# Patient Record
Sex: FEMALE | Race: WHITE | Employment: OTHER | URBAN - METROPOLITAN AREA
[De-identification: names, ages, dates, MRNs, and addresses within clinical notes are randomized per-mention and may not be internally consistent; named-entity substitution may affect disease eponyms.]

---

## 2018-09-01 ENCOUNTER — HOSPITAL ENCOUNTER (EMERGENCY)
Age: 53
Discharge: HOME | End: 2018-09-01
Attending: EMERGENCY MEDICINE
Payer: OTHER GOVERNMENT

## 2018-09-01 VITALS
TEMPERATURE: 98.7 F | WEIGHT: 135 LBS | DIASTOLIC BLOOD PRESSURE: 75 MMHG | HEART RATE: 93 BPM | OXYGEN SATURATION: 95 % | HEIGHT: 66 IN | SYSTOLIC BLOOD PRESSURE: 120 MMHG | RESPIRATION RATE: 18 BRPM | BODY MASS INDEX: 21.69 KG/M2

## 2018-09-01 DIAGNOSIS — N30.01 ACUTE CYSTITIS WITH HEMATURIA: Primary | ICD-10-CM

## 2018-09-01 LAB
APPEARANCE UR: ABNORMAL
BACTERIA URNS QL MICRO: ABNORMAL /HPF
BILIRUB UR QL: NEGATIVE
COLOR UR: ABNORMAL
EPITH CASTS URNS QL MICRO: ABNORMAL /LPF (ref 0–5)
GLUCOSE UR STRIP.AUTO-MCNC: NEGATIVE MG/DL
HGB UR QL STRIP: ABNORMAL
KETONES UR QL STRIP.AUTO: ABNORMAL MG/DL
LEUKOCYTE ESTERASE UR QL STRIP.AUTO: ABNORMAL
NITRITE UR QL STRIP.AUTO: POSITIVE
PH UR STRIP: 5 [PH] (ref 5–8)
PROT UR STRIP-MCNC: 300 MG/DL
RBC #/AREA URNS HPF: ABNORMAL /HPF (ref 0–5)
SP GR UR REFRACTOMETRY: >1.03 (ref 1–1.03)
UROBILINOGEN UR QL STRIP.AUTO: 1 EU/DL (ref 0.2–1)
WBC URNS QL MICRO: ABNORMAL /HPF (ref 0–4)

## 2018-09-01 PROCEDURE — 81001 URINALYSIS AUTO W/SCOPE: CPT | Performed by: PHYSICIAN ASSISTANT

## 2018-09-01 PROCEDURE — 87086 URINE CULTURE/COLONY COUNT: CPT

## 2018-09-01 PROCEDURE — 87077 CULTURE AEROBIC IDENTIFY: CPT

## 2018-09-01 PROCEDURE — 87186 SC STD MICRODIL/AGAR DIL: CPT

## 2018-09-01 PROCEDURE — 99283 EMERGENCY DEPT VISIT LOW MDM: CPT

## 2018-09-01 RX ORDER — NAPROXEN 375 MG/1
375 TABLET ORAL 2 TIMES DAILY WITH MEALS
Qty: 20 TAB | Refills: 0 | Status: SHIPPED | OUTPATIENT
Start: 2018-09-01 | End: 2019-05-10

## 2018-09-01 RX ORDER — PHENAZOPYRIDINE HYDROCHLORIDE 200 MG/1
200 TABLET, FILM COATED ORAL 3 TIMES DAILY
Qty: 6 TAB | Refills: 0 | Status: SHIPPED | OUTPATIENT
Start: 2018-09-01 | End: 2018-09-03

## 2018-09-01 RX ORDER — CEPHALEXIN 500 MG/1
500 CAPSULE ORAL 4 TIMES DAILY
Qty: 28 CAP | Refills: 0 | Status: SHIPPED | OUTPATIENT
Start: 2018-09-01 | End: 2018-09-08

## 2018-09-01 NOTE — ED PROVIDER NOTES
Patient is a 46 y.o. female presenting with urinary pain. The history is provided by the patient. Urinary Pain This is a new problem. Episode onset: 1 weeka go. The problem occurs every urination. The problem has been gradually worsening. The quality of the pain is described as burning. The pain is at a severity of 3/10. Patient reports a subjective fever - was not measured. The fever has been present for 1 - 2 days. Associated symptoms include chills, frequency, hematuria and urgency. Pertinent negatives include no sweats, no nausea, no vomiting, no discharge, no hesitancy, no flank pain, no vaginal discharge, no abdominal pain and no back pain. Associated symptoms comments: +abd pressure. The patient is not pregnant. Treatments tried: Azo. The treatment provided no relief. Denies any other modifying factors or complaints at this time. Past Medical History:  
Diagnosis Date  Bipolar affective ([facility] [address]  Chronic obstructive pulmonary disease ([facility] [address]  Cirrhosis ([facility] [address]  ETOH abuse  Hyperlipidemia 11/20/2009  Left breast mass 05/13/2011 U/S Left Breast: No definite mass identified. Recommended recheck in 6 months  Manic depression ([address]  Panic disorder  Vitamin D insufficiency 11/20/2009  
 23 ng/mL  Wrist fracture, right 01/06/2010 Non-Displaced Distal Radius/Ulnar Styloid Fx History reviewed. No pertinent surgical history. History reviewed. No pertinent family history. Social History Social History  Marital status:  Spouse name: N/A  
 Number of children: N/A  
 Years of education: N/A Occupational History  Not on file. Social History Main Topics  Smoking status: Former Smoker Packs/day: 1.00 Years: 40.00  Smokeless tobacco: Former User Quit date: 06/2016  Alcohol use No  
   Comment: quit since June 2016  Drug use: No  
 Sexual activity: Not on file Other Topics Concern  Not on file Social History Narrative ALLERGIES: Review of patient's allergies indicates no known allergies. Review of Systems Constitutional: Positive for chills. Negative for fever. HENT: Negative for ear pain, rhinorrhea and sore throat. Eyes: Negative for pain and redness. Respiratory: Negative for cough and shortness of breath. Cardiovascular: Negative for chest pain. Gastrointestinal: Negative for abdominal pain, constipation, diarrhea, nausea and vomiting. Genitourinary: Positive for dysuria, frequency, hematuria and urgency. Negative for flank pain, hesitancy, vaginal bleeding, vaginal discharge and vaginal pain. Musculoskeletal: Negative for back pain. Skin: Negative. Neurological: Negative for dizziness, weakness, light-headedness and headaches. Psychiatric/Behavioral: Negative. All other systems reviewed and are negative. Vitals:  
 09/01/18 1633 BP: 120/75 Pulse: 93 Resp: 18 Temp: 98.7 °F (37.1 °C) SpO2: 95% Weight: 61.2 kg (135 lb) Height: 5' 6\" (1.676 m) Physical Exam  
Constitutional: She is oriented to person, place, and time. She appears well-developed and well-nourished. HENT:  
Head: Normocephalic and atraumatic. Right Ear: Tympanic membrane, external ear and ear canal normal.  
Left Ear: Tympanic membrane, external ear and ear canal normal.  
Nose: Nose normal.  
Mouth/Throat: Oropharynx is clear and moist and mucous membranes are normal.  
Eyes: Conjunctivae and EOM are normal. Pupils are equal, round, and reactive to light. Neck: Normal range of motion. Cardiovascular: Normal rate, regular rhythm and normal heart sounds. Pulmonary/Chest: Effort normal and breath sounds normal.  
Abdominal: Soft. Bowel sounds are normal. There is no tenderness. There is no rigidity, no rebound, no guarding, no CVA tenderness, no tenderness at McBurney's point and negative Rojo's sign. Musculoskeletal: Normal range of motion. Neurological: She is alert and oriented to person, place, and time. Skin: Skin is warm and dry. Psychiatric: She has a normal mood and affect. Her behavior is normal. Judgment and thought content normal.  
Nursing note and vitals reviewed. MDM Number of Diagnoses or Management Options Acute cystitis with hematuria: new and requires workup Diagnosis management comments: DDX: UTI, pyelo, vaginitis, std 
 
UA with nitrites, leuks, bacteria. Afebrile, well appearing, without flank pain. Based upon the patient's presentation with noted HPI and PE, along with the work up done in the emergency department, I believe that the patient is having an urinary tract infection. Will treat with keflex and pyridium. Urine culture pending. Discussed results, care in ED and further care, f/u and s/s warranting return to ED. Pt and family present understood and agreed to plan. Progress: stable Plan and discharge instructions: 
Drink plenty of fluids. Finish antibiotics as directed. Take Pyridium for bladder relaxation as needed as directed. NOTE: it causes red-orange urine discoloration and may stain your clothes and contact lenses. Take Tylenol/Acetaminophen (every 4-6 hours) and/or Motrin/Ibuprofen/Advil (every 6-8 hours) or Naprosyn/Naproxyn/Aleve for fever or pain as needed. Follow up with your doctor or the provided referral for further evaluation and management Return to emergency room for worsening or new symptoms. Pt results have been reviewed with the patient and any family present. They have been counseled regarding diagnosis, treatment, and plan. They verbally convey understanding and agreement of the signs, symptoms, diagnosis, treatment and prognosis and additionally agrees to follow up as discussed. They also agree with the care-plan and convey that all of their questions have been answered.  I have also provided discharge instructions for them that include: educational information regarding their diagnosis and treatment, and list of reasons why they would want to return to the ED prior to their follow-up appointment, should their condition change. Page Rosales PA-C 
5:07 PM  
 
  
Amount and/or Complexity of Data Reviewed Clinical lab tests: ordered and reviewed Review and summarize past medical records: yes Discuss the patient with other providers: yes Risk of Complications, Morbidity, and/or Mortality Presenting problems: low Diagnostic procedures: low Management options: low ED Course Procedures Labs Reviewed URINALYSIS W/ RFLX MICROSCOPIC - Abnormal; Notable for the following:   
    Result Value Specific gravity >1.030 (*) Protein 300 (*) Ketone TRACE (*) Blood LARGE (*) Nitrites POSITIVE (*) Leukocyte Esterase MODERATE (*) All other components within normal limits URINE MICROSCOPIC ONLY - Abnormal; Notable for the following:   
 Bacteria 4+ (*) All other components within normal limits CULTURE, URINE Diagnosis: 1. Acute cystitis with hematuria Disposition: Discharge to home. Follow-up Information Follow up With Details Comments Contact Info [address] [facility] EMERGENCY DEPT  As needed, If symptoms worsen [facility] [id number] [phone] [facility]  [address] [facility]) [address] [phone] [facility] Go in 2 days  [place] [address] [address] [place] [address] 
[phone] Patient's Medications Start Taking CEPHALEXIN (KEFLEX) 500 MG CAPSULE    Take 1 Cap by mouth four (4) times daily for 7 days. NAPROXEN (NAPROSYN) 375 MG TABLET    Take 1 Tab by mouth two (2) times daily (with meals). PHENAZOPYRIDINE (PYRIDIUM) 200 MG TABLET    Take 1 Tab by mouth three (3) times daily for 2 days. Continue Taking No medications on file These Medications have changed No medications on file Stop Taking CYCLOBENZAPRINE (FLEXERIL) 5 MG TABLET    Take 1 Tab by mouth three (3) times daily as needed for Muscle Spasm(s). METHYLPREDNISOLONE (MEDROL, GILBERTO,) 4 MG TABLET    Follow package instructions.

## 2018-09-01 NOTE — DISCHARGE INSTRUCTIONS
Urinary Tract Infection in Women: Care Instructions  Your Care Instructions    A urinary tract infection, or UTI, is a general term for an infection anywhere between the kidneys and the urethra (where urine comes out). Most UTIs are bladder infections. They often cause pain or burning when you urinate. UTIs are caused by bacteria and can be cured with antibiotics. Be sure to complete your treatment so that the infection goes away. Follow-up care is a key part of your treatment and safety. Be sure to make and go to all appointments, and call your doctor if you are having problems. It's also a good idea to know your test results and keep a list of the medicines you take. How can you care for yourself at home? · Take your antibiotics as directed. Do not stop taking them just because you feel better. You need to take the full course of antibiotics. · Drink extra water and other fluids for the next day or two. This may help wash out the bacteria that are causing the infection. (If you have kidney, heart, or liver disease and have to limit fluids, talk with your doctor before you increase your fluid intake.)  · Avoid drinks that are carbonated or have caffeine. They can irritate the bladder. · Urinate often. Try to empty your bladder each time. · To relieve pain, take a hot bath or lay a heating pad set on low over your lower belly or genital area. Never go to sleep with a heating pad in place. To prevent UTIs  · Drink plenty of water each day. This helps you urinate often, which clears bacteria from your system. (If you have kidney, heart, or liver disease and have to limit fluids, talk with your doctor before you increase your fluid intake.)  · Urinate when you need to. · Urinate right after you have sex. · Change sanitary pads often. · Avoid douches, bubble baths, feminine hygiene sprays, and other feminine hygiene products that have deodorants.   · After going to the bathroom, wipe from front to back.  When should you call for help? Call your doctor now or seek immediate medical care if:    · Symptoms such as fever, chills, nausea, or vomiting get worse or appear for the first time.     · You have new pain in your back just below your rib cage. This is called flank pain.     · There is new blood or pus in your urine.     · You have any problems with your antibiotic medicine.    Watch closely for changes in your health, and be sure to contact your doctor if:    · You are not getting better after taking an antibiotic for 2 days.     · Your symptoms go away but then come back. Where can you learn more? Go to http://janae-rodrick.info/. Enter X174 in the search box to learn more about \"Urinary Tract Infection in Women: Care Instructions. \"  Current as of: May 12, 2017  Content Version: 11.7  © 0245-9002 [employer]. Care instructions adapted under license by [employer] (which disclaims liability or warranty for this information). If you have questions about a medical condition or this instruction, always ask your healthcare professional. [address] any warranty or liability for your use of this information.

## 2018-09-03 LAB
BACTERIA SPEC CULT: ABNORMAL
SERVICE CMNT-IMP: ABNORMAL

## 2019-05-09 ENCOUNTER — APPOINTMENT (OUTPATIENT)
Dept: CT IMAGING | Age: 54
End: 2019-05-09
Attending: PHYSICIAN ASSISTANT
Payer: MEDICAID

## 2019-05-09 ENCOUNTER — HOSPITAL ENCOUNTER (OUTPATIENT)
Age: 54
Setting detail: OBSERVATION
Discharge: HOME | End: 2019-05-10
Attending: EMERGENCY MEDICINE | Admitting: FAMILY MEDICINE
Payer: MEDICAID

## 2019-05-09 DIAGNOSIS — R11.2 INTRACTABLE VOMITING WITH NAUSEA, UNSPECIFIED VOMITING TYPE: Primary | ICD-10-CM

## 2019-05-09 DIAGNOSIS — K52.9 ENTERITIS: ICD-10-CM

## 2019-05-09 DIAGNOSIS — R10.84 ABDOMINAL PAIN, GENERALIZED: ICD-10-CM

## 2019-05-09 DIAGNOSIS — F10.29 ALCOHOL DEPENDENCE WITH UNSPECIFIED ALCOHOL-INDUCED DISORDER (HCC): ICD-10-CM

## 2019-05-09 LAB
ALBUMIN SERPL-MCNC: 3.5 G/DL (ref 3.4–5)
ALBUMIN/GLOB SERPL: 0.9 {RATIO} (ref 0.8–1.7)
ALP SERPL-CCNC: 183 U/L (ref 45–117)
ALT SERPL-CCNC: 98 U/L (ref 13–56)
ANION GAP SERPL CALC-SCNC: 11 MMOL/L (ref 3–18)
APPEARANCE UR: CLEAR
AST SERPL-CCNC: 194 U/L (ref 15–37)
BASOPHILS # BLD: 0.1 K/UL (ref 0–0.1)
BASOPHILS NFR BLD: 1 % (ref 0–2)
BILIRUB DIRECT SERPL-MCNC: 0.5 MG/DL (ref 0–0.2)
BILIRUB SERPL-MCNC: 1.3 MG/DL (ref 0.2–1)
BILIRUB UR QL: ABNORMAL
BUN SERPL-MCNC: 7 MG/DL (ref 7–18)
BUN/CREAT SERPL: 11 (ref 12–20)
CALCIUM SERPL-MCNC: 9.4 MG/DL (ref 8.5–10.1)
CHLORIDE SERPL-SCNC: 99 MMOL/L (ref 100–108)
CO2 SERPL-SCNC: 23 MMOL/L (ref 21–32)
COLOR UR: ABNORMAL
CREAT SERPL-MCNC: 0.62 MG/DL (ref 0.6–1.3)
DIFFERENTIAL METHOD BLD: ABNORMAL
EOSINOPHIL # BLD: 0 K/UL (ref 0–0.4)
EOSINOPHIL NFR BLD: 1 % (ref 0–5)
ERYTHROCYTE [DISTWIDTH] IN BLOOD BY AUTOMATED COUNT: 12.7 % (ref 11.6–14.5)
ETHANOL SERPL-MCNC: 101 MG/DL (ref 0–3)
GLOBULIN SER CALC-MCNC: 3.8 G/DL (ref 2–4)
GLUCOSE SERPL-MCNC: 88 MG/DL (ref 74–99)
GLUCOSE UR STRIP.AUTO-MCNC: NEGATIVE MG/DL
HCT VFR BLD AUTO: 43.7 % (ref 35–45)
HGB BLD-MCNC: 15.1 G/DL (ref 12–16)
HGB UR QL STRIP: NEGATIVE
KETONES UR QL STRIP.AUTO: ABNORMAL MG/DL
LACTATE BLD-SCNC: 3.79 MMOL/L (ref 0.4–2)
LEUKOCYTE ESTERASE UR QL STRIP.AUTO: NEGATIVE
LIPASE SERPL-CCNC: 187 U/L (ref 73–393)
LYMPHOCYTES # BLD: 2.1 K/UL (ref 0.9–3.6)
LYMPHOCYTES NFR BLD: 39 % (ref 21–52)
MCH RBC QN AUTO: 36.3 PG (ref 24–34)
MCHC RBC AUTO-ENTMCNC: 34.6 G/DL (ref 31–37)
MCV RBC AUTO: 105 FL (ref 74–97)
MONOCYTES # BLD: 0.6 K/UL (ref 0.05–1.2)
MONOCYTES NFR BLD: 11 % (ref 3–10)
NEUTS SEG # BLD: 2.6 K/UL (ref 1.8–8)
NEUTS SEG NFR BLD: 48 % (ref 40–73)
NITRITE UR QL STRIP.AUTO: NEGATIVE
PH UR STRIP: 6 [PH] (ref 5–8)
PLATELET # BLD AUTO: 71 K/UL (ref 135–420)
PMV BLD AUTO: 11.4 FL (ref 9.2–11.8)
POTASSIUM SERPL-SCNC: 3.3 MMOL/L (ref 3.5–5.5)
PROT SERPL-MCNC: 7.3 G/DL (ref 6.4–8.2)
PROT UR STRIP-MCNC: NEGATIVE MG/DL
RBC # BLD AUTO: 4.16 M/UL (ref 4.2–5.3)
SODIUM SERPL-SCNC: 133 MMOL/L (ref 136–145)
SP GR UR REFRACTOMETRY: >1.03 (ref 1–1.03)
UROBILINOGEN UR QL STRIP.AUTO: 1 EU/DL (ref 0.2–1)
WBC # BLD AUTO: 5.3 K/UL (ref 4.6–13.2)

## 2019-05-09 PROCEDURE — 85025 COMPLETE CBC W/AUTO DIFF WBC: CPT

## 2019-05-09 PROCEDURE — 81003 URINALYSIS AUTO W/O SCOPE: CPT

## 2019-05-09 PROCEDURE — 93005 ELECTROCARDIOGRAM TRACING: CPT

## 2019-05-09 PROCEDURE — 96366 THER/PROPH/DIAG IV INF ADDON: CPT

## 2019-05-09 PROCEDURE — 80048 BASIC METABOLIC PNL TOTAL CA: CPT

## 2019-05-09 PROCEDURE — 74011000250 HC RX REV CODE- 250: Performed by: PHYSICIAN ASSISTANT

## 2019-05-09 PROCEDURE — 96361 HYDRATE IV INFUSION ADD-ON: CPT

## 2019-05-09 PROCEDURE — 83605 ASSAY OF LACTIC ACID: CPT

## 2019-05-09 PROCEDURE — 96360 HYDRATION IV INFUSION INIT: CPT

## 2019-05-09 PROCEDURE — 99285 EMERGENCY DEPT VISIT HI MDM: CPT

## 2019-05-09 PROCEDURE — 80307 DRUG TEST PRSMV CHEM ANLYZR: CPT

## 2019-05-09 PROCEDURE — 96375 TX/PRO/DX INJ NEW DRUG ADDON: CPT

## 2019-05-09 PROCEDURE — 83690 ASSAY OF LIPASE: CPT

## 2019-05-09 PROCEDURE — 74177 CT ABD & PELVIS W/CONTRAST: CPT

## 2019-05-09 PROCEDURE — 80076 HEPATIC FUNCTION PANEL: CPT

## 2019-05-09 PROCEDURE — 96376 TX/PRO/DX INJ SAME DRUG ADON: CPT

## 2019-05-09 PROCEDURE — 74011250636 HC RX REV CODE- 250/636: Performed by: PHYSICIAN ASSISTANT

## 2019-05-09 PROCEDURE — 96365 THER/PROPH/DIAG IV INF INIT: CPT

## 2019-05-09 PROCEDURE — 74011636320 HC RX REV CODE- 636/320: Performed by: EMERGENCY MEDICINE

## 2019-05-09 RX ORDER — LORAZEPAM 2 MG/ML
1 INJECTION INTRAMUSCULAR
Status: COMPLETED | OUTPATIENT
Start: 2019-05-09 | End: 2019-05-09

## 2019-05-09 RX ORDER — SODIUM CHLORIDE 0.9 % (FLUSH) 0.9 %
5-40 SYRINGE (ML) INJECTION AS NEEDED
Status: DISCONTINUED | OUTPATIENT
Start: 2019-05-09 | End: 2019-05-10 | Stop reason: HOSPADM

## 2019-05-09 RX ORDER — LORAZEPAM 2 MG/ML
1 INJECTION INTRAMUSCULAR
Status: DISCONTINUED | OUTPATIENT
Start: 2019-05-09 | End: 2019-05-10 | Stop reason: HOSPADM

## 2019-05-09 RX ORDER — SODIUM CHLORIDE 0.9 % (FLUSH) 0.9 %
5-40 SYRINGE (ML) INJECTION EVERY 8 HOURS
Status: DISCONTINUED | OUTPATIENT
Start: 2019-05-09 | End: 2019-05-10 | Stop reason: HOSPADM

## 2019-05-09 RX ORDER — HALOPERIDOL 5 MG/ML
3 INJECTION INTRAMUSCULAR
Status: COMPLETED | OUTPATIENT
Start: 2019-05-09 | End: 2019-05-09

## 2019-05-09 RX ORDER — ONDANSETRON 2 MG/ML
4 INJECTION INTRAMUSCULAR; INTRAVENOUS
Status: COMPLETED | OUTPATIENT
Start: 2019-05-09 | End: 2019-05-09

## 2019-05-09 RX ORDER — DIPHENHYDRAMINE HYDROCHLORIDE 50 MG/ML
25 INJECTION, SOLUTION INTRAMUSCULAR; INTRAVENOUS
Status: COMPLETED | OUTPATIENT
Start: 2019-05-09 | End: 2019-05-09

## 2019-05-09 RX ORDER — LORAZEPAM 1 MG/1
1 TABLET ORAL
Status: DISCONTINUED | OUTPATIENT
Start: 2019-05-09 | End: 2019-05-10 | Stop reason: HOSPADM

## 2019-05-09 RX ORDER — LORAZEPAM 2 MG/ML
3 INJECTION INTRAMUSCULAR
Status: DISCONTINUED | OUTPATIENT
Start: 2019-05-09 | End: 2019-05-10 | Stop reason: HOSPADM

## 2019-05-09 RX ORDER — LORAZEPAM 2 MG/ML
2 INJECTION INTRAMUSCULAR
Status: DISCONTINUED | OUTPATIENT
Start: 2019-05-09 | End: 2019-05-10 | Stop reason: HOSPADM

## 2019-05-09 RX ORDER — LORAZEPAM 1 MG/1
2 TABLET ORAL
Status: DISCONTINUED | OUTPATIENT
Start: 2019-05-09 | End: 2019-05-10 | Stop reason: HOSPADM

## 2019-05-09 RX ORDER — METOCLOPRAMIDE HYDROCHLORIDE 5 MG/ML
5 INJECTION INTRAMUSCULAR; INTRAVENOUS
Status: COMPLETED | OUTPATIENT
Start: 2019-05-09 | End: 2019-05-09

## 2019-05-09 RX ADMIN — LORAZEPAM 1 MG: 2 INJECTION, SOLUTION INTRAMUSCULAR; INTRAVENOUS at 22:39

## 2019-05-09 RX ADMIN — ONDANSETRON 4 MG: 2 INJECTION INTRAMUSCULAR; INTRAVENOUS at 20:57

## 2019-05-09 RX ADMIN — IOPAMIDOL 100 ML: 612 INJECTION, SOLUTION INTRAVENOUS at 21:44

## 2019-05-09 RX ADMIN — DIPHENHYDRAMINE HYDROCHLORIDE 25 MG: 50 INJECTION, SOLUTION INTRAMUSCULAR; INTRAVENOUS at 22:41

## 2019-05-09 RX ADMIN — FOLIC ACID: 5 INJECTION, SOLUTION INTRAMUSCULAR; INTRAVENOUS; SUBCUTANEOUS at 20:45

## 2019-05-09 RX ADMIN — HALOPERIDOL LACTATE 3 MG: 5 INJECTION, SOLUTION INTRAMUSCULAR at 23:38

## 2019-05-09 RX ADMIN — METOCLOPRAMIDE 5 MG: 5 INJECTION, SOLUTION INTRAMUSCULAR; INTRAVENOUS at 22:42

## 2019-05-09 RX ADMIN — SODIUM CHLORIDE 1000 ML: 900 INJECTION, SOLUTION INTRAVENOUS at 20:57

## 2019-05-09 RX ADMIN — LORAZEPAM 1 MG: 2 INJECTION, SOLUTION INTRAMUSCULAR; INTRAVENOUS at 20:57

## 2019-05-10 VITALS
DIASTOLIC BLOOD PRESSURE: 65 MMHG | HEART RATE: 117 BPM | RESPIRATION RATE: 20 BRPM | OXYGEN SATURATION: 92 % | SYSTOLIC BLOOD PRESSURE: 97 MMHG | TEMPERATURE: 98.6 F

## 2019-05-10 PROBLEM — F10.939 ALCOHOL WITHDRAWAL (HCC): Status: ACTIVE | Noted: 2019-05-10

## 2019-05-10 PROBLEM — R11.2 INTRACTABLE NAUSEA AND VOMITING: Status: ACTIVE | Noted: 2019-05-10

## 2019-05-10 LAB
ANION GAP SERPL CALC-SCNC: 6 MMOL/L (ref 3–18)
ATRIAL RATE: 111 BPM
BUN SERPL-MCNC: 5 MG/DL (ref 7–18)
BUN/CREAT SERPL: 8 (ref 12–20)
CALCIUM SERPL-MCNC: 7.7 MG/DL (ref 8.5–10.1)
CALCULATED P AXIS, ECG09: 78 DEGREES
CALCULATED R AXIS, ECG10: 78 DEGREES
CALCULATED T AXIS, ECG11: 70 DEGREES
CHLORIDE SERPL-SCNC: 106 MMOL/L (ref 100–108)
CO2 SERPL-SCNC: 25 MMOL/L (ref 21–32)
CREAT SERPL-MCNC: 0.63 MG/DL (ref 0.6–1.3)
DIAGNOSIS, 93000: NORMAL
ERYTHROCYTE [DISTWIDTH] IN BLOOD BY AUTOMATED COUNT: 12.7 % (ref 11.6–14.5)
GLUCOSE SERPL-MCNC: 96 MG/DL (ref 74–99)
HCT VFR BLD AUTO: 38.1 % (ref 35–45)
HGB BLD-MCNC: 12.9 G/DL (ref 12–16)
LACTATE BLD-SCNC: 2.1 MMOL/L (ref 0.4–2)
LACTATE SERPL-SCNC: 2.3 MMOL/L (ref 0.4–2)
MCH RBC QN AUTO: 36.1 PG (ref 24–34)
MCHC RBC AUTO-ENTMCNC: 33.9 G/DL (ref 31–37)
MCV RBC AUTO: 106.7 FL (ref 74–97)
P-R INTERVAL, ECG05: 156 MS
PLATELET # BLD AUTO: 59 K/UL (ref 135–420)
PMV BLD AUTO: 11.9 FL (ref 9.2–11.8)
POTASSIUM SERPL-SCNC: 3 MMOL/L (ref 3.5–5.5)
Q-T INTERVAL, ECG07: 328 MS
QRS DURATION, ECG06: 82 MS
QTC CALCULATION (BEZET), ECG08: 446 MS
RBC # BLD AUTO: 3.57 M/UL (ref 4.2–5.3)
SODIUM SERPL-SCNC: 137 MMOL/L (ref 136–145)
VENTRICULAR RATE, ECG03: 111 BPM
WBC # BLD AUTO: 5 K/UL (ref 4.6–13.2)

## 2019-05-10 PROCEDURE — 96367 TX/PROPH/DG ADDL SEQ IV INF: CPT

## 2019-05-10 PROCEDURE — 74011250637 HC RX REV CODE- 250/637: Performed by: HOSPITALIST

## 2019-05-10 PROCEDURE — 96376 TX/PRO/DX INJ SAME DRUG ADON: CPT

## 2019-05-10 PROCEDURE — 74011250636 HC RX REV CODE- 250/636: Performed by: FAMILY MEDICINE

## 2019-05-10 PROCEDURE — 83605 ASSAY OF LACTIC ACID: CPT

## 2019-05-10 PROCEDURE — 99218 HC RM OBSERVATION: CPT

## 2019-05-10 PROCEDURE — 74011250636 HC RX REV CODE- 250/636: Performed by: HOSPITALIST

## 2019-05-10 PROCEDURE — 96366 THER/PROPH/DIAG IV INF ADDON: CPT

## 2019-05-10 PROCEDURE — 80048 BASIC METABOLIC PNL TOTAL CA: CPT

## 2019-05-10 PROCEDURE — 74011250637 HC RX REV CODE- 250/637: Performed by: PHYSICIAN ASSISTANT

## 2019-05-10 PROCEDURE — 74011250637 HC RX REV CODE- 250/637: Performed by: FAMILY MEDICINE

## 2019-05-10 PROCEDURE — 96361 HYDRATE IV INFUSION ADD-ON: CPT

## 2019-05-10 PROCEDURE — 85027 COMPLETE CBC AUTOMATED: CPT

## 2019-05-10 PROCEDURE — 36415 COLL VENOUS BLD VENIPUNCTURE: CPT

## 2019-05-10 RX ORDER — LORAZEPAM 2 MG/ML
2 INJECTION INTRAMUSCULAR
Status: DISCONTINUED | OUTPATIENT
Start: 2019-05-10 | End: 2019-05-10 | Stop reason: HOSPADM

## 2019-05-10 RX ORDER — MAGNESIUM SULFATE HEPTAHYDRATE 40 MG/ML
2 INJECTION, SOLUTION INTRAVENOUS ONCE
Status: COMPLETED | OUTPATIENT
Start: 2019-05-10 | End: 2019-05-10

## 2019-05-10 RX ORDER — SODIUM CHLORIDE 0.9 % (FLUSH) 0.9 %
5-40 SYRINGE (ML) INJECTION AS NEEDED
Status: DISCONTINUED | OUTPATIENT
Start: 2019-05-10 | End: 2019-05-10 | Stop reason: HOSPADM

## 2019-05-10 RX ORDER — FAMOTIDINE 20 MG/1
20 TABLET, FILM COATED ORAL 2 TIMES DAILY
Status: DISCONTINUED | OUTPATIENT
Start: 2019-05-10 | End: 2019-05-10 | Stop reason: HOSPADM

## 2019-05-10 RX ORDER — SODIUM CHLORIDE 0.9 % (FLUSH) 0.9 %
5-40 SYRINGE (ML) INJECTION EVERY 8 HOURS
Status: DISCONTINUED | OUTPATIENT
Start: 2019-05-10 | End: 2019-05-10 | Stop reason: HOSPADM

## 2019-05-10 RX ORDER — IBUPROFEN 200 MG
1 TABLET ORAL DAILY
Status: DISCONTINUED | OUTPATIENT
Start: 2019-05-10 | End: 2019-05-10 | Stop reason: HOSPADM

## 2019-05-10 RX ORDER — DEXTROSE, SODIUM CHLORIDE, SODIUM LACTATE, POTASSIUM CHLORIDE, AND CALCIUM CHLORIDE 5; .6; .31; .03; .02 G/100ML; G/100ML; G/100ML; G/100ML; G/100ML
100 INJECTION, SOLUTION INTRAVENOUS CONTINUOUS
Status: DISCONTINUED | OUTPATIENT
Start: 2019-05-10 | End: 2019-05-10 | Stop reason: HOSPADM

## 2019-05-10 RX ORDER — ONDANSETRON 2 MG/ML
4 INJECTION INTRAMUSCULAR; INTRAVENOUS
Status: DISCONTINUED | OUTPATIENT
Start: 2019-05-10 | End: 2019-05-10 | Stop reason: HOSPADM

## 2019-05-10 RX ORDER — LOPERAMIDE HYDROCHLORIDE 2 MG/1
4 CAPSULE ORAL ONCE
Status: COMPLETED | OUTPATIENT
Start: 2019-05-10 | End: 2019-05-10

## 2019-05-10 RX ORDER — LORAZEPAM 2 MG/ML
3 INJECTION INTRAMUSCULAR
Status: DISCONTINUED | OUTPATIENT
Start: 2019-05-10 | End: 2019-05-10 | Stop reason: HOSPADM

## 2019-05-10 RX ORDER — METOCLOPRAMIDE 5 MG/1
5 TABLET ORAL
Status: DISCONTINUED | OUTPATIENT
Start: 2019-05-10 | End: 2019-05-10 | Stop reason: HOSPADM

## 2019-05-10 RX ORDER — FAMOTIDINE 20 MG/1
20 TABLET, FILM COATED ORAL 2 TIMES DAILY
Qty: 60 TAB | Refills: 0 | Status: SHIPPED | OUTPATIENT
Start: 2019-05-10 | End: 2020-02-14

## 2019-05-10 RX ORDER — LORAZEPAM 1 MG/1
1 TABLET ORAL
Status: DISCONTINUED | OUTPATIENT
Start: 2019-05-10 | End: 2019-05-10 | Stop reason: HOSPADM

## 2019-05-10 RX ORDER — ONDANSETRON 4 MG/1
4 TABLET, ORALLY DISINTEGRATING ORAL
Qty: 20 TAB | Refills: 0 | Status: SHIPPED | OUTPATIENT
Start: 2019-05-10 | End: 2020-01-21

## 2019-05-10 RX ORDER — LORAZEPAM 2 MG/ML
1 INJECTION INTRAMUSCULAR
Status: DISCONTINUED | OUTPATIENT
Start: 2019-05-10 | End: 2019-05-10 | Stop reason: HOSPADM

## 2019-05-10 RX ORDER — LANOLIN ALCOHOL/MO/W.PET/CERES
400 CREAM (GRAM) TOPICAL DAILY
Qty: 30 TAB | Refills: 0 | Status: SHIPPED | OUTPATIENT
Start: 2019-05-10

## 2019-05-10 RX ORDER — POTASSIUM CHLORIDE 20 MEQ/1
40 TABLET, EXTENDED RELEASE ORAL
Status: COMPLETED | OUTPATIENT
Start: 2019-05-10 | End: 2019-05-10

## 2019-05-10 RX ORDER — LORAZEPAM 1 MG/1
2 TABLET ORAL
Status: DISCONTINUED | OUTPATIENT
Start: 2019-05-10 | End: 2019-05-10 | Stop reason: HOSPADM

## 2019-05-10 RX ORDER — ACETAMINOPHEN 325 MG/1
650 TABLET ORAL
Status: DISCONTINUED | OUTPATIENT
Start: 2019-05-10 | End: 2019-05-10 | Stop reason: HOSPADM

## 2019-05-10 RX ORDER — LOPERAMIDE HYDROCHLORIDE 2 MG/1
4 CAPSULE ORAL ONCE
Status: DISCONTINUED | OUTPATIENT
Start: 2019-05-10 | End: 2019-05-10 | Stop reason: SDUPTHER

## 2019-05-10 RX ORDER — LANOLIN ALCOHOL/MO/W.PET/CERES
100 CREAM (GRAM) TOPICAL DAILY
Qty: 30 TAB | Refills: 0 | Status: SHIPPED | OUTPATIENT
Start: 2019-05-10

## 2019-05-10 RX ADMIN — ONDANSETRON 4 MG: 2 INJECTION INTRAMUSCULAR; INTRAVENOUS at 02:11

## 2019-05-10 RX ADMIN — Medication 10 ML: at 09:55

## 2019-05-10 RX ADMIN — MAGNESIUM SULFATE HEPTAHYDRATE 2 G: 40 INJECTION, SOLUTION INTRAVENOUS at 09:53

## 2019-05-10 RX ADMIN — FAMOTIDINE 20 MG: 20 TABLET ORAL at 09:47

## 2019-05-10 RX ADMIN — Medication 10 ML: at 02:12

## 2019-05-10 RX ADMIN — POTASSIUM CHLORIDE 40 MEQ: 20 TABLET, EXTENDED RELEASE ORAL at 09:53

## 2019-05-10 RX ADMIN — SODIUM CHLORIDE, SODIUM LACTATE, POTASSIUM CHLORIDE, CALCIUM CHLORIDE, AND DEXTROSE MONOHYDRATE 100 ML/HR: 600; 310; 30; 20; 5 INJECTION, SOLUTION INTRAVENOUS at 02:15

## 2019-05-10 RX ADMIN — LOPERAMIDE HYDROCHLORIDE 4 MG: 2 CAPSULE ORAL at 11:44

## 2019-05-10 NOTE — PROGRESS NOTES
Problem: Discharge Planning Goal: *Discharge to safe environment Outcome: Resolved/Met Home Reason for Admission: ETOH withdrawal / N/V   
                
RRAT Score:  6 Plan for utilizing home health:   Not indicated. Current Advanced Directive/Advance Care Plan: Not on file Likelihood of Readmission:  Low/Green Transition of Care Plan:   Home with sig other & out-pt follow up Chart reviewed. Met with pt., verified all demographics. \[facility]\"" has NO ins. \[facility]\"" has NO PCP, referral sent to . : Stacey Franco, sig other, with whom she lives with. Uses no DME. Independent with ADL's prior to admit. States her sig other will pick her up @ discharge. Available as needed. Luana JacksonRN,ext 0901. Patient has designated her sig other to participate in his/her discharge plan and to receive any needed information. Name: Stacey Franco Address: 
Phone number:  [phone] Patient and/or next of kin has been given the Outpatient Observation Information and Notification letter and all questions answered. Care Management Interventions PCP Verified by CM: Yes(states was going to Dr. Misael Romero but no longer has in, will need new PCP) Palliative Care Criteria Met (RRAT>21 & CHF Dx)?: No 
Mode of Transport at Discharge: Other (see comment)(sig other) Transition of Care Consult (CM Consult): Discharge Planning Discharge Durable Medical Equipment: No 
Physical Therapy Consult: No 
Occupational Therapy Consult: No 
Speech Therapy Consult: No 
Current Support Network: Other(lives with sig other) Confirm Follow Up Transport: Self Plan discussed with Pt/Family/Caregiver: Yes Discharge Location Discharge Placement: Home

## 2019-05-10 NOTE — ROUTINE PROCESS
Bedside and Verbal shift change report given to [facility] (oncoming nurse) by Yen Fofana RN (offgoing nurse). Report included the following information SBAR, Kardex, Intake/Output, MAR, Recent Results and Cardiac Rhythm ST.  
 
0850 Pt requesting medication for diarrhea. Paged Dr. Scottie Pretty. Will continue to monitor. 1000 Received critical result for Lactic Acid of 2.3. Charted appropriately and paged Dr. Scottie Pretty. Will continue to monitor. 56 Notified Dr. Scottie Pretty about pt's critical lactic acid value. No new orders received at this time. Will continue to monitor. 1300 Reviewed discharge instructions with pt. Pt acknowledged. Pt discharged home with family.

## 2019-05-10 NOTE — ED PROVIDER NOTES
EMERGENCY DEPARTMENT HISTORY AND PHYSICAL EXAM 
 
Date: 5/9/2019 Patient Name: Phyllis Tinsley History of Presenting Illness Chief Complaint Patient presents with  Vomiting History Provided By: Patient Chief Complaint: abdominal pain Duration: 4 Days Timing:  Gradual and Constant Location: abdomen Quality: Pressure and Tightness Severity: Mild Modifying Factors: unable to keep anything down Associated Symptoms: N/V/D, tremors HPI: Phyllis Tinsley is a 53 y.o. female with a PMH of Hyperlipidemia, bipolar, COPD, vitamin D deficiency, alcohol abuse, manic depression, cirrhosis of the liver who presents to the ER complaining of generalized abdominal pain, distention, nausea, vomiting and diarrhea. Patient states her symptoms began 4 days ago and continue to persist.  She has not been able to tolerate any p.o. intake. Patient admits to approximately 1/2 pint of vodka daily, however due to her illness she has not been able to drink very much. She does admit to having a few sips earlier this afternoon to try and take the edge off. Patient also complaining of tremors that began earlier today. She denied any recent sick contacts. She denies any associated fevers. She has not had any bloody stools or emesis. She denied all other symptoms and complaints. PCP: None Current Facility-Administered Medications Medication Dose Route Frequency Provider Last Rate Last Dose  nicotine (NICODERM CQ) 21 mg/24 hr patch 1 Patch  1 Patch TransDERmal DAILY Markie Beltran PA-C      
 sodium chloride (NS) flush 5-40 mL  5-40 mL IntraVENous Q8H Maren Perez PA-C      
 sodium chloride (NS) flush 5-40 mL  5-40 mL IntraVENous PRN Maren Perez PA-C      
 LORazepam (ATIVAN) tablet 1 mg  1 mg Oral Q1H PRN Markie Beltran PA-C  Or  
 LORazepam (ATIVAN) injection 1 mg  1 mg IntraVENous Q1H PRN Markie Beltran PA-C      
  LORazepam (ATIVAN) tablet 2 mg  2 mg Oral Q1H PRN Desiree Peña PA-C Or  
 LORazepam (ATIVAN) injection 2 mg  2 mg IntraVENous Q1H PRN Maren Perez PA-C      
 LORazepam (ATIVAN) injection 3 mg  3 mg IntraVENous Q15MIN PRN Desiree Peña PA-C Current Outpatient Medications Medication Sig Dispense Refill  naproxen (NAPROSYN) 375 mg tablet Take 1 Tab by mouth two (2) times daily (with meals). 20 Tab 0 Past History Past Medical History: 
Past Medical History:  
Diagnosis Date  Bipolar affective ([facility] [address]  Chronic obstructive pulmonary disease ([facility] [address]  Cirrhosis ([facility] [address]  ETOH abuse  Hyperlipidemia 11/20/2009  Left breast mass 05/13/2011 U/S Left Breast: No definite mass identified. Recommended recheck in 6 months  Manic depression ([facility] [address]  Panic disorder  Vitamin D insufficiency 11/20/2009  
 23 ng/mL  Wrist fracture, right 01/06/2010 Non-Displaced Distal Radius/Ulnar Styloid Fx Past Surgical History: No past surgical history on file. Family History: No family history on file. Social History: 
Social History Tobacco Use  Smoking status: Former Smoker Packs/day: 1.00 Years: 40.00 Pack years: 40.00  Smokeless tobacco: Former User Quit date: 06/2016 Substance Use Topics  Alcohol use: No  
  Comment: quit since June 2016  Drug use: No  
 
 
Allergies: 
No Known Allergies Review of Systems Review of Systems Constitutional: Negative for chills, fatigue and fever. HENT: Negative. Negative for sore throat. Eyes: Negative. Respiratory: Negative for cough and shortness of breath. Cardiovascular: Negative for chest pain and palpitations. Gastrointestinal: Positive for abdominal pain, diarrhea, nausea and vomiting. Genitourinary: Negative for dysuria. Musculoskeletal: Negative. Skin: Negative. Neurological: Positive for tremors.  Negative for dizziness, weakness, light-headedness and headaches. Psychiatric/Behavioral: Negative. All other systems reviewed and are negative. Physical Exam  
 
Vitals:  
 05/09/19 2027 05/09/19 2030 05/09/19 2031 05/09/19 2200 BP:  124/86  104/76 Pulse:    98 Resp:    15 Temp:      
SpO2: 92% 95% 93% 93% Physical Exam  
Constitutional: She is oriented to person, place, and time. She appears well-developed and well-nourished. She appears distressed. Pt very tremulous on exam  
HENT:  
Head: Normocephalic and atraumatic. Mouth/Throat: Oropharynx is clear and moist.  
Eyes: Conjunctivae are normal. No scleral icterus. Neck: Neck supple. No JVD present. No tracheal deviation present. Cardiovascular: Regular rhythm and normal heart sounds. Tachycardia present. No murmur heard. Pulmonary/Chest: Effort normal and breath sounds normal. No respiratory distress. She has no wheezes. She has no rales. She exhibits no tenderness. Abdominal: Soft. Bowel sounds are normal. She exhibits distension. She exhibits no shifting dullness, no fluid wave and no mass. There is generalized tenderness. There is no rigidity, no rebound, no guarding, no CVA tenderness, no tenderness at McBurney's point and negative Rojo's sign. Mild distention noted throughout abdomen; no point tenderness, no peritoneal signs Musculoskeletal: Normal range of motion. Neurological: She is alert and oriented to person, place, and time. She has normal strength. She displays tremor. Gait normal. GCS eye subscore is 4. GCS verbal subscore is 5. GCS motor subscore is 6. Skin: Skin is warm and dry. She is not diaphoretic. Psychiatric: She has a normal mood and affect. Nursing note and vitals reviewed. Diagnostic Study Results Labs - Recent Results (from the past 12 hour(s)) CBC WITH AUTOMATED DIFF Collection Time: 05/09/19  8:45 PM  
Result Value Ref Range WBC 5.3 4.6 - 13.2 K/uL  
 RBC 4.16 (L) 4.20 - 5.30 M/uL HGB 15.1 12.0 - 16.0 g/dL HCT 43.7 35.0 - 45.0 % .0 (H) 74.0 - 97.0 FL  
 MCH 36.3 (H) 24.0 - 34.0 PG  
 MCHC 34.6 31.0 - 37.0 g/dL  
 RDW 12.7 11.6 - 14.5 % PLATELET 71 (L) 059 - 420 K/uL MPV 11.4 9.2 - 11.8 FL  
 NEUTROPHILS 48 40 - 73 % LYMPHOCYTES 39 21 - 52 % MONOCYTES 11 (H) 3 - 10 % EOSINOPHILS 1 0 - 5 % BASOPHILS 1 0 - 2 %  
 ABS. NEUTROPHILS 2.6 1.8 - 8.0 K/UL  
 ABS. LYMPHOCYTES 2.1 0.9 - 3.6 K/UL  
 ABS. MONOCYTES 0.6 0.05 - 1.2 K/UL  
 ABS. EOSINOPHILS 0.0 0.0 - 0.4 K/UL  
 ABS. BASOPHILS 0.1 0.0 - 0.1 K/UL  
 DF AUTOMATED METABOLIC PANEL, BASIC Collection Time: 05/09/19  8:45 PM  
Result Value Ref Range Sodium 133 (L) 136 - 145 mmol/L Potassium 3.3 (L) 3.5 - 5.5 mmol/L Chloride 99 (L) 100 - 108 mmol/L  
 CO2 23 21 - 32 mmol/L Anion gap 11 3.0 - 18 mmol/L Glucose 88 74 - 99 mg/dL BUN 7 7.0 - 18 MG/DL Creatinine 0.62 0.6 - 1.3 MG/DL  
 BUN/Creatinine ratio 11 (L) 12 - 20 GFR est AA >60 >60 ml/min/1.73m2 GFR est non-AA >60 >60 ml/min/1.73m2 Calcium 9.4 8.5 - 10.1 MG/DL  
HEPATIC FUNCTION PANEL Collection Time: 05/09/19  8:45 PM  
Result Value Ref Range Protein, total 7.3 6.4 - 8.2 g/dL Albumin 3.5 3.4 - 5.0 g/dL Globulin 3.8 2.0 - 4.0 g/dL A-G Ratio 0.9 0.8 - 1.7 Bilirubin, total 1.3 (H) 0.2 - 1.0 MG/DL Bilirubin, direct 0.5 (H) 0.0 - 0.2 MG/DL Alk. phosphatase 183 (H) 45 - 117 U/L  
 AST (SGOT) 194 (H) 15 - 37 U/L  
 ALT (SGPT) 98 (H) 13 - 56 U/L  
LIPASE Collection Time: 05/09/19  8:45 PM  
Result Value Ref Range Lipase 187 73 - 393 U/L  
ETHYL ALCOHOL Collection Time: 05/09/19  8:45 PM  
Result Value Ref Range ALCOHOL(ETHYL),SERUM 101 (H) 0 - 3 MG/DL POC LACTIC ACID Collection Time: 05/09/19  8:50 PM  
Result Value Ref Range Lactic Acid (POC) 3.79 (HH) 0.40 - 2.00 mmol/L  
EKG, 12 LEAD, INITIAL Collection Time: 05/09/19  8:52 PM  
Result Value Ref Range  Ventricular Rate 111 BPM  
 Atrial Rate 111 BPM  
 P-R Interval 156 ms QRS Duration 82 ms Q-T Interval 328 ms QTC Calculation (Bezet) 446 ms Calculated P Axis 78 degrees Calculated R Axis 78 degrees Calculated T Axis 70 degrees Diagnosis Sinus tachycardia Possible Lateral infarct , age undetermined Abnormal ECG No previous ECGs available URINALYSIS W/ RFLX MICROSCOPIC Collection Time: 05/09/19 10:15 PM  
Result Value Ref Range Color DARK YELLOW Appearance CLEAR Specific gravity >1.030 (H) 1.005 - 1.030  
 pH (UA) 6.0 5.0 - 8.0 Protein NEGATIVE  NEG mg/dL Glucose NEGATIVE  NEG mg/dL Ketone TRACE (A) NEG mg/dL Bilirubin SMALL (A) NEG Blood NEGATIVE  NEG Urobilinogen 1.0 0.2 - 1.0 EU/dL Nitrites NEGATIVE  NEG Leukocyte Esterase NEGATIVE  NEG    
POC LACTIC ACID Collection Time: 05/10/19 12:20 AM  
Result Value Ref Range Lactic Acid (POC) 2.10 (HH) 0.40 - 2.00 mmol/L Radiologic Studies -  
CT ABD PELV W CONT    (Results Pending) CT Results  (Last 48 hours) None CXR Results  (Last 48 hours) None Medical Decision Making I am the first provider for this patient. I reviewed the vital signs, available nursing notes, past medical history, past surgical history, family history and social history. Vital Signs-Reviewed the patient's vital signs. Records Reviewed: Nursing Notes and Old Medical Records 503 PM 
53-year-old female with history of cirrhosis and alcohol dependence presents the ER complaining of generalized abdominal pain, distention, nausea, vomiting and diarrhea 4 days ago. She has not been able to tolerate any p.o. intake since then. Patient reports normally drinking 1/2 pint of vodka daily however has only had a few sips today due to her illness. She has not had any recent sick contacts. She denies any associated fevers.   Patient is very tremulous on exam concerning for DTs or withdrawal.  Patient noted to have a moderately elevated lactic. We will plan on labs, imaging, fluids at this time. Patient also placed on CIWA protocol. EKG sinus tachycardia rate 111 with no acute ST or T wave abnormalities. Kamilah Gardner PA-C 
 
12:38 AM 
Patient has had continued multiple episodes of nausea and vomiting and has not tolerated any ice chips or other p.o. challenge. CT of the abdomen and pelvis shows enteritis with no other acute infectious process. Patient's lactic noted to improve to 2.1 after administration of a single fluid bolus and banana bag. Patient still somewhat tremulous on exam concerning for withdrawal and possible DTs. Discussed patient with hospitalist, Sammy Wills who agrees for admission at this time. Patient made aware of pending status. Kamilah Gardner PA-C Disposition: 
Admitted Follow-up Information None Current Discharge Medication List  
  
 
 
Provider Notes (Medical Decision Making):  
 
Procedures: 
Procedures Diagnosis Clinical Impression:  
1. Intractable vomiting with nausea, unspecified vomiting type 2. Alcohol dependence with unspecified alcohol-induced disorder ([facility] [address] 3. Abdominal pain, generalized 4. Enteritis

## 2019-05-10 NOTE — PROGRESS NOTES
Problem: Nausea/Vomiting (Adult) Goal: *Absence of nausea/vomiting Outcome: Progressing Towards Goal 
  
Problem: Alcohol Withdrawal 
Goal: *STG: Participates in treatment plan Outcome: Progressing Towards Goal 
Goal: *STG: Remains safe in hospital 
Outcome: Progressing Towards Goal 
Goal: *STG: Seeks staff when symptoms of withdrawal increase Outcome: Progressing Towards Goal 
Goal: *STG: Complies with medication therapy Outcome: Progressing Towards Goal 
Goal: *STG: Attends activities and groups Outcome: Progressing Towards Goal 
Goal: *STG: Will identify negative impact of chemical dependency including the use of tobacco, alcohol, and other substances Outcome: Progressing Towards Goal 
Goal: *STG: Verbalizes abstinence as an achievable goal 
Outcome: Progressing Towards Goal 
Goal: *STG: Agrees to participate in outpatient after care program to support ongoing mental health Outcome: Progressing Towards Goal 
Goal: *STG: Able to indentify relapse triggers including interpersonal/social and familial factors Outcome: Progressing Towards Goal 
Goal: *STG: Identify lifestyle changes to support long term sobriety such as vocation, employment, education, and legal issues Outcome: Progressing Towards Goal 
Goal: *STG: Maintains appropriate nutrition and hydration Outcome: Progressing Towards Goal 
Goal: *STG: Vital signs within defined limits Outcome: Progressing Towards Goal 
Goal: *STG/LTG: Relapse prevention plan in place to include housing/aftercare, leisure activities, and spirituality Outcome: Progressing Towards Goal 
Goal: Interventions Outcome: Progressing Towards Goal 
  
Problem: Pain Goal: *Control of Pain Outcome: Progressing Towards Goal

## 2019-05-10 NOTE — PROGRESS NOTES
PCP appointment made: 5/10/19 New or Established: New Doctor: Kriss Cook Date : 5/13/19 Time : 10:45a See provider location/contact information in follow up.

## 2019-05-10 NOTE — ED NOTES
Patient actively vomiting. Patient needs addressed and provider aware of patient condition, orders to be placed.

## 2019-05-10 NOTE — ED NOTES
TRANSFER - ED to INPATIENT REPORT: 
 
SBAR report made available to receiving floor on this patient being transferred to [address] [place] [address] ([facility])  for routine progression of care Admitting diagnosis Intractable nausea and vomiting [R11.2] Alcohol withdrawal ([facility] [F10.239] Information from the following report(s) SBAR, ED Summary, MAR, Recent Results and Cardiac Rhythm Strips was made available to receiving floor. Lines:  
Peripheral IV 05/09/19 Left Antecubital (Active) Site Assessment Clean, dry, & intact 5/9/2019  8:45 PM  
Phlebitis Assessment 0 5/9/2019  8:45 PM  
Infiltration Assessment 0 5/9/2019  8:45 PM  
Dressing Status Clean, dry, & intact 5/9/2019  8:45 PM  
Dressing Type Transparent 5/9/2019  8:45 PM  
Hub Color/Line Status Pink 5/9/2019  8:45 PM  
  
 
Medication list confirmed with patient Opportunity for questions and clarification was provided. Patient is oriented to time, place, person and situation . Patient is  continent and ambulatory without assist 
  
Valuables transported with patient Patient transported with: 
 [facility]

## 2019-05-10 NOTE — PROGRESS NOTES
conducted an initial consultation and Spiritual Assessment for Iraida Palacios, who is a 53 y.o.,female. Patients Primary Language is: [place]. According to the patients EMR Adventism Affiliation is: [facility].  
 
The reason the Patient came to the hospital is:  
Patient Active Problem List  
 Diagnosis Date Noted  [facility] withdrawal ([facility] 05/10/2019  Intractable nausea and vomiting 05/10/2019  Acute exacerbation of chronic obstructive pulmonary disease (COPD) ([facility] 09/14/2016  Acute maxillary sinusitis 09/14/2016  Former smoker 09/14/2016  Hyperlipidemia  ETOH abuse  Chronic obstructive pulmonary disease ([facility]  Manic depression ([facility]  Panic disorder  Left breast mass 05/13/2011  Wrist fracture, right 01/06/2010  Vitamin D insufficiency 11/20/2009 The  provided the following Interventions: 
Initiated a relationship of care and support. Explored issues of kade, spirituality and/or Taoist needs while hospitalized. Listened empathically. Provided chaplaincy education. Provided information about Spiritual Care Services. Offered prayer and assurance of continued prayers on patient's behalf. Chart reviewed. The following outcomes were achieved: 
Patient shared some information about their medical narrative and spiritual journey/beliefs. Patient processed feeling about current hospitalization. Patient expressed gratitude for the 's visit. Assessment: 
Patient did not indicate any spiritual or Taoist issues which require Spiritual Care Services interventions at this time. Patient does not have any Taoist/cultural needs that will affect patients preferences in health care. Plan: 
Chaplains will continue to follow and will provide pastoral care on an as needed or requested basis.  recommends bedside caregivers page  on duty if patient shows signs of acute spiritual or emotional distress. [address] [facility] Staff  Spiritual Care  
[phone]

## 2019-05-10 NOTE — DISCHARGE INSTRUCTIONS
Patient armband removed and shredded. DISCHARGE SUMMARY from Nurse    PATIENT INSTRUCTIONS:    What to do at Home:  Recommended activity: Activity as tolerated. If you experience any of the following symptoms increased nausea, vomiting, diarrhea, temperature greater than 100.5, bleeding or change in mental status please follow up with your PCP or call 911. *  Please give a list of your current medications to your Primary Care Provider. *  Please update this list whenever your medications are discontinued, doses are      changed, or new medications (including over-the-counter products) are added. *  Please carry medication information at all times in case of emergency situations. These are general instructions for a healthy lifestyle:    No smoking/ No tobacco products/ Avoid exposure to second hand smoke  Surgeon General's Warning:  Quitting smoking now greatly reduces serious risk to your health. Obesity, smoking, and sedentary lifestyle greatly increases your risk for illness    A healthy diet, regular physical exercise & weight monitoring are important for maintaining a healthy lifestyle    You may be retaining fluid if you have a history of heart failure or if you experience any of the following symptoms:  Weight gain of 3 pounds or more overnight or 5 pounds in a week, increased swelling in our hands or feet or shortness of breath while lying flat in bed. Please call your doctor as soon as you notice any of these symptoms; do not wait until your next office visit. Recognize signs and symptoms of STROKE:    F-face looks uneven    A-arms unable to move or move unevenly    S-speech slurred or non-existent    T-time-call 911 as soon as signs and symptoms begin-DO NOT go       Back to bed or wait to see if you get better-TIME IS BRAIN. Warning Signs of HEART ATTACK     Call 911 if you have these symptoms:   Chest discomfort.  Most heart attacks involve discomfort in the center of the chest that lasts more than a few minutes, or that goes away and comes back. It can feel like uncomfortable pressure, squeezing, fullness, or pain.  Discomfort in other areas of the upper body. Symptoms can include pain or discomfort in one or both arms, the back, neck, jaw, or stomach.  Shortness of breath with or without chest discomfort.  Other signs may include breaking out in a cold sweat, nausea, or lightheadedness. Don't wait more than five minutes to call 911 - MINUTES MATTER! Fast action can save your life. Calling 911 is almost always the fastest way to get lifesaving treatment. Emergency Medical Services staff can begin treatment when they arrive -- up to an hour sooner than if someone gets to the hospital by car. The discharge information has been reviewed with the patient. The patient verbalized understanding. Discharge medications reviewed with the patient and appropriate educational materials and side effects teaching were provided.   ___________________________________________________________________________________________________________________________________

## 2019-05-10 NOTE — PROGRESS NOTES
0145 - Pt arrived to the unit from the ED. Alert and oriented x 4. Denies any pain. No signs of distress. [id number] - Bedside and Verbal shift change report given to Jessica Drake RN (oncoming nurse) by Neyda Lopez RN (offgoing nurse). Report included the following information SBAR, Kardex, Intake/Output, MAR and Recent Results.

## 2019-05-10 NOTE — ED TRIAGE NOTES
Pt arrived through triage with c/o N/V and abdominal pain x 3-4 days. Pt states cirrhosis of the liver. States she consumes alcohol daily, last drink was at approximately 1500 \"just a few sips. \" Pt shaking in triage.

## 2019-05-10 NOTE — H&P
Internal Medicine History and Physical 
   
 
 
Subjective HPI: Michell De León is a 53 y.o. female who presented to the ED with generalized abdominal pain, distention, nausea, vomiting and diarrhea. Patient states her symptoms began 4 days ago and continue to persist.  She has not been able to tolerate any p.o. intake. Patient admits to approximately 1/2 pint of vodka daily, however due to her illness she has not been able to drink very much. She does admit to having a few sips earlier this afternoon to try and take the edge off. Patient also complaining of tremors that began earlier today. She denied any recent sick contacts. She denies any associated fevers. She has not had any bloody stools or emesis. She denied all other symptoms and complaints. ED evaluation revealed persistent N/V in spite of treatment. Patient was tremulous, tachycardic with suspicion for withdrawal sx. Will admit for further evaluation and treatment PMHx: 
Past Medical History:  
Diagnosis Date  Bipolar affective ([facility]  Chronic obstructive pulmonary disease ([facility]  Cirrhosis ([facility]  ETOH abuse  Hyperlipidemia 11/20/2009  Left breast mass 05/13/2011 U/S Left Breast: No definite mass identified. Recommended recheck in 6 months  Manic depression ([facility]  Panic disorder  Vitamin D insufficiency 11/20/2009  
 23 ng/mL  Wrist fracture, right 01/06/2010 Non-Displaced Distal Radius/Ulnar Styloid Fx PSurgHx: 
No past surgical history on file. SocialHx: 
Social History Socioeconomic History  Marital status:  Spouse name: Not on file  Number of children: Not on file  Years of education: Not on file  Highest education level: Not on file Occupational History  Not on file Social Needs  Financial resource strain: Not on file  Food insecurity:  
  Worry: Not on file Inability: Not on file  Transportation needs: Medical: Not on file Non-medical: Not on file Tobacco Use  Smoking status: Former Smoker Packs/day: 1.00 Years: 40.00 Pack years: 40.00  Smokeless tobacco: Former User Quit date: 06/2016 Substance and Sexual Activity  Alcohol use: No  
  Comment: quit since June 2016  Drug use: No  
 Sexual activity: Not on file Lifestyle  Physical activity:  
  Days per week: Not on file Minutes per session: Not on file  Stress: Not on file Relationships  Social connections:  
  Talks on phone: Not on file Gets together: Not on file Attends Confucianist service: Not on file Active member of club or organization: Not on file Attends meetings of clubs or organizations: Not on file Relationship status: Not on file  Intimate partner violence:  
  Fear of current or ex partner: Not on file Emotionally abused: Not on file Physically abused: Not on file Forced sexual activity: Not on file Other Topics Concern  Not on file Social History Narrative  Not on file Allergies: 
No Known Allergies FamilyHx: 
No family history on file. Prior to Admission Medications Prescriptions Last Dose Informant Patient Reported? Taking?  
naproxen (NAPROSYN) 375 mg tablet   No No  
Sig: Take 1 Tab by mouth two (2) times daily (with meals). Facility-Administered Medications: None Review of Systems: 
CONST: fever(-),   chills(-),   Fatigue(+),   Malaise(+) SKIN: itching(-),   rash(-) EYES: vision - no change from baseline ENT: ear pain(-),   nasal discharge(-),   sore throat(-),   voice change(-) RESP: SOB(-),   cough(-),   hemoptysis(-) CV: chest pain(-),   PND(-),   orthopnea(-),   exertional dyspnea(-),   palpitations(-), syncope(-) 
GI: abdominal pain (+)   melena(-),   hematemesis(-), hematochesia(-) 
: dysuria(-),   frequency(-),   urgency(-),   hematuria(-) 
MS: arthralgias(-),   myalgias(-) NEURO: speech w/o change,   tremors(-),   seizures(-),   numbness(-),   dizziness(-) Objective Visit Vitals /76 Pulse 98 Temp 99.1 °F (37.3 °C) Resp 15 SpO2 93% Physical Exam: 
CONST: NAD,   VSS reviewed SKIN: rashes(-),   ulcers(-) EYES: PERRL,   EOMI,   sclerae w/o jaundice HENT: HEENT,   normal appearing nose and ears,   normal nasal mucosa and turbinates NECK: supple,   no LA,   trachea is midline,   thyroid w/o goiter or palpable nodules RESP: normal respiratory effort,   wheezes(-),   rales(-),   rhochi(-),   no consolidation on percussion CHEST: normal axillae,   retractions(-),   use of accessory muscles(-) CV: JVD(-),   carotid bruits(-),   RRR,   gallops(-),   murmurs(-),   edema LE(-),   abd bruits(-),   peripheral pulses normal 
ABD: soft, tender(+) diffusely,   distended(-),   HSM(-),   BS(+) in all quadrants,   peritoneal signs(-) 
MS: NROM in all extremities,  clubbing(-),   peripheral cyanosis(-) NEURO: speech normal, tremors(+),   CN II-XII(-),   lateralizing signs(-) PSYCH: AOC x 3,   appropriate affect,   non-suicidal 
 
 
Laboratory Studies: 
CMP:  
Lab Results Component Value Date/Time  (L) 05/09/2019 08:45 PM  
 K 3.3 (L) 05/09/2019 08:45 PM  
 CL 99 (L) 05/09/2019 08:45 PM  
 CO2 23 05/09/2019 08:45 PM  
 AGAP 11 05/09/2019 08:45 PM  
 GLU 88 05/09/2019 08:45 PM  
 BUN 7 05/09/2019 08:45 PM  
 CREA 0.62 05/09/2019 08:45 PM  
 GFRAA >60 05/09/2019 08:45 PM  
 GFRNA >60 05/09/2019 08:45 PM  
 CA 9.4 05/09/2019 08:45 PM  
 ALB 3.5 05/09/2019 08:45 PM  
 TP 7.3 05/09/2019 08:45 PM  
 GLOB 3.8 05/09/2019 08:45 PM  
 AGRAT 0.9 05/09/2019 08:45 PM  
 SGOT 194 (H) 05/09/2019 08:45 PM  
 ALT 98 (H) 05/09/2019 08:45 PM  
 
CBC:  
Lab Results Component Value Date/Time  WBC 5.3 05/09/2019 08:45 PM  
 HGB 15.1 05/09/2019 08:45 PM  
 HCT 43.7 05/09/2019 08:45 PM  
 PLT 71 (L) 05/09/2019 08:45 PM  
 
COAGS: No results found for: APTT, PTP, INR 
 Liver Panel:  
Lab Results Component Value Date/Time ALB 3.5 05/09/2019 08:45 PM  
 CBIL 0.5 (H) 05/09/2019 08:45 PM  
 TP 7.3 05/09/2019 08:45 PM  
 GLOB 3.8 05/09/2019 08:45 PM  
 AGRAT 0.9 05/09/2019 08:45 PM  
 SGOT 194 (H) 05/09/2019 08:45 PM  
 ALT 98 (H) 05/09/2019 08:45 PM  
  (H) 05/09/2019 08:45 PM  
 
Pancreatic Markers:  
Lab Results Component Value Date/Time LPSE 187 05/09/2019 08:45 PM  
 
 
Imaging Reviewed: 
No results found. Assessment/Plan Active Hospital Problems Diagnosis Date Noted  Alcohol withdrawal ([facility] 05/10/2019  Intractable nausea and vomiting 05/10/2019 Intractable nausea and vomiting Clear liquids and advance as tolerated Antiemetics Alcohol withdrawal ([facility] CIWA protocol - Cont acceptable home medications for chronic conditions  
- DVT protocol and GI prophylaxis I have personally reviewed all pertinent labs, films and EKGs that have officially resulted. I reviewed available electronic documentation outlining the initial presentation as well as the emergency room physician's encounter. Total time spent with patient and chart review, orders and documentation - 45min out of which more than 50% spent face-to-face with patient. Concetta Ibarra MD 
5/10/2019  
1:05 AM 
 
 
[facility]

## 2019-05-10 NOTE — ED NOTES
Patient has hx of COPD. Patient desatting to 87-89% on RA while dozing off, patient placed on 2L NC, provider aware.

## 2019-05-12 NOTE — DISCHARGE SUMMARY
Discharge Summary Patient: Anabela Bueno               Sex: female          DOA: 5/9/2019 YOB: 1965      Age:  53 y.o.        LOS:  LOS: 0 days Admit Date: 5/9/2019 Discharge Date: 5/10/2019 Discharge Medications:    
Discharge Medication List as of 5/10/2019 12:21 PM  
  
START taking these medications Details  
famotidine (PEPCID) 20 mg tablet Take 1 Tab by mouth two (2) times a day., Print, Disp-60 Tab, R-0  
  
ondansetron (ZOFRAN ODT) 4 mg disintegrating tablet Take 1 Tab by mouth every eight (8) hours as needed for Nausea. , Print, Disp-20 Tab, R-0  
  
magnesium oxide (MAG-OX) 400 mg tablet Take 1 Tab by mouth daily. , Print, Disp-30 Tab, R-0  
  
thiamine HCL (B-1) 100 mg tablet Take 1 Tab by mouth daily. , Print, Disp-30 Tab, R-0  
  
  
STOP taking these medications  
  
 naproxen (NAPROSYN) 375 mg tablet Comments:  
Reason for Stopping: Follow-up: PCP Discharge Condition: Stable Activity: Activity as tolerated Diet: Resume previous diet Labs: 
Labs: Results:  
   
Chemistry Recent Labs 05/10/19 
0820 05/09/19 2045 GLU 96 88  133* K 3.0* 3.3*  
 99* CO2 25 23 BUN 5* 7 CREA 0.63 0.62  
CA 7.7* 9.4 AGAP 6 11 BUCR 8* 11* AP  --  183* TP  --  7.3 ALB  --  3.5 GLOB  --  3.8 AGRAT  --  0.9 CBC w/Diff Recent Labs 05/10/19 
0820 05/09/19 2045 WBC 5.0 5.3  
RBC 3.57* 4.16* HGB 12.9 15.1 HCT 38.1 43.7 PLT 59* 71* GRANS  --  48  
LYMPH  --  39 EOS  --  1 Cardiac Enzymes No results for input(s): CPK, CKND1, PAPO in the last 72 hours. No lab exists for component: [place] Coagulation No results for input(s): PTP, INR, APTT in the last 72 hours. No lab exists for component: INREXT Lipid Panel Lab Results Component Value Date/Time  Cholesterol, total 210 (H) 11/20/2009 11:39 AM  
 HDL Cholesterol 72 (H) 11/20/2009 11:39 AM  
 LDL, calculated 126.4 (H) 11/20/2009 11:39 AM  
 VLDL, calculated 11.6 11/20/2009 11:39 AM  
 Triglyceride 58 11/20/2009 11:39 AM  
 CHOL/HDL Ratio 2.9 11/20/2009 11:39 AM  
  
BNP No results for input(s): BNPP in the last 72 hours. Liver Enzymes Recent Labs 05/09/19 
2045  
TP 7.3 ALB 3.5 * SGOT 194* Thyroid Studies No results found for: T4, T3U, TSH, TSHEXT Imaging:   
Ct Abd Pelv W Cont Result Date: 5/10/2019 CT of the abdomen and pelvis with contrast INDICATION: Abdominal pain, cirrhosis. . COMPARISON: None. TECHNIQUE: Axial CT imaging of the abdomen and pelvis was performed with intravenous contrast. No oral contrast was given which decreases sensitivity for bowel pathology or abscess. Multiplanar reformats were generated. One or more dose reduction techniques were used on this CT: automated exposure control, adjustment of the mAs and/or kVp according to patient size, and iterative reconstruction techniques. The specific techniques used on this CT exam have been documented in the patient's electronic medical record. Digital Imaging and Communications in Medicine (DICOM) format image data are available to nonaffiliated external healthcare facilities or entities on a secure, media free, reciprocally searchable basis with patient authorization for at least a 12-month period after this study. _______________ FINDINGS: LOWER CHEST: Unremarkable. LIVER, BILIARY: Liver is diffusely hypodense and nodular in contour. No discrete hepatic mass noted. There is evidence for portal hypertension with recanalized umbilical vein. No biliary dilation. Cholelithiasis without evidence for acute cholecystitis. PANCREAS: Normal. SPLEEN: Normal. ADRENALS: Normal. KIDNEYS: 6 mm indeterminant hypodensity anterior right kidney, probable cyst. Left kidney appears normal. No urolithiasis or hydronephrosis. VASCULATURE: Patent. GASTROINTESTINAL TRACT: No bowel dilation or wall thickening. LYMPH NODES: No enlarged lymph nodes. PELVIC ORGANS: Unremarkable. BONES: Age indeterminant mild superior endplate compression of L4 vertebral body, likely chronic. No other fracture or suspicious bone lesion. OTHER: None. _______________ IMPRESSION: 1. Hepatic steatosis. Findings consistent with cirrhosis with portal hypertension and recanalized umbilical vein. No hepatic mass, splenomegaly, or ascites. 2. Cholelithiasis. 3. Indeterminate 6 mm right renal cortical hypodensity, statistically cyst.  4. Mild superior endplate compression of L4 vertebral body, age indeterminate but most likely chronic. A preliminary report provided by on call radiology resident. Consults: None Treatment Team: Treatment Team: Consulting Provider: Dann Jang MD 
 
Significant Diagnostic Studies: labs: No results found for this or any previous visit (from the past 25 hour(s)). Discharge diagnoses:   
Problem List as of 5/10/2019 Never Reviewed Codes Class Noted - Resolved Alcohol withdrawal ([facility] ICD-10-CM: D79.921 ICD-9-CM: 291.81  5/10/2019 - Present * (Principal) Intractable nausea and vomiting ICD-10-CM: R11.2 ICD-9-CM: 536.2  5/10/2019 - Present Hyperlipidemia ICD-10-CM: E78.5 ICD-9-CM: 272.4  Unknown - Present ETOH abuse ICD-10-CM: F10.10 ICD-9-CM: 305.00  Unknown - Present Chronic obstructive pulmonary disease (HCC) ICD-10-CM: J44.9 ICD-9-CM: 496  Unknown - Present Manic depression ([facility] ICD-10-CM: F31.9 ICD-9-CM: 296.80  Unknown - Present Panic disorder ICD-10-CM: F41.0 ICD-9-CM: 300.01  Unknown - Present Acute exacerbation of chronic obstructive pulmonary disease (COPD) ([facility] ICD-10-CM: J44.1 ICD-9-CM: 491.21  9/14/2016 - Present Acute maxillary sinusitis ICD-10-CM: J01.00 ICD-9-CM: 461.0  9/14/2016 - Present Former smoker ICD-10-CM: O02.817 ICD-9-CM: V15.82  9/14/2016 - Present Left breast mass ICD-10-CM: N63.20 ICD-9-CM: 611.72  5/13/2011 - Present Overview Signed 9/14/2016  9:26 AM by Alejo Van  
  U/S Left Breast: No definite mass identified. Recommended recheck in 3 months. Wrist fracture, right ICD-10-CM: S62.101A ICD-9-CM: 814.00  1/6/2010 - Present Overview Signed 9/14/2016  1:24 PM by Alejo Van Non-Displaced Distal Radius/Ulnar Styloid Fx Vitamin D insufficiency ICD-10-CM: E55.9 ICD-9-CM: 268.9  11/20/2009 - Present Overview Signed 9/14/2016  9:25 AM by Alejo Van  
  23 ng/mL Hospital Course:  
Major issues addressed during hospitalization outlined  below. Sudeep Cantu is a 53 y.o. female who presented to the ED with generalized abdominal pain, distention, nausea, vomiting and diarrhea.  Patient states her symptoms began 4 days ago and continue to persist.  She has not been able to tolerate any p.o. intake.  Patient admits to approximately 1/2 pint of vodka daily, however due to her illness she has not been able to drink very much.  She does admit to having a few sips earlier this afternoon to try and take the edge off.  Patient also complaining of tremors that began earlier today.  She denied any recent sick contacts. Luanne Lovelace denies any associated fevers. Luanne Lovelace has not had any bloody stools or emesis.  She denied all other symptoms and complaints. 
  
ED evaluation revealed persistent N/V in spite of treatment. Patient was tremulous, tachycardic with suspicion for withdrawal sx.  
  
Will admit for further evaluation and treatment Patient discharged the following morning as hospital is not a drug rehab center and there were no alarming withdrawal symptoms (ie seizure or hallucination). DC home. Ean Mercer MD 
May 12, 2019 Total time spent35 minutes

## 2019-05-13 ENCOUNTER — HOSPITAL ENCOUNTER (OUTPATIENT)
Dept: LAB | Age: 54
Discharge: HOME | End: 2019-05-13
Payer: MEDICAID

## 2019-05-13 ENCOUNTER — OFFICE VISIT (OUTPATIENT)
Dept: FAMILY MEDICINE CLINIC | Age: 54
End: 2019-05-13

## 2019-05-13 VITALS
BODY MASS INDEX: 21.21 KG/M2 | HEIGHT: 66 IN | OXYGEN SATURATION: 99 % | DIASTOLIC BLOOD PRESSURE: 82 MMHG | SYSTOLIC BLOOD PRESSURE: 105 MMHG | WEIGHT: 132 LBS | RESPIRATION RATE: 16 BRPM | HEART RATE: 94 BPM | TEMPERATURE: 97.4 F

## 2019-05-13 DIAGNOSIS — D69.6 THROMBOCYTOPENIA (HCC): ICD-10-CM

## 2019-05-13 DIAGNOSIS — E78.2 MIXED HYPERLIPIDEMIA: ICD-10-CM

## 2019-05-13 DIAGNOSIS — E87.6 HYPOKALEMIA: ICD-10-CM

## 2019-05-13 DIAGNOSIS — J44.9 CHRONIC OBSTRUCTIVE PULMONARY DISEASE, UNSPECIFIED COPD TYPE (HCC): ICD-10-CM

## 2019-05-13 DIAGNOSIS — F31.62 BIPOLAR DISORDER, CURRENT EPISODE MIXED, MODERATE (HCC): ICD-10-CM

## 2019-05-13 DIAGNOSIS — K70.30 ALCOHOLIC CIRRHOSIS OF LIVER WITHOUT ASCITES (HCC): ICD-10-CM

## 2019-05-13 DIAGNOSIS — E55.9 VITAMIN D INSUFFICIENCY: ICD-10-CM

## 2019-05-13 DIAGNOSIS — F10.930 ALCOHOL WITHDRAWAL SYNDROME WITHOUT COMPLICATION (HCC): Primary | ICD-10-CM

## 2019-05-13 LAB
ERYTHROCYTE [DISTWIDTH] IN BLOOD BY AUTOMATED COUNT: 13.3 % (ref 11.6–14.5)
HCT VFR BLD AUTO: 43.7 % (ref 35–45)
HGB BLD-MCNC: 13.9 G/DL (ref 12–16)
MCH RBC QN AUTO: 35.5 PG (ref 24–34)
MCHC RBC AUTO-ENTMCNC: 31.8 G/DL (ref 31–37)
MCV RBC AUTO: 111.8 FL (ref 74–97)
PLATELET # BLD AUTO: 70 K/UL (ref 135–420)
PMV BLD AUTO: 12.7 FL (ref 9.2–11.8)
POTASSIUM SERPL-SCNC: 3.7 MMOL/L (ref 3.5–5.5)
RBC # BLD AUTO: 3.91 M/UL (ref 4.2–5.3)
WBC # BLD AUTO: 3.2 K/UL (ref 4.6–13.2)

## 2019-05-13 PROCEDURE — 36415 COLL VENOUS BLD VENIPUNCTURE: CPT

## 2019-05-13 PROCEDURE — 84132 ASSAY OF SERUM POTASSIUM: CPT

## 2019-05-13 PROCEDURE — 85027 COMPLETE CBC AUTOMATED: CPT

## 2019-05-13 RX ORDER — BUSPIRONE HYDROCHLORIDE 10 MG/1
10 TABLET ORAL
COMMUNITY
End: 2020-01-21

## 2019-05-13 RX ORDER — BUDESONIDE AND FORMOTEROL FUMARATE DIHYDRATE 160; 4.5 UG/1; UG/1
2 AEROSOL RESPIRATORY (INHALATION) 2 TIMES DAILY
Qty: 1 INHALER | Refills: 2 | Status: SHIPPED | OUTPATIENT
Start: 2019-05-13 | End: 2020-02-14 | Stop reason: SDUPTHER

## 2019-05-13 RX ORDER — BUDESONIDE AND FORMOTEROL FUMARATE DIHYDRATE 160; 4.5 UG/1; UG/1
2 AEROSOL RESPIRATORY (INHALATION) 2 TIMES DAILY
COMMUNITY
End: 2019-05-13 | Stop reason: SDUPTHER

## 2019-05-13 RX ORDER — OMEPRAZOLE 40 MG/1
20 CAPSULE, DELAYED RELEASE ORAL DAILY
COMMUNITY

## 2019-05-13 RX ORDER — RISPERIDONE 2 MG/1
2 TABLET, FILM COATED ORAL DAILY
COMMUNITY
End: 2020-01-01

## 2019-05-13 NOTE — PATIENT INSTRUCTIONS
Take a B50 vitamin daily     Cirrhosis: Care Instructions  Your Care Instructions    Cirrhosis occurs when healthy tissue in your liver gets scarred. This keeps the liver from working well. It usually happens after a liver has been inflamed for years. Cirrhosis is most often caused by alcohol abuse or hepatitis infection. But there are other causes too. These include medicines and too much fat in the liver. Conditions passed down in families and other disorders can also cause it. In some cases, no cause can be found. Treatment can't completely fix liver damage. But you may be able to slow or prevent more damage if you don't drink alcohol or use drugs that harm your liver. Follow-up care is a key part of your treatment and safety. Be sure to make and go to all appointments, and call your doctor if you are having problems. It's also a good idea to know your test results and keep a list of the medicines you take. How can you care for yourself at home? · Do not drink any alcohol. It can harm your liver. Talk to your doctor if you need help to stop drinking. · Be safe with medicines. Take your medicines exactly as prescribed. Call your doctor if you think you are having a problem with your medicine. · Talk to your doctor before you take any other medicines. These include over-the-counter medicines and herbal products. · Be careful taking acetaminophen (Tylenol), ibuprofen (Advil, Motrin), or naproxen (Aleve). These can sometimes cause more liver damage. Talk with your doctor if you're not sure which medicines are safe. · If your cirrhosis causes extra fluid to build up in your body, try not to eat a lot of salt. Use less salt when you cook and at the table. Don't eat fast foods or snack foods with a lot of salt. Extra fluid in your belly, legs, and chest can cause serious problems. · Work with your doctor or a dietitian to be sure you eat the right amount of carbohydrate, protein, fat, and sodium (salt).  It's very important to choose the best foods for the health of your liver. · If your doctor recommends it, limit how much fluid you drink. · If your doctor recommends it, get more exercise. Walking is a good choice. Bit by bit, increase the amount you walk every day. Try for at least 30 minutes on most days of the week. You also may want to swim, bike, or do other activities. When should you call for help? Call 911 anytime you think you may need emergency care. For example, call if:    · You have trouble breathing.     · You vomit blood or what looks like coffee grounds.    Call your doctor now or seek immediate medical care if:    · You feel very sleepy or confused.     · You have new belly pain, or your pain gets worse.     · You have a fever.     · There is a new or increasing yellow tint to your skin or the whites of your eyes.     · You have any abnormal bleeding, such as:  ? Nosebleeds. ? Vaginal bleeding that is different (heavier, more frequent, at a different time of the month) than what you are used to.  ? Bloody or black stools, or rectal bleeding. ? Bloody or pink urine.    Watch closely for changes in your health, and be sure to contact your doctor if:    · You have any problems.     · Your belly is getting bigger.     · You are gaining weight. Where can you learn more? Go to http://janae-rodrick.info/. Enter M412 in the search box to learn more about \"Cirrhosis: Care Instructions. \"  Current as of: March 27, 2018  Content Version: 11.9  © 4957-8031 [employer]. Care instructions adapted under license by [employer] (which disclaims liability or warranty for this information). If you have questions about a medical condition or this instruction, always ask your healthcare professional. [address] any warranty or liability for your use of this information.

## 2019-05-13 NOTE — PROGRESS NOTES
Pt is a 53y.o. year old female who presents for transition of care. Hospitalization summary; Ramandeep Henry a 53 y. o. female who presented to the ED with generalized abdominal pain, distention, nausea, vomiting and diarrhea.  Patient states her symptoms began 4 days ago and continue to persist.  She has not been able to tolerate any p.o. intake.  Patient admits to approximately 1/2 pint of vodka daily, however due to her illness she has not been able to drink very much.  She does admit to having a few sips earlier this afternoon to try and take the edge off.  Patient also complaining of tremors that began earlier today.  She denied any recent sick contacts. Mariia Boswell denies any associated fevers. Mariia Boswell has not had any bloody stools or emesis.  She denied all other symptoms and complaints.     ED evaluation revealed persistent N/V in spite of treatment. Patient was tremulous, tachycardic with suspicion for withdrawal sx.      Will admit for further evaluation and treatment      Patient discharged the following morning as hospital is not a drug rehab center and there were no alarming withdrawal symptoms (ie seizure or hallucination). DC home. Works at [email or web address], only getting 4 hours a week    Test results:  CT abdomen  IMPRESSION: 1. Hepatic steatosis. Findings consistent with cirrhosis with portal hypertension and recanalized umbilical vein. No hepatic mass, splenomegaly, or ascites. 2. Cholelithiasis. 3. Indeterminate 6 mm right renal cortical hypodensity, statistically cyst.  4. Mild superior endplate compression of L4 vertebral body, age indeterminate but most likely chronic    ROS:  Review of Systems   Constitutional: Negative. HENT: Negative. Eyes: Negative. Respiratory: Positive for shortness of breath. At times pmh copd   Cardiovascular: Negative. Gastrointestinal: Positive for abdominal pain. Genitourinary: Negative. Musculoskeletal: Positive for back pain. Skin: Negative. Neurological: Positive for tingling and sensory change. Endorses severe painful neuropathy to her hands and feet. Walk with a foot slap. Psychiatric/Behavioral: Positive for depression and substance abuse. Negative for suicidal ideas. The patient is nervous/anxious. Has started drinking again after years of sobriety. It is the only thing to help her pain. Recently  in the last year. Works at [employer] and her hours are being cut. Date admitted: 5/9/19    Date discharged: 5/10/19    Date of face to face: 5/13/19    Medication reconciliation performed: Yes    Medications added/changed/discontinued: Start: pepcid, zofran, mag oxide, thiamine  Stop: naproxen    Review discharge instructions: Yes    Need for follow up on in hospital testing: No    Need for follow up with specialist: Yes  Name of specialist: Gastroenterology     Does patient have help at home: No  Name:     Barriers to obtaining medications: Yes. Her alcoholism and emotional status    Patient/family educated on cause of hospitalization: Yes    Patient/family able to repeat back cause of hospitalization, disease process, medication changes, and when to seek help: Yes    Patient Past Records were reviewed:  yes    Vitals:    05/13/19 1118   BP: 105/82   Pulse: 94   Resp: 16   Temp: 97.4 °F (36.3 °C)   TempSrc: Oral   SpO2: 99%   Weight: 132 lb (59.9 kg)   Height: 5' 6\" (1.676 m)      Body mass index is 21.31 kg/m². Physical assessment:  Physical Exam   Constitutional: She is oriented to person, place, and time. Vital signs are normal. She appears not jaundiced. She appears cachectic. She appears distressed. HENT:   Head: Normocephalic and atraumatic. Right Ear: Hearing, tympanic membrane, external ear and ear canal normal.   Left Ear: Hearing, tympanic membrane, external ear and ear canal normal.   Eyes: Pupils are equal, round, and reactive to light.  Conjunctivae are normal.   Neck: Full passive range of motion without pain. Carotid bruit is not present. No thyroid mass and no thyromegaly present. Cardiovascular: Normal rate, regular rhythm, S1 normal and S2 normal.   Pulmonary/Chest: Effort normal and breath sounds normal.   Abdominal: Bowel sounds are normal. She exhibits no distension, no fluid wave, no abdominal bruit and no ascites. There is no hepatosplenomegaly. There is no tenderness. Musculoskeletal:        Right foot: There is deformity. Left foot: There is deformity. Bilateral foot drop   Neurological: She is alert and oriented to person, place, and time. She displays weakness. Gait abnormal.   Skin: Skin is warm, dry and intact. Psychiatric: Memory and judgment normal. Her affect is blunt. She exhibits a depressed mood. She expresses no suicidal plans. 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  Recommended recheck in 6 months    Manic depression ([address]     Panic disorder     Vitamin D insufficiency 11/20/2009    23 ng/mL    Wrist fracture, right 01/06/2010    Non-Displaced Distal Radius/Ulnar Styloid Fx          Social History     Socioeconomic History    Marital status:      Spouse name: Not on file    Number of children: Not on file    Years of education: Not on file    Highest education level: Not on file   Occupational History    Not on file   Social Needs    Financial resource strain: Not on file    Food insecurity:     Worry: Not on file     Inability: Not on file    Transportation needs:     Medical: Not on file     Non-medical: Not on file   Tobacco Use    Smoking status: Former Smoker     Packs/day: 1.00     Years: 40.00     Pack years: 40.00    Smokeless tobacco: Former User     Quit date: 06/2016   Substance and Sexual Activity    Alcohol use: No     Comment: quit since June 2016    Drug use: No    Sexual activity: Not on file   Lifestyle    Physical activity:     Days per week: Not on file     Minutes per session: Not on file    Stress: Not on file   Relationships    Social connections:     Talks on phone: Not on file     Gets together: Not on file     Attends Rastafarian service: Not on file     Active member of club or organization: Not on file     Attends meetings of clubs or organizations: Not on file     Relationship status: Not on file    Intimate partner violence:     Fear of current or ex partner: Not on file     Emotionally abused: Not on file     Physically abused: Not on file     Forced sexual activity: Not on file   Other Topics Concern    Not on file   Social History Narrative    Not on file     No family history on file. Current Outpatient Medications   Medication Sig Dispense Refill    albuterol (PROVENTIL HFA) 90 mcg/actuation inhaler Take 1-2 Puffs by inhalation.  busPIRone (BUSPAR) 10 mg tablet Take 10 mg by mouth two (2) times daily as needed (take one to three tabs po bid as needed). Indications: Repeated Episodes of Anxiety      omeprazole (PRILOSEC) 40 mg capsule Take 40 mg by mouth daily.  risperiDONE (RISPERDAL) 2 mg tablet Take 2 mg by mouth daily.  Indications: Bipolar Disorder in Remission      budesonide-formoterol (SYMBICORT) 160-4.5 mcg/actuation HFAA Take 2 Puffs by inhalation two (2) times a day. 1 Inhaler 2    tiotropium bromide (SPIRIVA RESPIMAT) 2.5 mcg/actuation inhaler Take 1 Puff by inhalation daily. 1 Inhaler 2    famotidine (PEPCID) 20 mg tablet Take 1 Tab by mouth two (2) times a day. 60 Tab 0    magnesium oxide (MAG-OX) 400 mg tablet Take 1 Tab by mouth daily. 30 Tab 0    escitalopram oxalate (LEXAPRO) 10 mg tablet Take 10 mg by mouth.  ondansetron (ZOFRAN ODT) 4 mg disintegrating tablet Take 1 Tab by mouth every eight (8) hours as needed for Nausea. 20 Tab 0    thiamine HCL (B-1) 100 mg tablet Take 1 Tab by mouth daily. 30 Tab 0       Social History     Tobacco Use   Smoking Status Former Smoker    Packs/day: 1.00    Years: 40.00    Pack years: 40.00   Smokeless Tobacco Former User    Quit date: 06/2016       Allergies   Allergen Reactions    Animal Dander Itching    Egg Nausea and Vomiting       Assessment/ Plan:   Diagnoses and all orders for this visit:    1. Alcohol withdrawal syndrome without complication (HCC)  -no symptoms of alcohol withdrawal at todays visiti    2. Mixed hyperlipidemia  -most likely related to her liver disease,    3. Vitamin D insufficiency    4. Alcoholic cirrhosis of liver without ascites (HCC)  -     REFERRAL TO GASTROENTEROLOGY  -     CBC W/O DIFF; Future  -get her re-established with gastroenterology     5. Thrombocytopenia (HCC)  -     CBC W/O DIFF; Future  -recheck platlets    6. Hypokalemia  -     POTASSIUM; Future  -re check potasium    7. Chronic obstructive pulmonary disease, unspecified COPD type (HCC)  -     budesonide-formoterol (SYMBICORT) 160-4.5 mcg/actuation HFAA; Take 2 Puffs by inhalation two (2) times a day. -     tiotropium bromide (SPIRIVA RESPIMAT) 2.5 mcg/actuation inhaler; Take 1 Puff by inhalation daily.  -controlled with her medications  -refill meds    8.  Bipolar disorder, current episode mixed, moderate ([address]  -on buspar, risperidone and lexapro, continue same    Visit included long conversation with her telling me the problems and  Issues she is having in her life. The neuropathy in her hands and feet. Her resumption and alcohol consumption. Problems with her work cutting her hours. Her feeling of hopelessness as she knows much of the damage done to her body from alcoholism is not reversible even if she had a liver transplat. Feels like \"what the use\". Follow-up and Dispositions    · Return in about 1 month (around 6/10/2019) for cirrhosis, hypokalemia, 15 minutes. I have discussed the diagnosis with the patient and the intended plan as seen in the above orders. The patient has received an After-Visit Summary and questions were answered concerning future plans. Medication Side Effects and Warnings were discussed with patient: yes      Return to clinic if sxs persist, to ER if sxs worsen    Patient verbalized understanding of above instructions. AVS printed and given to ptANDREA Aguilar-BC  [id number] [facility] [address]  [address]

## 2019-05-13 NOTE — PROGRESS NOTES
1. Have you been to the ER, urgent care clinic since your last visit? Hospitalized since your last visit? Yes When: 5-9-2019 at [place] [address]     2. Have you seen or consulted any other health care providers outside of the [address] [place] [address] since your last visit? Include any pap smears or colon screening.  No     Chief Complaint   Patient presents with    Transitions Of Care      5-9-2019 Depaul for nausea/ vomitting

## 2019-05-26 PROBLEM — Z72.0 TOBACCO ABUSE: Status: ACTIVE | Noted: 2018-10-21

## 2019-05-26 PROBLEM — K70.30 ALCOHOLIC CIRRHOSIS OF LIVER WITHOUT ASCITES (HCC): Status: ACTIVE | Noted: 2018-10-21

## 2019-05-26 RX ORDER — ALBUTEROL SULFATE 90 UG/1
1-2 AEROSOL, METERED RESPIRATORY (INHALATION)
COMMUNITY
Start: 2018-10-21

## 2019-05-26 RX ORDER — ESCITALOPRAM OXALATE 10 MG/1
10 TABLET ORAL
COMMUNITY
End: 2020-02-14

## 2019-06-12 ENCOUNTER — OFFICE VISIT (OUTPATIENT)
Dept: FAMILY MEDICINE CLINIC | Age: 54
End: 2019-06-12

## 2019-06-12 VITALS
HEART RATE: 118 BPM | DIASTOLIC BLOOD PRESSURE: 85 MMHG | HEIGHT: 66 IN | BODY MASS INDEX: 20.38 KG/M2 | OXYGEN SATURATION: 95 % | WEIGHT: 126.8 LBS | SYSTOLIC BLOOD PRESSURE: 99 MMHG | TEMPERATURE: 98.4 F | RESPIRATION RATE: 16 BRPM

## 2019-06-12 DIAGNOSIS — K70.30 ALCOHOLIC CIRRHOSIS OF LIVER WITHOUT ASCITES (HCC): Primary | ICD-10-CM

## 2019-06-12 DIAGNOSIS — G89.29 CHRONIC MIDLINE THORACIC BACK PAIN: ICD-10-CM

## 2019-06-12 DIAGNOSIS — E87.6 HYPOKALEMIA: ICD-10-CM

## 2019-06-12 DIAGNOSIS — M54.6 CHRONIC MIDLINE THORACIC BACK PAIN: ICD-10-CM

## 2019-06-12 DIAGNOSIS — M41.9 SCOLIOSIS OF THORACOLUMBAR SPINE, UNSPECIFIED SCOLIOSIS TYPE: ICD-10-CM

## 2019-06-12 RX ORDER — NAPROXEN SODIUM 220 MG
220 TABLET ORAL AS NEEDED
COMMUNITY
End: 2020-01-21

## 2019-06-12 NOTE — PROGRESS NOTES
[address] [place] [address] [place] [address]               [phone]      Nelly Walker is a 53 y.o. female and presents with Other (Hypokalemia, 1 month follow up) and Cirrhosis Of Liver       Assessment/Plan:    Diagnoses and all orders for this visit:    1. Alcoholic cirrhosis of liver without ascites ([facility]    2. Hypokalemia  -     POTASSIUM; Future    3. Chronic midline thoracic back pain  -     REFERRAL TO PAIN MANAGEMENT    4. Scoliosis of thoracolumbar spine, unspecified scoliosis type  -     REFERRAL TO PAIN MANAGEMENT    she continues to drink, which is unfortunate, because at her last visit she was in a good amount of pain in her feet and hands and she had been told a transplant would not fix that pain, however, today she denies any hand or feet pain, defer primary care of her cirrhosis to GI    Potassium has been low, 5/13 was 3.7, recheck today    Chronic pain in back, refer to pain managment    Follow-up and Dispositions    · Return in about 3 months (around 9/12/2019) for chronic conditions, 30 minutes. Subjective:    Hypokalemia  No heart palpitation nor did she notice a slow heart rate    cirrohsis  appt with  GI on 7/12 at 11am, has to pay 100$  She does not have the money at this time  Still drinking alcohol, smoking marijuana  Has been taking vitamins including B50  Denies hand neuropathy    Back pain  Due to scoliosis  Can barely stand all day at work  Works at [employer] as   Working about 4-6 hours every 2 weeks    ROS:  As stated in HPI, otherwise all others negative. ROS    The problem list was updated as a part of today's visit.   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 SH:  Social History     Tobacco Use    Smoking status: Former Smoker     Packs/day: 1.00     Years: 40.00     Pack years: 40.00    Smokeless tobacco: Former User     Quit date: 06/2016   Substance Use Topics    Alcohol use: No     Comment: quit since June 2016    Drug use: No       Medications/Allergies:  Current Outpatient Medications on File Prior to Visit   Medication Sig Dispense Refill    naproxen sodium (ALEVE) 220 mg tablet Take 220 mg by mouth as needed.  albuterol (PROVENTIL HFA) 90 mcg/actuation inhaler Take 1-2 Puffs by inhalation.  busPIRone (BUSPAR) 10 mg tablet Take 10 mg by mouth two (2) times daily as needed (take one to three tabs po bid as needed). Indications: Repeated Episodes of Anxiety      risperiDONE (RISPERDAL) 2 mg tablet Take 2 mg by mouth daily. Indications: Bipolar Disorder in Remission      budesonide-formoterol (SYMBICORT) 160-4.5 mcg/actuation HFAA Take 2 Puffs by inhalation two (2) times a day. 1 Inhaler 2    tiotropium bromide (SPIRIVA RESPIMAT) 2.5 mcg/actuation inhaler Take 1 Puff by inhalation daily. 1 Inhaler 2    famotidine (PEPCID) 20 mg tablet Take 1 Tab by mouth two (2) times a day. 60 Tab 0    ondansetron (ZOFRAN ODT) 4 mg disintegrating tablet Take 1 Tab by mouth every eight (8) hours as needed for Nausea. 20 Tab 0    magnesium oxide (MAG-OX) 400 mg tablet Take 1 Tab by mouth daily. 30 Tab 0    thiamine HCL (B-1) 100 mg tablet Take 1 Tab by mouth daily. 30 Tab 0    escitalopram oxalate (LEXAPRO) 10 mg tablet Take 10 mg by mouth.  omeprazole (PRILOSEC) 40 mg capsule Take 40 mg by mouth daily. No current facility-administered medications on file prior to visit.          Allergies   Allergen Reactions  Animal Dander Itching    Egg Nausea and Vomiting       Objective:  Visit Vitals  BP 99/85   Pulse (!) 118   Temp 98.4 °F (36.9 °C) (Oral)   Resp 16   Ht 5' 6\" (1.676 m)   Wt 126 lb 12.8 oz (57.5 kg)   SpO2 95%   BMI 20.47 kg/m²    Body mass index is 20.47 kg/m². Physical assessment  Physical Exam   Constitutional: She is oriented to person, place, and time and well-developed, well-nourished, and in no distress. Vital signs are normal.   HENT:   Head: Normocephalic and atraumatic. Cardiovascular: Normal rate, regular rhythm and normal heart sounds. Pulmonary/Chest: Effort normal and breath sounds normal.   Musculoskeletal:   No longer walking with foot drop/slap  Taking slow purposful steps   Neurological: She is alert and oriented to person, place, and time. Gait normal.   No neuropathic hand pain   Skin: Skin is warm, dry and intact. Nursing note and vitals reviewed.         Labwork and Ancillary Studies:    CBC w/Diff  Lab Results   Component Value Date/Time    WBC 3.2 (L) 05/13/2019 11:57 AM    HGB 13.9 05/13/2019 11:57 AM    PLATELET 70 (L) 61/73/3065 11:57 AM         Basic Metabolic Profile  Lab Results   Component Value Date/Time    Sodium 137 05/10/2019 08:20 AM    Potassium 3.7 05/13/2019 11:57 AM    Chloride 106 05/10/2019 08:20 AM    CO2 25 05/10/2019 08:20 AM    Anion gap 6 05/10/2019 08:20 AM    Glucose 96 05/10/2019 08:20 AM    BUN 5 (L) 05/10/2019 08:20 AM    Creatinine 0.63 05/10/2019 08:20 AM    BUN/Creatinine ratio 8 (L) 05/10/2019 08:20 AM    GFR est AA >60 05/10/2019 08:20 AM    GFR est non-AA >60 05/10/2019 08:20 AM    Calcium 7.7 (L) 05/10/2019 08:20 AM        Cholesterol  Lab Results   Component Value Date/Time    Cholesterol, total 210 (H) 11/20/2009 11:39 AM    HDL Cholesterol 72 (H) 11/20/2009 11:39 AM    LDL, calculated 126.4 (H) 11/20/2009 11:39 AM    Triglyceride 58 11/20/2009 11:39 AM    CHOL/HDL Ratio 2.9 11/20/2009 11:39 AM       Health Maintenance:   Health Maintenance Topic Date Due    Hepatitis C Screening  1965    PAP AKA CERVICAL CYTOLOGY  01/10/1985    BREAST CANCER SCRN MAMMOGRAM  01/10/2014    FOBT Q 1 YEAR AGE 50-75  01/10/2014    Shingrix Vaccine Age 50> (1 of 2) 09/17/2019 (Originally 1/10/2014)    DTaP/Tdap/Td series (1 - Tdap) 06/12/2020 (Originally 1/10/1985)    Pneumococcal 0-64 years (1 of 1 - PPSV23) 06/12/2020 (Originally 1/10/1970)    Influenza Age 5 to Adult  08/01/2019       I have discussed the diagnosis with the patient and the intended plan as seen in the above orders. The patient has received an After-Visit Summary and questions were answered concerning future plans. An After Visit Summary was printed and given to the patient. All diagnosis have been discussed with the patient and all of the patient's questions have been answered. Follow-up and Dispositions    · Return in about 3 months (around 9/12/2019) for chronic conditions, 30 minutes. Dru Baltazar, AGNP-BC  [address] [facility]   [address] [facility] [address]  [address]

## 2019-06-12 NOTE — PROGRESS NOTES
1. Have you been to the ER, urgent care clinic since your last visit? Hospitalized since your last visit? No.     2. Have you seen or consulted any other health care providers outside of the [address] [place] [address] since your last visit? Include any pap smears or colon screening.  No.     Chief Complaint   Patient presents with    Other     Hypokalemia, 1 month follow up    Cirrhosis Of Liver

## 2019-06-12 NOTE — PATIENT INSTRUCTIONS
Liver Disease Diet: Care Instructions  Your Care Instructions    The liver does many jobs that are vital to the rest of your body. When something is wrong with the liver, your body may not get the nutrition it needs. It is important that you eat a healthy diet that includes a variety of foods from the basic food groups: grains, vegetables, fruits, dairy, and protein foods. Follow your doctor's instructions for eating carbohydrate, protein, and fat in the right amounts for you. Your doctor also may limit salt or take salt out of your diet to help protect the liver. Always talk with your doctor or dietitian before you make changes in your diet. Follow-up care is a key part of your treatment and safety. Be sure to make and go to all appointments, and call your doctor if you are having problems. It's also a good idea to know your test results and keep a list of the medicines you take. How can you care for yourself at home? · Work with your doctor or dietitian to create a food plan that guides your daily food choices. · Eat a balanced diet. Do not skip meals or go for many hours without eating. If you eat several small meals during the day, you have a better chance of getting the extra calories your body needs for energy. · Your doctor may recommend a high-carbohydrate diet to get enough calories, since you may have to limit fat. You may need to spread carbohydrate throughout your meals and snacks to control the amount of sugar in your blood. Eat six small meals a day, rather than three big ones. Carbohydrate is found in:  ? Whole-grain and refined breads and cereals. ? Some vegetables. ? Cooked beans (such as kidney or black beans) and peas (such as lentils or split peas). ? Fruits. ? Low-fat or nonfat milk and milk products, which also supply protein. ? Candy, table sugar, and sugary soda drinks (try to limit these).   · Follow your doctor's or dietitian's instructions on how to get the right amount of Patient states that she has been having abdominal pain since last evening, and states that she has been having rapid heartbeat throughout the day.  Rates pain as a 5 on 0-10 scale.  Aleve this morning with no relief.  Reports an episode of emesis today at 1800.  Patient offered water and warm blanket.     protein in your diet. Examples of animal protein are:  ? Meat, fish, and poultry. ? Eggs. ? Milk and milk products. · Your doctor or dietitian may ask you to eat a certain amount of protein that comes from plants (rather than protein that comes from animals). You can get plant protein from foods such as:  ? Cooked dried beans and peas. ? Peanut butter, nuts, and seeds. ? Tofu. · Limit salt, if your doctor tells you to. This will help prevent fluid buildup in your belly and chest, which can cause serious problems. Salt is in many prepared foods, such as kinney, canned foods, snack foods, sauces, and soups. Look for reduced-salt products. · Your doctor may recommend vitamin and mineral supplements. However, do not take any other medicine, including over-the-counter medicines, vitamins, and herbal products, without talking to your doctor first.  · Do NOT take acetaminophen (Tylenol), ibuprofen (Advil, Motrin), or naproxen (Aleve). These can cause more liver damage. · Do not drink any alcohol. It can harm your liver. Talk to your doctor if you need help to stop drinking. · If you have a loss of appetite or have nausea or vomiting, try to:  ? Stay away from foods and food smells that make you feel worse. ? Avoid greasy or fatty foods. ? Eat food that settles your stomach when it feels upset. Try crackers, dry toast, or murali (murali tea, hard murali candy, or crystallized murali). When should you call for help? Watch closely for changes in your health, and be sure to contact your doctor if you have any problems. Where can you learn more? Go to http://janae-rodrick.info/. Enter U188 in the search box to learn more about \"Liver Disease Diet: Care Instructions. \"  Current as of: March 28, 2018  Content Version: 11.9  © 0854-7729 [employer]. Care instructions adapted under license by [employer] (which disclaims liability or warranty for this information).  If you have questions about a medical condition or this instruction, always ask your healthcare professional. [address] any warranty or liability for your use of this information. Cirrhosis: Care Instructions  Your Care Instructions    Cirrhosis occurs when healthy tissue in your liver gets scarred. This keeps the liver from working well. It usually happens after a liver has been inflamed for years. Cirrhosis is most often caused by alcohol abuse or hepatitis infection. But there are other causes too. These include medicines and too much fat in the liver. Conditions passed down in families and other disorders can also cause it. In some cases, no cause can be found. Treatment can't completely fix liver damage. But you may be able to slow or prevent more damage if you don't drink alcohol or use drugs that harm your liver. Follow-up care is a key part of your treatment and safety. Be sure to make and go to all appointments, and call your doctor if you are having problems. It's also a good idea to know your test results and keep a list of the medicines you take. How can you care for yourself at home? · Do not drink any alcohol. It can harm your liver. Talk to your doctor if you need help to stop drinking. · Be safe with medicines. Take your medicines exactly as prescribed. Call your doctor if you think you are having a problem with your medicine. · Talk to your doctor before you take any other medicines. These include over-the-counter medicines and herbal products. · Be careful taking acetaminophen (Tylenol), ibuprofen (Advil, Motrin), or naproxen (Aleve). These can sometimes cause more liver damage. Talk with your doctor if you're not sure which medicines are safe. · If your cirrhosis causes extra fluid to build up in your body, try not to eat a lot of salt. Use less salt when you cook and at the table. Don't eat fast foods or snack foods with a lot of salt.  Extra fluid in your belly, legs, and chest can cause serious problems. · Work with your doctor or a dietitian to be sure you eat the right amount of carbohydrate, protein, fat, and sodium (salt). It's very important to choose the best foods for the health of your liver. · If your doctor recommends it, limit how much fluid you drink. · If your doctor recommends it, get more exercise. Walking is a good choice. Bit by bit, increase the amount you walk every day. Try for at least 30 minutes on most days of the week. You also may want to swim, bike, or do other activities. When should you call for help? Call 911 anytime you think you may need emergency care. For example, call if:    · You have trouble breathing.     · You vomit blood or what looks like coffee grounds.    Call your doctor now or seek immediate medical care if:    · You feel very sleepy or confused.     · You have new belly pain, or your pain gets worse.     · You have a fever.     · There is a new or increasing yellow tint to your skin or the whites of your eyes.     · You have any abnormal bleeding, such as:  ? Nosebleeds. ? Vaginal bleeding that is different (heavier, more frequent, at a different time of the month) than what you are used to.  ? Bloody or black stools, or rectal bleeding. ? Bloody or pink urine.    Watch closely for changes in your health, and be sure to contact your doctor if:    · You have any problems.     · Your belly is getting bigger.     · You are gaining weight. Where can you learn more? Go to http://janae-rodrick.info/. Enter M412 in the search box to learn more about \"Cirrhosis: Care Instructions. \"  Current as of: March 27, 2018  Content Version: 11.9  © 9361-9666 [employer]. Care instructions adapted under license by [employer] (which disclaims liability or warranty for this information).  If you have questions about a medical condition or this instruction, always ask your healthcare professional. [email or web address] disclaims any warranty or liability for your use of this information.

## 2020-01-01 ENCOUNTER — VIRTUAL VISIT (OUTPATIENT)
Dept: FAMILY MEDICINE CLINIC | Age: 55
End: 2020-01-01

## 2020-01-01 ENCOUNTER — TELEPHONE (OUTPATIENT)
Dept: FAMILY MEDICINE CLINIC | Age: 55
End: 2020-01-01

## 2020-01-01 ENCOUNTER — HOSPITAL ENCOUNTER (OUTPATIENT)
Dept: MAMMOGRAPHY | Age: 55
Discharge: HOME | End: 2020-08-31
Attending: NURSE PRACTITIONER
Payer: MEDICAID

## 2020-01-01 ENCOUNTER — HOSPITAL ENCOUNTER (OUTPATIENT)
Dept: LAB | Age: 55
Discharge: HOME | End: 2020-09-10
Payer: MEDICAID

## 2020-01-01 ENCOUNTER — HOSPITAL ENCOUNTER (OUTPATIENT)
Dept: LAB | Age: 55
Discharge: HOME | End: 2020-04-02
Payer: COMMERCIAL

## 2020-01-01 ENCOUNTER — HOSPITAL ENCOUNTER (OUTPATIENT)
Dept: GENERAL RADIOLOGY | Age: 55
Discharge: HOME | End: 2020-07-29
Payer: COMMERCIAL

## 2020-01-01 DIAGNOSIS — Z12.31 ENCOUNTER FOR SCREENING MAMMOGRAM FOR BREAST CANCER: ICD-10-CM

## 2020-01-01 DIAGNOSIS — D70.8 OTHER NEUTROPENIA (HCC): ICD-10-CM

## 2020-01-01 DIAGNOSIS — J44.9 CHRONIC OBSTRUCTIVE PULMONARY DISEASE, UNSPECIFIED COPD TYPE (HCC): Primary | ICD-10-CM

## 2020-01-01 DIAGNOSIS — J44.9 CHRONIC OBSTRUCTIVE PULMONARY DISEASE, UNSPECIFIED COPD TYPE (HCC): ICD-10-CM

## 2020-01-01 DIAGNOSIS — Z12.31 SCREENING MAMMOGRAM, ENCOUNTER FOR: ICD-10-CM

## 2020-01-01 DIAGNOSIS — K70.30 ALCOHOLIC CIRRHOSIS OF LIVER WITHOUT ASCITES (HCC): Primary | ICD-10-CM

## 2020-01-01 DIAGNOSIS — F31.75 BIPOLAR DISORDER, IN PARTIAL REMISSION, MOST RECENT EPISODE DEPRESSED (HCC): ICD-10-CM

## 2020-01-01 DIAGNOSIS — K70.30 ALCOHOLIC CIRRHOSIS OF LIVER WITHOUT ASCITES (HCC): ICD-10-CM

## 2020-01-01 DIAGNOSIS — F51.01 PRIMARY INSOMNIA: ICD-10-CM

## 2020-01-01 DIAGNOSIS — Z72.0 NICOTINE ABUSE: ICD-10-CM

## 2020-01-01 DIAGNOSIS — R10.9 STOMACH ACHE: ICD-10-CM

## 2020-01-01 DIAGNOSIS — E78.2 MIXED HYPERLIPIDEMIA: ICD-10-CM

## 2020-01-01 LAB
25(OH)D3 SERPL-MCNC: 65.8 NG/ML (ref 30–100)
ALBUMIN SERPL-MCNC: 3.5 G/DL (ref 3.4–5)
ALBUMIN SERPL-MCNC: 3.7 G/DL (ref 3.4–5)
ALBUMIN/GLOB SERPL: 0.9 {RATIO} (ref 0.8–1.7)
ALBUMIN/GLOB SERPL: 1 {RATIO} (ref 0.8–1.7)
ALP SERPL-CCNC: 130 U/L (ref 45–117)
ALP SERPL-CCNC: 80 U/L (ref 45–117)
ALT SERPL-CCNC: 116 U/L (ref 13–56)
ALT SERPL-CCNC: 43 U/L (ref 13–56)
ANION GAP SERPL CALC-SCNC: 5 MMOL/L (ref 3–18)
ANION GAP SERPL CALC-SCNC: 7 MMOL/L (ref 3–18)
AST SERPL-CCNC: 332 U/L (ref 10–38)
AST SERPL-CCNC: 73 U/L (ref 10–38)
BASOPHILS # BLD: 0 K/UL (ref 0–0.1)
BASOPHILS # BLD: 0.1 K/UL (ref 0–0.1)
BASOPHILS NFR BLD: 2 % (ref 0–2)
BASOPHILS NFR BLD: 2 % (ref 0–2)
BILIRUB SERPL-MCNC: 0.7 MG/DL (ref 0.2–1)
BILIRUB SERPL-MCNC: 0.8 MG/DL (ref 0.2–1)
BUN SERPL-MCNC: 11 MG/DL (ref 7–18)
BUN SERPL-MCNC: 5 MG/DL (ref 7–18)
BUN/CREAT SERPL: 12 (ref 12–20)
BUN/CREAT SERPL: 19 (ref 12–20)
CALCIUM SERPL-MCNC: 8.7 MG/DL (ref 8.5–10.1)
CALCIUM SERPL-MCNC: 8.8 MG/DL (ref 8.5–10.1)
CHLORIDE SERPL-SCNC: 106 MMOL/L (ref 100–111)
CHLORIDE SERPL-SCNC: 106 MMOL/L (ref 100–111)
CHOLEST SERPL-MCNC: 205 MG/DL
CHOLEST SERPL-MCNC: 238 MG/DL
CO2 SERPL-SCNC: 26 MMOL/L (ref 21–32)
CO2 SERPL-SCNC: 30 MMOL/L (ref 21–32)
CREAT SERPL-MCNC: 0.42 MG/DL (ref 0.6–1.3)
CREAT SERPL-MCNC: 0.57 MG/DL (ref 0.6–1.3)
DIFFERENTIAL METHOD BLD: ABNORMAL
DIFFERENTIAL METHOD BLD: ABNORMAL
EOSINOPHIL # BLD: 0 K/UL (ref 0–0.4)
EOSINOPHIL # BLD: 0.1 K/UL (ref 0–0.4)
EOSINOPHIL NFR BLD: 0 % (ref 0–5)
EOSINOPHIL NFR BLD: 3 % (ref 0–5)
ERYTHROCYTE [DISTWIDTH] IN BLOOD BY AUTOMATED COUNT: 13.3 % (ref 11.6–14.5)
ERYTHROCYTE [DISTWIDTH] IN BLOOD BY AUTOMATED COUNT: 15.7 % (ref 11.6–14.5)
GLOBULIN SER CALC-MCNC: 3.7 G/DL (ref 2–4)
GLOBULIN SER CALC-MCNC: 3.9 G/DL (ref 2–4)
GLUCOSE SERPL-MCNC: 92 MG/DL (ref 74–99)
GLUCOSE SERPL-MCNC: 93 MG/DL (ref 74–99)
HBA1C MFR BLD: 4.9 % (ref 4.2–5.6)
HBA1C MFR BLD: 5.1 % (ref 4.2–5.6)
HCT VFR BLD AUTO: 38.6 % (ref 35–45)
HCT VFR BLD AUTO: 40.2 % (ref 35–45)
HDLC SERPL-MCNC: 50 MG/DL (ref 40–60)
HDLC SERPL-MCNC: 87 MG/DL (ref 40–60)
HDLC SERPL: 2.4 {RATIO} (ref 0–5)
HDLC SERPL: 4.8 {RATIO} (ref 0–5)
HGB BLD-MCNC: 12.4 G/DL (ref 12–16)
HGB BLD-MCNC: 13.1 G/DL (ref 12–16)
LDLC SERPL CALC-MCNC: 109.2 MG/DL (ref 0–100)
LDLC SERPL CALC-MCNC: ABNORMAL MG/DL (ref 0–100)
LIPID PROFILE,FLP: ABNORMAL
LIPID PROFILE,FLP: ABNORMAL
LYMPHOCYTES # BLD: 0.8 K/UL (ref 0.9–3.6)
LYMPHOCYTES # BLD: 1.5 K/UL (ref 0.9–3.6)
LYMPHOCYTES NFR BLD: 33 % (ref 21–52)
LYMPHOCYTES NFR BLD: 47 % (ref 21–52)
MCH RBC QN AUTO: 30 PG (ref 24–34)
MCH RBC QN AUTO: 30.8 PG (ref 24–34)
MCHC RBC AUTO-ENTMCNC: 32.1 G/DL (ref 31–37)
MCHC RBC AUTO-ENTMCNC: 32.6 G/DL (ref 31–37)
MCV RBC AUTO: 93.2 FL (ref 74–97)
MCV RBC AUTO: 94.4 FL (ref 74–97)
MONOCYTES # BLD: 0.4 K/UL (ref 0.05–1.2)
MONOCYTES # BLD: 0.5 K/UL (ref 0.05–1.2)
MONOCYTES NFR BLD: 11 % (ref 3–10)
MONOCYTES NFR BLD: 18 % (ref 3–10)
NEUTS SEG # BLD: 1.2 K/UL (ref 1.8–8)
NEUTS SEG # BLD: 1.2 K/UL (ref 1.8–8)
NEUTS SEG NFR BLD: 37 % (ref 40–73)
NEUTS SEG NFR BLD: 47 % (ref 40–73)
PLATELET # BLD AUTO: 113 K/UL (ref 135–420)
PLATELET # BLD AUTO: 122 K/UL (ref 135–420)
PMV BLD AUTO: 11.5 FL (ref 9.2–11.8)
PMV BLD AUTO: 11.9 FL (ref 9.2–11.8)
POTASSIUM SERPL-SCNC: 3.7 MMOL/L (ref 3.5–5.5)
POTASSIUM SERPL-SCNC: 3.9 MMOL/L (ref 3.5–5.5)
PROLACTIN SERPL-MCNC: 84.8 NG/ML
PROT SERPL-MCNC: 7.4 G/DL (ref 6.4–8.2)
PROT SERPL-MCNC: 7.4 G/DL (ref 6.4–8.2)
RBC # BLD AUTO: 4.14 M/UL (ref 4.2–5.3)
RBC # BLD AUTO: 4.26 M/UL (ref 4.2–5.3)
SODIUM SERPL-SCNC: 139 MMOL/L (ref 136–145)
SODIUM SERPL-SCNC: 141 MMOL/L (ref 136–145)
T3FREE SERPL-MCNC: 3 PG/ML (ref 2.18–3.98)
T4 FREE SERPL-MCNC: 1 NG/DL (ref 0.7–1.5)
TRIGL SERPL-MCNC: 44 MG/DL (ref ?–150)
TRIGL SERPL-MCNC: 441 MG/DL (ref ?–150)
TSH SERPL DL<=0.05 MIU/L-ACNC: 1.45 UIU/ML (ref 0.36–3.74)
VIT B12 SERPL-MCNC: 1218 PG/ML (ref 211–911)
VLDLC SERPL CALC-MCNC: 8.8 MG/DL
VLDLC SERPL CALC-MCNC: ABNORMAL MG/DL
WBC # BLD AUTO: 2.5 K/UL (ref 4.6–13.2)
WBC # BLD AUTO: 3.2 K/UL (ref 4.6–13.2)

## 2020-01-01 PROCEDURE — 82607 VITAMIN B-12: CPT

## 2020-01-01 PROCEDURE — 80061 LIPID PANEL: CPT

## 2020-01-01 PROCEDURE — 83036 HEMOGLOBIN GLYCOSYLATED A1C: CPT

## 2020-01-01 PROCEDURE — 84439 ASSAY OF FREE THYROXINE: CPT

## 2020-01-01 PROCEDURE — 85025 COMPLETE CBC W/AUTO DIFF WBC: CPT

## 2020-01-01 PROCEDURE — 84443 ASSAY THYROID STIM HORMONE: CPT

## 2020-01-01 PROCEDURE — 36415 COLL VENOUS BLD VENIPUNCTURE: CPT

## 2020-01-01 PROCEDURE — 84481 FREE ASSAY (FT-3): CPT

## 2020-01-01 PROCEDURE — 74018 RADEX ABDOMEN 1 VIEW: CPT

## 2020-01-01 PROCEDURE — 77063 BREAST TOMOSYNTHESIS BI: CPT

## 2020-01-01 PROCEDURE — 80053 COMPREHEN METABOLIC PANEL: CPT

## 2020-01-01 PROCEDURE — 82306 VITAMIN D 25 HYDROXY: CPT

## 2020-01-01 PROCEDURE — 84146 ASSAY OF PROLACTIN: CPT

## 2020-01-01 RX ORDER — LACTULOSE 10 G/15ML
2 SOLUTION ORAL; RECTAL 2 TIMES DAILY
COMMUNITY

## 2020-01-01 RX ORDER — TRAZODONE HYDROCHLORIDE 50 MG/1
50 TABLET ORAL
COMMUNITY
End: 2020-01-01

## 2020-01-01 RX ORDER — ZIPRASIDONE HYDROCHLORIDE 20 MG/1
CAPSULE ORAL
COMMUNITY
Start: 2020-01-01

## 2020-01-01 RX ORDER — CALCIUM CARB/VITAMIN D3/VIT K1 500-500-40
TABLET,CHEWABLE ORAL
COMMUNITY

## 2020-01-08 ENCOUNTER — APPOINTMENT (OUTPATIENT)
Dept: ULTRASOUND IMAGING | Age: 55
DRG: 463 | End: 2020-01-08
Attending: EMERGENCY MEDICINE
Payer: COMMERCIAL

## 2020-01-08 ENCOUNTER — APPOINTMENT (OUTPATIENT)
Dept: CT IMAGING | Age: 55
DRG: 463 | End: 2020-01-08
Attending: EMERGENCY MEDICINE
Payer: COMMERCIAL

## 2020-01-08 ENCOUNTER — APPOINTMENT (OUTPATIENT)
Dept: GENERAL RADIOLOGY | Age: 55
DRG: 463 | End: 2020-01-08
Attending: EMERGENCY MEDICINE
Payer: COMMERCIAL

## 2020-01-08 ENCOUNTER — HOSPITAL ENCOUNTER (INPATIENT)
Age: 55
LOS: 12 days | Discharge: HOME | DRG: 463 | End: 2020-01-21
Attending: EMERGENCY MEDICINE | Admitting: INTERNAL MEDICINE
Payer: COMMERCIAL

## 2020-01-08 DIAGNOSIS — N39.0 ACUTE UTI: ICD-10-CM

## 2020-01-08 DIAGNOSIS — R11.2 NON-INTRACTABLE VOMITING WITH NAUSEA, UNSPECIFIED VOMITING TYPE: ICD-10-CM

## 2020-01-08 DIAGNOSIS — R65.21 SEPTIC SHOCK (HCC): Primary | ICD-10-CM

## 2020-01-08 DIAGNOSIS — N17.9 AKI (ACUTE KIDNEY INJURY) (HCC): ICD-10-CM

## 2020-01-08 DIAGNOSIS — K80.10 CALCULUS OF GALLBLADDER WITH CHOLECYSTITIS WITHOUT BILIARY OBSTRUCTION, UNSPECIFIED CHOLECYSTITIS ACUITY: ICD-10-CM

## 2020-01-08 DIAGNOSIS — R10.11 ACUTE ABDOMINAL PAIN IN RIGHT UPPER QUADRANT: ICD-10-CM

## 2020-01-08 DIAGNOSIS — J44.1 COPD WITH ACUTE EXACERBATION (HCC): ICD-10-CM

## 2020-01-08 DIAGNOSIS — A41.9 SEPTIC SHOCK (HCC): Primary | ICD-10-CM

## 2020-01-08 LAB
ALBUMIN SERPL-MCNC: 3.1 G/DL (ref 3.4–5)
ALBUMIN/GLOB SERPL: 0.9 {RATIO} (ref 0.8–1.7)
ALP SERPL-CCNC: 185 U/L (ref 45–117)
ALT SERPL-CCNC: 180 U/L (ref 13–56)
ANION GAP SERPL CALC-SCNC: 19 MMOL/L (ref 3–18)
APPEARANCE UR: ABNORMAL
AST SERPL-CCNC: 333 U/L (ref 10–38)
BACTERIA URNS QL MICRO: ABNORMAL /HPF
BASOPHILS # BLD: 0 K/UL (ref 0–0.1)
BASOPHILS NFR BLD: 0 % (ref 0–2)
BILIRUB SERPL-MCNC: 3.5 MG/DL (ref 0.2–1)
BILIRUB UR QL: ABNORMAL
BUN SERPL-MCNC: 21 MG/DL (ref 7–18)
BUN/CREAT SERPL: 7 (ref 12–20)
CALCIUM SERPL-MCNC: 9.2 MG/DL (ref 8.5–10.1)
CHLORIDE SERPL-SCNC: 97 MMOL/L (ref 100–111)
CO2 SERPL-SCNC: 20 MMOL/L (ref 21–32)
COLOR UR: ABNORMAL
CREAT SERPL-MCNC: 2.87 MG/DL (ref 0.6–1.3)
DIFFERENTIAL METHOD BLD: ABNORMAL
EOSINOPHIL # BLD: 0 K/UL (ref 0–0.4)
EOSINOPHIL NFR BLD: 0 % (ref 0–5)
EPITH CASTS URNS QL MICRO: ABNORMAL /LPF (ref 0–5)
ERYTHROCYTE [DISTWIDTH] IN BLOOD BY AUTOMATED COUNT: 15.1 % (ref 11.6–14.5)
ETHANOL SERPL-MCNC: 25 MG/DL (ref 0–3)
GLOBULIN SER CALC-MCNC: 3.5 G/DL (ref 2–4)
GLUCOSE SERPL-MCNC: 86 MG/DL (ref 74–99)
GLUCOSE UR STRIP.AUTO-MCNC: NEGATIVE MG/DL
HCT VFR BLD AUTO: 36.1 % (ref 35–45)
HGB BLD-MCNC: 11.9 G/DL (ref 12–16)
HGB UR QL STRIP: ABNORMAL
INR PPP: 1.1 (ref 0.8–1.2)
KETONES UR QL STRIP.AUTO: 15 MG/DL
LACTATE BLD-SCNC: 1.85 MMOL/L (ref 0.4–2)
LEUKOCYTE ESTERASE UR QL STRIP.AUTO: ABNORMAL
LIPASE SERPL-CCNC: 366 U/L (ref 73–393)
LYMPHOCYTES # BLD: 0.7 K/UL (ref 0.9–3.6)
LYMPHOCYTES NFR BLD: 16 % (ref 21–52)
MAGNESIUM SERPL-MCNC: 2.8 MG/DL (ref 1.6–2.6)
MCH RBC QN AUTO: 33.7 PG (ref 24–34)
MCHC RBC AUTO-ENTMCNC: 33 G/DL (ref 31–37)
MCV RBC AUTO: 102.3 FL (ref 74–97)
MONOCYTES # BLD: 0.6 K/UL (ref 0.05–1.2)
MONOCYTES NFR BLD: 14 % (ref 3–10)
NEUTS SEG # BLD: 3 K/UL (ref 1.8–8)
NEUTS SEG NFR BLD: 70 % (ref 40–73)
NITRITE UR QL STRIP.AUTO: NEGATIVE
PH UR STRIP: 5 [PH] (ref 5–8)
PLATELET # BLD AUTO: 51 K/UL (ref 135–420)
PMV BLD AUTO: 13 FL (ref 9.2–11.8)
POTASSIUM SERPL-SCNC: 3.8 MMOL/L (ref 3.5–5.5)
PROT SERPL-MCNC: 6.6 G/DL (ref 6.4–8.2)
PROT UR STRIP-MCNC: 30 MG/DL
PROTHROMBIN TIME: 14.3 SEC (ref 11.5–15.2)
RBC # BLD AUTO: 3.53 M/UL (ref 4.2–5.3)
RBC #/AREA URNS HPF: ABNORMAL /HPF (ref 0–5)
SODIUM SERPL-SCNC: 136 MMOL/L (ref 136–145)
SP GR UR REFRACTOMETRY: 1.02 (ref 1–1.03)
UROBILINOGEN UR QL STRIP.AUTO: 1 EU/DL (ref 0.2–1)
WBC # BLD AUTO: 4.3 K/UL (ref 4.6–13.2)
WBC URNS QL MICRO: ABNORMAL /HPF (ref 0–4)
YEAST URNS QL MICRO: ABNORMAL

## 2020-01-08 PROCEDURE — 93005 ELECTROCARDIOGRAM TRACING: CPT

## 2020-01-08 PROCEDURE — 80053 COMPREHEN METABOLIC PANEL: CPT

## 2020-01-08 PROCEDURE — 81001 URINALYSIS AUTO W/SCOPE: CPT

## 2020-01-08 PROCEDURE — 87186 SC STD MICRODIL/AGAR DIL: CPT

## 2020-01-08 PROCEDURE — 85025 COMPLETE CBC W/AUTO DIFF WBC: CPT

## 2020-01-08 PROCEDURE — 94762 N-INVAS EAR/PLS OXIMTRY CONT: CPT

## 2020-01-08 PROCEDURE — 99285 EMERGENCY DEPT VISIT HI MDM: CPT

## 2020-01-08 PROCEDURE — 80307 DRUG TEST PRSMV CHEM ANLYZR: CPT

## 2020-01-08 PROCEDURE — 74011000250 HC RX REV CODE- 250: Performed by: EMERGENCY MEDICINE

## 2020-01-08 PROCEDURE — 87086 URINE CULTURE/COLONY COUNT: CPT

## 2020-01-08 PROCEDURE — 76705 ECHO EXAM OF ABDOMEN: CPT

## 2020-01-08 PROCEDURE — 85610 PROTHROMBIN TIME: CPT

## 2020-01-08 PROCEDURE — 96367 TX/PROPH/DG ADDL SEQ IV INF: CPT

## 2020-01-08 PROCEDURE — 74011250636 HC RX REV CODE- 250/636: Performed by: NURSE PRACTITIONER

## 2020-01-08 PROCEDURE — 83690 ASSAY OF LIPASE: CPT

## 2020-01-08 PROCEDURE — 74176 CT ABD & PELVIS W/O CONTRAST: CPT

## 2020-01-08 PROCEDURE — 83605 ASSAY OF LACTIC ACID: CPT

## 2020-01-08 PROCEDURE — 96375 TX/PRO/DX INJ NEW DRUG ADDON: CPT

## 2020-01-08 PROCEDURE — 87077 CULTURE AEROBIC IDENTIFY: CPT

## 2020-01-08 PROCEDURE — 83735 ASSAY OF MAGNESIUM: CPT

## 2020-01-08 PROCEDURE — 96365 THER/PROPH/DIAG IV INF INIT: CPT

## 2020-01-08 PROCEDURE — 74011250636 HC RX REV CODE- 250/636: Performed by: EMERGENCY MEDICINE

## 2020-01-08 PROCEDURE — 87040 BLOOD CULTURE FOR BACTERIA: CPT

## 2020-01-08 PROCEDURE — 74011000258 HC RX REV CODE- 258: Performed by: EMERGENCY MEDICINE

## 2020-01-08 PROCEDURE — 71045 X-RAY EXAM CHEST 1 VIEW: CPT

## 2020-01-08 RX ORDER — IPRATROPIUM BROMIDE AND ALBUTEROL SULFATE 2.5; .5 MG/3ML; MG/3ML
3 SOLUTION RESPIRATORY (INHALATION)
Status: COMPLETED | OUTPATIENT
Start: 2020-01-08 | End: 2020-01-08

## 2020-01-08 RX ORDER — SODIUM CHLORIDE 0.9 % (FLUSH) 0.9 %
5-10 SYRINGE (ML) INJECTION AS NEEDED
Status: DISCONTINUED | OUTPATIENT
Start: 2020-01-08 | End: 2020-01-21 | Stop reason: HOSPADM

## 2020-01-08 RX ORDER — NOREPINEPHRINE BIT/0.9 % NACL 8 MG/250ML
.5-16 INFUSION BOTTLE (ML) INTRAVENOUS
Status: DISCONTINUED | OUTPATIENT
Start: 2020-01-08 | End: 2020-01-09

## 2020-01-08 RX ORDER — FAMOTIDINE 10 MG/ML
20 INJECTION INTRAVENOUS
Status: COMPLETED | OUTPATIENT
Start: 2020-01-08 | End: 2020-01-08

## 2020-01-08 RX ORDER — LORAZEPAM 2 MG/ML
0.5 INJECTION INTRAMUSCULAR
Status: COMPLETED | OUTPATIENT
Start: 2020-01-08 | End: 2020-01-08

## 2020-01-08 RX ORDER — ONDANSETRON 2 MG/ML
4 INJECTION INTRAMUSCULAR; INTRAVENOUS
Status: COMPLETED | OUTPATIENT
Start: 2020-01-08 | End: 2020-01-08

## 2020-01-08 RX ADMIN — METHYLPREDNISOLONE SODIUM SUCCINATE 125 MG: 125 INJECTION, POWDER, FOR SOLUTION INTRAMUSCULAR; INTRAVENOUS at 23:46

## 2020-01-08 RX ADMIN — LORAZEPAM 0.5 MG: 2 INJECTION, SOLUTION INTRAMUSCULAR; INTRAVENOUS at 23:45

## 2020-01-08 RX ADMIN — SODIUM CHLORIDE, SODIUM LACTATE, POTASSIUM CHLORIDE, AND CALCIUM CHLORIDE 1000 ML: 600; 310; 30; 20 INJECTION, SOLUTION INTRAVENOUS at 22:18

## 2020-01-08 RX ADMIN — NOREPINEPHRINE BITARTRATE 0.5 MCG/MIN: 1 INJECTION INTRAVENOUS at 23:46

## 2020-01-08 RX ADMIN — PIPERACILLIN AND TAZOBACTAM 3.38 G: 3; .375 INJECTION, POWDER, LYOPHILIZED, FOR SOLUTION INTRAVENOUS at 21:50

## 2020-01-08 RX ADMIN — SODIUM CHLORIDE 1000 ML: 900 INJECTION, SOLUTION INTRAVENOUS at 18:27

## 2020-01-08 RX ADMIN — IPRATROPIUM BROMIDE AND ALBUTEROL SULFATE 3 ML: .5; 3 SOLUTION RESPIRATORY (INHALATION) at 22:18

## 2020-01-08 RX ADMIN — SODIUM CHLORIDE 1000 ML: 900 INJECTION, SOLUTION INTRAVENOUS at 22:33

## 2020-01-08 RX ADMIN — FOLIC ACID: 5 INJECTION, SOLUTION INTRAMUSCULAR; INTRAVENOUS; SUBCUTANEOUS at 18:55

## 2020-01-08 RX ADMIN — ONDANSETRON 4 MG: 2 INJECTION INTRAMUSCULAR; INTRAVENOUS at 18:31

## 2020-01-08 RX ADMIN — FAMOTIDINE 20 MG: 10 INJECTION, SOLUTION INTRAVENOUS at 18:31

## 2020-01-08 RX ADMIN — IPRATROPIUM BROMIDE AND ALBUTEROL SULFATE 3 ML: .5; 3 SOLUTION RESPIRATORY (INHALATION) at 23:45

## 2020-01-08 NOTE — ED NOTES
I performed a brief evaluation, including history and physical, of the patient here in triage and I have determined that pt will need further treatment and evaluation from the main side ER physician. I have placed initial orders to help in expediting patients care. Chief complaint: nausea, vomiting, diarrhea X 6-8 days with associated generalized abd discomfort, generalized weakness. Also c/o blurred vision with associated h/a, no dizziness.           January 08, 2020 at 5:41 PM - Demetrice Holden NP        Visit Vitals  /80   Pulse (!) 120   Temp 97.9 °F (36.6 °C)   Resp 18   SpO2 100%

## 2020-01-08 NOTE — ED TRIAGE NOTES
\"I have low blood pressure and my doctor wheeled me over here to be seen. I can't stand very well, I get wobbly, loss of appetite, upset stomach, all for a few weeks, worse today. My eyesight is getting worse, it's blurry. \"

## 2020-01-08 NOTE — ED PROVIDER NOTES
Wily Bee is a 53 y.o. female with history of liver cirrhosis, COPD who was sent over from her psychiatrist office due to hypotension. Patient states she has been having nausea vomiting and occasional diarrhea for the last several days. She has had difficult time keeping things down. She is not been able to eat much. She denies any blood in her stool or melena. She denies any fever. She always has a chronic cough. There is no hemoptysis. She denies any urinary symptoms. Patient symptoms are worse with any standing or exertion. She last drink alcohol last night. The history is provided by the patient and medical records. Past Medical History:   Diagnosis Date    Bipolar affective ([facility]     Chronic obstructive pulmonary disease ([facility]     Cirrhosis ([facility]     ETOH abuse     Hyperlipidemia 11/20/2009    Left breast mass 05/13/2011    U/S Left Breast: No definite mass identified. Recommended recheck in 6 months    Manic depression ([facility]     Panic disorder     Vitamin D insufficiency 11/20/2009    23 ng/mL    Wrist fracture, right 01/06/2010    Non-Displaced Distal Radius/Ulnar Styloid Fx       History reviewed. No pertinent surgical history. History reviewed. No pertinent family history.     Social History     Socioeconomic History    Marital status:      Spouse name: Not on file    Number of children: Not on file    Years of education: Not on file    Highest education level: Not on file   Occupational History    Not on file   Social Needs    Financial resource strain: Not on file    Food insecurity:     Worry: Not on file     Inability: Not on file    Transportation needs:     Medical: Not on file     Non-medical: Not on file   Tobacco Use    Smoking status: Former Smoker     Packs/day: 1.00     Years: 40.00     Pack years: 40.00    Smokeless tobacco: Former User     Quit date: 06/2016   Substance and Sexual Activity    Alcohol use: No     Comment: quit since June 2016    Drug use: No    Sexual activity: Not on file   Lifestyle    Physical activity:     Days per week: Not on file     Minutes per session: Not on file    Stress: Not on file   Relationships    Social connections:     Talks on phone: Not on file     Gets together: Not on file     Attends [employer] service: Not on file     Active member of club or organization: Not on file     Attends meetings of clubs or organizations: Not on file     Relationship status: Not on file    Intimate partner violence:     Fear of current or ex partner: Not on file     Emotionally abused: Not on file     Physically abused: Not on file     Forced sexual activity: Not on file   Other Topics Concern    Not on file   Social History Narrative    Not on file         ALLERGIES: Animal dander and Egg    Review of Systems   Constitutional: Positive for fatigue. HENT: Negative for sore throat. Eyes: Negative for visual disturbance. Respiratory: Positive for cough. Cardiovascular: Negative for chest pain. Gastrointestinal: Positive for abdominal distention. Genitourinary: Positive for decreased urine volume. Negative for difficulty urinating. Musculoskeletal: Negative for gait problem. Skin: Negative for wound. Neurological: Positive for weakness and light-headedness. Negative for syncope. Hematological: Bruises/bleeds easily. Psychiatric/Behavioral: Positive for sleep disturbance. Vitals:    01/09/20 0021 01/09/20 0026 01/09/20 0036 01/09/20 0045   BP: (!) 111/93 94/55 100/54 99/51   Pulse: (!) 129 (!) 130 (!) 121 (!) 115   Resp: 20 22 22 18   Temp:       SpO2: 92% 97% 95% 96%   Weight:                Physical Exam  Vitals signs and nursing note reviewed. Constitutional:       General: She is in acute distress. Appearance: She is well-developed. She is ill-appearing. She is not diaphoretic. HENT:      Head: Normocephalic and atraumatic.       Right Ear: External ear normal.      Left Ear: External ear normal.      Nose: Nose normal.      Mouth/Throat:      Pharynx: Uvula midline. Eyes:      General: No scleral icterus. Conjunctiva/sclera: Conjunctivae normal.   Neck:      Musculoskeletal: Neck supple. Cardiovascular:      Rate and Rhythm: Regular rhythm. Tachycardia present. Heart sounds: Normal heart sounds. Pulmonary:      Effort: Pulmonary effort is normal.      Breath sounds: Normal breath sounds. Abdominal:      General: There is distension. Palpations: Abdomen is soft. Tenderness: There is no tenderness. Musculoskeletal:      Right lower leg: Edema present. Left lower leg: Edema present. Skin:     General: Skin is warm and dry. Capillary Refill: Capillary refill takes less than 2 seconds. Neurological:      Mental Status: She is alert and oriented to person, place, and time.       Gait: Gait normal.   Psychiatric:         Behavior: Behavior normal.          MDM       Procedures  Vitals:  Patient Vitals for the past 12 hrs:   Temp Pulse Resp BP SpO2   01/09/20 0045  (!) 115 18 99/51 96 %   01/09/20 0036  (!) 121 22 100/54 95 %   01/09/20 0026  (!) 130 22 94/55 97 %   01/09/20 0021  (!) 129 20 (!) 111/93 92 %   01/09/20 0015  (!) 117 20 (!) 85/47 96 %   01/09/20 0000  (!) 122 14 (!) 87/43 99 %   01/08/20 2345  (!) 116 18 (!) 83/44 96 %   01/08/20 2329  (!) 114 12 90/45 98 %   01/08/20 2245  (!) 112 15 (!) 82/46 99 %   01/08/20 2230  (!) 118 17 (!) 89/50 (!) 82 %   01/08/20 2217  (!) 118 17 (!) 82/47 92 %   01/08/20 2215  (!) 107 16 (!) 70/38 97 %   01/08/20 2203  (!) 112 14 (!) 81/43 90 %   01/08/20 2115  (!) 127 24 91/57 (!) 88 %   01/08/20 2103  (!) 110 20 94/67 (!) 88 %   01/08/20 2100  (!) 119 17 (!) 80/28 (!) 88 %   01/08/20 2030    (!) 89/47    01/08/20 1930  (!) 111 18 (!) 85/47 90 %   01/08/20 1915  (!) 113 17 (!) 86/50 93 %   01/08/20 1902  (!) 112 19 95/54 93 %   01/08/20 1900  (!) 110 18 (!) 88/49 93 %   01/08/20 1845  (!) 109 17 (!) 76/60 97 %   01/08/20 1830  (!) 109 13 (!) 88/56 95 %   01/08/20 1823     95 %   01/08/20 1817  (!) 119 19 (!) 86/48 94 %   01/08/20 1739 97.9 °F (36.6 °C) (!) 120 18 100/80 100 %         Medications ordered:   Medications   sodium chloride (NS) flush 5-10 mL (has no administration in time range)   piperacillin-tazobactam (ZOSYN) 3.375 g in 0.9% sodium chloride (MBP/ADV) 100 mL MBP (0 g IntraVENous IV Completed 1/8/20 2354)   NOREPINephrine (LEVOPHED) 8 mg in 0.9% NS 250ml infusion (16 mcg/min IntraVENous Rate Change 1/9/20 0047)   nicotine (NICODERM CQ) 21 mg/24 hr patch 1 Patch (has no administration in time range)   lactated Ringers infusion (has no administration in time range)   sodium chloride 0.9 % bolus infusion 1,000 mL (0 mL IntraVENous IV Completed 1/8/20 1927)   ondansetron (ZOFRAN) injection 4 mg (4 mg IntraVENous Given 1/8/20 1831)   famotidine (PF) (PEPCID) injection 20 mg (20 mg IntraVENous Given 1/8/20 1831)   dextrose 5% and 0.9% NaCl 1,000 mL with mvi, adult no. 4 with vit K 10 mL, thiamine 129 mg, folic acid 1 mg, magnesium sulfate 2 g infusion ( IntraVENous IV Completed 1/8/20 1945)   sodium chloride 0.9 % bolus infusion 1,000 mL (0 mL IntraVENous IV Completed 1/8/20 2354)   lactated ringers bolus infusion 1,000 mL (0 mL IntraVENous IV Completed 1/8/20 2354)   albuterol-ipratropium (DUO-NEB) 2.5 MG-0.5 MG/3 ML (3 mL Nebulization Given 1/8/20 2218)   LORazepam (ATIVAN) injection 0.5 mg (0.5 mg IntraVENous Given 1/8/20 2345)   albuterol-ipratropium (DUO-NEB) 2.5 MG-0.5 MG/3 ML (3 mL Nebulization Given 1/8/20 2345)   methylPREDNISolone (PF) (Solu-MEDROL) injection 125 mg (125 mg IntraVENous Given 1/8/20 2346)         Lab findings:  Recent Results (from the past 12 hour(s))   EKG, 12 LEAD, INITIAL    Collection Time: 01/08/20  5:43 PM   Result Value Ref Range    Ventricular Rate 115 BPM    Atrial Rate 115 BPM    P-R Interval 144 ms    QRS Duration 76 ms    Q-T Interval 314 ms    QTC Calculation (Bezet) 434 ms    Calculated P Axis 82 degrees    Calculated R Axis 80 degrees    Calculated T Axis 75 degrees    Diagnosis       Sinus tachycardia  Otherwise normal ECG  When compared with ECG of 09-MAY-2019 20:52,  No significant change was found     CBC WITH AUTOMATED DIFF    Collection Time: 01/08/20  5:45 PM   Result Value Ref Range    WBC 4.3 (L) 4.6 - 13.2 K/uL    RBC 3.53 (L) 4.20 - 5.30 M/uL    HGB 11.9 (L) 12.0 - 16.0 g/dL    HCT 36.1 35.0 - 45.0 %    .3 (H) 74.0 - 97.0 FL    MCH 33.7 24.0 - 34.0 PG    MCHC 33.0 31.0 - 37.0 g/dL    RDW 15.1 (H) 11.6 - 14.5 %    PLATELET 51 (L) 035 - 420 K/uL    MPV 13.0 (H) 9.2 - 11.8 FL    NEUTROPHILS 70 40 - 73 %    LYMPHOCYTES 16 (L) 21 - 52 %    MONOCYTES 14 (H) 3 - 10 %    EOSINOPHILS 0 0 - 5 %    BASOPHILS 0 0 - 2 %    ABS. NEUTROPHILS 3.0 1.8 - 8.0 K/UL    ABS. LYMPHOCYTES 0.7 (L) 0.9 - 3.6 K/UL    ABS. MONOCYTES 0.6 0.05 - 1.2 K/UL    ABS. EOSINOPHILS 0.0 0.0 - 0.4 K/UL    ABS.  BASOPHILS 0.0 0.0 - 0.1 K/UL    DF AUTOMATED     LIPASE    Collection Time: 01/08/20  5:45 PM   Result Value Ref Range    Lipase 366 73 - 393 U/L   MAGNESIUM    Collection Time: 01/08/20  5:45 PM   Result Value Ref Range    Magnesium 2.8 (H) 1.6 - 2.6 mg/dL   URINALYSIS W/ RFLX MICROSCOPIC    Collection Time: 01/08/20  6:05 PM   Result Value Ref Range    Color DARK YELLOW      Appearance TURBID      Specific gravity 1.022 1.005 - 1.030      pH (UA) 5.0 5.0 - 8.0      Protein 30 (A) NEG mg/dL    Glucose NEGATIVE  NEG mg/dL    Ketone 15 (A) NEG mg/dL    Bilirubin SMALL (A) NEG      Blood MODERATE (A) NEG      Urobilinogen 1.0 0.2 - 1.0 EU/dL    Nitrites NEGATIVE  NEG      Leukocyte Esterase SMALL (A) NEG     URINE MICROSCOPIC ONLY    Collection Time: 01/08/20  6:05 PM   Result Value Ref Range    WBC 6 to 8 0 - 4 /hpf    RBC 2 to 3 0 - 5 /hpf    Epithelial cells 2+ 0 - 5 /lpf    Bacteria 3+ (A) NEG /hpf    Yeast 1+ (A) NEG   METABOLIC PANEL, COMPREHENSIVE    Collection Time: 01/08/20  6:15 PM   Result Value Ref Range    Sodium 136 136 - 145 mmol/L    Potassium 3.8 3.5 - 5.5 mmol/L    Chloride 97 (L) 100 - 111 mmol/L    CO2 20 (L) 21 - 32 mmol/L    Anion gap 19 (H) 3.0 - 18 mmol/L    Glucose 86 74 - 99 mg/dL    BUN 21 (H) 7.0 - 18 MG/DL    Creatinine 2.87 (H) 0.6 - 1.3 MG/DL    BUN/Creatinine ratio 7 (L) 12 - 20      GFR est AA 21 (L) >60 ml/min/1.73m2    GFR est non-AA 17 (L) >60 ml/min/1.73m2    Calcium 9.2 8.5 - 10.1 MG/DL    Bilirubin, total 3.5 (H) 0.2 - 1.0 MG/DL    ALT (SGPT) 180 (H) 13 - 56 U/L    AST (SGOT) 333 (H) 10 - 38 U/L    Alk.  phosphatase 185 (H) 45 - 117 U/L    Protein, total 6.6 6.4 - 8.2 g/dL    Albumin 3.1 (L) 3.4 - 5.0 g/dL    Globulin 3.5 2.0 - 4.0 g/dL    A-G Ratio 0.9 0.8 - 1.7     PROTHROMBIN TIME + INR    Collection Time: 01/08/20  6:15 PM   Result Value Ref Range    Prothrombin time 14.3 11.5 - 15.2 sec    INR 1.1 0.8 - 1.2     ETHYL ALCOHOL    Collection Time: 01/08/20  6:15 PM   Result Value Ref Range    ALCOHOL(ETHYL),SERUM 25 (H) 0 - 3 MG/DL   POC LACTIC ACID    Collection Time: 01/08/20  9:12 PM   Result Value Ref Range    Lactic Acid (POC) 1.85 0.40 - 2.00 mmol/L   EKG, 12 LEAD, INITIAL    Collection Time: 01/08/20  9:35 PM   Result Value Ref Range    Ventricular Rate 111 BPM    QRS Duration 80 ms    Q-T Interval 328 ms    QTC Calculation (Bezet) 446 ms    Calculated R Axis 67 degrees    Calculated T Axis 62 degrees    Diagnosis       Accelerated Junctional rhythm  Abnormal ECG  When compared with ECG of 08-JAN-2020 17:43,  Junctional rhythm has replaced Sinus rhythm         EKG interpretation by ED Physician:  Sinus tach with no acute ST or T wave changes  Rate 115 QRS 76   No significant change from previous    Cardiac monitor: Tachycardia with regular rhythm and no ectopy  Pulse ox: 100% room air    X-Ray, CT or other radiology findings or impressions:  XR CHEST PORT    (Results Pending)   US ABD LTD    (Results Pending)   CT ABD PELV WO CONT (Results Pending)   Chest x-ray: No acute process per my interpretation  Ultrasound: Thickened gallbladder wall with cholelithiasis. No definite pericholecystic fluid. Negative sonographic Rojo's. Equivocal for cholecystitis. CT abdomen pelvis: Market fluid fat stranding around the miguelina hepatis and pancreatic head and body. Thickened gallbladder wall with gallstones. Difficult to discern the primary pathology. Favored to be pancreatitis with market kim-pancreatic edema and fluid. There could also be some biliary component given thickened gallbladder and gallstones. Also some degree of acute hepatitis. Finding of cirrhosis with portal hypertension. Diverticulosis. Progress notes, Consult notes or additional Procedure notes:         ED Critical Care Note    System at risk for life threatening failure: Neuro, cardiac, pulmonary, renal, hepatic  Associated problems: Septic shock, cholelithiasis, acute kidney injury, COPD    Critical Care services provided: Bedside management of septic shock, acute kidney injury, COPD, UTI, documentation, consultation, bedside reassessment  Excluded procedures (time not included in critical care): EKG interpretation    Total Critical Care Time (in minutes) 130    Discussed with Dr. Durga Hoffman who is on-call for the intensive care unit with whom I reviewed labs and imaging results along with current clinical situation who will arrange consult. Discussed with Dr. Marisela Fairbanks on-call for general surgery    Discussed with Dr. Yousuf Lowery on-call for GI who will also consult. Discussed with Dr. Yogi Contreras on-call for the hospitalist service who will admit    Symptoms likely that her gallbladder would be the actual source of her infection. Still feel like this is more of a volume related issue given her significant kidney injury. Despite 4 L of fluid patient was still persistently hypotensive and required vasopressor support.   Patient has good peripheral access and can continue with his current regimen as anticipate that she will be be able to weaned off her Levophed very soon. Reevaluation of patient:   stable    Disposition:  Diagnosis:   1. Septic shock ([facility]    2. Acute abdominal pain in right upper quadrant    3. Non-intractable vomiting with nausea, unspecified vomiting type    4. HENRRY (acute kidney injury) ([facility]    5. Calculus of gallbladder with cholecystitis without biliary obstruction, unspecified cholecystitis acuity    6. Acute UTI    7. COPD with acute exacerbation ([facility]        Disposition: admit      Follow-up Information    None           Patient's Medications   Start Taking    No medications on file   Continue Taking    ALBUTEROL (PROVENTIL HFA) 90 MCG/ACTUATION INHALER    Take 1-2 Puffs by inhalation. BUDESONIDE-FORMOTEROL (SYMBICORT) 160-4.5 MCG/ACTUATION HFAA    Take 2 Puffs by inhalation two (2) times a day. BUSPIRONE (BUSPAR) 10 MG TABLET    Take 10 mg by mouth two (2) times daily as needed (take one to three tabs po bid as needed). Indications: Repeated Episodes of Anxiety    ESCITALOPRAM OXALATE (LEXAPRO) 10 MG TABLET    Take 10 mg by mouth. FAMOTIDINE (PEPCID) 20 MG TABLET    Take 1 Tab by mouth two (2) times a day. MAGNESIUM OXIDE (MAG-OX) 400 MG TABLET    Take 1 Tab by mouth daily. NAPROXEN SODIUM (ALEVE) 220 MG TABLET    Take 220 mg by mouth as needed. OMEPRAZOLE (PRILOSEC) 40 MG CAPSULE    Take 40 mg by mouth daily. ONDANSETRON (ZOFRAN ODT) 4 MG DISINTEGRATING TABLET    Take 1 Tab by mouth every eight (8) hours as needed for Nausea. RISPERIDONE (RISPERDAL) 2 MG TABLET    Take 2 mg by mouth daily. Indications: Bipolar Disorder in Remission    THIAMINE HCL (B-1) 100 MG TABLET    Take 1 Tab by mouth daily. TIOTROPIUM BROMIDE (SPIRIVA RESPIMAT) 2.5 MCG/ACTUATION INHALER    Take 1 Puff by inhalation daily.    These Medications have changed    No medications on file   Stop Taking    No medications on file

## 2020-01-09 PROBLEM — D69.6 THROMBOCYTOPENIA (HCC): Status: ACTIVE | Noted: 2020-01-09

## 2020-01-09 PROBLEM — D53.9 MACROCYTIC ANEMIA: Status: ACTIVE | Noted: 2020-01-09

## 2020-01-09 PROBLEM — D72.819 LEUKOPENIA: Status: ACTIVE | Noted: 2020-01-09

## 2020-01-09 PROBLEM — J96.00 ACUTE RESPIRATORY FAILURE (HCC): Status: ACTIVE | Noted: 2020-01-09

## 2020-01-09 PROBLEM — N39.0 UTI (URINARY TRACT INFECTION): Status: ACTIVE | Noted: 2020-01-09

## 2020-01-09 PROBLEM — E87.6 HYPOKALEMIA: Status: ACTIVE | Noted: 2020-01-09

## 2020-01-09 PROBLEM — K74.60 CIRRHOSIS (HCC): Status: ACTIVE | Noted: 2020-01-09

## 2020-01-09 PROBLEM — I95.9 ACUTE HYPOTENSION: Status: ACTIVE | Noted: 2020-01-09

## 2020-01-09 PROBLEM — D61.818 PANCYTOPENIA (HCC): Status: ACTIVE | Noted: 2020-01-09

## 2020-01-09 PROBLEM — N17.9 AKI (ACUTE KIDNEY INJURY) (HCC): Status: ACTIVE | Noted: 2020-01-09

## 2020-01-09 LAB
ALBUMIN SERPL-MCNC: 2.4 G/DL (ref 3.4–5)
ALBUMIN/GLOB SERPL: 0.8 {RATIO} (ref 0.8–1.7)
ALP SERPL-CCNC: 141 U/L (ref 45–117)
ALT SERPL-CCNC: 165 U/L (ref 13–56)
AMMONIA PLAS-SCNC: 55 UMOL/L (ref 11–32)
ANION GAP SERPL CALC-SCNC: 13 MMOL/L (ref 3–18)
ANION GAP SERPL CALC-SCNC: 9 MMOL/L (ref 3–18)
APPEARANCE UR: CLEAR
AST SERPL-CCNC: 256 U/L (ref 10–38)
ATRIAL RATE: 115 BPM
BACTERIA URNS QL MICRO: ABNORMAL /HPF
BASOPHILS # BLD: 0 K/UL (ref 0–0.1)
BASOPHILS NFR BLD: 0 % (ref 0–2)
BILIRUB SERPL-MCNC: 2.7 MG/DL (ref 0.2–1)
BILIRUB UR QL: NEGATIVE
BUN SERPL-MCNC: 13 MG/DL (ref 7–18)
BUN SERPL-MCNC: 17 MG/DL (ref 7–18)
BUN/CREAT SERPL: 11 (ref 12–20)
BUN/CREAT SERPL: 9 (ref 12–20)
CALCIUM SERPL-MCNC: 7.3 MG/DL (ref 8.5–10.1)
CALCIUM SERPL-MCNC: 7.6 MG/DL (ref 8.5–10.1)
CALCULATED P AXIS, ECG09: 82 DEGREES
CALCULATED R AXIS, ECG10: 67 DEGREES
CALCULATED R AXIS, ECG10: 80 DEGREES
CALCULATED T AXIS, ECG11: 62 DEGREES
CALCULATED T AXIS, ECG11: 75 DEGREES
CHLORIDE SERPL-SCNC: 106 MMOL/L (ref 100–111)
CHLORIDE SERPL-SCNC: 106 MMOL/L (ref 100–111)
CO2 SERPL-SCNC: 21 MMOL/L (ref 21–32)
CO2 SERPL-SCNC: 25 MMOL/L (ref 21–32)
COLOR UR: YELLOW
CREAT SERPL-MCNC: 1.23 MG/DL (ref 0.6–1.3)
CREAT SERPL-MCNC: 1.85 MG/DL (ref 0.6–1.3)
DIAGNOSIS, 93000: NORMAL
DIAGNOSIS, 93000: NORMAL
DIFFERENTIAL METHOD BLD: ABNORMAL
EOSINOPHIL # BLD: 0 K/UL (ref 0–0.4)
EOSINOPHIL NFR BLD: 0 % (ref 0–5)
EPITH CASTS URNS QL MICRO: ABNORMAL /LPF (ref 0–5)
ERYTHROCYTE [DISTWIDTH] IN BLOOD BY AUTOMATED COUNT: 15 % (ref 11.6–14.5)
GLOBULIN SER CALC-MCNC: 3 G/DL (ref 2–4)
GLUCOSE SERPL-MCNC: 117 MG/DL (ref 74–99)
GLUCOSE SERPL-MCNC: 184 MG/DL (ref 74–99)
GLUCOSE UR STRIP.AUTO-MCNC: >1000 MG/DL
HCT VFR BLD AUTO: 31.1 % (ref 35–45)
HGB BLD-MCNC: 10.2 G/DL (ref 12–16)
HGB UR QL STRIP: ABNORMAL
KETONES UR QL STRIP.AUTO: 15 MG/DL
LEUKOCYTE ESTERASE UR QL STRIP.AUTO: NEGATIVE
LYMPHOCYTES # BLD: 0.2 K/UL (ref 0.9–3.6)
LYMPHOCYTES NFR BLD: 5 % (ref 21–52)
MCH RBC QN AUTO: 33.9 PG (ref 24–34)
MCHC RBC AUTO-ENTMCNC: 32.8 G/DL (ref 31–37)
MCV RBC AUTO: 103.3 FL (ref 74–97)
MONOCYTES # BLD: 0.5 K/UL (ref 0.05–1.2)
MONOCYTES NFR BLD: 14 % (ref 3–10)
NEUTS SEG # BLD: 2.7 K/UL (ref 1.8–8)
NEUTS SEG NFR BLD: 81 % (ref 40–73)
NITRITE UR QL STRIP.AUTO: NEGATIVE
P-R INTERVAL, ECG05: 144 MS
PH UR STRIP: 5 [PH] (ref 5–8)
PHOSPHATE SERPL-MCNC: 0.5 MG/DL (ref 2.5–4.9)
PLATELET # BLD AUTO: 45 K/UL (ref 135–420)
PMV BLD AUTO: 12.7 FL (ref 9.2–11.8)
POTASSIUM SERPL-SCNC: 3 MMOL/L (ref 3.5–5.5)
POTASSIUM SERPL-SCNC: 3.1 MMOL/L (ref 3.5–5.5)
POTASSIUM SERPL-SCNC: 3.2 MMOL/L (ref 3.5–5.5)
PROT SERPL-MCNC: 5.4 G/DL (ref 6.4–8.2)
PROT UR STRIP-MCNC: ABNORMAL MG/DL
Q-T INTERVAL, ECG07: 314 MS
Q-T INTERVAL, ECG07: 328 MS
QRS DURATION, ECG06: 76 MS
QRS DURATION, ECG06: 80 MS
QTC CALCULATION (BEZET), ECG08: 434 MS
QTC CALCULATION (BEZET), ECG08: 446 MS
RBC # BLD AUTO: 3.01 M/UL (ref 4.2–5.3)
RBC #/AREA URNS HPF: ABNORMAL /HPF (ref 0–5)
SODIUM SERPL-SCNC: 140 MMOL/L (ref 136–145)
SODIUM SERPL-SCNC: 140 MMOL/L (ref 136–145)
SP GR UR REFRACTOMETRY: 1.02 (ref 1–1.03)
UROBILINOGEN UR QL STRIP.AUTO: 1 EU/DL (ref 0.2–1)
VENTRICULAR RATE, ECG03: 111 BPM
VENTRICULAR RATE, ECG03: 115 BPM
WBC # BLD AUTO: 3.3 K/UL (ref 4.6–13.2)
WBC URNS QL MICRO: ABNORMAL /HPF (ref 0–4)
YEAST URNS QL MICRO: ABNORMAL

## 2020-01-09 PROCEDURE — 36415 COLL VENOUS BLD VENIPUNCTURE: CPT

## 2020-01-09 PROCEDURE — 74011000250 HC RX REV CODE- 250: Performed by: INTERNAL MEDICINE

## 2020-01-09 PROCEDURE — 74011250637 HC RX REV CODE- 250/637: Performed by: EMERGENCY MEDICINE

## 2020-01-09 PROCEDURE — 74011000258 HC RX REV CODE- 258: Performed by: EMERGENCY MEDICINE

## 2020-01-09 PROCEDURE — 74011250636 HC RX REV CODE- 250/636: Performed by: EMERGENCY MEDICINE

## 2020-01-09 PROCEDURE — 77030037877 HC DRSG MEPILEX >48IN BORD MOLN -A

## 2020-01-09 PROCEDURE — 94640 AIRWAY INHALATION TREATMENT: CPT

## 2020-01-09 PROCEDURE — 74011250637 HC RX REV CODE- 250/637: Performed by: INTERNAL MEDICINE

## 2020-01-09 PROCEDURE — 85025 COMPLETE CBC W/AUTO DIFF WBC: CPT

## 2020-01-09 PROCEDURE — 74011250636 HC RX REV CODE- 250/636: Performed by: INTERNAL MEDICINE

## 2020-01-09 PROCEDURE — 94762 N-INVAS EAR/PLS OXIMTRY CONT: CPT

## 2020-01-09 PROCEDURE — 84100 ASSAY OF PHOSPHORUS: CPT

## 2020-01-09 PROCEDURE — 74011000258 HC RX REV CODE- 258: Performed by: INTERNAL MEDICINE

## 2020-01-09 PROCEDURE — 80053 COMPREHEN METABOLIC PANEL: CPT

## 2020-01-09 PROCEDURE — 65610000006 HC RM INTENSIVE CARE

## 2020-01-09 PROCEDURE — 81001 URINALYSIS AUTO W/SCOPE: CPT

## 2020-01-09 PROCEDURE — 82140 ASSAY OF AMMONIA: CPT

## 2020-01-09 PROCEDURE — 77010033678 HC OXYGEN DAILY

## 2020-01-09 PROCEDURE — 97166 OT EVAL MOD COMPLEX 45 MIN: CPT

## 2020-01-09 PROCEDURE — 74011000250 HC RX REV CODE- 250: Performed by: EMERGENCY MEDICINE

## 2020-01-09 PROCEDURE — 74011250637 HC RX REV CODE- 250/637: Performed by: HOSPITALIST

## 2020-01-09 PROCEDURE — 84132 ASSAY OF SERUM POTASSIUM: CPT

## 2020-01-09 PROCEDURE — 77030038269 HC DRN EXT URIN PURWCK BARD -A

## 2020-01-09 PROCEDURE — 74011250636 HC RX REV CODE- 250/636: Performed by: HOSPITALIST

## 2020-01-09 RX ORDER — BUSPIRONE HYDROCHLORIDE 10 MG/1
10 TABLET ORAL
Status: DISCONTINUED | OUTPATIENT
Start: 2020-01-09 | End: 2020-01-21 | Stop reason: HOSPADM

## 2020-01-09 RX ORDER — POTASSIUM CHLORIDE 750 MG/1
10 TABLET, EXTENDED RELEASE ORAL
Status: COMPLETED | OUTPATIENT
Start: 2020-01-09 | End: 2020-01-09

## 2020-01-09 RX ORDER — ASPIRIN 325 MG/1
100 TABLET, FILM COATED ORAL DAILY
Status: DISCONTINUED | OUTPATIENT
Start: 2020-01-09 | End: 2020-01-21 | Stop reason: HOSPADM

## 2020-01-09 RX ORDER — LANOLIN ALCOHOL/MO/W.PET/CERES
400 CREAM (GRAM) TOPICAL DAILY
Status: DISCONTINUED | OUTPATIENT
Start: 2020-01-09 | End: 2020-01-11

## 2020-01-09 RX ORDER — LANOLIN ALCOHOL/MO/W.PET/CERES
12 CREAM (GRAM) TOPICAL
Status: DISCONTINUED | OUTPATIENT
Start: 2020-01-09 | End: 2020-01-21 | Stop reason: HOSPADM

## 2020-01-09 RX ORDER — FOLIC ACID 1 MG/1
1 TABLET ORAL DAILY
Status: DISCONTINUED | OUTPATIENT
Start: 2020-01-09 | End: 2020-01-21 | Stop reason: HOSPADM

## 2020-01-09 RX ORDER — IPRATROPIUM BROMIDE 0.5 MG/2.5ML
0.5 SOLUTION RESPIRATORY (INHALATION)
Status: DISCONTINUED | OUTPATIENT
Start: 2020-01-09 | End: 2020-01-21 | Stop reason: HOSPADM

## 2020-01-09 RX ORDER — LORAZEPAM 2 MG/ML
3 INJECTION INTRAMUSCULAR
Status: DISCONTINUED | OUTPATIENT
Start: 2020-01-09 | End: 2020-01-21 | Stop reason: HOSPADM

## 2020-01-09 RX ORDER — RISPERIDONE 2 MG/1
2 TABLET, FILM COATED ORAL DAILY
Status: DISCONTINUED | OUTPATIENT
Start: 2020-01-09 | End: 2020-01-21 | Stop reason: HOSPADM

## 2020-01-09 RX ORDER — BUDESONIDE AND FORMOTEROL FUMARATE DIHYDRATE 160; 4.5 UG/1; UG/1
2 AEROSOL RESPIRATORY (INHALATION) 2 TIMES DAILY
Status: DISCONTINUED | OUTPATIENT
Start: 2020-01-09 | End: 2020-01-09

## 2020-01-09 RX ORDER — FAMOTIDINE 20 MG/1
20 TABLET, FILM COATED ORAL 2 TIMES DAILY
Status: DISCONTINUED | OUTPATIENT
Start: 2020-01-09 | End: 2020-01-11

## 2020-01-09 RX ORDER — BUDESONIDE 0.5 MG/2ML
500 INHALANT ORAL
Status: DISCONTINUED | OUTPATIENT
Start: 2020-01-09 | End: 2020-01-21 | Stop reason: HOSPADM

## 2020-01-09 RX ORDER — IBUPROFEN 200 MG
1 TABLET ORAL DAILY
Status: DISCONTINUED | OUTPATIENT
Start: 2020-01-09 | End: 2020-01-21 | Stop reason: HOSPADM

## 2020-01-09 RX ORDER — ACETAMINOPHEN 500 MG
500 TABLET ORAL
Status: DISCONTINUED | OUTPATIENT
Start: 2020-01-09 | End: 2020-01-21 | Stop reason: HOSPADM

## 2020-01-09 RX ORDER — POTASSIUM CHLORIDE 7.45 MG/ML
10 INJECTION INTRAVENOUS
Status: DISPENSED | OUTPATIENT
Start: 2020-01-10 | End: 2020-01-10

## 2020-01-09 RX ORDER — LORAZEPAM 1 MG/1
2 TABLET ORAL
Status: DISCONTINUED | OUTPATIENT
Start: 2020-01-09 | End: 2020-01-21 | Stop reason: HOSPADM

## 2020-01-09 RX ORDER — ARFORMOTEROL TARTRATE 15 UG/2ML
15 SOLUTION RESPIRATORY (INHALATION)
Status: DISCONTINUED | OUTPATIENT
Start: 2020-01-09 | End: 2020-01-21 | Stop reason: HOSPADM

## 2020-01-09 RX ORDER — SODIUM CHLORIDE, SODIUM LACTATE, POTASSIUM CHLORIDE, CALCIUM CHLORIDE 600; 310; 30; 20 MG/100ML; MG/100ML; MG/100ML; MG/100ML
150 INJECTION, SOLUTION INTRAVENOUS CONTINUOUS
Status: DISCONTINUED | OUTPATIENT
Start: 2020-01-09 | End: 2020-01-12

## 2020-01-09 RX ORDER — ESCITALOPRAM OXALATE 10 MG/1
10 TABLET ORAL DAILY
Status: DISCONTINUED | OUTPATIENT
Start: 2020-01-09 | End: 2020-01-21 | Stop reason: HOSPADM

## 2020-01-09 RX ORDER — LORAZEPAM 2 MG/ML
2 INJECTION INTRAMUSCULAR
Status: DISCONTINUED | OUTPATIENT
Start: 2020-01-09 | End: 2020-01-21 | Stop reason: HOSPADM

## 2020-01-09 RX ORDER — IPRATROPIUM BROMIDE AND ALBUTEROL SULFATE 2.5; .5 MG/3ML; MG/3ML
3 SOLUTION RESPIRATORY (INHALATION)
Status: DISCONTINUED | OUTPATIENT
Start: 2020-01-09 | End: 2020-01-09

## 2020-01-09 RX ORDER — LORAZEPAM 1 MG/1
1 TABLET ORAL
Status: DISCONTINUED | OUTPATIENT
Start: 2020-01-09 | End: 2020-01-21 | Stop reason: HOSPADM

## 2020-01-09 RX ORDER — ONDANSETRON 2 MG/ML
4 INJECTION INTRAMUSCULAR; INTRAVENOUS
Status: DISCONTINUED | OUTPATIENT
Start: 2020-01-09 | End: 2020-01-21 | Stop reason: HOSPADM

## 2020-01-09 RX ORDER — PANTOPRAZOLE SODIUM 40 MG/1
40 TABLET, DELAYED RELEASE ORAL
Status: DISCONTINUED | OUTPATIENT
Start: 2020-01-09 | End: 2020-01-09

## 2020-01-09 RX ORDER — LORAZEPAM 2 MG/ML
1 INJECTION INTRAMUSCULAR
Status: DISCONTINUED | OUTPATIENT
Start: 2020-01-09 | End: 2020-01-21 | Stop reason: HOSPADM

## 2020-01-09 RX ORDER — PHENYLEPHRINE HCL IN 0.9% NACL 30MG/250ML
10-100 PLASTIC BAG, INJECTION (ML) INTRAVENOUS
Status: DISCONTINUED | OUTPATIENT
Start: 2020-01-09 | End: 2020-01-11

## 2020-01-09 RX ADMIN — SODIUM CHLORIDE, SODIUM LACTATE, POTASSIUM CHLORIDE, AND CALCIUM CHLORIDE 150 ML/HR: 600; 310; 30; 20 INJECTION, SOLUTION INTRAVENOUS at 07:21

## 2020-01-09 RX ADMIN — LORAZEPAM 2 MG: 1 TABLET ORAL at 16:35

## 2020-01-09 RX ADMIN — ARFORMOTEROL TARTRATE 15 MCG: 15 SOLUTION RESPIRATORY (INHALATION) at 10:43

## 2020-01-09 RX ADMIN — POTASSIUM CHLORIDE 10 MEQ: 10 TABLET, EXTENDED RELEASE ORAL at 15:28

## 2020-01-09 RX ADMIN — FOLIC ACID 1 MG: 1 TABLET ORAL at 12:25

## 2020-01-09 RX ADMIN — PANTOPRAZOLE SODIUM 40 MG: 40 TABLET, DELAYED RELEASE ORAL at 08:38

## 2020-01-09 RX ADMIN — PIPERACILLIN AND TAZOBACTAM 3.38 G: 3; .375 INJECTION, POWDER, LYOPHILIZED, FOR SOLUTION INTRAVENOUS at 04:58

## 2020-01-09 RX ADMIN — POTASSIUM BICARBONATE 40 MEQ: 782 TABLET, EFFERVESCENT ORAL at 04:57

## 2020-01-09 RX ADMIN — RISPERIDONE 2 MG: 0.5 TABLET, FILM COATED ORAL at 08:37

## 2020-01-09 RX ADMIN — POTASSIUM CHLORIDE 10 MEQ: 10 TABLET, EXTENDED RELEASE ORAL at 16:35

## 2020-01-09 RX ADMIN — POTASSIUM CHLORIDE 10 MEQ: 10 TABLET, EXTENDED RELEASE ORAL at 16:07

## 2020-01-09 RX ADMIN — SODIUM CHLORIDE, SODIUM LACTATE, POTASSIUM CHLORIDE, AND CALCIUM CHLORIDE 150 ML/HR: 600; 310; 30; 20 INJECTION, SOLUTION INTRAVENOUS at 22:38

## 2020-01-09 RX ADMIN — BUDESONIDE 500 MCG: 0.5 SUSPENSION RESPIRATORY (INHALATION) at 20:43

## 2020-01-09 RX ADMIN — PIPERACILLIN AND TAZOBACTAM 3.38 G: 3; .375 INJECTION, POWDER, LYOPHILIZED, FOR SOLUTION INTRAVENOUS at 16:36

## 2020-01-09 RX ADMIN — LORAZEPAM 1 MG: 1 TABLET ORAL at 12:25

## 2020-01-09 RX ADMIN — POTASSIUM CHLORIDE 10 MEQ: 7.46 INJECTION, SOLUTION INTRAVENOUS at 23:18

## 2020-01-09 RX ADMIN — FAMOTIDINE 20 MG: 20 TABLET, FILM COATED ORAL at 08:41

## 2020-01-09 RX ADMIN — LACTULOSE 15 ML: 20 SOLUTION ORAL at 22:37

## 2020-01-09 RX ADMIN — BUDESONIDE 500 MCG: 0.5 SUSPENSION RESPIRATORY (INHALATION) at 10:43

## 2020-01-09 RX ADMIN — SODIUM CHLORIDE, SODIUM LACTATE, POTASSIUM CHLORIDE, AND CALCIUM CHLORIDE 150 ML/HR: 600; 310; 30; 20 INJECTION, SOLUTION INTRAVENOUS at 14:43

## 2020-01-09 RX ADMIN — NOREPINEPHRINE BITARTRATE 7 MCG/MIN: 1 INJECTION INTRAVENOUS at 08:55

## 2020-01-09 RX ADMIN — FAMOTIDINE 20 MG: 20 TABLET, FILM COATED ORAL at 18:03

## 2020-01-09 RX ADMIN — POTASSIUM CHLORIDE 10 MEQ: 10 TABLET, EXTENDED RELEASE ORAL at 14:17

## 2020-01-09 RX ADMIN — POTASSIUM CHLORIDE 10 MEQ: 10 TABLET, EXTENDED RELEASE ORAL at 18:03

## 2020-01-09 RX ADMIN — SODIUM CHLORIDE 30 MMOL: 900 INJECTION, SOLUTION INTRAVENOUS at 20:58

## 2020-01-09 RX ADMIN — PIPERACILLIN AND TAZOBACTAM 3.38 G: 3; .375 INJECTION, POWDER, LYOPHILIZED, FOR SOLUTION INTRAVENOUS at 08:53

## 2020-01-09 RX ADMIN — ESCITALOPRAM 10 MG: 10 TABLET, FILM COATED ORAL at 08:39

## 2020-01-09 RX ADMIN — ARFORMOTEROL TARTRATE 15 MCG: 15 SOLUTION RESPIRATORY (INHALATION) at 20:43

## 2020-01-09 RX ADMIN — NOREPINEPHRINE BITARTRATE 5 MCG/MIN: 1 INJECTION INTRAVENOUS at 08:56

## 2020-01-09 RX ADMIN — Medication 45 MCG/MIN: at 23:01

## 2020-01-09 RX ADMIN — SODIUM CHLORIDE, SODIUM LACTATE, POTASSIUM CHLORIDE, AND CALCIUM CHLORIDE 150 ML/HR: 600; 310; 30; 20 INJECTION, SOLUTION INTRAVENOUS at 01:06

## 2020-01-09 RX ADMIN — Medication 400 MG: at 08:40

## 2020-01-09 RX ADMIN — Medication 30 MCG/MIN: at 12:29

## 2020-01-09 RX ADMIN — TIOTROPIUM BROMIDE INHALATION SPRAY 1 PUFF: 3.12 SPRAY, METERED RESPIRATORY (INHALATION) at 08:37

## 2020-01-09 RX ADMIN — Medication 100 MG: at 08:40

## 2020-01-09 NOTE — ED NOTES
TRANSFER - ED to INPATIENT REPORT:    SBAR report made available to receiving floor on this patient being transferred to [address] [place] [address] ICU [id number])  for routine progression of care       Admitting diagnosis Acute hypotension [I95.9]  Macrocytic anemia [D53.9]  Thrombocytopenia (HCC) [D69.6]  Hypokalemia [E87.6]  HENRRY (acute kidney injury) ([facility] [N17.9]  Leukopenia [D72.819]  Pancytopenia ([facility] [D61.818]  UTI (urinary tract infection) [N39.0]    Information from the following report(s) SBAR was made available to receiving floor. Lines:   Peripheral IV 01/08/20 Left Antecubital (Active)   Site Assessment Clean, dry, & intact 1/8/2020  6:26 PM   Phlebitis Assessment 0 1/8/2020  6:26 PM   Infiltration Assessment 0 1/8/2020  6:26 PM   Dressing Status Clean, dry, & intact 1/8/2020  6:26 PM   Dressing Type Transparent 1/8/2020  6:26 PM   Hub Color/Line Status Pink;Patent; Flushed 1/8/2020  6:26 PM       Peripheral IV 01/08/20 Right Wrist (Active)   Site Assessment Clean, dry, & intact 1/8/2020  9:25 PM   Phlebitis Assessment 0 1/8/2020  9:25 PM   Infiltration Assessment 0 1/8/2020  9:25 PM   Dressing Status Clean, dry, & intact 1/8/2020  9:25 PM   Hub Color/Line Status Green 1/8/2020  9:25 PM        Medication list confirmed with patient    Opportunity for questions and clarification was provided. Patient is oriented to time, place, person and situation.   Patient is  continent and ambulatory with assist     Valuables transported with patient     Patient transported with:   Monitor  O2 @ 3 liters  Registered Nurse    MAP (Monitor): 72 =Monitored (most recent)  Vitals w/ MEWS Score (last day)     Date/Time MEWS Score Pulse Resp Temp BP Level of Consciousness SpO2    01/09/20 0345    94  20    103/63    94 %    01/09/20 0330    (!) 105  17    106/64    93 %    01/09/20 0315    (!) 102  18    104/60    93 %    01/09/20 0310    (!) 108  19    109/63    92 %    01/09/20 0305    (!) 115  23    109/64    92 % 01/09/20 0300    (!) 142  22    95/47    91 %    01/09/20 0255    (!) 148  22    129/86    92 %    01/09/20 0245    (!) 101  19    100/57    96 %    01/09/20 0240    (!) 101  19    99/58    96 %    01/09/20 0230    (!) 105  16    103/57    95 %    01/09/20 0220    (!) 134  21    119/70    98 %    01/09/20 0215    100  19    99/52    96 %    01/09/20 0210    100  18    94/53    97 %    01/09/20 0200    (!) 102  18    95/51    97 %    01/09/20 0150    (!) 105  17    95/52    97 %    01/09/20 0145    (!) 107  17    99/52    97 %    01/09/20 0140    (!) 108  18    104/60    96 %    01/09/20 0135    (!) 132  22    110/59    96 %    01/09/20 0132    (!) 105  19    98/60    97 %    01/09/20 0130    (!) 106  19    90/46    96 %    01/09/20 0125    (!) 110  25    90/49    97 %    01/09/20 0120    (!) 113  20    99/54    97 %    01/09/20 0115    (!) 113  26    104/55    96 %    01/09/20 0110    (!) 109  16        96 %    01/09/20 0100    (!) 108  19    (!) 88/54    96 %    01/09/20 0053    (!) 115  19    103/56    94 %    01/09/20 0045    (!) 115  18    99/51    96 %    01/09/20 0036    (!) 121  22    100/54    95 %    01/09/20 0030    (!) 130  23    (!) 88/53    97 %    01/09/20 0026    (!) 130  22    94/55    97 %    01/09/20 0021    (!) 129  20    (!) 111/93    92 %    01/09/20 0015    (!) 117  20    (!) 85/47    96 %    01/09/20 0000    (!) 122  14    (!) 87/43    99 %    01/08/20 2355    (!) 117  18    (!) 77/44    98 %    01/08/20 2345    (!) 116  18    (!) 83/44    96 %    01/08/20 2330    (!) 122  21    (!) 85/45    96 %    01/08/20 2329    (!) 114  12    90/45    98 %    01/08/20 2319    (!) 115  16    90/45    95 %    01/08/20 2245    (!) 112  15    (!) 82/46    99 %    01/08/20 2236    (!) 115  17    (!) 83/44    (!) 82 %    01/08/20 2230    (!) 118  17    (!) 89/50    (!) 82 %    01/08/20 2217    (!) 118 17    (!) 82/47    92 %    01/08/20 2215    (!) 107  16    (!) 70/38    97 %    01/08/20 2203    (!) 112  14    (!) 81/43    90 %    01/08/20 2115    (!) 127  24    91/57    (!) 88 %    01/08/20 2103    (!) 110  20    94/67    (!) 88 %    01/08/20 2100    (!) 119  17    (!) 80/28    (!) 88 %    01/08/20 2030          (!) 89/47        01/08/20 1930    (!) 111  18    (!) 85/47    90 %    01/08/20 1915    (!) 113  17    (!) 86/50    93 %    01/08/20 1902    (!) 112  19    95/54    93 %    01/08/20 1900    (!) 110  18    (!) 88/49    93 %    01/08/20 1845    (!) 109  17    (!) 76/60    97 %    01/08/20 1830    (!) 109  13    (!) 88/56    95 %    01/08/20 18:23:49              95 %    01/08/20 1817    (!) 119  19    (!) 86/48    94 %    01/08/20 1739  4  (!) 120  18  97.9 °F (36.6 °C)  100/80  Alert  100 %              Septic Patients:     Lactic Acid  Lab Results   Component Value Date    LACPOC 1.85 01/08/2020    LACPOC 2.10 ([facility]) 05/10/2019

## 2020-01-09 NOTE — CONSULTS
Consult Note    Patient: Linn Connolly               Sex: female          DOA: 1/8/2020         YOB: 1965      Age:  53 y.o.        LOS:  LOS: 0 days              HPI:     Linn Connolly is a 53 y.o. female with a longstanding hx of ETOH cirrhosis (>10 years) and COPD sent to the ER by her psychiatrist due to hypotension noted in the office. The patient complained of shortness of breath and cough. She also complained of RUQ pain, nausea and one episode of emesis yesterday. A CT in the ER showed gallstones with GB wall thickening and inflammatory changes around the duodenum and head of the pancreas. There was a small amount of perihepatic ascites. The patient admits to ongoing drinking of Vodka at night (three shots). She takes Aleve prn. Past Medical History:   Diagnosis Date    Bipolar affective ([address]     Chronic obstructive pulmonary disease ([facility]     Cirrhosis ([facility]     ETOH abuse     Hyperlipidemia 11/20/2009    Left breast mass 05/13/2011    U/S Left Breast: No definite mass identified. Recommended recheck in 6 months    Manic depression ([address]     Panic disorder     Vitamin D insufficiency 11/20/2009    23 ng/mL    Wrist fracture, right 01/06/2010    Non-Displaced Distal Radius/Ulnar Styloid Fx       History reviewed. No pertinent surgical history. History reviewed. No pertinent family history. Social History     Socioeconomic History    Marital status:      Spouse name: Not on file    Number of children: Not on file    Years of education: Not on file    Highest education level: Not on file   Tobacco Use    Smoking status: Former Smoker     Packs/day: 1.00     Years: 40.00     Pack years: 40.00    Smokeless tobacco: Former User     Quit date: 06/2016   Substance and Sexual Activity    Alcohol use: No     Comment: quit since June 2016    Drug use: No       Prior to Admission medications    Medication Sig Start Date End Date Taking?  Authorizing Provider naproxen sodium (ALEVE) 220 mg tablet Take 220 mg by mouth as needed. Yes Provider, Historical   albuterol (PROVENTIL HFA) 90 mcg/actuation inhaler Take 1-2 Puffs by inhalation. 10/21/18  Yes Provider, Historical   omeprazole (PRILOSEC) 40 mg capsule Take 40 mg by mouth daily. Yes Provider, Historical   risperiDONE (RISPERDAL) 2 mg tablet Take 2 mg by mouth daily. Indications: Bipolar Disorder in Remission   Yes Kevin Alvarado MD   famotidine (PEPCID) 20 mg tablet Take 1 Tab by mouth two (2) times a day. 5/10/19  Yes Tonya Gresham MD   ondansetron (ZOFRAN ODT) 4 mg disintegrating tablet Take 1 Tab by mouth every eight (8) hours as needed for Nausea. 5/10/19  Yes Tonya Gresham MD   escitalopram oxalate (LEXAPRO) 10 mg tablet Take 10 mg by mouth. Provider, Historical   busPIRone (BUSPAR) 10 mg tablet Take 10 mg by mouth two (2) times daily as needed (take one to three tabs po bid as needed). Indications: Repeated Episodes of Anxiety    Kevin Alvarado MD   budesonide-formoterol (SYMBICORT) 160-4.5 mcg/actuation HFAA Take 2 Puffs by inhalation two (2) times a day. 5/13/19   Luis Crawford NP   tiotropium bromide (SPIRIVA RESPIMAT) 2.5 mcg/actuation inhaler Take 1 Puff by inhalation daily. 5/13/19   Luis Crawford NP   magnesium oxide (MAG-OX) 400 mg tablet Take 1 Tab by mouth daily. 5/10/19   Tonya Gresham MD   thiamine HCL (B-1) 100 mg tablet Take 1 Tab by mouth daily. 5/10/19   Tonya Gresham MD       Allergies   Allergen Reactions    Animal Dander Itching    Egg Nausea and Vomiting       Review of Systems  A comprehensive review of systems was negative except for that written in the History of Present Illness.       Physical Exam:      Visit Vitals  BP 90/48   Pulse (!) 120   Temp 98.1 °F (36.7 °C)   Resp 25   Ht 5' 6\" (1.676 m)   Wt 71.7 kg (158 lb)   SpO2 95%   BMI 25.50 kg/m²       Physical Exam:  Constitutional: negative  Eyes: negative  Ears, nose, mouth, throat, and face: negative  Respiratory: negative  Cardiovascular: negative  Gastrointestinal: soft, mildly tender in the RUQ, no masses  Genitourinary:negative  Integument/breast: negative  Hematologic/lymphatic: negative  Musculoskeletal:negative  Neurological: negative    Labs Reviewed:  BMP:   Lab Results   Component Value Date/Time     01/09/2020 12:02 PM    K 3.1 (L) 01/09/2020 12:02 PM     01/09/2020 12:02 PM    CO2 25 01/09/2020 12:02 PM    AGAP 9 01/09/2020 12:02 PM     (H) 01/09/2020 12:02 PM    BUN 13 01/09/2020 12:02 PM    CREA 1.23 01/09/2020 12:02 PM    GFRAA 55 (L) 01/09/2020 12:02 PM    GFRNA 45 (L) 01/09/2020 12:02 PM     CMP:   Lab Results   Component Value Date/Time     01/09/2020 12:02 PM    K 3.1 (L) 01/09/2020 12:02 PM     01/09/2020 12:02 PM    CO2 25 01/09/2020 12:02 PM    AGAP 9 01/09/2020 12:02 PM     (H) 01/09/2020 12:02 PM    BUN 13 01/09/2020 12:02 PM    CREA 1.23 01/09/2020 12:02 PM    GFRAA 55 (L) 01/09/2020 12:02 PM    GFRNA 45 (L) 01/09/2020 12:02 PM    CA 7.6 (L) 01/09/2020 12:02 PM    MG 2.8 (H) 01/08/2020 05:45 PM    PHOS 0.5 (L) 01/09/2020 12:02 PM    ALB 2.4 (L) 01/09/2020 12:02 PM    TP 5.4 (L) 01/09/2020 12:02 PM    GLOB 3.0 01/09/2020 12:02 PM    AGRAT 0.8 01/09/2020 12:02 PM    SGOT 256 (H) 01/09/2020 12:02 PM     (H) 01/09/2020 12:02 PM     CBC:   Lab Results   Component Value Date/Time    WBC 3.3 (L) 01/09/2020 12:02 PM    HGB 10.2 (L) 01/09/2020 12:02 PM    HCT 31.1 (L) 01/09/2020 12:02 PM    PLT 45 (L) 01/09/2020 12:02 PM     COAGS:   Lab Results   Component Value Date/Time    PTP 14.3 01/08/2020 06:15 PM    INR 1.1 01/08/2020 06:15 PM     Liver Panel:   Lab Results   Component Value Date/Time    ALB 2.4 (L) 01/09/2020 12:02 PM    TP 5.4 (L) 01/09/2020 12:02 PM    GLOB 3.0 01/09/2020 12:02 PM    AGRAT 0.8 01/09/2020 12:02 PM    SGOT 256 (H) 01/09/2020 12:02 PM     (H) 01/09/2020 12:02 PM     (H) 01/09/2020 12:02 PM     Pancreatic Markers:   Lab Results   Component Value Date/Time    LPSE 366 01/08/2020 05:45 PM       Imaging:  CT scan  1. An acute inflammatory or infectious process in the upper abdomen centered in  the peripancreatic/periduodenal region represents a change from prior, not well  assessed in the absence of intravenous or oral contrast. Differential  possibilities include acute pancreatitis versus duodenitis, and less likely  acute cholecystitis given nondistention of the gallbladder (although there are  gallstones and areas of minor gallbladder wall thickening).     2. Small volume simple perihepatic ascites and trace pelvic free fluid are new  from prior. Peripancreatic/periduodenal fluid and stranding are present, also  new from prior.     3. Additional stable chronic findings include profound hepatic steatosis,  cirrhosis, and sequela of portal hypertension.       Assessment/Plan     Tiny Jerez is a 52 yo woman with longstanding ETOH cirrhosis and a past hx of ascites, who continues to drink alcohol. She is admitted with RUQ pain, nausea, vomiting, loose stools, and hypotension. Labs show evidence of hepatic decompensation. CT shows inflammatory changes in the area of the head of the pancreas and duodenum, along with cholelithiasis and a thickened GB wall. The differential dx for the patient 's pain includes acid peptic disease in the setting of NSAID use, pancreatitis, or cholethiasis/cholecystitis. Plan:    Monitor LFTs  EGD tomorrow. HIDA scan tomorrow  Full liquids, low fat.      Arpit Kaba MD.

## 2020-01-09 NOTE — PROGRESS NOTES
Problem: Discharge Planning  Goal: *Discharge to safe environment  Outcome: Progressing Towards Goal  Plan- home with Home Health Skilled RN

## 2020-01-09 NOTE — ED NOTES
Patient stated nebulizer treatments make her \"very nervous\" if she takes the full treatment. Provider made aware. Per Dr Dandre Judge patient does not need to complete treatment at this time.  Treatment stopped

## 2020-01-09 NOTE — DIABETES MGMT
NUTRITIONAL ASSESSMENT GLYCEMIC CONTROL/ PLAN OF CARE     Randolph Macias           53 y.o.           1/8/2020                 1. Septic shock ([facility]    2. Acute abdominal pain in right upper quadrant    3. Non-intractable vomiting with nausea, unspecified vomiting type    4. HENRRY (acute kidney injury) ([facility] [facility]    5. Calculus of gallbladder with cholecystitis without biliary obstruction, unspecified cholecystitis acuity    6. Acute UTI    7. COPD with acute exacerbation (HCC)         INTERVENTIONS/PLAN:   Monitor po intake and diet advancement, labs and weights. ASSESSMENT:   Pt is 122% ideal weight;  BMI (calculated): 25.5 kg/m2 (overweight classification). Pt appears well nourished but is at nutrition risk with recent reported history of N/V/D and poor appetite. Although pt reports weight loss of unknown amount, current weight is 31 lbs > than her weight in June 2019. With large weight discrepancy between  current weight or past documented weights, question their accuracy. Nutrition Diagnoses:   Inadequate oral food and beverage intake due to recent N/V/D as evidenced by pt reporting poor po intake past several days and overall poor appetite past 2 months. SUBJECTIVE/OBJECTIVE:   Information obtained from: chart review, pt, ICU rounds  Pt with PMHx including etoh cirrhosis, Chronic Hypotension, Active Etoh abuse, COPD, and Bipolar disorder, who presented to the ER after being found hypotensive by her MD (pt c/o unsteady gait,  N/V/D and poor appetite). RUQ and Epigastric abd pain. CT Abd showed peripancreatic inflammation vs infection   1/9:  Pt reports her usual weight is 136 lbs and she thinks she has lost weight over the past couple of months but is unable to quantify. She states she was once allergic to eggs but that she now is able to eat them without side effects. She denies having issues with chewing or swallowing.   She reports that a friend brought breakfast to her this AM before she knew she was NPO/full liquids. Per pt reports of po intake at breakfast, she consumed 425 calories, 22 grams protein. Diet: full liquids    No data found. Medications: [x]                Reviewed   Pertinent:  Folvite, Thiamine  IVF:  LR at 150 ml/hr    Most Recent POC Glucose:   Recent Labs     01/09/20  0046 01/08/20  1815   * 86         Labs:   No results found for: HBA1C, HGBE8, HFD6GHRR  Lab Results   Component Value Date/Time    Sodium 140 01/09/2020 12:46 AM    Potassium 3.0 (L) 01/09/2020 12:46 AM    Chloride 106 01/09/2020 12:46 AM    CO2 21 01/09/2020 12:46 AM    Anion gap 13 01/09/2020 12:46 AM    Glucose 117 (H) 01/09/2020 12:46 AM    BUN 17 01/09/2020 12:46 AM    Creatinine 1.85 (H) 01/09/2020 12:46 AM    Calcium 7.3 (L) 01/09/2020 12:46 AM    Magnesium 2.8 (H) 01/08/2020 05:45 PM    Albumin 3.1 (L) 01/08/2020 06:15 PM       Anthropometrics: IBW : 59 kg (130 lb), % IBW (Calculated): 121.54 %, BMI (calculated): 25.5  Wt Readings from Last 1 Encounters:   01/09/20 71.7 kg (158 lb)      Last Weight Metrics:  Weight Loss Metrics 1/9/2020 6/12/2019 5/13/2019 9/1/2018 11/26/2016 9/14/2016   Today's Wt 158 lb 126 lb 12.8 oz 132 lb 135 lb 140 lb 117 lb   BMI 25.5 kg/m2 20.47 kg/m2 21.31 kg/m2 21.79 kg/m2 22.6 kg/m2 19.47 kg/m2     Wt Readings from Last 3 Encounters:   01/09/20 71.7 kg (158 lb)   06/12/19 57.5 kg (126 lb 12.8 oz)   05/13/19 59.9 kg (132 lb)     Ht Readings from Last 1 Encounters:   01/09/20 5' 6\" (1.676 m)     Estimated Nutrition Needs:  1728 Kcals/day, Protein (g): 77 g Fluid (ml): 1700 ml  Based on:   [x]          Actual BW    []          ABW   []            Adjusted BW         Nutrition Interventions:  None at this time  Goal:   Pt will consume > 75% meals by 1/14/20. Weight maintenance (+/- 1-2 kg) by 1/26/20.      Nutrition Monitoring and Evaluation      [x]     Monitor po intake on meal rounds  [x]     Continue inpatient monitoring and intervention  []     Other:  Nutrition Risk:  [] High     [x]  Moderate    []  Minimal/Uncompromised  [place], [address] [place] [address], E   Office:  [facility] Pager:  [phone]

## 2020-01-09 NOTE — ED NOTES
Pt HR noted to be elevated, RN checked on the patient. Pt stated she needed to use the bathroom, Pt placed on bed pan.

## 2020-01-09 NOTE — CONSULTS
[facility]  Pulmonary, Critical Care, and Sleep Medicine    Name: Michell De León MRN: [id number]   : 1965 Hospital: [address] [place] [address]   Date: 2020  Consulted By:      Critical Care History and Physical      Subjective/History:   Patient is a 53 y.o. female with Etoh cirrhosis, Chronic Hypotension, Active Etoh abuse, COPD, and Bipolar disorder, who presented to the ER overnight after being found hypotensive by her psychiatrist. She c/o unsteady gait, decreased appetite, N/V/D, and blurry vision x 1-2 weeks. In the ER she was found to be very tremulous. She reports she is still actively drinking. In the ER she was found to have BP 76/60 (MAP 64), and concern was raised for possible sepsis. Patient was afebrile with no leukocytosis and lactate only 1.85. She was given 2L IVF but after BP remained low, she was started on levophed. Patient c/o abd pain, and CT Abdomen revealed peripancreatic inflammation vs infection. RUQ US showed gallstones with a nondistended GB but did show GB wall thickening. GI and Surgery were consulted by the ER. This AM, patient reports that she feels a little better. She says the nebulizer she received made her feel nervous though. She is in sinus tach this AM. She says no further N/V but that she continues to have diarrhea and abd pain. Denies SOB, Cough, F/C. Past Medical History:   Diagnosis Date    Bipolar affective ([facility]     Chronic obstructive pulmonary disease ([facility]     Cirrhosis ([facility]     ETOH abuse     Hyperlipidemia 2009    Left breast mass 2011    U/S Left Breast: No definite mass identified. Recommended recheck in 6 months    Manic depression ([address]     Panic disorder     Vitamin D insufficiency 2009    23 ng/mL    Wrist fracture, right 2010    Non-Displaced Distal Radius/Ulnar Styloid Fx        Prior to Admission medications    Medication Sig Start Date End Date Taking?  Authorizing Provider   naproxen sodium (ALEVE) 220 mg tablet Take 220 mg by mouth as needed. Yes Provider, Historical   albuterol (PROVENTIL HFA) 90 mcg/actuation inhaler Take 1-2 Puffs by inhalation. 10/21/18  Yes Provider, Historical   omeprazole (PRILOSEC) 40 mg capsule Take 40 mg by mouth daily. Yes Provider, Historical   risperiDONE (RISPERDAL) 2 mg tablet Take 2 mg by mouth daily. Indications: Bipolar Disorder in Remission   Yes Elisabet Alvarado MD   famotidine (PEPCID) 20 mg tablet Take 1 Tab by mouth two (2) times a day. 5/10/19  Yes Saravanan Kim MD   ondansetron (ZOFRAN ODT) 4 mg disintegrating tablet Take 1 Tab by mouth every eight (8) hours as needed for Nausea. 5/10/19  Yes Saravanan Kim MD   escitalopram oxalate (LEXAPRO) 10 mg tablet Take 10 mg by mouth. Provider, Historical   busPIRone (BUSPAR) 10 mg tablet Take 10 mg by mouth two (2) times daily as needed (take one to three tabs po bid as needed). Indications: Repeated Episodes of Anxiety    Elisabet Alvarado MD   budesonide-formoterol (SYMBICORT) 160-4.5 mcg/actuation HFAA Take 2 Puffs by inhalation two (2) times a day. 5/13/19   Carmen Perry NP   tiotropium bromide (SPIRIVA RESPIMAT) 2.5 mcg/actuation inhaler Take 1 Puff by inhalation daily. 5/13/19   Carmen Perry NP   magnesium oxide (MAG-OX) 400 mg tablet Take 1 Tab by mouth daily. 5/10/19   Saravanan Kim MD   thiamine HCL (B-1) 100 mg tablet Take 1 Tab by mouth daily.  5/10/19   Saravanan Kim MD     Current Facility-Administered Medications   Medication Dose Route Frequency    nicotine (NICODERM CQ) 21 mg/24 hr patch 1 Patch  1 Patch TransDERmal DAILY    lactated Ringers infusion  150 mL/hr IntraVENous CONTINUOUS    escitalopram oxalate (LEXAPRO) tablet 10 mg  10 mg Oral DAILY    famotidine (PEPCID) tablet 20 mg  20 mg Oral BID    magnesium oxide (MAG-OX) tablet 400 mg  400 mg Oral DAILY    risperiDONE (RisperDAL) tablet 2 mg  2 mg Oral DAILY    thiamine mononitrate (B-1) tablet 100 mg  100 mg Oral DAILY    tiotropium bromide (SPIRIVA RESPIMAT) 2.5 mcg /actuation  1 Puff Inhalation DAILY    piperacillin-tazobactam (ZOSYN) 3.375 g in 0.9% sodium chloride (MBP/ADV) 100 mL MBP  3.375 g IntraVENous Q8H    budesonide (PULMICORT) 500 mcg/2 ml nebulizer suspension  500 mcg Nebulization BID RT    arformoterol (BROVANA) neb solution 15 mcg  15 mcg Nebulization BID RT    folic acid (FOLVITE) tablet 1 mg  1 mg Oral DAILY    NOREPINephrine (LEVOPHED) 8 mg in 0.9% NS 250ml infusion  0.5-16 mcg/min IntraVENous TITRATE     Allergies   Allergen Reactions    Animal Dander Itching    Egg Nausea and Vomiting      Social History     Tobacco Use    Smoking status: Former Smoker     Packs/day: 1.00     Years: 40.00     Pack years: 40.00    Smokeless tobacco: Former User     Quit date: 2016   Substance Use Topics    Alcohol use: No     Comment: quit since 2016        History reviewed. No pertinent family history. Review of Systems:  A comprehensive review of systems was negative except for that written in the HPI. Objective:   Vital Signs:    Visit Vitals  BP 90/48   Pulse (!) 120   Temp 98.6 °F (37 °C)   Resp 25   Wt 71.7 kg (158 lb)   SpO2 95%   BMI 25.50 kg/m²       O2 Device: Nasal cannula   O2 Flow Rate (L/min): 3 l/min(patient found on 3 lpm. SpO2 93%. )   Temp (24hrs), Av.4 °F (36.9 °C), Min:97.9 °F (36.6 °C), Max:98.6 °F (37 °C)       Intake/Output:   Last shift:      701 - 1900  In: 374 [I.V.:374]  Out: -   Last 3 shifts: 1901 -  07  In: 2385.1 [I.V.:2385.1]  Out: 400 [Urine:400]    Intake/Output Summary (Last 24 hours) at 2020 1153  Last data filed at 2020 0900  Gross per 24 hour   Intake 2759.14 ml   Output 400 ml   Net 2359.14 ml     Physical Exam:   General:  WDWN Adult female, Alert, cooperative, NAD   HEENT:   NCAT, PERRL, OP clear, MM moist    Neck: Supple, trachea midline.    Lungs:   Symmetrical chest rise; good AE bilat; CTAB; no wheezes/rhonchi/rales noted.    Heart:  RRR, S1, S2 normal, no m/r/g   Abdomen:   Soft, TTP in Epigastric and RUQ, No guarding or rebound. Normoactive bowel sounds. Extremities: Extremities normal, atraumatic, no cyanosis or edema. Pulses: 2+ and symmetric all extremities. Skin: Skin color, texture, turgor normal. No rashes or lesions   Neurologic: Grossly nonfocal, Awake/alert, moving all extremities, mildly tremulous. Devices:      Data:     Recent Results (from the past 24 hour(s))   EKG, 12 LEAD, INITIAL    Collection Time: 01/08/20  5:43 PM   Result Value Ref Range    Ventricular Rate 115 BPM    Atrial Rate 115 BPM    P-R Interval 144 ms    QRS Duration 76 ms    Q-T Interval 314 ms    QTC Calculation (Bezet) 434 ms    Calculated P Axis 82 degrees    Calculated R Axis 80 degrees    Calculated T Axis 75 degrees    Diagnosis       Sinus tachycardia  Otherwise normal ECG  When compared with ECG of 09-MAY-2019 20:52,  No significant change was found  Confirmed by Patricio Ellsworth ([id number]) on 1/9/2020 9:08:26 AM     CBC WITH AUTOMATED DIFF    Collection Time: 01/08/20  5:45 PM   Result Value Ref Range    WBC 4.3 (L) 4.6 - 13.2 K/uL    RBC 3.53 (L) 4.20 - 5.30 M/uL    HGB 11.9 (L) 12.0 - 16.0 g/dL    HCT 36.1 35.0 - 45.0 %    .3 (H) 74.0 - 97.0 FL    MCH 33.7 24.0 - 34.0 PG    MCHC 33.0 31.0 - 37.0 g/dL    RDW 15.1 (H) 11.6 - 14.5 %    PLATELET 51 (L) 014 - 420 K/uL    MPV 13.0 (H) 9.2 - 11.8 FL    NEUTROPHILS 70 40 - 73 %    LYMPHOCYTES 16 (L) 21 - 52 %    MONOCYTES 14 (H) 3 - 10 %    EOSINOPHILS 0 0 - 5 %    BASOPHILS 0 0 - 2 %    ABS. NEUTROPHILS 3.0 1.8 - 8.0 K/UL    ABS. LYMPHOCYTES 0.7 (L) 0.9 - 3.6 K/UL    ABS. MONOCYTES 0.6 0.05 - 1.2 K/UL    ABS. EOSINOPHILS 0.0 0.0 - 0.4 K/UL    ABS.  BASOPHILS 0.0 0.0 - 0.1 K/UL    DF AUTOMATED     LIPASE    Collection Time: 01/08/20  5:45 PM   Result Value Ref Range    Lipase 366 73 - 393 U/L   MAGNESIUM    Collection Time: 01/08/20  5:45 PM   Result Value Ref Range    Magnesium 2.8 (H) 1.6 - 2.6 mg/dL   URINALYSIS W/ RFLX MICROSCOPIC    Collection Time: 01/08/20  6:05 PM   Result Value Ref Range    Color DARK YELLOW      Appearance TURBID      Specific gravity 1.022 1.005 - 1.030      pH (UA) 5.0 5.0 - 8.0      Protein 30 (A) NEG mg/dL    Glucose NEGATIVE  NEG mg/dL    Ketone 15 (A) NEG mg/dL    Bilirubin SMALL (A) NEG      Blood MODERATE (A) NEG      Urobilinogen 1.0 0.2 - 1.0 EU/dL    Nitrites NEGATIVE  NEG      Leukocyte Esterase SMALL (A) NEG     URINE MICROSCOPIC ONLY    Collection Time: 01/08/20  6:05 PM   Result Value Ref Range    WBC 6 to 8 0 - 4 /hpf    RBC 2 to 3 0 - 5 /hpf    Epithelial cells 2+ 0 - 5 /lpf    Bacteria 3+ (A) NEG /hpf    Yeast 1+ (A) NEG   METABOLIC PANEL, COMPREHENSIVE    Collection Time: 01/08/20  6:15 PM   Result Value Ref Range    Sodium 136 136 - 145 mmol/L    Potassium 3.8 3.5 - 5.5 mmol/L    Chloride 97 (L) 100 - 111 mmol/L    CO2 20 (L) 21 - 32 mmol/L    Anion gap 19 (H) 3.0 - 18 mmol/L    Glucose 86 74 - 99 mg/dL    BUN 21 (H) 7.0 - 18 MG/DL    Creatinine 2.87 (H) 0.6 - 1.3 MG/DL    BUN/Creatinine ratio 7 (L) 12 - 20      GFR est AA 21 (L) >60 ml/min/1.73m2    GFR est non-AA 17 (L) >60 ml/min/1.73m2    Calcium 9.2 8.5 - 10.1 MG/DL    Bilirubin, total 3.5 (H) 0.2 - 1.0 MG/DL    ALT (SGPT) 180 (H) 13 - 56 U/L    AST (SGOT) 333 (H) 10 - 38 U/L    Alk.  phosphatase 185 (H) 45 - 117 U/L    Protein, total 6.6 6.4 - 8.2 g/dL    Albumin 3.1 (L) 3.4 - 5.0 g/dL    Globulin 3.5 2.0 - 4.0 g/dL    A-G Ratio 0.9 0.8 - 1.7     PROTHROMBIN TIME + INR    Collection Time: 01/08/20  6:15 PM   Result Value Ref Range    Prothrombin time 14.3 11.5 - 15.2 sec    INR 1.1 0.8 - 1.2     ETHYL ALCOHOL    Collection Time: 01/08/20  6:15 PM   Result Value Ref Range    ALCOHOL(ETHYL),SERUM 25 (H) 0 - 3 MG/DL   CULTURE, BLOOD    Collection Time: 01/08/20  9:11 PM   Result Value Ref Range    Special Requests: LEFT  Antecubital        Culture result: NO GROWTH AFTER 11 HOURS     POC LACTIC ACID    Collection Time: 01/08/20  9:12 PM   Result Value Ref Range    Lactic Acid (POC) 1.85 0.40 - 2.00 mmol/L   EKG, 12 LEAD, INITIAL    Collection Time: 01/08/20  9:35 PM   Result Value Ref Range    Ventricular Rate 111 BPM    QRS Duration 80 ms    Q-T Interval 328 ms    QTC Calculation (Bezet) 446 ms    Calculated R Axis 67 degrees    Calculated T Axis 62 degrees    Diagnosis       Poor data quality, interpretation may be adversely affected  Electrode noise  Repeat EKG  Probable sinus rhythm. Abnormal ECG  Confirmed by Stephanie Trujillo ([id number]) on 1/9/2020 9:09:47 AM     CULTURE, BLOOD    Collection Time: 01/08/20  9:41 PM   Result Value Ref Range    Special Requests: RIGHT  HAND        Culture result: NO GROWTH AFTER 11 HOURS     METABOLIC PANEL, BASIC    Collection Time: 01/09/20 12:46 AM   Result Value Ref Range    Sodium 140 136 - 145 mmol/L    Potassium 3.0 (L) 3.5 - 5.5 mmol/L    Chloride 106 100 - 111 mmol/L    CO2 21 21 - 32 mmol/L    Anion gap 13 3.0 - 18 mmol/L    Glucose 117 (H) 74 - 99 mg/dL    BUN 17 7.0 - 18 MG/DL    Creatinine 1.85 (H) 0.6 - 1.3 MG/DL    BUN/Creatinine ratio 9 (L) 12 - 20      GFR est AA 34 (L) >60 ml/min/1.73m2    GFR est non-AA 28 (L) >60 ml/min/1.73m2    Calcium 7.3 (L) 8.5 - 10.1 MG/DL           No results for input(s): FIO2I, IFO2, HCO3I, IHCO3, HCOPOC, PCO2I, PCOPOC, IPHI, PHI, PHPOC, PO2I, PO2POC in the last 72 hours. No lab exists for component: IPOC2    Imaging:  I have personally reviewed the patients radiographs and have reviewed the reports:    CXR [1/8]:Subtle nodular opacity in the peripheral right lower lung zone, likely superimposition. No definite focal consolidation, effusion or pneumothorax. Biapical scarring. CT ABD/PELVIS [1/8]:   1.  An acute inflammatory or infectious process in the upper abdomen centered in  the peripancreatic/periduodenal region represents a change from prior, not well  assessed in the absence of intravenous or oral contrast. Differential  possibilities include acute pancreatitis versus duodenitis, and less likely  acute cholecystitis given nondistention of the gallbladder (although there are  gallstones and areas of minor gallbladder wall thickening). 2. Small volume simple perihepatic ascites and trace pelvic free fluid are new  from prior. Peripancreatic/periduodenal fluid and stranding are present, also  new from prior. 3. Additional stable chronic findings include profound hepatic steatosis,  cirrhosis, and sequela of portal hypertension.     RUQ US (1/8):   1. Hepatic steatosis and nodularity of the hepatic contour, suggestive of cirrhosis. 2. Dependent shadowing gallstones are present in the nondistended gallbladder. There is nonspecific gallbladder wall thickening, which may be related to hepatic dysfunction. Findings are equivocal for acute cholecystitis. If there is clinical concern for acute cholecystitis, consider nuclear medicine hepatobiliary imaging for further evaluation. 3. Trace perihepatic and upper abdominal ascites. IMPRESSION:   54yoF with Etoh cirrhosis, Chronic Hypotension, Active Etoh abuse, COPD, and Bipolar disorder, who presented to the ER overnight after being found hypotensive by her psychiatrist. She c/o unsteady gait, decreased appetite, N/V/D, and blurry vision x 1-2 weeks. In the ER she was found to be very tremulous. She reports she is still actively drinking. In the ER she was found to have BP 76/60 (MAP 64), and concern was raised for possible sepsis. Patient was afebrile with no leukocytosis and lactate only 1.85      PLAN:   Respiratory:  No active issues    Cardiovascular:  1. Sinus tachycardia: likely related to the levophed and nebs; will change levo to rodrick  2. Shock superimposed on Chronic HYPOtension: will lower MAP goal to >55 given that she has cirrhosis; baseline SBP on multiple prior clinic visits typically in 90's-100.  Worsening of baseline blood pressure likely related to hypovolemia and less likely due to sepsis as patient has no fever, leukocytosis, and lactate was only 1.85 before IVF's given. Renal:  1. HENRRY: likely pre-renal in setting of intravascular depletion from N/V/D  - creatinine 2.87 on admit, up from baseline of 0.63, improved to 1.85 today  - UA contaminated with epithelial cells; send new sample  - monitor UOP; avoid nephrotoxic agents. If creatinine doesn't continue improving will obtain renal ultrasound    2. Hypokalemia:  - repleted     Infectious Disease:  1. Questionable Septic shock: no signs of sepsis; is in shock but may be related to hypovolemia. Only possible source of infxn is her abdomen as she has pain in her epigastric and RUQ. RUQ US indeterminate for acute sherrie. Awaiting surgery consult. Continue Zosyn. Blood cultures pending. CXR showed no pneumonia. UA was contaminated sample with many epithelial cells; collect new sample now. Endocrine:  No active issues     GI:  1. Abdominal pain; Etoh cirrhosis; Possible Etoh hepatitis:  - RUQ and Epigastric abd pain. CT Abd showed peripancreatic inflammation vs infection which could be related to pancreatitis vs duodenitis vs acute sherrie. Lipase normal. Elevated LFT's possible related to Etoh hepatitis. RUQ US indeterminate for acute sherrie. Awaiting GI and Surgery consults. - continue trending LFT's daily. Check Ammonia. - continue thiamine and folate daily; monitor CIWA. Hematologic:  1. Thrombocytopenia: related to her cirrhosis; continue to monitor. SCD's for GI prophylaxis    Neurologic:  1. Tremors: unclear if this is due to Etoh withdraw vs Asterixis related to HE.   - check ammonia level; monitor CIWA. Ativan PRN. 2. Generalized weakness; Subjective unsteady gait:  - consult PT/OT       Best Practice:  · Sepsis Bundle per Hospital Protocol  · Glycemic control; avoid Hypoglycemia  · IHI ICU Bundles:  ·  Reyna Bundle Followed    · Stress ulcer prophylaxis. Famotidine   · DVT prophylaxis. SCD's  · Need for Lines, chairez assessed. · Restraints need. · Palliative care evaluation. NA  · Code Status: FULL        Total of 60 min critical care time spent at bedside during the course of care providing evaluation,management and care decisions and ordering appropriate treatment related to critical care problems exclusive of procedures. The reason for providing this level of medical care for this critically ill patient was due a critical illness that impaired one or more vital organ systems such that there was a high probability of imminent or life threatening deterioration in the patients condition. This care involved high complexity decision making to assess, manipulate, and support vital system functions, to treat this degree vital organ system failure and to prevent further life threatening deterioration of the patients condition.       Janis Barker MD  Pulmonary & Critical Care

## 2020-01-09 NOTE — PROGRESS NOTES
PT orders received and chart reviewed. Attempted PT evaluation x2 (1120; 3851). Heartrate tachy (120s) both attempts; mobility contraindicated. Will follow up.

## 2020-01-09 NOTE — ED NOTES
Pt aaox3 pt reports she is feeling stronger, Pt able to lift hips to get on and off the bed pan.  Pt HR improved after she used the bed pan

## 2020-01-09 NOTE — PROGRESS NOTES
Problem: Discharge Planning  Goal: *Discharge to safe environment  Outcome: Progressing Towards Goal  Plan- home with Home Health Skilled RN    Reason for Admission:   Acute Respiratory Failure/ hypotension/ macrocytic anemia/ thrombocytopenia/ ? sepsis                  RRAT Score:    19              Do you (patient/family) have any concerns for transition/discharge? no                 Plan for utilizing home health:   [facility] [address] [place] [address] obtained, copy to chart, copy to pt, referral placed in referal in chart for [facility]. Current Advanced Directive/Advance Care Plan:  no            Transition of Care Plan:    Chart reviewed  And pt verified demographics. She says she no longer sees PCP Albina Brown, but to see Silvia Park [phone] per her insurance card. I was unable to change PCP in chart, it wont accept PCP's name. Patient lives with [facility] and is independent with ADL. She uses no DME. She is on o2 right now, it will need to be weaned before dc. Patient is very shaky right now. I asked if she was shaky before or was it related to her not drinking in a while. She says she shakes from her anxiety issue, says she just got medication for it. Her plan is home with home health. Patient has designated _Fiance_______________________ to participate in his/her discharge plan and to receive any needed information. Name: David [place]  Address:  Phone number: [phone]    Patient says she does not have any family members to list as contacts. Care Management Interventions  PCP Verified by CM: No(She will be seeing Silvia Xavier [phone])  Palliative Care Criteria Met (RRAT>21 & CHF Dx)?: No  Mode of Transport at Discharge:  Other (see comment)(alberto)  Transition of Care Consult (CM Consult): Discharge Planning  MyChart Signup: No  Discharge Durable Medical Equipment: No  Physical Therapy Consult: Yes  Occupational Therapy Consult: Yes  Speech Therapy Consult: No  Current Support Network: Other(Lives with Fiance)  Confirm Follow Up Transport: Friends  Discharge Location  Discharge Placement: Home     JAYMIE De DiosN  [facility]  [phone], Pager [phone]  [email or web address]

## 2020-01-09 NOTE — CONSULTS
Consult Note    Patient: Manoj Crawford               Sex: female          DOA: 1/8/2020         YOB: 1965      Age:  53 y.o.        LOS:  LOS: 0 days              HPI:   9:27 AM attempted to see patient for consult, pt asked for me to return at a later time as she is eating her breakfast and wishes to finish. Manoj Crawford is a 53 y.o. female who presented to [facility] ER 01/08/2020 with complaint of Hypotension and Nausea. Patient reported that she has been having fevers, chills, diaphoresis, BLE edema, a cough productive of clear mucus, and intermittent chest congestion. Patient also reports a UTI 2 weeks ago, urinary urgency, nausea and vomiting with reduced oral intake for the last 3 days accompanied by some diarrhea. Patient reported that she has had chronically low blood pressure even before she was diagnosed with liver cirrhosis. Patient reported that she has had ascites before and that she does continue to drink 3 shots of vodka a night to improve her sleeping. This morning she reports reduced appetite but is tolerating her diet without difficulty. Denies nausea, vomiting, denies abdominal pain, denies flatus, +BM. Last BM this morning x2. Voiding without difficulty. Pateint's Cr improved from 2.87 to Cr 1.87 with the administration of IV fluids. Past Medical History:   Diagnosis Date    Bipolar affective ([address]     Chronic obstructive pulmonary disease ([address]     Cirrhosis ([address]     ETOH abuse     Hyperlipidemia 11/20/2009    Left breast mass 05/13/2011    U/S Left Breast: No definite mass identified. Recommended recheck in 6 months    Manic depression ([address]     Panic disorder     Vitamin D insufficiency 11/20/2009    23 ng/mL    Wrist fracture, right 01/06/2010    Non-Displaced Distal Radius/Ulnar Styloid Fx       History reviewed. No pertinent surgical history. History reviewed. No pertinent family history.     Social History     Socioeconomic History    Marital status:      Spouse name: Not on file    Number of children: Not on file    Years of education: Not on file    Highest education level: Not on file   Tobacco Use    Smoking status: Former Smoker     Packs/day: 1.00     Years: 40.00     Pack years: 40.00    Smokeless tobacco: Former User     Quit date: 06/2016   Substance and Sexual Activity    Alcohol use: No     Comment: quit since June 2016    Drug use: No       Prior to Admission medications    Medication Sig Start Date End Date Taking? Authorizing Provider   naproxen sodium (ALEVE) 220 mg tablet Take 220 mg by mouth as needed. Yes Provider, Historical   albuterol (PROVENTIL HFA) 90 mcg/actuation inhaler Take 1-2 Puffs by inhalation. 10/21/18  Yes Provider, Historical   omeprazole (PRILOSEC) 40 mg capsule Take 40 mg by mouth daily. Yes Provider, Historical   risperiDONE (RISPERDAL) 2 mg tablet Take 2 mg by mouth daily. Indications: Bipolar Disorder in Remission   Yes Reese Alvarado MD   famotidine (PEPCID) 20 mg tablet Take 1 Tab by mouth two (2) times a day. 5/10/19  Yes Caesar Carrizales MD   ondansetron (ZOFRAN ODT) 4 mg disintegrating tablet Take 1 Tab by mouth every eight (8) hours as needed for Nausea. 5/10/19  Yes Caesar Carrizales MD   escitalopram oxalate (LEXAPRO) 10 mg tablet Take 10 mg by mouth. Provider, Historical   busPIRone (BUSPAR) 10 mg tablet Take 10 mg by mouth two (2) times daily as needed (take one to three tabs po bid as needed). Indications: Repeated Episodes of Anxiety    Reese Alvarado MD   budesonide-formoterol (SYMBICORT) 160-4.5 mcg/actuation HFAA Take 2 Puffs by inhalation two (2) times a day. 5/13/19   Darnell Sheehan NP   tiotropium bromide (SPIRIVA RESPIMAT) 2.5 mcg/actuation inhaler Take 1 Puff by inhalation daily. 5/13/19   Darnell Sheehan NP   magnesium oxide (MAG-OX) 400 mg tablet Take 1 Tab by mouth daily. 5/10/19   Caesar Carrizales MD   thiamine HCL (B-1) 100 mg tablet Take 1 Tab by mouth daily.  5/10/19 Nani Kapadia MD       Allergies   Allergen Reactions    Animal Dander Itching    Egg Nausea and Vomiting     Review of Systems - History obtained from chart review and the patient  General ROS: negative for - chills or fever  Gastrointestinal ROS: negative for - abdominal pain, constipation, gas/bloating, heartburn or nausea/vomiting, positive for loss of appetite   Neurological ROS: negative for - confusion, dizziness or headaches    Physical Exam:      Visit Vitals  /61   Pulse (!) 138   Temp 98.6 °F (37 °C)   Resp 23   Wt 71.7 kg (158 lb)   SpO2 94%   BMI 25.50 kg/m²     Physical Exam  Constitutional:       General: She is awake. HENT:      Head: Normocephalic. Cardiovascular:      Rate and Rhythm: Tachycardia present. Abdominal:      General: Abdomen is protuberant. Bowel sounds are normal. There is distension. Palpations: There is no mass. Tenderness: There is no tenderness. There is no guarding. Comments:  pt notes Rounded/ firm is her normal presentation and denies distension    Skin:     General: Skin is warm and dry. Neurological:      Mental Status: She is alert and oriented to person, place, and time. Psychiatric:         Attention and Perception: Attention normal.         Mood and Affect: Mood normal.         Behavior: Behavior is cooperative. Thought Content:  Thought content normal.         Results:   Recent Results (from the past 48 hour(s))   EKG, 12 LEAD, INITIAL    Collection Time: 01/08/20  5:43 PM   Result Value Ref Range    Ventricular Rate 115 BPM    Atrial Rate 115 BPM    P-R Interval 144 ms    QRS Duration 76 ms    Q-T Interval 314 ms    QTC Calculation (Bezet) 434 ms    Calculated P Axis 82 degrees    Calculated R Axis 80 degrees    Calculated T Axis 75 degrees    Diagnosis       Sinus tachycardia  Otherwise normal ECG  When compared with ECG of 09-MAY-2019 20:52,  No significant change was found  Confirmed by Patricio Ellsworth ([id number]) on 1/9/2020 9:08:26 AM     CBC WITH AUTOMATED DIFF    Collection Time: 01/08/20  5:45 PM   Result Value Ref Range    WBC 4.3 (L) 4.6 - 13.2 K/uL    RBC 3.53 (L) 4.20 - 5.30 M/uL    HGB 11.9 (L) 12.0 - 16.0 g/dL    HCT 36.1 35.0 - 45.0 %    .3 (H) 74.0 - 97.0 FL    MCH 33.7 24.0 - 34.0 PG    MCHC 33.0 31.0 - 37.0 g/dL    RDW 15.1 (H) 11.6 - 14.5 %    PLATELET 51 (L) 325 - 420 K/uL    MPV 13.0 (H) 9.2 - 11.8 FL    NEUTROPHILS 70 40 - 73 %    LYMPHOCYTES 16 (L) 21 - 52 %    MONOCYTES 14 (H) 3 - 10 %    EOSINOPHILS 0 0 - 5 %    BASOPHILS 0 0 - 2 %    ABS. NEUTROPHILS 3.0 1.8 - 8.0 K/UL    ABS. LYMPHOCYTES 0.7 (L) 0.9 - 3.6 K/UL    ABS. MONOCYTES 0.6 0.05 - 1.2 K/UL    ABS. EOSINOPHILS 0.0 0.0 - 0.4 K/UL    ABS.  BASOPHILS 0.0 0.0 - 0.1 K/UL    DF AUTOMATED     LIPASE    Collection Time: 01/08/20  5:45 PM   Result Value Ref Range    Lipase 366 73 - 393 U/L   MAGNESIUM    Collection Time: 01/08/20  5:45 PM   Result Value Ref Range    Magnesium 2.8 (H) 1.6 - 2.6 mg/dL   URINALYSIS W/ RFLX MICROSCOPIC    Collection Time: 01/08/20  6:05 PM   Result Value Ref Range    Color DARK YELLOW      Appearance TURBID      Specific gravity 1.022 1.005 - 1.030      pH (UA) 5.0 5.0 - 8.0      Protein 30 (A) NEG mg/dL    Glucose NEGATIVE  NEG mg/dL    Ketone 15 (A) NEG mg/dL    Bilirubin SMALL (A) NEG      Blood MODERATE (A) NEG      Urobilinogen 1.0 0.2 - 1.0 EU/dL    Nitrites NEGATIVE  NEG      Leukocyte Esterase SMALL (A) NEG     URINE MICROSCOPIC ONLY    Collection Time: 01/08/20  6:05 PM   Result Value Ref Range    WBC 6 to 8 0 - 4 /hpf    RBC 2 to 3 0 - 5 /hpf    Epithelial cells 2+ 0 - 5 /lpf    Bacteria 3+ (A) NEG /hpf    Yeast 1+ (A) NEG   METABOLIC PANEL, COMPREHENSIVE    Collection Time: 01/08/20  6:15 PM   Result Value Ref Range    Sodium 136 136 - 145 mmol/L    Potassium 3.8 3.5 - 5.5 mmol/L    Chloride 97 (L) 100 - 111 mmol/L    CO2 20 (L) 21 - 32 mmol/L    Anion gap 19 (H) 3.0 - 18 mmol/L    Glucose 86 74 - 99 mg/dL    BUN 21 (H) 7.0 - 18 MG/DL    Creatinine 2.87 (H) 0.6 - 1.3 MG/DL    BUN/Creatinine ratio 7 (L) 12 - 20      GFR est AA 21 (L) >60 ml/min/1.73m2    GFR est non-AA 17 (L) >60 ml/min/1.73m2    Calcium 9.2 8.5 - 10.1 MG/DL    Bilirubin, total 3.5 (H) 0.2 - 1.0 MG/DL    ALT (SGPT) 180 (H) 13 - 56 U/L    AST (SGOT) 333 (H) 10 - 38 U/L    Alk. phosphatase 185 (H) 45 - 117 U/L    Protein, total 6.6 6.4 - 8.2 g/dL    Albumin 3.1 (L) 3.4 - 5.0 g/dL    Globulin 3.5 2.0 - 4.0 g/dL    A-G Ratio 0.9 0.8 - 1.7     PROTHROMBIN TIME + INR    Collection Time: 01/08/20  6:15 PM   Result Value Ref Range    Prothrombin time 14.3 11.5 - 15.2 sec    INR 1.1 0.8 - 1.2     ETHYL ALCOHOL    Collection Time: 01/08/20  6:15 PM   Result Value Ref Range    ALCOHOL(ETHYL),SERUM 25 (H) 0 - 3 MG/DL   CULTURE, BLOOD    Collection Time: 01/08/20  9:11 PM   Result Value Ref Range    Special Requests: LEFT  Antecubital        Culture result: NO GROWTH AFTER 11 HOURS     POC LACTIC ACID    Collection Time: 01/08/20  9:12 PM   Result Value Ref Range    Lactic Acid (POC) 1.85 0.40 - 2.00 mmol/L   EKG, 12 LEAD, INITIAL    Collection Time: 01/08/20  9:35 PM   Result Value Ref Range    Ventricular Rate 111 BPM    QRS Duration 80 ms    Q-T Interval 328 ms    QTC Calculation (Bezet) 446 ms    Calculated R Axis 67 degrees    Calculated T Axis 62 degrees    Diagnosis       Poor data quality, interpretation may be adversely affected  Electrode noise  Repeat EKG  Probable sinus rhythm.   Abnormal ECG  Confirmed by Gaby Haque ([id number]) on 1/9/2020 9:09:47 AM     CULTURE, BLOOD    Collection Time: 01/08/20  9:41 PM   Result Value Ref Range    Special Requests: RIGHT  HAND        Culture result: NO GROWTH AFTER 11 HOURS     METABOLIC PANEL, BASIC    Collection Time: 01/09/20 12:46 AM   Result Value Ref Range    Sodium 140 136 - 145 mmol/L    Potassium 3.0 (L) 3.5 - 5.5 mmol/L    Chloride 106 100 - 111 mmol/L    CO2 21 21 - 32 mmol/L    Anion gap 13 3.0 - 18 mmol/L    Glucose 117 (H) 74 - 99 mg/dL    BUN 17 7.0 - 18 MG/DL    Creatinine 1.85 (H) 0.6 - 1.3 MG/DL    BUN/Creatinine ratio 9 (L) 12 - 20      GFR est AA 34 (L) >60 ml/min/1.73m2    GFR est non-AA 28 (L) >60 ml/min/1.73m2    Calcium 7.3 (L) 8.5 - 10.1 MG/DL     1/8/20 CXR   Subtle nodular opacity in the peripheral right lower lung zone, likely  superimposition.   No definite focal consolidation. 1/8/2020 US ABD   1. Hepatic steatosis and nodularity of the hepatic contour, suggestive of  cirrhosis.     2. Dependent shadowing gallstones are present in the nondistended gallbladder. There is nonspecific gallbladder wall thickening, which may be related to  hepatic dysfunction. Findings are equivocal for acute cholecystitis. If there is  clinical concern for acute cholecystitis, consider nuclear medicine  hepatobiliary imaging for further evaluation.     3. Trace perihepatic and upper abdominal ascites. 1/8/20 CT ABD    1. An acute inflammatory or infectious process in the upper abdomen centered in  the peripancreatic/periduodenal region represents a change from prior, not well  assessed in the absence of intravenous or oral contrast. Differential  possibilities include acute pancreatitis versus duodenitis, and less likely  acute cholecystitis given nondistention of the gallbladder (although there are  gallstones and areas of minor gallbladder wall thickening).     2. Small volume simple perihepatic ascites and trace pelvic free fluid are new  from prior. Peripancreatic/periduodenal fluid and stranding are present, also  new from prior.     3. Additional stable chronic findings include profound hepatic steatosis,  cirrhosis, and sequela of portal hypertension.     Assessment/Plan     Principal Problem:    Acute respiratory failure ([address] (1/9/2020)    Active Problems:    ETOH abuse ()      COPD (chronic obstructive pulmonary disease) (HCC) ()      Bipolar mood disorder (HCC) ()      Hypokalemia (1/9/2020)      Macrocytic anemia (1/9/2020)      Leukopenia (1/9/2020)      UTI (urinary tract infection) (1/9/2020)      Thrombocytopenia ([address] (1/9/2020)      HENRRY (acute kidney injury) ([address] (1/9/2020)      Pancytopenia ([address] (1/9/2020)      Acute hypotension (1/9/2020)      Cirrhosis ([address] (1/9/2020)      Asymptomatic gallstones, discussed with Dr. Ibrahima Dallas. No surgical intervention recommended for this problem at this time. Surgical team will signoff and be available as needed. Please don't hesitate to contact us for any surgical issues that may arise. Thank you for your consultation. Signed By: Shaan Martin NP     January 9, 2020        Attestation:  I have personally seen and examined this patient and independently confirmed all of the above findings noted by the Nurse Practitioner. I have independently assessed her ongoing problems and formulated the plan of treatment which will be carried out as per above.     Efrain Conner DO, FACS

## 2020-01-09 NOTE — H&P
History and Physical    Patient: Cece Perry MRN: [id number]  SSN: [id number]    YOB: 1965  Age: 63 y.o. Sex: female      Subjective:      Cece Perry is a 63 y.o. female who presents to [facility] ER with complaint of Hypotension and Nausea. Patient was reportedly referred to [facility] ER by her Psychiatrist when she was seen on 1/08/2020 and her Blood Pressure was low. Patient states that she has been having fevers, chills, diaphoresis, BLE edema, a cough productive of clear mucus, and intermittent chest congestion. Patient also reports a UTI 2 weeks ago and continuing urinary urgency. Patient also reports nausea and vomiting with reduced oral intake for the last 3 days accompanied by \"a little bit of diarrhea. \"  Patient states that she has had chronically low blood pressure even before she was diagnosed with liver cirrhosis. Patient reports that she has had ascites before and that she does continue to drink 3 shots of vodka a night to improve her sleeping. In [facility] ER, Patient was noted to have Blood Pressures of 70/38 and 49/90 mm Hg and a borderline Urinalysis for UTI. Patient was started on IV Norepinephrine for hypotension. Patient is admitted to [facility] ICU for management of Hypotension requiring Pressors, HENRRY likely due to Dehydration, and borderline UTI. Past Medical History:   Diagnosis Date    Bipolar affective ([address]     Chronic obstructive pulmonary disease ([address]     Cirrhosis ([address]     ETOH abuse     Former smoker     1 PPD x40 years    History of ascites     History of sinusitis     Hyperlipidemia 11/20/2009    Patient denies    Left breast mass 05/13/2011    U/S Left Breast: No definite mass identified.  Recommended recheck in 6 months    Manic depression ([address]     Panic disorder     Vitamin D insufficiency 11/20/2009    23 ng/mL    Wrist fracture, right 01/06/2010    Non-Displaced Distal Radius/Ulnar Styloid Fx     Past Surgical History:   Procedure Laterality Date    HX WISDOM TEETH EXTRACTION        Family History   Problem Relation Age of Onset    No Known Problems Daughter     No Known Problems Adoptive Father     No Known Problems Adoptive Mother      Social History     Tobacco Use    Smoking status: Former Smoker     Packs/day: 1.00     Years: 40.00     Pack years: 40.00    Smokeless tobacco: Former User     Quit date: 06/2016   Substance Use Topics    Alcohol use: Yes     Alcohol/week: 21.0 standard drinks     Types: 21 Shots of liquor per week     Comment: Quit 6/2016; however, takes 3 shots of vodka nightly to help sleep      Patient lives at home with Jesus. Patient is up ad lola at home. Patient is a Former Smoker who Smoked 1 PPD x40 years. Patient denies Cecilia Celi. Patient reports current use of 21 shots of vodka/week as a sleeping aid. Patient reports occasional use of Marijuana (once/3 months) and denies other Illicit Drug Use. Prior to Admission medications    Medication Sig Start Date End Date Taking? Authorizing Provider   naproxen sodium (ALEVE) 220 mg tablet Take 220 mg by mouth as needed. Yes Provider, Historical   albuterol (PROVENTIL HFA) 90 mcg/actuation inhaler Take 1-2 Puffs by inhalation. 10/21/18  Yes Provider, Historical   omeprazole (PRILOSEC) 40 mg capsule Take 40 mg by mouth daily. Yes Provider, Historical   risperiDONE (RISPERDAL) 2 mg tablet Take 2 mg by mouth daily. Indications: Bipolar Disorder in Remission   Yes Reese Alvarado MD   famotidine (PEPCID) 20 mg tablet Take 1 Tab by mouth two (2) times a day. 5/10/19  Yes Caesar Carrizales MD   ondansetron (ZOFRAN ODT) 4 mg disintegrating tablet Take 1 Tab by mouth every eight (8) hours as needed for Nausea. 5/10/19  Yes Caesar Carrizales MD   escitalopram oxalate (LEXAPRO) 10 mg tablet Take 10 mg by mouth. Provider, Historical   busPIRone (BUSPAR) 10 mg tablet Take 10 mg by mouth two (2) times daily as needed (take one to three tabs po bid as needed).  Indications: Repeated Episodes of Anxiety    Brenda Alvarado MD   budesonide-formoterol (SYMBICORT) 160-4.5 mcg/actuation HFAA Take 2 Puffs by inhalation two (2) times a day. 5/13/19   Maylin Sandra NP   tiotropium bromide (SPIRIVA RESPIMAT) 2.5 mcg/actuation inhaler Take 1 Puff by inhalation daily. 5/13/19   Maylin Sandra NP   magnesium oxide (MAG-OX) 400 mg tablet Take 1 Tab by mouth daily. 5/10/19   Michael Meeks MD   thiamine HCL (B-1) 100 mg tablet Take 1 Tab by mouth daily. 5/10/19   Michael Staff, MD        Allergies   Allergen Reactions    Animal Dander Itching    Egg Nausea and Vomiting     Review of Systems:  (+) Loss of Appetite  (+) Insomnia  (+) Fevers  (+) Chills  (+) Congestion  (+) Cough  (+) Increased Sputum Production  (+) BLE Edema  (+) Nausea  (+) Vomiting  (+) Diarrhea  (+) Urinary Urgency  (+) Diaphoresis  (-) Pain with Inspiration  (-) Wheezing  (-) Shortness of Breath  (-) Chest Pain  (-) Abdominal Pain  (-) Dysuria  All other systems have been reviewed and are negative      Objective:     Vitals:    01/10/20 1830 01/10/20 1900 01/10/20 1930 01/10/20 2024   BP: 101/70 101/73 93/76    Pulse: 96 (!) 104 (!) 109    Resp: 16 21 20    Temp:       SpO2: 98% 97% 97% 95%   Weight:       Height:            Physical Exam:  General:  Adult female lying in bed in no acute distress  HEENT:  Atraumatic, normocephalic; Pupils equally round and reactive to light with accommodation; Extraocular muscles intact; (+) Somewhat dry Oropharynx without erythema, edema, or exudates; (+) Upper dental device in place; (+) NC in place and running at 3 L/min O2  Neck:  No Bruits; No Lymphadenopathy  Chest:  No pectus carinatum;  No pectus excavatum  Cardiovascular:  (+) Tachycardic rate, regular rhythm without rubs, gallops, or murmurs  Respiratory:  Clear to Auscultation Bilaterally without wheezes, rales, or rhonchi; normal effort of breathing  Abdominal:  Soft, non-tense, (+) Mildly tender epigastricum; BS present without guarding, rebound, or masses  :  Deferred  Extremities:  Pulses 2+ x4 without edema, clubbing, or cyanosis  Musculoskeletal:  Strength 5/5 and symmetrical in BUE and BLE  Integument:  No rash on face, forearms, or legs  Neurological:  A&O x4/4; No gross deficits of Visual Acuity, Eye Movement, Jaw Opening, Facial Expression, Hearing, Phonation, or Head Movement; No gross deficits of Tongue Movement or Slurring of Speech; (+) Full Body Tremor; (+) Eye-blinking in rhythm with Tremors  Psychiatric:  (+) Affect is Mildly Depressed; Language is present and fluent; (+) Behavior is Mildly Withdrawn      Assessment:     Hospital Problems  Date Reviewed: 1/9/2020          Codes Class Noted POA    * (Principal) Acute respiratory failure ([address] ICD-10-CM: J96.00  ICD-9-CM: 518.81  1/9/2020 Yes        Acute hypotension ICD-10-CM: I95.9  ICD-9-CM: 458.9  1/9/2020 Yes        HENRRY (acute kidney injury) ([address] ICD-10-CM: N17.9  ICD-9-CM: 584.9  1/9/2020 Yes        UTI (urinary tract infection) ICD-10-CM: N39.0  ICD-9-CM: 599.0  1/9/2020 Yes        Hypokalemia ICD-10-CM: E87.6  ICD-9-CM: 276.8  1/9/2020 Yes        Macrocytic anemia ICD-10-CM: D53.9  ICD-9-CM: 281.9  1/9/2020 Yes        Leukopenia ICD-10-CM: D72.819  ICD-9-CM: 288.50  1/9/2020 Yes        Thrombocytopenia ([address] ICD-10-CM: D69.6  ICD-9-CM: 287.5  1/9/2020 Yes        Pancytopenia ([address] ICD-10-CM: Q58.243  ICD-9-CM: 284.19  1/9/2020 Yes        Cirrhosis ([address] ICD-10-CM: K74.60  ICD-9-CM: 571.5  1/9/2020 Yes        ETOH abuse ICD-10-CM: F10.10  ICD-9-CM: 305.00  Unknown Yes        COPD (chronic obstructive pulmonary disease) ([address] ICD-10-CM: J44.9  ICD-9-CM: 515  Unknown Yes        Bipolar mood disorder ([address] ICD-10-CM: F31.9  ICD-9-CM: 296.80  Unknown Yes              Plan:     Therapeutic oxygen as needed. Continue IV fluids ( mL/hr) and IV Norepinephrine gtt as needed.   Notably, Pateint's Cr improved from 2.87 to Cr 1.87 with the administration of IV fluids. Due to concern for possible Gallstone picture, GI was consulted, as was General Surgery. NPO in AM.    IV Zosyn for borderline UTI. Consider CT Chest if clinical picture begins to favor PNA. CIWA protocols. Continue home medications for COPD, GERD, Bipolar Mood Disorder, and ETOHism. PRN Nicotine patch. DVT mechanoprophylaxis.     Signed By: Indra Leigh,      January 10, 2020

## 2020-01-09 NOTE — PROGRESS NOTES
Respiratory Therapy Assessment Care Plan    Patient:  Bo Lawson 53 y.o. female 1/9/2020 10:50 AM    Acute hypotension [I95.9]  Macrocytic anemia [D53.9]  Thrombocytopenia (HCC) [D69.6]  Hypokalemia [E87.6]  HENRRY (acute kidney injury) ([facility] [N17.9]  Leukopenia [D72.819]  Pancytopenia ([address] [D61.818]  UTI (urinary tract infection) [N39.0]      Chest X-RAY:   Results from Hospital Encounter encounter on 01/08/20   XR CHEST PORT    Impression IMPRESSION:    Subtle nodular opacity in the peripheral right lower lung zone, likely  superimposition. No definite focal consolidation. Results from [place] encounter on 09/14/16   XR CHEST PA LAT    Impression IMPRESSION:    Moderate to marked COPD lungs. Moderate pulmonary fibrosis in upper lobes bilaterally. Mild streaky atelectatic/fibrotic changes at the basal right lung although  subtle infiltrates are not excluded. Normal cardiac size. No evidence of cardiac decompensation. No evidence of pleural effusion or pneumothorax. Mild compression of T8 vertebral body, of undetermined age. Vital Signs:   Visit Vitals  /61   Pulse (!) 138   Temp 98.6 °F (37 °C)   Resp 23   Wt 71.7 kg (158 lb)   SpO2 93%   BMI 25.50 kg/m²         Indications for treatment: Patient presents with a history of COPD. Plan of care: Brovana Q12H and Pulmicort Q12H and Spiriva as ordered per physician. Continue oxygen therapy as needed per patient. Goal: Titrate oxygen as tolerated per patient.

## 2020-01-09 NOTE — PROGRESS NOTES
5411: Received pt from ED. Transported by Abad Cabral. Pt is calm, alert, and cooperative. 0500: Assessment complete. Pt AAO x4. Pt is weak and has moderate tremor of full body and extremities. Pt currently on levophed gtt. Pt denies and nausea or pain at this time. Pt given yellow gripper socks and fall risk arm band. Pt instructed to call before getting out of bed and for any needs. Bed locked and low, bed alarm on, call light within reach. 2891: Called to bedside by patient. Pt needs to have BM. Pt placed on bedpan, pt was able to help lift her hips to have bedpan placed. 12: Helped patient place breakfast order with dietary. 0710: Bedside and Verbal shift change report given to Neli Cloud (oncoming nurse) by Cayla Laguerre RN   (offgoing nurse). Report included the following information SBAR, Kardex, Intake/Output, MAR, Recent Results, Cardiac Rhythm Sinus Tach and Alarm Parameters .

## 2020-01-09 NOTE — PROGRESS NOTES
Problem: Self Care Deficits Care Plan (Adult)  Goal: *Acute Goals and Plan of Care (Insert Text)  Description  Occupational Therapy Goals  Initiated 1/9/2020 within 7 day(s). 1.  Patient will perform grooming with independence while sitting at edge of bed and maintaining vital signs within asymptomatic range. 2.  Patient will perform upper body dressing and bathing with supervision/set-up. 3.  Patient will perform lower body dressing and bathing with minimal assistance/contact guard assist and appropriate AE prn.  4.  Patient will perform toilet transfers with minimal assistance/contact guard assist.  5.  Patient will perform all aspects of toileting with supervision/set-up. 6.  Patient will participate in upper extremity therapeutic exercise/activities with supervision/set-up for 8 minutes. 7.  Patient will utilize energy conservation techniques during functional activities with verbal cues. Prior Level of Function:  Patient was living with fiance and was I for ADLs and simple IADLs, as well as driving. Outcome: Progressing Towards Goal   OCCUPATIONAL THERAPY EVALUATION    Patient: Iraida Palacios (23 y.o. female)  Date: 1/9/2020  Primary Diagnosis: Acute hypotension [I95.9]  Macrocytic anemia [D53.9]  Thrombocytopenia (HCC) [D69.6]  Hypokalemia [E87.6]  HENRRY (acute kidney injury) ([facility] [address] [N17.9]  Leukopenia [D72.819]  Pancytopenia ([address] [D61.818]  UTI (urinary tract infection) [N39.0]        Precautions:  Fall, Skin, Seizure  PLOF: See above    ASSESSMENT :  Based on the objective data described below, the patient presents with recent history of acute respiratory failure requiring admission to ICU. Patient currently is demonstrating increase HR. Nursing cleared patient to be seen and patient was agreeable to assessment. Patient declined any bathing or grooming activities at this time and reported that she was not supposed to get up and sit at side of bed.   Nursing stated that patient could attempt but once in room HR was noted to elevate to 140 with bed level movements and patient was not transitioned to edge of bed at this time. Patient demonstrated generally decreased functional AROM and strength in BUEs (3+/5). Current medical condition and elevated HR are impacting mobility which impacts I for ADL tasks. In addition, patient has decreased strength and ROM in BUEs that impacts participation and I for ADLs. Patient will benefit from skilled intervention to address the above impairments. Patient's rehabilitation potential is considered to be Fair  Factors which may influence rehabilitation potential include:   []             None noted  []             Mental ability/status  [x]             Medical condition  []             Home/family situation and support systems  []             Safety awareness  []             Pain tolerance/management  []             Other:      PLAN :  Recommendations and Planned Interventions:   [x]               Self Care Training                  [x]      Therapeutic Activities  [x]               Functional Mobility Training   []      Cognitive Retraining  [x]               Therapeutic Exercises           [x]      Endurance Activities  []               Balance Training                    []      Neuromuscular Re-Education  []               Visual/Perceptual Training     []      Home Safety Training  [x]               Patient Education                   []      Family Training/Education  []               Other (comment):    Frequency/Duration: Patient will be followed by occupational therapy 3-5 times a week to address goals. Discharge Recommendations: Home Health versus Inpatient Rehab based on functional progression during admission  Further Equipment Recommendations for Discharge: TBD based on functional progress during hospital admission     SUBJECTIVE:   Patient stated Johnna Italo don't want me to get up.     OBJECTIVE DATA SUMMARY:     Past Medical History:   Diagnosis Date Bipolar affective ([facility] [address]     Chronic obstructive pulmonary disease (HCC)     Cirrhosis ([facility] [address]     ETOH abuse     Hyperlipidemia 11/20/2009    Left breast mass 05/13/2011    U/S Left Breast: No definite mass identified. Recommended recheck in 6 months    Manic depression ([facility] [address]     Panic disorder     Vitamin D insufficiency 11/20/2009    23 ng/mL    Wrist fracture, right 01/06/2010    Non-Displaced Distal Radius/Ulnar Styloid Fx   History reviewed. No pertinent surgical history. Barriers to Learning/Limitations: yes;  emotional  Compensate with: visual, verbal, tactile, kinesthetic cues/model    Home Situation:   Home Situation  Home Environment: Apartment  # Steps to Enter: 2  Rails to Enter: No  One/Two Story Residence: Two story  # of Interior Steps: 15  Interior Rails: Both  Living Alone: No(Lives with Fiance)  Support Systems: Spouse/Significant Other/Partner, Family member(s), Friends \ neighbors  Patient Expects to be Discharged to[de-identified] Unknown  Current DME Used/Available at Home: Shower chair  Tub or Shower Type: Shower  [x]  Right hand dominant   []  Left hand dominant    Cognitive/Behavioral Status:  Neurologic State: Alert  Orientation Level: Oriented X4  Cognition: Appropriate for age attention/concentration     Vision/Perceptual:    Acuity: Able to read clock/calendar on wall without difficulty       Coordination: BUE     Fine Motor Skills-Upper: Left Intact; Right Intact    Gross Motor Skills-Upper: Left Intact; Right Intact    Balance:  Sitting: Intact; With support    Strength: BUE  Strength: Generally decreased, functional    Tone & Sensation: BUE  Tone: Normal  Sensation: Intact    Range of Motion: BUE  AROM: Generally decreased, functional    Functional Mobility and Transfers for ADLs:  Bed Mobility:  Scooting: Total assistance;Assist x2  Limited ability to assess sitting and transfers secondary to elevated HR during activities.     ADL Assessment:   Feeding: Setup    Oral Facial Hygiene/Grooming: Setup;Stand-by assistance    Bathing: Maximum assistance    Upper Body Dressing: Minimum assistance    Lower Body Dressing: Maximum assistance   Based on clinical judgement with observation of UB ROM and strength from bed level. Pain:  Pain level pre-treatment: 0/10   Pain level post-treatment: 0/10   Pain Intervention(s): Medication (see MAR); Rest, Ice, Repositioning   Response to intervention: Nurse notified, See doc flow    Activity Tolerance:   Poor  Please refer to the flowsheet for vital signs taken during this treatment. After treatment:   [] Patient left in no apparent distress sitting up in chair  [x] Patient left in no apparent distress in bed  [x] Call bell left within reach  [x] Nursing notified  [] Caregiver present  [] Bed alarm activated    COMMUNICATION/EDUCATION:   [x] Role of Occupational Therapy in the acute care setting  [] Home safety education was provided and the patient/caregiver indicated understanding. [] Patient/family have participated as able in goal setting and plan of care. [x] Patient/family agree to work toward stated goals and plan of care. [] Patient understands intent and goals of therapy, but is neutral about his/her participation. [] Patient is unable to participate in goal setting and plan of care. Thank you for this referral.  Mc Yarbrough OTR/L  Time Calculation: 11 mins    Eval Complexity: History: MEDIUM Complexity : Expanded review of history including physical, cognitive and psychosocial  history ; Examination: MEDIUM Complexity : 3-5 performance deficits relating to physical, cognitive , or psychosocial skils that result in activity limitations and / or participation restrictions; Decision Making:MEDIUM Complexity : Patient may present with comorbidities that affect occupational performnce.  Miniml to moderate modification of tasks or assistance (eg, physical or verbal ) with assesment(s) is necessary to enable patient to complete evaluation

## 2020-01-10 ENCOUNTER — HOSPITAL ENCOUNTER (OUTPATIENT)
Dept: NUCLEAR MEDICINE | Age: 55
Discharge: HOME | DRG: 463 | End: 2020-01-10
Attending: INTERNAL MEDICINE
Payer: COMMERCIAL

## 2020-01-10 ENCOUNTER — ANESTHESIA (OUTPATIENT)
Dept: ENDOSCOPY | Age: 55
DRG: 463 | End: 2020-01-10
Payer: COMMERCIAL

## 2020-01-10 ENCOUNTER — ANESTHESIA EVENT (OUTPATIENT)
Dept: ENDOSCOPY | Age: 55
DRG: 463 | End: 2020-01-10
Payer: COMMERCIAL

## 2020-01-10 LAB
ALBUMIN SERPL-MCNC: 2.3 G/DL (ref 3.4–5)
ALBUMIN/GLOB SERPL: 0.7 {RATIO} (ref 0.8–1.7)
ALP SERPL-CCNC: 142 U/L (ref 45–117)
ALT SERPL-CCNC: 172 U/L (ref 13–56)
AMMONIA PLAS-SCNC: 52 UMOL/L (ref 11–32)
ANION GAP SERPL CALC-SCNC: 9 MMOL/L (ref 3–18)
AST SERPL-CCNC: 246 U/L (ref 10–38)
BASOPHILS # BLD: 0 K/UL (ref 0–0.1)
BASOPHILS NFR BLD: 0 % (ref 0–2)
BILIRUB SERPL-MCNC: 2.2 MG/DL (ref 0.2–1)
BUN SERPL-MCNC: 9 MG/DL (ref 7–18)
BUN/CREAT SERPL: 10 (ref 12–20)
CA-I SERPL-SCNC: 0.93 MMOL/L (ref 1.12–1.32)
CA-I SERPL-SCNC: 1.09 MMOL/L (ref 1.12–1.32)
CALCIUM SERPL-MCNC: 7.2 MG/DL (ref 8.5–10.1)
CHLORIDE SERPL-SCNC: 109 MMOL/L (ref 100–111)
CO2 SERPL-SCNC: 25 MMOL/L (ref 21–32)
CREAT SERPL-MCNC: 0.88 MG/DL (ref 0.6–1.3)
DIFFERENTIAL METHOD BLD: ABNORMAL
EOSINOPHIL # BLD: 0 K/UL (ref 0–0.4)
EOSINOPHIL NFR BLD: 0 % (ref 0–5)
ERYTHROCYTE [DISTWIDTH] IN BLOOD BY AUTOMATED COUNT: 15.1 % (ref 11.6–14.5)
GLOBULIN SER CALC-MCNC: 3.1 G/DL (ref 2–4)
GLUCOSE SERPL-MCNC: 95 MG/DL (ref 74–99)
HCT VFR BLD AUTO: 31.7 % (ref 35–45)
HGB BLD-MCNC: 10.4 G/DL (ref 12–16)
INR PPP: 1.3 (ref 0.8–1.2)
LYMPHOCYTES # BLD: 0.5 K/UL (ref 0.9–3.6)
LYMPHOCYTES NFR BLD: 14 % (ref 21–52)
MAGNESIUM SERPL-MCNC: 1.5 MG/DL (ref 1.6–2.6)
MAGNESIUM SERPL-MCNC: 1.9 MG/DL (ref 1.6–2.6)
MCH RBC QN AUTO: 33.8 PG (ref 24–34)
MCHC RBC AUTO-ENTMCNC: 32.8 G/DL (ref 31–37)
MCV RBC AUTO: 102.9 FL (ref 74–97)
MONOCYTES # BLD: 0.6 K/UL (ref 0.05–1.2)
MONOCYTES NFR BLD: 17 % (ref 3–10)
NEUTS SEG # BLD: 2.6 K/UL (ref 1.8–8)
NEUTS SEG NFR BLD: 69 % (ref 40–73)
PHOSPHATE SERPL-MCNC: 1.9 MG/DL (ref 2.5–4.9)
PHOSPHATE SERPL-MCNC: 2.6 MG/DL (ref 2.5–4.9)
PLATELET # BLD AUTO: 49 K/UL (ref 135–420)
PMV BLD AUTO: 12.8 FL (ref 9.2–11.8)
POTASSIUM SERPL-SCNC: 3.4 MMOL/L (ref 3.5–5.5)
POTASSIUM SERPL-SCNC: 3.4 MMOL/L (ref 3.5–5.5)
PROT SERPL-MCNC: 5.4 G/DL (ref 6.4–8.2)
PROTHROMBIN TIME: 15.5 SEC (ref 11.5–15.2)
RBC # BLD AUTO: 3.08 M/UL (ref 4.2–5.3)
SODIUM SERPL-SCNC: 143 MMOL/L (ref 136–145)
WBC # BLD AUTO: 3.8 K/UL (ref 4.6–13.2)

## 2020-01-10 PROCEDURE — 74011250637 HC RX REV CODE- 250/637: Performed by: INTERNAL MEDICINE

## 2020-01-10 PROCEDURE — 74011000250 HC RX REV CODE- 250: Performed by: INTERNAL MEDICINE

## 2020-01-10 PROCEDURE — 77010033678 HC OXYGEN DAILY

## 2020-01-10 PROCEDURE — 74011000258 HC RX REV CODE- 258: Performed by: INTERNAL MEDICINE

## 2020-01-10 PROCEDURE — 84100 ASSAY OF PHOSPHORUS: CPT

## 2020-01-10 PROCEDURE — 74011250636 HC RX REV CODE- 250/636: Performed by: INTERNAL MEDICINE

## 2020-01-10 PROCEDURE — 77030035028 HC CLP HEMSTAS ENDOSCP INSC COOK -C: Performed by: INTERNAL MEDICINE

## 2020-01-10 PROCEDURE — 76060000032 HC ANESTHESIA 0.5 TO 1 HR: Performed by: INTERNAL MEDICINE

## 2020-01-10 PROCEDURE — 76040000007: Performed by: INTERNAL MEDICINE

## 2020-01-10 PROCEDURE — 84132 ASSAY OF SERUM POTASSIUM: CPT

## 2020-01-10 PROCEDURE — 74011250636 HC RX REV CODE- 250/636: Performed by: HOSPITALIST

## 2020-01-10 PROCEDURE — 0DB68ZX EXCISION OF STOMACH, VIA NATURAL OR ARTIFICIAL OPENING ENDOSCOPIC, DIAGNOSTIC: ICD-10-PCS | Performed by: INTERNAL MEDICINE

## 2020-01-10 PROCEDURE — 97162 PT EVAL MOD COMPLEX 30 MIN: CPT

## 2020-01-10 PROCEDURE — 97530 THERAPEUTIC ACTIVITIES: CPT

## 2020-01-10 PROCEDURE — 88305 TISSUE EXAM BY PATHOLOGIST: CPT

## 2020-01-10 PROCEDURE — 77030021593 HC FCPS BIOP ENDOSC BSC -A: Performed by: INTERNAL MEDICINE

## 2020-01-10 PROCEDURE — 77030037186 HC VLV ENDOSC STRL DEFENDO DISP MVAT -A: Performed by: INTERNAL MEDICINE

## 2020-01-10 PROCEDURE — 65610000006 HC RM INTENSIVE CARE

## 2020-01-10 PROCEDURE — 94640 AIRWAY INHALATION TREATMENT: CPT

## 2020-01-10 PROCEDURE — 83735 ASSAY OF MAGNESIUM: CPT

## 2020-01-10 PROCEDURE — 85610 PROTHROMBIN TIME: CPT

## 2020-01-10 PROCEDURE — 36415 COLL VENOUS BLD VENIPUNCTURE: CPT

## 2020-01-10 PROCEDURE — 78226 HEPATOBILIARY SYSTEM IMAGING: CPT

## 2020-01-10 PROCEDURE — 82330 ASSAY OF CALCIUM: CPT

## 2020-01-10 PROCEDURE — 82140 ASSAY OF AMMONIA: CPT

## 2020-01-10 PROCEDURE — 74011000258 HC RX REV CODE- 258: Performed by: NURSE ANESTHETIST, CERTIFIED REGISTERED

## 2020-01-10 PROCEDURE — 77030038269 HC DRN EXT URIN PURWCK BARD -A

## 2020-01-10 PROCEDURE — 74011000250 HC RX REV CODE- 250: Performed by: NURSE ANESTHETIST, CERTIFIED REGISTERED

## 2020-01-10 PROCEDURE — 85025 COMPLETE CBC W/AUTO DIFF WBC: CPT

## 2020-01-10 PROCEDURE — 88342 IMHCHEM/IMCYTCHM 1ST ANTB: CPT

## 2020-01-10 PROCEDURE — 74011250636 HC RX REV CODE- 250/636: Performed by: NURSE ANESTHETIST, CERTIFIED REGISTERED

## 2020-01-10 RX ORDER — PROPOFOL 10 MG/ML
INJECTION, EMULSION INTRAVENOUS AS NEEDED
Status: DISCONTINUED | OUTPATIENT
Start: 2020-01-10 | End: 2020-01-10 | Stop reason: HOSPADM

## 2020-01-10 RX ORDER — MAGNESIUM SULFATE 1 G/100ML
1 INJECTION INTRAVENOUS ONCE
Status: COMPLETED | OUTPATIENT
Start: 2020-01-10 | End: 2020-01-10

## 2020-01-10 RX ORDER — LIDOCAINE HYDROCHLORIDE 20 MG/ML
INJECTION, SOLUTION EPIDURAL; INFILTRATION; INTRACAUDAL; PERINEURAL AS NEEDED
Status: DISCONTINUED | OUTPATIENT
Start: 2020-01-10 | End: 2020-01-10 | Stop reason: HOSPADM

## 2020-01-10 RX ADMIN — LORAZEPAM 1 MG: 2 INJECTION, SOLUTION INTRAMUSCULAR; INTRAVENOUS at 00:39

## 2020-01-10 RX ADMIN — PHENYLEPHRINE HYDROCHLORIDE 100 MCG: 10 INJECTION INTRAVENOUS at 16:14

## 2020-01-10 RX ADMIN — PROPOFOL 30 MG: 10 INJECTION, EMULSION INTRAVENOUS at 16:10

## 2020-01-10 RX ADMIN — LIDOCAINE HYDROCHLORIDE 80 MG: 20 INJECTION, SOLUTION INTRAVENOUS at 16:09

## 2020-01-10 RX ADMIN — SODIUM CHLORIDE 30 MMOL: 900 INJECTION, SOLUTION INTRAVENOUS at 16:49

## 2020-01-10 RX ADMIN — PHENYLEPHRINE HYDROCHLORIDE 100 MCG: 10 INJECTION INTRAVENOUS at 16:10

## 2020-01-10 RX ADMIN — FAMOTIDINE 20 MG: 20 TABLET, FILM COATED ORAL at 18:22

## 2020-01-10 RX ADMIN — Medication 100 MG: at 12:33

## 2020-01-10 RX ADMIN — LACTULOSE 15 ML: 20 SOLUTION ORAL at 21:53

## 2020-01-10 RX ADMIN — CALCIUM GLUCONATE 4 G: 94 INJECTION, SOLUTION INTRAVENOUS at 09:19

## 2020-01-10 RX ADMIN — PIPERACILLIN AND TAZOBACTAM 3.38 G: 3; .375 INJECTION, POWDER, LYOPHILIZED, FOR SOLUTION INTRAVENOUS at 18:23

## 2020-01-10 RX ADMIN — PROPOFOL 10 MG: 10 INJECTION, EMULSION INTRAVENOUS at 16:13

## 2020-01-10 RX ADMIN — POTASSIUM CHLORIDE 10 MEQ: 7.46 INJECTION, SOLUTION INTRAVENOUS at 05:06

## 2020-01-10 RX ADMIN — PIPERACILLIN AND TAZOBACTAM 3.38 G: 3; .375 INJECTION, POWDER, LYOPHILIZED, FOR SOLUTION INTRAVENOUS at 09:14

## 2020-01-10 RX ADMIN — FAMOTIDINE 20 MG: 20 TABLET, FILM COATED ORAL at 12:33

## 2020-01-10 RX ADMIN — FOLIC ACID 1 MG: 1 TABLET ORAL at 12:33

## 2020-01-10 RX ADMIN — SODIUM CHLORIDE 6 MMOL: 900 INJECTION, SOLUTION INTRAVENOUS at 09:56

## 2020-01-10 RX ADMIN — PROPOFOL 10 MG: 10 INJECTION, EMULSION INTRAVENOUS at 16:16

## 2020-01-10 RX ADMIN — POTASSIUM CHLORIDE 10 MEQ: 7.46 INJECTION, SOLUTION INTRAVENOUS at 02:19

## 2020-01-10 RX ADMIN — PROPOFOL 20 MG: 10 INJECTION, EMULSION INTRAVENOUS at 16:14

## 2020-01-10 RX ADMIN — POTASSIUM CHLORIDE 10 MEQ: 7.46 INJECTION, SOLUTION INTRAVENOUS at 00:39

## 2020-01-10 RX ADMIN — PROPOFOL 20 MG: 10 INJECTION, EMULSION INTRAVENOUS at 16:12

## 2020-01-10 RX ADMIN — PROPOFOL 40 MG: 10 INJECTION, EMULSION INTRAVENOUS at 16:17

## 2020-01-10 RX ADMIN — PIPERACILLIN AND TAZOBACTAM 3.38 G: 3; .375 INJECTION, POWDER, LYOPHILIZED, FOR SOLUTION INTRAVENOUS at 01:00

## 2020-01-10 RX ADMIN — Medication 400 MG: at 12:33

## 2020-01-10 RX ADMIN — PROPOFOL 70 MG: 10 INJECTION, EMULSION INTRAVENOUS at 16:09

## 2020-01-10 RX ADMIN — POTASSIUM CHLORIDE 10 MEQ: 7.46 INJECTION, SOLUTION INTRAVENOUS at 04:00

## 2020-01-10 RX ADMIN — SODIUM CHLORIDE, SODIUM LACTATE, POTASSIUM CHLORIDE, AND CALCIUM CHLORIDE 150 ML/HR: 600; 310; 30; 20 INJECTION, SOLUTION INTRAVENOUS at 13:06

## 2020-01-10 RX ADMIN — MAGNESIUM SULFATE HEPTAHYDRATE 1 G: 1 INJECTION, SOLUTION INTRAVENOUS at 14:42

## 2020-01-10 RX ADMIN — PROPOFOL 50 MG: 10 INJECTION, EMULSION INTRAVENOUS at 16:11

## 2020-01-10 RX ADMIN — RISPERIDONE 2 MG: 0.5 TABLET, FILM COATED ORAL at 12:33

## 2020-01-10 RX ADMIN — LORAZEPAM 1 MG: 1 TABLET ORAL at 21:53

## 2020-01-10 RX ADMIN — ESCITALOPRAM 10 MG: 10 TABLET, FILM COATED ORAL at 12:33

## 2020-01-10 RX ADMIN — PHENYLEPHRINE HYDROCHLORIDE 100 MCG: 10 INJECTION INTRAVENOUS at 16:11

## 2020-01-10 RX ADMIN — Medication 40 MCG/MIN: at 09:13

## 2020-01-10 RX ADMIN — TIOTROPIUM BROMIDE INHALATION SPRAY 1 PUFF: 3.12 SPRAY, METERED RESPIRATORY (INHALATION) at 09:45

## 2020-01-10 RX ADMIN — BUDESONIDE 500 MCG: 0.5 SUSPENSION RESPIRATORY (INHALATION) at 09:34

## 2020-01-10 RX ADMIN — ARFORMOTEROL TARTRATE 15 MCG: 15 SOLUTION RESPIRATORY (INHALATION) at 20:22

## 2020-01-10 RX ADMIN — ARFORMOTEROL TARTRATE 15 MCG: 15 SOLUTION RESPIRATORY (INHALATION) at 09:34

## 2020-01-10 RX ADMIN — BUDESONIDE 500 MCG: 0.5 SUSPENSION RESPIRATORY (INHALATION) at 20:22

## 2020-01-10 NOTE — PROGRESS NOTES
Respiratory Therapy Assessment Care Plan    Patient:  Cordelia Linda 63 y.o. female 1/10/2020 9:39 AM    Acute hypotension [I95.9]  Macrocytic anemia [D53.9]  Thrombocytopenia (HCC) [D69.6]  Hypokalemia [E87.6]  HENRRY (acute kidney injury) ([facility] [address] [N17.9]  Leukopenia [D72.819]  Pancytopenia ([address] [D61.818]  UTI (urinary tract infection) [N39.0]      Chest X-RAY:   Results from Hospital Encounter encounter on 01/08/20   XR CHEST PORT    Impression IMPRESSION:    Subtle nodular opacity in the peripheral right lower lung zone, likely  superimposition. No definite focal consolidation. Results from [place] encounter on 09/14/16   XR CHEST PA LAT    Impression IMPRESSION:    Moderate to marked COPD lungs. Moderate pulmonary fibrosis in upper lobes bilaterally. Mild streaky atelectatic/fibrotic changes at the basal right lung although  subtle infiltrates are not excluded. Normal cardiac size. No evidence of cardiac decompensation. No evidence of pleural effusion or pneumothorax. Mild compression of T8 vertebral body, of undetermined age. Vital Signs:   Visit Vitals  BP (!) 78/51   Pulse (!) 101   Temp 98 °F (36.7 °C)   Resp 22   Ht 5' 6\" (1.676 m)   Wt 71.7 kg (158 lb)   SpO2 95%   BMI 25.50 kg/m²         Indications for treatment: History of COPD      Plan of care: Brovana Q12H and Pulmicort Q12H and Spiriva as ordered per MD. Continue oxygen therapy as needed per patient. Goal: Continue to improve WOB and titrate oxygen as tolerated per patient.

## 2020-01-10 NOTE — PROGRESS NOTES
Assumed care of patient. Alert and oriented. Resting in bed. PIV x3. Smooth and MIVF infusing. purewick in place. Bedside shift change report given to octaviano (oncoming nurse) by Maury Carroll RN   (offgoing nurse). Report included the following information SBAR, MAR and Recent Results.

## 2020-01-10 NOTE — PROGRESS NOTES
Problem: Tissue Perfusion - Cardiopulmonary, Altered  Goal: *Optimize tissue perfusion  Outcome: Progressing Towards Goal  Goal: *Absence of hypoxia  Outcome: Progressing Towards Goal     Problem: Alcohol Withdrawal  Goal: *STG: Participates in treatment plan  Outcome: Progressing Towards Goal  Goal: *STG: Remains safe in hospital  Outcome: Progressing Towards Goal  Goal: *STG: Seeks staff when symptoms of withdrawal increase  Outcome: Progressing Towards Goal  Goal: *STG: Complies with medication therapy  Outcome: Progressing Towards Goal  Goal: *STG: Attends activities and groups  Outcome: Progressing Towards Goal  Goal: *STG: Will identify negative impact of chemical dependency including the use of tobacco, alcohol, and other substances  Outcome: Progressing Towards Goal  Goal: *STG: Verbalizes abstinence as an achievable goal  Outcome: Progressing Towards Goal  Goal: *STG: Agrees to participate in outpatient after care program to support ongoing mental health  Outcome: Progressing Towards Goal  Goal: *STG: Able to indentify relapse triggers including interpersonal/social and familial factors  Outcome: Progressing Towards Goal  Goal: *STG: Identify lifestyle changes to support long term sobriety such as vocation, employment, education, and legal issues  Outcome: Progressing Towards Goal  Goal: *STG: Maintains appropriate nutrition and hydration  Outcome: Progressing Towards Goal  Goal: *STG: Vital signs within defined limits  Outcome: Progressing Towards Goal  Goal: *STG/LTG: Relapse prevention plan in place to include housing/aftercare, leisure activities, and spirituality  Outcome: Progressing Towards Goal  Goal: Interventions  Outcome: Progressing Towards Goal     Problem: Pain  Goal: *Control of Pain  Outcome: Progressing Towards Goal     Problem: Falls - Risk of  Goal: *Absence of Falls  Description  Document Fina Rodriguez Fall Risk and appropriate interventions in the flowsheet.   Outcome: Progressing Towards Goal  Note: Fall Risk Interventions:  Mobility Interventions: Communicate number of staff needed for ambulation/transfer         Medication Interventions: Evaluate medications/consider consulting pharmacy    Elimination Interventions: Call light in reach    History of Falls Interventions: Evaluate medications/consider consulting pharmacy         Problem: Pressure Injury - Risk of  Goal: *Prevention of pressure injury  Description  Document Lex Scale and appropriate interventions in the flowsheet.   Outcome: Progressing Towards Goal  Note: Pressure Injury Interventions:  Sensory Interventions: Assess changes in LOC    Moisture Interventions: Apply protective barrier, creams and emollients    Activity Interventions: Pressure redistribution bed/mattress(bed type)    Mobility Interventions: HOB 30 degrees or less    Nutrition Interventions: Document food/fluid/supplement intake    Friction and Shear Interventions: HOB 30 degrees or less                Problem: Discharge Planning  Goal: *Discharge to safe environment  Outcome: Progressing Towards Goal     Problem: Patient Education: Go to Patient Education Activity  Goal: Patient/Family Education  Outcome: Progressing Towards Goal

## 2020-01-10 NOTE — PROGRESS NOTES
Attempted for OT tx. NSG staff reported pt off unit for Nuclear Scan. Will attempt f/u OT as schedule permits.

## 2020-01-10 NOTE — PROGRESS NOTES
Physical Exam  Skin:     General: Skin is warm and dry. Capillary Refill: Capillary refill takes less than 2 seconds. Findings: Ecchymosis present. Primary Nurse Choco Mancilla RN and ANA Aguirre performed a dual skin assessment on this patient Impairment noted- see wound doc flow sheet Lex score is 16.

## 2020-01-10 NOTE — PROCEDURES
Endoscopy Procedure Note    Patient: Salima Chou MRN: [id number]  SSN: [id number]    YOB: 1965  Age: 63 y.o. Sex: female      Date/Time:  1/10/2020 4:26 PM    Esophagogastroduodenoscopy (EGD) Procedure Note    Procedure: Esophagogastroduodenoscopy with biopsy and clipping of biopsy sites. IMPRESSION:   1. Incomplete esophageal ring near GE junction. 2. No esophageal or gastric varices. 3. Portal gastropathy. 4. Non-specific, non-erosive gastritis. Biopsies taken from the antrum (steady ooze from bx sites controlled with clips). 5. Findings on this exam do not explain the patient's pain. RECOMMENDATIONS:  1. -Await pathology. 2. -Await result of HIDA scan. 3. -MRCP to rule out CBD stone and evaluate the pancreas. Indication: Abdominal pain, epigastric  :  Toño Valencia MD  Referring Provider:   Noelle Oh NP  History: The history and physical exam were reviewed and updated. Endoscope: [id number]  Extent of Exam: second portion of the duodenum  ASA: Per Anesthesia  Anethesia/Sedation:  MAC anesthesia    Description of the procedure: The procedure was discussed with the patient including risks, benefits, alternatives including risks of iv sedation, bleeding, perforation and aspiration. A safety timeout was performed. The patient was placed in the left lateral decubitus position. A bite block was placed. The patient was given incremental doses of intravenous sedation until moderate sedation was achieved. The patients vital signs were monitored at all times including heart rate/rhythm, blood pressure and oxygen saturation. The endoscope was then passed under direct visualization to the second portion of the duodenum. The endoscope was then slowly withdrawn while visualizing the mucosa. In the stomach a retroflexion was performed and gastric fundus and cardia visualized. The endoscope was then slowly withdrawn.   The patient was then transferred to recovery in stable condition. Findings:    Esophagus: The esophageal mucosa without ulcers, erosions, inflammatory changes, changes of Price's esophagus, strictures, varices or mass lesions. An incomplete ring was seen near the GE junction. A small, trivial, sliding, hiatus hernia was noted. Stomach: The gastric mucosa was without ulcers, erosions, vascular, or mass lesions. The gastric folds were normal. No varices were foundThere was no retained food in the stomach. Patchy erythema was seen in the antrum of the stomach. Biopsies were taken to rule out H pylori infection. There was a steady ooze of blood from the two biopsy sites in the antrum. Two clips were used to achieve hemostasis. The pylorus was not deformed. Duodenum: The duodenum mucosa was without ulcers, erosions, inflammatory changes, vascular or mass lesions. Specimens:   ID Type Source Tests Collected by Time Destination   1 : bx r/o H. Pylori  Preservative Gastric  Cathie Swain MD 1/10/2020 9177 Pathology               Complications:   None; patient tolerated the procedure well.     EBL:Minimal        Fracisco Purcell MD  January 10, 2020  4:26 PM

## 2020-01-10 NOTE — PROGRESS NOTES
Problem: Tissue Perfusion - Cardiopulmonary, Altered  Goal: *Optimize tissue perfusion  Outcome: Progressing Towards Goal  Goal: *Absence of hypoxia  Outcome: Progressing Towards Goal     Problem: Alcohol Withdrawal  Goal: *STG: Participates in treatment plan  Outcome: Progressing Towards Goal  Goal: *STG: Remains safe in hospital  Outcome: Progressing Towards Goal  Goal: *STG: Seeks staff when symptoms of withdrawal increase  Outcome: Progressing Towards Goal  Goal: *STG: Complies with medication therapy  Outcome: Progressing Towards Goal  Goal: *STG: Attends activities and groups  Outcome: Progressing Towards Goal  Goal: *STG: Will identify negative impact of chemical dependency including the use of tobacco, alcohol, and other substances  Outcome: Progressing Towards Goal  Goal: *STG: Verbalizes abstinence as an achievable goal  Outcome: Progressing Towards Goal  Goal: *STG: Agrees to participate in outpatient after care program to support ongoing mental health  Outcome: Progressing Towards Goal  Goal: *STG: Able to indentify relapse triggers including interpersonal/social and familial factors  Outcome: Progressing Towards Goal  Goal: *STG: Identify lifestyle changes to support long term sobriety such as vocation, employment, education, and legal issues  Outcome: Progressing Towards Goal  Goal: *STG: Maintains appropriate nutrition and hydration  Outcome: Progressing Towards Goal  Goal: *STG: Vital signs within defined limits  Outcome: Progressing Towards Goal  Goal: *STG/LTG: Relapse prevention plan in place to include housing/aftercare, leisure activities, and spirituality  Outcome: Progressing Towards Goal  Goal: Interventions  Outcome: Progressing Towards Goal     Problem: Pain  Goal: *Control of Pain  Outcome: Progressing Towards Goal     Problem: Falls - Risk of  Goal: *Absence of Falls  Description  Document Fina Rodriguez Fall Risk and appropriate interventions in the flowsheet.   Outcome: Progressing Towards Goal  Note: Fall Risk Interventions:  Mobility Interventions: Bed/chair exit alarm         Medication Interventions: Bed/chair exit alarm, Evaluate medications/consider consulting pharmacy    Elimination Interventions: Bed/chair exit alarm, Call light in reach    History of Falls Interventions: Bed/chair exit alarm         Problem: Pressure Injury - Risk of  Goal: *Prevention of pressure injury  Description  Document Lex Scale and appropriate interventions in the flowsheet.   Outcome: Progressing Towards Goal  Note: Pressure Injury Interventions:  Sensory Interventions: Assess need for specialty bed    Moisture Interventions: Absorbent underpads    Activity Interventions: Pressure redistribution bed/mattress(bed type)    Mobility Interventions: HOB 30 degrees or less, Pressure redistribution bed/mattress (bed type)    Nutrition Interventions: Document food/fluid/supplement intake    Friction and Shear Interventions: HOB 30 degrees or less, Foam dressings/transparent film/skin sealants

## 2020-01-10 NOTE — PROGRESS NOTES
Problem: Mobility Impaired (Adult and Pediatric)  Goal: *Acute Goals and Plan of Care (Insert Text)  Description  Physical Therapy Goals  Initiated 1/10/2020 and to be accomplished within 7 day(s)  1. Patient will move from supine to sit and sit to supine in bed with modified independence. 2.  Patient will transfer from bed to chair and chair to bed with modified independence using the least restrictive device. 3.  Patient will perform sit to stand with modified independence. 4.  Patient will ambulate with modified independence for 150 feet with the least restrictive device. 5.  Patient will ascend/descend 10 stairs with handrail(s) with modified independence. Outcome: Progressing Towards Goal   PHYSICAL THERAPY EVALUATION    Patient: Zach Aaron (83 y.o. female)  Date: 1/10/2020  Primary Diagnosis: Acute hypotension [I95.9]  Macrocytic anemia [D53.9]  Thrombocytopenia (HCC) [D69.6]  Hypokalemia [E87.6]  HENRRY (acute kidney injury) ([facility] [address] [N17.9]  Leukopenia [D72.819]  Pancytopenia ([address] [D61.818]  UTI (urinary tract infection) [N39.0]  Precautions: Fall  PLOF: Independent  ASSESSMENT :  Oriented to person and place; disoriented to time. Min A for supine to sit. Good sitting balance. Min A for sit to stand. Min A for standing at ww; 2 minutes. Completed side steps x3 to [facility] with ww and supervision. Min A for stand to sit. Min A for sit to supine. BP 99/66 pre mobility; BP 99/72 post mobility. HR 130s with mobility. O2 saturation 90% or greater with mobility. Education provided on bed mobility, transfers, ADLs, balance, amb, safety, exercise, role of PT, plan of care, cognition, skin integrity, vitals as indicated. Educated on need for RN assistance with mobility; verbalized understanding. Call bell in reach. Patient will benefit from skilled intervention to address the above impairments.   Patient's rehabilitation potential is considered to be Good  Factors which may influence rehabilitation potential include:   []         None noted  []         Mental ability/status  [x]         Medical condition  []         Home/family situation and support systems  []         Safety awareness  []         Pain tolerance/management  []         Other:      PLAN :  Recommendations and Planned Interventions:   [x]           Bed Mobility Training             [x]    Neuromuscular Re-Education  [x]           Transfer Training                   []    Orthotic/Prosthetic Training  [x]           Gait Training                          []    Modalities  [x]           Therapeutic Exercises           []    Edema Management/Control  [x]           Therapeutic Activities            [x]    Family Training/Education  [x]           Patient Education  []           Other (comment):    Frequency/Duration: Patient will be followed by physical therapy 3-5 times a week to address goals. Discharge Recommendations: Quentin Hobbs; may quickly progress to home with home health  Further Equipment Recommendations for Discharge: rolling walker     SUBJECTIVE:   Patient stated My stomach is sore from moving in bed.     OBJECTIVE DATA SUMMARY:     Past Medical History:   Diagnosis Date    Bipolar affective ([facility] [address]     Chronic obstructive pulmonary disease ([facility] [address]     Cirrhosis ([facility] [address]     ETOH abuse     Hyperlipidemia 11/20/2009    Left breast mass 05/13/2011    U/S Left Breast: No definite mass identified. Recommended recheck in 6 months    Manic depression ([facility] [address]     Panic disorder     Vitamin D insufficiency 11/20/2009    23 ng/mL    Wrist fracture, right 01/06/2010    Non-Displaced Distal Radius/Ulnar Styloid Fx   History reviewed. No pertinent surgical history.   Barriers to Learning/Limitations: yes;  altered mental status (i.e.Sedation, Confusion)  Compensate with: Visual Cues, Verbal Cues, Tactile Cues and Kinesthetic Cues    Home Situation:  Home Situation  Home Environment: Apartment(2nd floor)  # Steps to Enter: 2  Rails to Enter: No  One/Two Story Residence: Two story  # of Interior Steps: 15  Interior Rails: Both  Living Alone: No(Lives with Fiance)  Support Systems: Spouse/Significant Other/Partner, Family member(s), Friends \ neighbors  Patient Expects to be Discharged to[de-identified] Apartment  Current DME Used/Available at Home: Shower chair  Tub or Shower Type: Shower    Critical Behavior:  Neurologic State: Alert  Orientation Level: Oriented to person;Oriented to place  Cognition: Follows commands  Safety/Judgement: Fall prevention  Psychosocial  Patient Behaviors: Cooperative    Strength:    Manual Muscle Testing (LE)         R     L    Hip Flexion:   4+/5  4+/5  Knee EXT:   4+/5  4+/5  Knee FLEX:   4+/5  4+/5  Ankle DF:   4+/5  4+/5  _________________________________________________   Range Of Motion:  BLE AROM WFL  Functional Mobility:  Bed Mobility:  Supine to Sit: Minimum assistance  Sit to Supine: Minimum assistance  Transfers:  Sit to Stand: Minimum assistance  Stand to Sit: Minimum assistance  Balance:   Sitting: Impaired  Sitting - Static: Good (unsupported)  Sitting - Dynamic: Good (unsupported)  Standing: Impaired  Standing - Static: Good  Standing - Dynamic : Fair    Neuro Re-education:  Seated EOB 5 minutes  Standing 2 minutes    Pain:  Pain level pre-treatment: 9/10   Pain level post-treatment: 9/10     Activity Tolerance:   Fair    After treatment:   []         Patient left in no apparent distress sitting up in chair  [x]         Patient left in no apparent distress in bed  [x]         Call bell left within reach  [x]         Nursing notified  []         Caregiver present  []         Bed alarm activated  []         SCDs applied    COMMUNICATION/EDUCATION:   [x]         Role of physical therapy and plan of care in the acute care setting. [x]         Fall prevention education was provided and the patient/caregiver indicated understanding. [x]         Patient/family have participated as able in goal setting and plan of care.   [] Patient/family agree to work toward stated goals and plan of care. []         Patient understands intent and goals of therapy, but is neutral about his/her participation. []         Patient is unable to participate in goal setting/plan of care: ongoing with therapy staff.     Thank you for this referral.  Riley Jordan, PT   Time Calculation: 20 mins    Eval Complexity: History: MEDIUM  Complexity : 1-2 comorbidities / personal factors will impact the outcome/ POC Exam:MEDIUM Complexity : 3 Standardized tests and measures addressing body structure, function, activity limitation and / or participation in recreation  Presentation: MEDIUM Complexity : Evolving with changing characteristics  Clinical Decision Making:Medium Complexity    Clinical judgement; ROM, MMT, functional mobility Overall Complexity:MEDIUM

## 2020-01-10 NOTE — PROGRESS NOTES
(7035) Received report from [facility]. Assumed care of patient. VSS. Whiteboard updated. Will continue to monitor. ([id number]) Patient transferred to nuc med for scan. VSS. Will continue to monitor.     (1500) Waiting for 2nd part of nuc med scan. Will travel to [facility] for EGD after scan. (7532) Patient returned from EGD. VSS. Will continue to monitor. (7388) Bedside and Verbal shift change report given to ANA Aguirre (oncoming nurse) by Kehinde Lara RN (offgoing nurse). Report included the following information SBAR, Intake/Output, MAR, Recent Results, Cardiac Rhythm NSR to ST and Alarm Parameters .

## 2020-01-10 NOTE — PROGRESS NOTES
[facility]  Pulmonary, Critical Care, and Sleep Medicine    Name: aZch Aaron MRN: [id number]   : 1965 Hospital: [facility]   Date: 1/10/2020  Consulted By:      Critical Care History and Physical      Subjective/History:   Patient is a 63 y.o. female with Etoh cirrhosis, Chronic Hypotension, Active Etoh abuse, COPD, and Bipolar disorder, who presented to the ER overnight after being found hypotensive by her psychiatrist. She c/o unsteady gait, decreased appetite, N/V/D, and blurry vision x 1-2 weeks. In the ER she was found to be very tremulous. She reports she is still actively drinking. In the ER she was found to have BP 76/60 (MAP 64), and concern was raised for possible sepsis. Patient was afebrile with no leukocytosis and lactate only 1.85. She was given 2L IVF but after BP remained low, she was started on levophed. Patient c/o abd pain, and CT Abdomen revealed peripancreatic inflammation vs infection. RUQ US showed gallstones with a nondistended GB but did show GB wall thickening. GI and Surgery were consulted by the ER. Overnight: no events; says her abd pain feels a little better today. HIDA done this AM was negative. EGD this afternoon showed only gastritis. Plan for MRCP. Remains on low dose Smooth gtt. Past Medical History:   Diagnosis Date    Bipolar affective ([address]     Chronic obstructive pulmonary disease ([address]     Cirrhosis ([address]     ETOH abuse     Hyperlipidemia 2009    Left breast mass 2011    U/S Left Breast: No definite mass identified. Recommended recheck in 6 months    Manic depression ([address]     Panic disorder     Vitamin D insufficiency 2009    23 ng/mL    Wrist fracture, right 2010    Non-Displaced Distal Radius/Ulnar Styloid Fx        Prior to Admission medications    Medication Sig Start Date End Date Taking? Authorizing Provider   naproxen sodium (ALEVE) 220 mg tablet Take 220 mg by mouth as needed.    Yes Provider, Historical   albuterol (PROVENTIL HFA) 90 mcg/actuation inhaler Take 1-2 Puffs by inhalation. 10/21/18  Yes Provider, Historical   omeprazole (PRILOSEC) 40 mg capsule Take 40 mg by mouth daily. Yes Provider, Historical   risperiDONE (RISPERDAL) 2 mg tablet Take 2 mg by mouth daily. Indications: Bipolar Disorder in Remission   Yes Reese Alvarado MD   famotidine (PEPCID) 20 mg tablet Take 1 Tab by mouth two (2) times a day. 5/10/19  Yes Caesar Carrizales MD   ondansetron (ZOFRAN ODT) 4 mg disintegrating tablet Take 1 Tab by mouth every eight (8) hours as needed for Nausea. 5/10/19  Yes Caesar Carrizales MD   escitalopram oxalate (LEXAPRO) 10 mg tablet Take 10 mg by mouth. Provider, Historical   busPIRone (BUSPAR) 10 mg tablet Take 10 mg by mouth two (2) times daily as needed (take one to three tabs po bid as needed). Indications: Repeated Episodes of Anxiety    Reese Alvarado MD   budesonide-formoterol (SYMBICORT) 160-4.5 mcg/actuation HFAA Take 2 Puffs by inhalation two (2) times a day. 5/13/19   Darnell Sheehan NP   tiotropium bromide (SPIRIVA RESPIMAT) 2.5 mcg/actuation inhaler Take 1 Puff by inhalation daily. 5/13/19   Darnell Sheehan NP   magnesium oxide (MAG-OX) 400 mg tablet Take 1 Tab by mouth daily. 5/10/19   Caesar Carrizales MD   thiamine HCL (B-1) 100 mg tablet Take 1 Tab by mouth daily.  5/10/19   Caesar Carrizales MD     Current Facility-Administered Medications   Medication Dose Route Frequency    sodium phosphate 30 mmol in 0.9% sodium chloride 250 mL infusion  30 mmol IntraVENous ONCE    nicotine (NICODERM CQ) 21 mg/24 hr patch 1 Patch  1 Patch TransDERmal DAILY    lactated Ringers infusion  150 mL/hr IntraVENous CONTINUOUS    escitalopram oxalate (LEXAPRO) tablet 10 mg  10 mg Oral DAILY    famotidine (PEPCID) tablet 20 mg  20 mg Oral BID    magnesium oxide (MAG-OX) tablet 400 mg  400 mg Oral DAILY    risperiDONE (RisperDAL) tablet 2 mg  2 mg Oral DAILY    thiamine mononitrate (B-1) tablet 100 mg  100 mg Oral DAILY    tiotropium bromide (SPIRIVA RESPIMAT) 2.5 mcg /actuation  1 Puff Inhalation DAILY    piperacillin-tazobactam (ZOSYN) 3.375 g in 0.9% sodium chloride (MBP/ADV) 100 mL MBP  3.375 g IntraVENous Q8H    budesonide (PULMICORT) 500 mcg/2 ml nebulizer suspension  500 mcg Nebulization BID RT    arformoterol (BROVANA) neb solution 15 mcg  15 mcg Nebulization BID RT    folic acid (FOLVITE) tablet 1 mg  1 mg Oral DAILY    PHENYLephrine (PF)(ISAMAR-SYNEPHRINE) 30 mg in 0.9% sodium chloride 250 mL infusion   mcg/min IntraVENous TITRATE    lactulose (CHRONULAC) 10 gram/15 mL solution 15 mL  10 g Oral TID     Allergies   Allergen Reactions    Animal Dander Itching    Egg Nausea and Vomiting      Social History     Tobacco Use    Smoking status: Former Smoker     Packs/day: 1.00     Years: 40.00     Pack years: 40.00    Smokeless tobacco: Former User     Quit date: 2016   Substance Use Topics    Alcohol use: No     Comment: quit since 2016      History reviewed. No pertinent family history. Review of Systems:  A comprehensive review of systems was negative except for that written in the HPI.     Objective:   Vital Signs:    Visit Vitals  /70   Pulse 96   Temp 98 °F (36.7 °C)   Resp 16   Ht 5' 6\" (1.676 m)   Wt 71.7 kg (158 lb)   SpO2 98%   BMI 25.50 kg/m²       O2 Device: Nasal cannula   O2 Flow Rate (L/min): 4 l/min   Temp (24hrs), Av.2 °F (36.8 °C), Min:98 °F (36.7 °C), Max:98.6 °F (37 °C)       Intake/Output:   Last shift:      01/10 07 - 01/10 190  In: 2460.4 [I.V.:2460.4]  Out: 9462 [Urine:1050]  Last 3 shifts: 1901 - 01/10 0700  In: 7476.8 [P.O.:475; I.V.:7001.8]  Out: 1300 [Urine:1300]    Intake/Output Summary (Last 24 hours) at 1/10/2020 1847  Last data filed at 1/10/2020 1800  Gross per 24 hour   Intake 5132.08 ml   Output 1050 ml   Net 4082.08 ml     Physical Exam:   General:  WDWN Adult female, Alert, cooperative, NAD   HEENT:   NCAT, PERRL, OP clear, MM moist    Neck: Supple, trachea midline. Lungs:   Symmetrical chest rise; good AE bilat; CTAB; no wheezes/rhonchi/rales noted. Heart:  RRR, S1, S2 normal, no m/r/g   Abdomen:   Soft, TTP in Epigastric and RUQ, No guarding or rebound. Normoactive bowel sounds. Extremities: Extremities normal, atraumatic, no cyanosis or edema. Pulses: 2+ and symmetric all extremities. Skin: Skin color, texture, turgor normal. No rashes or lesions   Neurologic: Grossly nonfocal, Awake/alert, moving all extremitiess. Devices:      Data:     Recent Results (from the past 24 hour(s))   POTASSIUM    Collection Time: 01/09/20  9:15 PM   Result Value Ref Range    Potassium 3.2 (L) 3.5 - 5.5 mmol/L   PHOSPHORUS    Collection Time: 01/10/20  2:00 AM   Result Value Ref Range    Phosphorus 1.9 (L) 2.5 - 4.9 MG/DL   MAGNESIUM    Collection Time: 01/10/20  2:00 AM   Result Value Ref Range    Magnesium 1.9 1.6 - 2.6 mg/dL   CALCIUM, IONIZED    Collection Time: 01/10/20  2:00 AM   Result Value Ref Range    Ionized Calcium 0.93 (L) 1.12 - 1.32 MMOL/L   CBC WITH AUTOMATED DIFF    Collection Time: 01/10/20  2:00 AM   Result Value Ref Range    WBC 3.8 (L) 4.6 - 13.2 K/uL    RBC 3.08 (L) 4.20 - 5.30 M/uL    HGB 10.4 (L) 12.0 - 16.0 g/dL    HCT 31.7 (L) 35.0 - 45.0 %    .9 (H) 74.0 - 97.0 FL    MCH 33.8 24.0 - 34.0 PG    MCHC 32.8 31.0 - 37.0 g/dL    RDW 15.1 (H) 11.6 - 14.5 %    PLATELET 49 (L) 255 - 420 K/uL    MPV 12.8 (H) 9.2 - 11.8 FL    NEUTROPHILS 69 40 - 73 %    LYMPHOCYTES 14 (L) 21 - 52 %    MONOCYTES 17 (H) 3 - 10 %    EOSINOPHILS 0 0 - 5 %    BASOPHILS 0 0 - 2 %    ABS. NEUTROPHILS 2.6 1.8 - 8.0 K/UL    ABS. LYMPHOCYTES 0.5 (L) 0.9 - 3.6 K/UL    ABS. MONOCYTES 0.6 0.05 - 1.2 K/UL    ABS. EOSINOPHILS 0.0 0.0 - 0.4 K/UL    ABS.  BASOPHILS 0.0 0.0 - 0.1 K/UL    DF AUTOMATED     METABOLIC PANEL, COMPREHENSIVE    Collection Time: 01/10/20  2:00 AM   Result Value Ref Range    Sodium 143 136 - 145 mmol/L Potassium 3.4 (L) 3.5 - 5.5 mmol/L    Chloride 109 100 - 111 mmol/L    CO2 25 21 - 32 mmol/L    Anion gap 9 3.0 - 18 mmol/L    Glucose 95 74 - 99 mg/dL    BUN 9 7.0 - 18 MG/DL    Creatinine 0.88 0.6 - 1.3 MG/DL    BUN/Creatinine ratio 10 (L) 12 - 20      GFR est AA >60 >60 ml/min/1.73m2    GFR est non-AA >60 >60 ml/min/1.73m2    Calcium 7.2 (L) 8.5 - 10.1 MG/DL    Bilirubin, total 2.2 (H) 0.2 - 1.0 MG/DL    ALT (SGPT) 172 (H) 13 - 56 U/L    AST (SGOT) 246 (H) 10 - 38 U/L    Alk. phosphatase 142 (H) 45 - 117 U/L    Protein, total 5.4 (L) 6.4 - 8.2 g/dL    Albumin 2.3 (L) 3.4 - 5.0 g/dL    Globulin 3.1 2.0 - 4.0 g/dL    A-G Ratio 0.7 (L) 0.8 - 1.7     AMMONIA    Collection Time: 01/10/20  2:00 AM   Result Value Ref Range    Ammonia 52 (H) 11 - 32 UMOL/L   PROTHROMBIN TIME + INR    Collection Time: 01/10/20  2:00 AM   Result Value Ref Range    Prothrombin time 15.5 (H) 11.5 - 15.2 sec    INR 1.3 (H) 0.8 - 1.2             No results for input(s): FIO2I, IFO2, HCO3I, IHCO3, HCOPOC, PCO2I, PCOPOC, IPHI, PHI, PHPOC, PO2I, PO2POC in the last 72 hours. No lab exists for component: IPOC2    Imaging:  I have personally reviewed the patients radiographs and have reviewed the reports:    CXR [1/8]:Subtle nodular opacity in the peripheral right lower lung zone, likely superimposition. No definite focal consolidation, effusion or pneumothorax. Biapical scarring. CT ABD/PELVIS [1/8]:   1. An acute inflammatory or infectious process in the upper abdomen centered in  the peripancreatic/periduodenal region represents a change from prior, not well  assessed in the absence of intravenous or oral contrast. Differential  possibilities include acute pancreatitis versus duodenitis, and less likely  acute cholecystitis given nondistention of the gallbladder (although there are  gallstones and areas of minor gallbladder wall thickening). 2. Small volume simple perihepatic ascites and trace pelvic free fluid are new  from prior. Peripancreatic/periduodenal fluid and stranding are present, also  new from prior. 3. Additional stable chronic findings include profound hepatic steatosis,  cirrhosis, and sequela of portal hypertension.     RUQ US (1/8):   1. Hepatic steatosis and nodularity of the hepatic contour, suggestive of cirrhosis. 2. Dependent shadowing gallstones are present in the nondistended gallbladder. There is nonspecific gallbladder wall thickening, which may be related to hepatic dysfunction. Findings are equivocal for acute cholecystitis. If there is clinical concern for acute cholecystitis, consider nuclear medicine hepatobiliary imaging for further evaluation. 3. Trace perihepatic and upper abdominal ascites. IMPRESSION:   54yoF with Etoh cirrhosis, Chronic Hypotension, Active Etoh abuse, COPD, and Bipolar disorder, who presented to the ER overnight after being found hypotensive by her psychiatrist. She c/o unsteady gait, decreased appetite, N/V/D, and blurry vision x 1-2 weeks. In the ER she was found to be very tremulous. She reports she is still actively drinking. In the ER she was found to have BP 76/60 (MAP 64), and concern was raised for possible sepsis. Patient was afebrile with no leukocytosis and lactate only 1.85      PLAN:   Respiratory:  No active issues    Cardiovascular:  1. Sinus tachycardia: improved after changing levophed to rodrick  2. Shock superimposed on Chronic HYPOtension: will lower MAP goal to >55 given that she has cirrhosis; baseline SBP on multiple prior clinic visits typically in 90's-100. Worsening of baseline blood pressure likely related to hypovolemia and less likely due to sepsis as patient has no fever, leukocytosis, and lactate was only 1.85 before IVF's given. Renal:  1. HENRRY: likely pre-renal in setting of intravascular depletion from N/V/D  - creatinine 2.87 on admit, up from baseline of 0.63, improved to 0.88 today  - monitor UOP; avoid nephrotoxic agents.      2. Hypokalemia; Hypomagnesemia; Hypophosphatemia:  - repleted     Infectious Disease:  1. Questionable Septic shock: no signs of sepsis; is in shock but may be related to hypovolemia. Only possible source of infxn is her abdomen as she has pain in her epigastric and RUQ. RUQ US indeterminate for acute sherrie. Surgery consult felt was not acute sherrie. HIDA now is negative. Continue Zosyn. Blood cultures no growth. CXR showed no pneumonia. UA contaminated with epithelial cells; new sample shows bacteria but no signs of infxn (no WBC, leuk, nitrites); in setting of shock though it is reasonable to treat. Culture growing GNR's. Endocrine:  No active issues     GI:  1. Abdominal pain; Etoh cirrhosis; Possible Etoh hepatitis:  - RUQ and Epigastric abd pain. CT Abd showed peripancreatic inflammation vs infection which could be related to pancreatitis vs duodenitis vs acute sherrie. Lipase normal. Elevated LFT's possible related to Etoh hepatitis. RUQ US indeterminate for acute sherrie, but HIDA negative. - EGD today showed gastritis which would not explain her abd pain  - MRCP ordered   - continue trending LFT's daily. - continue thiamine and folate daily; monitor CIWA. Hematologic:  1. Thrombocytopenia: related to her cirrhosis; continue to monitor. SCD's for GI prophylaxis    Neurologic:  1. Tremors: unclear if this is due to Etoh withdraw vs Asterixis related to HE.   - check ammonia level; monitor CIWA. Ativan PRN. 2. Generalized weakness; Subjective unsteady gait:  - consult PT/OT       Best Practice:  · Sepsis Bundle per Hospital Protocol  · Glycemic control; avoid Hypoglycemia  · IHI ICU Bundles:  ·  Chairez Bundle Followed    · Stress ulcer prophylaxis. Famotidine   · DVT prophylaxis. SCD's  · Need for Lines, chairez assessed. · Restraints need. · Palliative care evaluation. NA  · Code Status: FULL        Total of 60 min critical care time spent at bedside during the course of care providing evaluation,management and care decisions and ordering appropriate treatment related to critical care problems exclusive of procedures. The reason for providing this level of medical care for this critically ill patient was due a critical illness that impaired one or more vital organ systems such that there was a high probability of imminent or life threatening deterioration in the patients condition. This care involved high complexity decision making to assess, manipulate, and support vital system functions, to treat this degree vital organ system failure and to prevent further life threatening deterioration of the patients condition.       Yari Benites MD  Pulmonary & Critical Care

## 2020-01-10 NOTE — PROGRESS NOTES
Internal Medicine Progress Note    Patient's Name: Linn Connolly  Admit Date: 1/8/2020  Length of Stay: 1      Assessment/Plan     Active Hospital Problems    Diagnosis Date Noted    Hypokalemia 01/09/2020    Macrocytic anemia 01/09/2020    Leukopenia 01/09/2020    UTI (urinary tract infection) 01/09/2020    Thrombocytopenia ([facility] [address] 01/09/2020    HENRRY (acute kidney injury) ([facility] [address] 01/09/2020    Pancytopenia ([facility] [address] 01/09/2020    Acute hypotension 01/09/2020    Acute respiratory failure ([facility] [address] 01/09/2020    Cirrhosis ([facility] [address] 01/09/2020    COPD (chronic obstructive pulmonary disease) (HCC)     Bipolar mood disorder (HCC)     ETOH abuse      - Questionable infection, remains afebrile, white stable  - Urine cult w/ GNR  - Blood cult NGTD  - Cont IVAB for now  - Cont pressors and titrate off as able  - Cr back WNL  - Trend BMP  - LFTS stable w/ decrease in Tbili  - Plan was for EGD today, unclear if still happening  - HIDA scan results pending  - Surgery consult if needed based on HIDA  - Appreciate GI  - Replete lytes  - Cont thiamine/folate/MVI  - CIWA protocol  - PT/OT  - Cont acceptable home medications for chronic conditions   - DVT protocol    I have personally reviewed all pertinent labs and films that have officially resulted over the last 24 hours. I have personally checked for all pending labs that are awaiting final results.     Subjective     Pt s/e @ bedside  No major events overnight  States feeling better than yest  Mild abd pain   Remains on pressor  Denies CP or SOB    Objective     Visit Vitals  BP 90/64   Pulse (!) 108   Temp 98 °F (36.7 °C)   Resp 23   Ht 5' 6\" (1.676 m)   Wt 71.7 kg (158 lb)   SpO2 95%   BMI 25.50 kg/m²       Physical Exam:  General Appearance: NAD, conversant  Lungs: CTA with normal respiratory effort  CV: RRR, no m/r/g  Abdomen: soft, mild TTP RUQ, normal bowel sounds  Extremities: no cyanosis, no peripheral edema  Neuro: No focal deficits, motor/sensory intact    Lab/Data Reviewed:  BMP:   Lab Results   Component Value Date/Time     01/10/2020 02:00 AM    K 3.4 (L) 01/10/2020 02:00 AM     01/10/2020 02:00 AM    CO2 25 01/10/2020 02:00 AM    AGAP 9 01/10/2020 02:00 AM    GLU 95 01/10/2020 02:00 AM    BUN 9 01/10/2020 02:00 AM    CREA 0.88 01/10/2020 02:00 AM    GFRAA >60 01/10/2020 02:00 AM    GFRNA >60 01/10/2020 02:00 AM     CBC:   Lab Results   Component Value Date/Time    WBC 3.8 (L) 01/10/2020 02:00 AM    HGB 10.4 (L) 01/10/2020 02:00 AM    HCT 31.7 (L) 01/10/2020 02:00 AM    PLT 49 (L) 01/10/2020 02:00 AM       Imaging Reviewed:  No results found.     Medications Reviewed:  Current Facility-Administered Medications   Medication Dose Route Frequency    sodium phosphate 30 mmol in 0.9% sodium chloride 250 mL infusion  30 mmol IntraVENous ONCE    magnesium sulfate 1 g/100 ml IVPB (premix or compounded)  1 g IntraVENous ONCE    nicotine (NICODERM CQ) 21 mg/24 hr patch 1 Patch  1 Patch TransDERmal DAILY    lactated Ringers infusion  150 mL/hr IntraVENous CONTINUOUS    busPIRone (BUSPAR) tablet 10 mg  10 mg Oral BID PRN    escitalopram oxalate (LEXAPRO) tablet 10 mg  10 mg Oral DAILY    famotidine (PEPCID) tablet 20 mg  20 mg Oral BID    magnesium oxide (MAG-OX) tablet 400 mg  400 mg Oral DAILY    risperiDONE (RisperDAL) tablet 2 mg  2 mg Oral DAILY    thiamine mononitrate (B-1) tablet 100 mg  100 mg Oral DAILY    tiotropium bromide (SPIRIVA RESPIMAT) 2.5 mcg /actuation  1 Puff Inhalation DAILY    ondansetron (ZOFRAN) injection 4 mg  4 mg IntraVENous Q4H PRN    melatonin tablet 12 mg  12 mg Oral QHS PRN    acetaminophen (TYLENOL) tablet 500 mg  500 mg Oral Q6H PRN    LORazepam (ATIVAN) tablet 1 mg  1 mg Oral Q1H PRN    Or    LORazepam (ATIVAN) injection 1 mg  1 mg IntraVENous Q1H PRN    LORazepam (ATIVAN) tablet 2 mg  2 mg Oral Q1H PRN    Or    LORazepam (ATIVAN) injection 2 mg  2 mg IntraVENous Q1H PRN    LORazepam (ATIVAN) injection 3 mg  3 mg IntraVENous Q15MIN PRN    piperacillin-tazobactam (ZOSYN) 3.375 g in 0.9% sodium chloride (MBP/ADV) 100 mL MBP  3.375 g IntraVENous Q8H    ELECTROLYTE REPLACEMENT PROTOCOL - Potassium Standard Dosing   1 Each Other PRN    ELECTROLYTE REPLACEMENT PROTOCOL - Magnesium   1 Each Other PRN    ELECTROLYTE REPLACEMENT PROTOCOL - Phosphorus  Standard Dosing  1 Each Other PRN    ELECTROLYTE REPLACEMENT PROTOCOL - Calcium   1 Each Other PRN    budesonide (PULMICORT) 500 mcg/2 ml nebulizer suspension  500 mcg Nebulization BID RT    arformoterol (BROVANA) neb solution 15 mcg  15 mcg Nebulization BID RT    folic acid (FOLVITE) tablet 1 mg  1 mg Oral DAILY    ipratropium (ATROVENT) 0.02 % nebulizer solution 0.5 mg  0.5 mg Nebulization Q4H PRN    PHENYLephrine (PF)(ISAMAR-SYNEPHRINE) 30 mg in 0.9% sodium chloride 250 mL infusion   mcg/min IntraVENous TITRATE    lactulose (CHRONULAC) 10 gram/15 mL solution 15 mL  10 g Oral TID    sodium chloride (NS) flush 5-10 mL  5-10 mL IntraVENous PRN           Dinah Gonzáles DO  Internal Medicine, Hospitalist  Pager: [phone] [facility]

## 2020-01-10 NOTE — ANESTHESIA PREPROCEDURE EVALUATION
Relevant Problems   No relevant active problems       Anesthetic History     PONV          Review of Systems / Medical History  Patient summary reviewed and pertinent labs reviewed    Pulmonary    COPD: moderate               Neuro/Psych              Cardiovascular                       GI/Hepatic/Renal           Liver disease     Endo/Other  Within defined limits           Other Findings              Physical Exam    Airway  Mallampati: II  TM Distance: 4 - 6 cm  Neck ROM: normal range of motion   Mouth opening: Normal     Cardiovascular    Rhythm: regular  Rate: normal         Dental    Dentition: Poor dentition     Pulmonary  Breath sounds clear to auscultation               Abdominal  GI exam deferred       Other Findings            Anesthetic Plan    ASA: 3  Anesthesia type: MAC            Anesthetic plan and risks discussed with: Patient

## 2020-01-11 ENCOUNTER — APPOINTMENT (OUTPATIENT)
Dept: MRI IMAGING | Age: 55
DRG: 463 | End: 2020-01-11
Attending: HOSPITALIST
Payer: COMMERCIAL

## 2020-01-11 LAB
ALBUMIN SERPL-MCNC: 2.3 G/DL (ref 3.4–5)
ALBUMIN/GLOB SERPL: 0.7 {RATIO} (ref 0.8–1.7)
ALP SERPL-CCNC: 147 U/L (ref 45–117)
ALT SERPL-CCNC: 178 U/L (ref 13–56)
AMMONIA PLAS-SCNC: 36 UMOL/L (ref 11–32)
ANION GAP SERPL CALC-SCNC: 7 MMOL/L (ref 3–18)
AST SERPL-CCNC: 204 U/L (ref 10–38)
BACTERIA SPEC CULT: ABNORMAL
BASOPHILS # BLD: 0 K/UL (ref 0–0.1)
BASOPHILS NFR BLD: 0 % (ref 0–3)
BILIRUB SERPL-MCNC: 1.8 MG/DL (ref 0.2–1)
BUN SERPL-MCNC: 6 MG/DL (ref 7–18)
BUN/CREAT SERPL: 8 (ref 12–20)
CA-I SERPL-SCNC: 1.12 MMOL/L (ref 1.12–1.32)
CALCIUM SERPL-MCNC: 8.6 MG/DL (ref 8.5–10.1)
CHLORIDE SERPL-SCNC: 109 MMOL/L (ref 100–111)
CO2 SERPL-SCNC: 25 MMOL/L (ref 21–32)
CREAT SERPL-MCNC: 0.76 MG/DL (ref 0.6–1.3)
DIFFERENTIAL METHOD BLD: ABNORMAL
EOSINOPHIL # BLD: 0 K/UL (ref 0–0.4)
EOSINOPHIL NFR BLD: 0 % (ref 0–5)
ERYTHROCYTE [DISTWIDTH] IN BLOOD BY AUTOMATED COUNT: 15.9 % (ref 11.6–14.5)
GLOBULIN SER CALC-MCNC: 3.5 G/DL (ref 2–4)
GLUCOSE SERPL-MCNC: 92 MG/DL (ref 74–99)
HCT VFR BLD AUTO: 31.7 % (ref 35–45)
HGB BLD-MCNC: 10.5 G/DL (ref 12–16)
LYMPHOCYTES # BLD: 0.9 K/UL (ref 0.8–3.5)
LYMPHOCYTES NFR BLD: 26 % (ref 20–51)
MAGNESIUM SERPL-MCNC: 2.1 MG/DL (ref 1.6–2.6)
MCH RBC QN AUTO: 34.8 PG (ref 24–34)
MCHC RBC AUTO-ENTMCNC: 33.1 G/DL (ref 31–37)
MCV RBC AUTO: 105 FL (ref 74–97)
MONOCYTES # BLD: 0.4 K/UL (ref 0–1)
MONOCYTES NFR BLD: 13 % (ref 2–9)
NEUTS SEG # BLD: 2 K/UL (ref 1.8–8)
NEUTS SEG NFR BLD: 61 % (ref 42–75)
PHOSPHATE SERPL-MCNC: 1.5 MG/DL (ref 2.5–4.9)
PLATELET # BLD AUTO: 60 K/UL (ref 135–420)
PLATELET COMMENTS,PCOM: ABNORMAL
PMV BLD AUTO: 12.3 FL (ref 9.2–11.8)
POTASSIUM SERPL-SCNC: 3.8 MMOL/L (ref 3.5–5.5)
PROT SERPL-MCNC: 5.8 G/DL (ref 6.4–8.2)
RBC # BLD AUTO: 3.02 M/UL (ref 4.2–5.3)
RBC MORPH BLD: ABNORMAL
SERVICE CMNT-IMP: ABNORMAL
SODIUM SERPL-SCNC: 141 MMOL/L (ref 136–145)
WBC # BLD AUTO: 3.3 K/UL (ref 4.6–13.2)

## 2020-01-11 PROCEDURE — 83735 ASSAY OF MAGNESIUM: CPT

## 2020-01-11 PROCEDURE — 36415 COLL VENOUS BLD VENIPUNCTURE: CPT

## 2020-01-11 PROCEDURE — 74011000250 HC RX REV CODE- 250: Performed by: INTERNAL MEDICINE

## 2020-01-11 PROCEDURE — 82330 ASSAY OF CALCIUM: CPT

## 2020-01-11 PROCEDURE — 74011250637 HC RX REV CODE- 250/637: Performed by: INTERNAL MEDICINE

## 2020-01-11 PROCEDURE — 74011250636 HC RX REV CODE- 250/636: Performed by: INTERNAL MEDICINE

## 2020-01-11 PROCEDURE — 77030038269 HC DRN EXT URIN PURWCK BARD -A

## 2020-01-11 PROCEDURE — 74011000258 HC RX REV CODE- 258: Performed by: INTERNAL MEDICINE

## 2020-01-11 PROCEDURE — 85025 COMPLETE CBC W/AUTO DIFF WBC: CPT

## 2020-01-11 PROCEDURE — 84100 ASSAY OF PHOSPHORUS: CPT

## 2020-01-11 PROCEDURE — 74011250637 HC RX REV CODE- 250/637: Performed by: HOSPITALIST

## 2020-01-11 PROCEDURE — 94640 AIRWAY INHALATION TREATMENT: CPT

## 2020-01-11 PROCEDURE — 77010033678 HC OXYGEN DAILY

## 2020-01-11 PROCEDURE — 74011250636 HC RX REV CODE- 250/636

## 2020-01-11 PROCEDURE — 82140 ASSAY OF AMMONIA: CPT

## 2020-01-11 PROCEDURE — 65270000029 HC RM PRIVATE

## 2020-01-11 PROCEDURE — 74011250637 HC RX REV CODE- 250/637

## 2020-01-11 PROCEDURE — 80053 COMPREHEN METABOLIC PANEL: CPT

## 2020-01-11 PROCEDURE — 94762 N-INVAS EAR/PLS OXIMTRY CONT: CPT

## 2020-01-11 RX ORDER — LORAZEPAM 2 MG/ML
INJECTION INTRAMUSCULAR
Status: DISPENSED
Start: 2020-01-11 | End: 2020-01-12

## 2020-01-11 RX ORDER — SODIUM,POTASSIUM PHOSPHATES 280-250MG
POWDER IN PACKET (EA) ORAL
Status: DISPENSED
Start: 2020-01-11 | End: 2020-01-11

## 2020-01-11 RX ORDER — BUDESONIDE 0.5 MG/2ML
INHALANT ORAL
Status: DISPENSED
Start: 2020-01-11 | End: 2020-01-12

## 2020-01-11 RX ORDER — PIPERACILLIN SODIUM, TAZOBACTAM SODIUM 3; .375 G/15ML; G/15ML
INJECTION, POWDER, LYOPHILIZED, FOR SOLUTION INTRAVENOUS
Status: DISPENSED
Start: 2020-01-11 | End: 2020-01-11

## 2020-01-11 RX ORDER — MAGNESIUM SULFATE HEPTAHYDRATE 40 MG/ML
2 INJECTION, SOLUTION INTRAVENOUS ONCE
Status: COMPLETED | OUTPATIENT
Start: 2020-01-11 | End: 2020-01-11

## 2020-01-11 RX ORDER — ARFORMOTEROL TARTRATE 15 UG/2ML
SOLUTION RESPIRATORY (INHALATION)
Status: DISPENSED
Start: 2020-01-11 | End: 2020-01-12

## 2020-01-11 RX ORDER — POTASSIUM CHLORIDE 750 MG/1
10 TABLET, EXTENDED RELEASE ORAL
Status: DISCONTINUED | OUTPATIENT
Start: 2020-01-11 | End: 2020-01-11

## 2020-01-11 RX ORDER — LORAZEPAM 2 MG/ML
0.5 INJECTION INTRAMUSCULAR
Status: COMPLETED | OUTPATIENT
Start: 2020-01-11 | End: 2020-01-11

## 2020-01-11 RX ORDER — MAGNESIUM SULFATE HEPTAHYDRATE 40 MG/ML
INJECTION, SOLUTION INTRAVENOUS
Status: COMPLETED
Start: 2020-01-11 | End: 2020-01-11

## 2020-01-11 RX ORDER — SODIUM,POTASSIUM PHOSPHATES 280-250MG
2 POWDER IN PACKET (EA) ORAL
Status: DISCONTINUED | OUTPATIENT
Start: 2020-01-11 | End: 2020-01-11

## 2020-01-11 RX ADMIN — LORAZEPAM 1 MG: 1 TABLET ORAL at 04:50

## 2020-01-11 RX ADMIN — RIFAXIMIN 550 MG: 550 TABLET ORAL at 13:04

## 2020-01-11 RX ADMIN — ACETAMINOPHEN 500 MG: 500 TABLET, FILM COATED ORAL at 09:31

## 2020-01-11 RX ADMIN — Medication 100 MG: at 09:15

## 2020-01-11 RX ADMIN — IPRATROPIUM BROMIDE 0.5 MG: 0.5 SOLUTION RESPIRATORY (INHALATION) at 04:21

## 2020-01-11 RX ADMIN — RISPERIDONE 2 MG: 0.5 TABLET, FILM COATED ORAL at 09:00

## 2020-01-11 RX ADMIN — LORAZEPAM 0.5 MG: 2 INJECTION, SOLUTION INTRAMUSCULAR; INTRAVENOUS at 23:22

## 2020-01-11 RX ADMIN — MAGNESIUM SULFATE HEPTAHYDRATE 2 G: 40 INJECTION, SOLUTION INTRAVENOUS at 01:23

## 2020-01-11 RX ADMIN — ARFORMOTEROL TARTRATE 15 MCG: 15 SOLUTION RESPIRATORY (INHALATION) at 20:06

## 2020-01-11 RX ADMIN — POTASSIUM BICARBONATE 20 MEQ: 782 TABLET, EFFERVESCENT ORAL at 01:23

## 2020-01-11 RX ADMIN — FOLIC ACID 1 MG: 1 TABLET ORAL at 09:40

## 2020-01-11 RX ADMIN — ARFORMOTEROL TARTRATE 15 MCG: 15 SOLUTION RESPIRATORY (INHALATION) at 08:20

## 2020-01-11 RX ADMIN — RIFAXIMIN 550 MG: 550 TABLET ORAL at 17:58

## 2020-01-11 RX ADMIN — PIPERACILLIN AND TAZOBACTAM 3.38 G: 3; .375 INJECTION, POWDER, LYOPHILIZED, FOR SOLUTION INTRAVENOUS at 10:07

## 2020-01-11 RX ADMIN — Medication 400 MG: at 09:40

## 2020-01-11 RX ADMIN — POTASSIUM & SODIUM PHOSPHATES POWDER PACK 280-160-250 MG 2 PACKET: 280-160-250 PACK at 09:31

## 2020-01-11 RX ADMIN — PIPERACILLIN AND TAZOBACTAM 3.38 G: 3; .375 INJECTION, POWDER, LYOPHILIZED, FOR SOLUTION INTRAVENOUS at 18:05

## 2020-01-11 RX ADMIN — ESCITALOPRAM 10 MG: 10 TABLET, FILM COATED ORAL at 09:40

## 2020-01-11 RX ADMIN — FAMOTIDINE 20 MG: 20 TABLET, FILM COATED ORAL at 09:40

## 2020-01-11 RX ADMIN — PIPERACILLIN AND TAZOBACTAM 3.38 G: 3; .375 INJECTION, POWDER, LYOPHILIZED, FOR SOLUTION INTRAVENOUS at 01:22

## 2020-01-11 RX ADMIN — POTASSIUM BICARBONATE 10 MEQ: 391 TABLET, EFFERVESCENT ORAL at 04:00

## 2020-01-11 RX ADMIN — LACTULOSE 15 ML: 20 SOLUTION ORAL at 09:34

## 2020-01-11 RX ADMIN — SODIUM CHLORIDE, SODIUM LACTATE, POTASSIUM CHLORIDE, AND CALCIUM CHLORIDE 150 ML/HR: 600; 310; 30; 20 INJECTION, SOLUTION INTRAVENOUS at 09:56

## 2020-01-11 RX ADMIN — SODIUM CHLORIDE 30 MMOL: 900 INJECTION, SOLUTION INTRAVENOUS at 14:32

## 2020-01-11 RX ADMIN — BUDESONIDE 500 MCG: 0.5 SUSPENSION RESPIRATORY (INHALATION) at 20:06

## 2020-01-11 RX ADMIN — SODIUM CHLORIDE, SODIUM LACTATE, POTASSIUM CHLORIDE, AND CALCIUM CHLORIDE 150 ML/HR: 600; 310; 30; 20 INJECTION, SOLUTION INTRAVENOUS at 16:52

## 2020-01-11 RX ADMIN — POTASSIUM BICARBONATE 10 MEQ: 391 TABLET, EFFERVESCENT ORAL at 03:00

## 2020-01-11 RX ADMIN — TIOTROPIUM BROMIDE INHALATION SPRAY 1 PUFF: 3.12 SPRAY, METERED RESPIRATORY (INHALATION) at 09:16

## 2020-01-11 RX ADMIN — BUDESONIDE 500 MCG: 0.5 SUSPENSION RESPIRATORY (INHALATION) at 08:20

## 2020-01-11 RX ADMIN — CALCIUM GLUCONATE 2 G: 94 INJECTION, SOLUTION INTRAVENOUS at 02:30

## 2020-01-11 NOTE — PROGRESS NOTES
Problem: Tissue Perfusion - Cardiopulmonary, Altered  Goal: *Optimize tissue perfusion  Outcome: Progressing Towards Goal  Goal: *Absence of hypoxia  Outcome: Progressing Towards Goal     Problem: Alcohol Withdrawal  Goal: *STG: Participates in treatment plan  Outcome: Progressing Towards Goal  Goal: *STG: Remains safe in hospital  Outcome: Progressing Towards Goal  Goal: *STG: Seeks staff when symptoms of withdrawal increase  Outcome: Progressing Towards Goal  Goal: *STG: Complies with medication therapy  Outcome: Progressing Towards Goal  Goal: *STG: Attends activities and groups  Outcome: Progressing Towards Goal  Goal: *STG: Will identify negative impact of chemical dependency including the use of tobacco, alcohol, and other substances  Outcome: Progressing Towards Goal  Goal: *STG: Verbalizes abstinence as an achievable goal  Outcome: Progressing Towards Goal  Goal: *STG: Agrees to participate in outpatient after care program to support ongoing mental health  Outcome: Progressing Towards Goal  Goal: *STG: Able to indentify relapse triggers including interpersonal/social and familial factors  Outcome: Progressing Towards Goal  Goal: *STG: Identify lifestyle changes to support long term sobriety such as vocation, employment, education, and legal issues  Outcome: Progressing Towards Goal  Goal: *STG: Maintains appropriate nutrition and hydration  Outcome: Progressing Towards Goal  Goal: *STG: Vital signs within defined limits  Outcome: Progressing Towards Goal  Goal: *STG/LTG: Relapse prevention plan in place to include housing/aftercare, leisure activities, and spirituality  Outcome: Progressing Towards Goal  Goal: Interventions  Outcome: Progressing Towards Goal     Problem: Pain  Goal: *Control of Pain  Outcome: Progressing Towards Goal     Problem: Falls - Risk of  Goal: *Absence of Falls  Description  Document Cris Box Fall Risk and appropriate interventions in the flowsheet.   Outcome: Progressing Towards Goal  Note: Fall Risk Interventions:  Mobility Interventions: Bed/chair exit alarm         Medication Interventions: Evaluate medications/consider consulting pharmacy    Elimination Interventions: Bed/chair exit alarm, Call light in reach, Patient to call for help with toileting needs    History of Falls Interventions: Evaluate medications/consider consulting pharmacy         Problem: Pressure Injury - Risk of  Goal: *Prevention of pressure injury  Description  Document Lex Scale and appropriate interventions in the flowsheet.   Outcome: Progressing Towards Goal  Note: Pressure Injury Interventions:  Sensory Interventions: Assess changes in LOC    Moisture Interventions: Check for incontinence Q2 hours and as needed    Activity Interventions: Pressure redistribution bed/mattress(bed type)    Mobility Interventions: HOB 30 degrees or less    Nutrition Interventions: Document food/fluid/supplement intake    Friction and Shear Interventions: HOB 30 degrees or less                Problem: Discharge Planning  Goal: *Discharge to safe environment  Outcome: Progressing Towards Goal     Problem: Patient Education: Go to Patient Education Activity  Goal: Patient/Family Education  Outcome: Progressing Towards Goal     Problem: Patient Education: Go to Patient Education Activity  Goal: Patient/Family Education  Outcome: Progressing Towards Goal

## 2020-01-11 NOTE — PROGRESS NOTES
[facility]  Pulmonary, Critical Care, and Sleep Medicine    Name: Denny Covarrubias MRN: [id number]   : 1965 Hospital: [address] [place] [address]   Date: 2020  Consulted By:      Critical Care History and Physical      Subjective/History:   Patient is a 63 y.o. female with Etoh cirrhosis, Chronic Hypotension, Active Etoh abuse, COPD, and Bipolar disorder, who presented to the ER overnight after being found hypotensive by her psychiatrist. She c/o unsteady gait, decreased appetite, N/V/D, and blurry vision x 1-2 weeks. In the ER she was found to be very tremulous. She reports she is still actively drinking. In the ER she was found to have BP 76/60 (MAP 64), and concern was raised for possible sepsis. Patient was afebrile with no leukocytosis and lactate only 1.85. She was given 2L IVF but after BP remained low, she was started on levophed. Patient c/o abd pain, and CT Abdomen revealed peripancreatic inflammation vs infection. RUQ US showed gallstones with a nondistended GB but did show GB wall thickening. GI and Surgery were consulted by the ER. Overnight: no events; continues to have abdominal pain, that today she says is in her epigastric and lower abdomen. Has stayed off Smooth since 1/10 @ 3pm. HIDA negative. EGD showed only gastritis. Plan for MRCP. Today patient says she is frustrated that she is so weak and needs help to get up; she wants PT/OT     Past Medical History:   Diagnosis Date    Bipolar affective ([address]     Chronic obstructive pulmonary disease ([address]     Cirrhosis ([address]     ETOH abuse     Former smoker     1 PPD x40 years    History of ascites     History of sinusitis     Hyperlipidemia 2009    Patient denies    Left breast mass 2011    U/S Left Breast: No definite mass identified.  Recommended recheck in 6 months    Manic depression ([address]     Panic disorder     Vitamin D insufficiency 2009    23 ng/mL    Wrist fracture, right 2010 Non-Displaced Distal Radius/Ulnar Styloid Fx        Prior to Admission medications    Medication Sig Start Date End Date Taking? Authorizing Provider   naproxen sodium (ALEVE) 220 mg tablet Take 220 mg by mouth as needed. Yes Provider, Historical   albuterol (PROVENTIL HFA) 90 mcg/actuation inhaler Take 1-2 Puffs by inhalation. 10/21/18  Yes Provider, Historical   omeprazole (PRILOSEC) 40 mg capsule Take 40 mg by mouth daily. Yes Provider, Historical   risperiDONE (RISPERDAL) 2 mg tablet Take 2 mg by mouth daily. Indications: Bipolar Disorder in Remission   Yes Megha Alvarado MD   famotidine (PEPCID) 20 mg tablet Take 1 Tab by mouth two (2) times a day. 5/10/19  Yes Bandar Elaine MD   ondansetron (ZOFRAN ODT) 4 mg disintegrating tablet Take 1 Tab by mouth every eight (8) hours as needed for Nausea. 5/10/19  Yes Bandar Elaine MD   escitalopram oxalate (LEXAPRO) 10 mg tablet Take 10 mg by mouth. Provider, Historical   busPIRone (BUSPAR) 10 mg tablet Take 10 mg by mouth two (2) times daily as needed (take one to three tabs po bid as needed). Indications: Repeated Episodes of Anxiety    Megha Alvarado MD   budesonide-formoterol (SYMBICORT) 160-4.5 mcg/actuation HFAA Take 2 Puffs by inhalation two (2) times a day. 5/13/19   Marquita Jeans, NP   tiotropium bromide (SPIRIVA RESPIMAT) 2.5 mcg/actuation inhaler Take 1 Puff by inhalation daily. 5/13/19   Marquita Jeans, NP   magnesium oxide (MAG-OX) 400 mg tablet Take 1 Tab by mouth daily. 5/10/19   Bandar Elaine MD   thiamine HCL (B-1) 100 mg tablet Take 1 Tab by mouth daily.  5/10/19   Bandar Elaine MD     Current Facility-Administered Medications   Medication Dose Route Frequency    potassium, sodium phosphates (NEUTRA-PHOS) 280-160-250 mg packet        potassium, sodium phosphates (NEUTRA-PHOS) 280-160-250 mg packet        potassium, sodium phosphates (NEUTRA-PHOS) 280-160-250 mg packet        piperacillin-tazobactam (ZOSYN) 3.375 gram injection  potassium bicarb-citric acid (EFFER-K) 20 mEq tablet        rifAXIMin (XIFAXAN) tablet 550 mg  550 mg Oral BID    sodium phosphate 30 mmol in 0.9% sodium chloride 250 mL infusion  30 mmol IntraVENous ONCE    nicotine (NICODERM CQ) 21 mg/24 hr patch 1 Patch  1 Patch TransDERmal DAILY    lactated Ringers infusion  150 mL/hr IntraVENous CONTINUOUS    escitalopram oxalate (LEXAPRO) tablet 10 mg  10 mg Oral DAILY    risperiDONE (RisperDAL) tablet 2 mg  2 mg Oral DAILY    thiamine mononitrate (B-1) tablet 100 mg  100 mg Oral DAILY    tiotropium bromide (SPIRIVA RESPIMAT) 2.5 mcg /actuation  1 Puff Inhalation DAILY    piperacillin-tazobactam (ZOSYN) 3.375 g in 0.9% sodium chloride (MBP/ADV) 100 mL MBP  3.375 g IntraVENous Q8H    budesonide (PULMICORT) 500 mcg/2 ml nebulizer suspension  500 mcg Nebulization BID RT    arformoterol (BROVANA) neb solution 15 mcg  15 mcg Nebulization BID RT    folic acid (FOLVITE) tablet 1 mg  1 mg Oral DAILY     Allergies   Allergen Reactions    Animal Dander Itching    Egg Nausea and Vomiting      Social History     Tobacco Use    Smoking status: Former Smoker     Packs/day: 1.00     Years: 40.00     Pack years: 40.00    Smokeless tobacco: Former User     Quit date: 06/2016   Substance Use Topics    Alcohol use: Yes     Alcohol/week: 21.0 standard drinks     Types: 21 Shots of liquor per week     Comment: Quit 6/2016; however, takes 3 shots of vodka nightly to help sleep      Family History   Problem Relation Age of Onset    No Known Problems Daughter     No Known Problems Adoptive Father     No Known Problems Adoptive Mother       Review of Systems:  A comprehensive review of systems was negative except for that written in the HPI.     Objective:   Vital Signs:    Visit Vitals  BP 94/66   Pulse (!) 112   Temp 98.3 °F (36.8 °C)   Resp 23   Ht 5' 6\" (1.676 m)   Wt 71.7 kg (158 lb)   SpO2 95%   BMI 25.50 kg/m²       O2 Device: Nasal cannula   O2 Flow Rate (L/min): 44 l/min   Temp (24hrs), Av.1 °F (36.7 °C), Min:98 °F (36.7 °C), Max:98.3 °F (36.8 °C)       Intake/Output:   Last shift:      No intake/output data recorded. Last 3 shifts:  1901 -  0700  In: 6112 [I.V.:6112]  Out: 1150 [Urine:1150]    Intake/Output Summary (Last 24 hours) at 2020 1244  Last data filed at 2020 0123  Gross per 24 hour   Intake 3136.07 ml   Output 500 ml   Net 2636.07 ml     Physical Exam:   General:  WDWN Adult female, Alert, cooperative, NAD   HEENT:   NCAT, PERRL, OP clear, MM moist    Neck: Supple, trachea midline. Lungs:   Symmetrical chest rise; good AE bilat; CTAB; no wheezes/rhonchi/rales noted. Heart:  RRR, S1, S2 normal, no m/r/g   Abdomen:   Soft, TTP in Epigastric and LLQ, No guarding or rebound. Normoactive bowel sounds. Extremities: Extremities normal, atraumatic, no cyanosis or edema. Pulses: 2+ and symmetric all extremities. Skin: Skin color, texture, turgor normal. No rashes or lesions   Neurologic: Grossly nonfocal, Awake/alert, moving all extremitiess.     Devices:      Data:     Recent Results (from the past 24 hour(s))   POTASSIUM    Collection Time: 01/10/20  9:30 PM   Result Value Ref Range    Potassium 3.4 (L) 3.5 - 5.5 mmol/L   MAGNESIUM    Collection Time: 01/10/20  9:30 PM   Result Value Ref Range    Magnesium 1.5 (L) 1.6 - 2.6 mg/dL   PHOSPHORUS    Collection Time: 01/10/20  9:30 PM   Result Value Ref Range    Phosphorus 2.6 2.5 - 4.9 MG/DL   CALCIUM, IONIZED    Collection Time: 01/10/20  9:30 PM   Result Value Ref Range    Ionized Calcium 1.09 (L) 1.12 - 1.32 MMOL/L   PHOSPHORUS    Collection Time: 20  5:20 AM   Result Value Ref Range    Phosphorus 1.5 (L) 2.5 - 4.9 MG/DL   MAGNESIUM    Collection Time: 20  5:20 AM   Result Value Ref Range    Magnesium 2.1 1.6 - 2.6 mg/dL   CALCIUM, IONIZED    Collection Time: 20  5:20 AM   Result Value Ref Range    Ionized Calcium 1.12 1.12 - 1.32 MMOL/L   CBC WITH AUTOMATED DIFF Collection Time: 01/11/20  5:20 AM   Result Value Ref Range    WBC 3.3 (L) 4.6 - 13.2 K/uL    RBC 3.02 (L) 4.20 - 5.30 M/uL    HGB 10.5 (L) 12.0 - 16.0 g/dL    HCT 31.7 (L) 35.0 - 45.0 %    .0 (H) 74.0 - 97.0 FL    MCH 34.8 (H) 24.0 - 34.0 PG    MCHC 33.1 31.0 - 37.0 g/dL    RDW 15.9 (H) 11.6 - 14.5 %    PLATELET 60 (L) 927 - 420 K/uL    MPV 12.3 (H) 9.2 - 11.8 FL    NEUTROPHILS 61 42 - 75 %    LYMPHOCYTES 26 20 - 51 %    MONOCYTES 13 (H) 2 - 9 %    EOSINOPHILS 0 0 - 5 %    BASOPHILS 0 0 - 3 %    ABS. NEUTROPHILS 2.0 1.8 - 8.0 K/UL    ABS. LYMPHOCYTES 0.9 0.8 - 3.5 K/UL    ABS. MONOCYTES 0.4 0 - 1.0 K/UL    ABS. EOSINOPHILS 0.0 0.0 - 0.4 K/UL    ABS. BASOPHILS 0.0 0.0 - 0.1 K/UL    PLATELET COMMENTS PLATELET COUNT DECREASED ON SLIDE REVIEW     RBC COMMENTS ANISOCYTOSIS  2+        RBC COMMENTS POLYCHROMASIA  2+        RBC COMMENTS MACROCYTOSIS  2+        DF MANUAL     METABOLIC PANEL, COMPREHENSIVE    Collection Time: 01/11/20  5:20 AM   Result Value Ref Range    Sodium 141 136 - 145 mmol/L    Potassium 3.8 3.5 - 5.5 mmol/L    Chloride 109 100 - 111 mmol/L    CO2 25 21 - 32 mmol/L    Anion gap 7 3.0 - 18 mmol/L    Glucose 92 74 - 99 mg/dL    BUN 6 (L) 7.0 - 18 MG/DL    Creatinine 0.76 0.6 - 1.3 MG/DL    BUN/Creatinine ratio 8 (L) 12 - 20      GFR est AA >60 >60 ml/min/1.73m2    GFR est non-AA >60 >60 ml/min/1.73m2    Calcium 8.6 8.5 - 10.1 MG/DL    Bilirubin, total 1.8 (H) 0.2 - 1.0 MG/DL    ALT (SGPT) 178 (H) 13 - 56 U/L    AST (SGOT) 204 (H) 10 - 38 U/L    Alk. phosphatase 147 (H) 45 - 117 U/L    Protein, total 5.8 (L) 6.4 - 8.2 g/dL    Albumin 2.3 (L) 3.4 - 5.0 g/dL    Globulin 3.5 2.0 - 4.0 g/dL    A-G Ratio 0.7 (L) 0.8 - 1.7     AMMONIA    Collection Time: 01/11/20  5:20 AM   Result Value Ref Range    Ammonia 36 (H) 11 - 32 UMOL/L           No results for input(s): FIO2I, IFO2, HCO3I, IHCO3, HCOPOC, PCO2I, PCOPOC, IPHI, PHI, PHPOC, PO2I, PO2POC in the last 72 hours.     No lab exists for component: IPOC2    Imaging:  I have personally reviewed the patients radiographs and have reviewed the reports:    CXR [1/8]:Subtle nodular opacity in the peripheral right lower lung zone, likely superimposition. No definite focal consolidation, effusion or pneumothorax. Biapical scarring. CT ABD/PELVIS [1/8]:   1. An acute inflammatory or infectious process in the upper abdomen centered in  the peripancreatic/periduodenal region represents a change from prior, not well  assessed in the absence of intravenous or oral contrast. Differential  possibilities include acute pancreatitis versus duodenitis, and less likely  acute cholecystitis given nondistention of the gallbladder (although there are  gallstones and areas of minor gallbladder wall thickening). 2. Small volume simple perihepatic ascites and trace pelvic free fluid are new  from prior. Peripancreatic/periduodenal fluid and stranding are present, also  new from prior. 3. Additional stable chronic findings include profound hepatic steatosis,  cirrhosis, and sequela of portal hypertension.     RUQ US (1/8):   1. Hepatic steatosis and nodularity of the hepatic contour, suggestive of cirrhosis. 2. Dependent shadowing gallstones are present in the nondistended gallbladder. There is nonspecific gallbladder wall thickening, which may be related to hepatic dysfunction. Findings are equivocal for acute cholecystitis. If there is clinical concern for acute cholecystitis, consider nuclear medicine hepatobiliary imaging for further evaluation. 3. Trace perihepatic and upper abdominal ascites. IMPRESSION:   54yoF with Etoh cirrhosis, Chronic Hypotension, Active Etoh abuse, COPD, and Bipolar disorder, who presented to the ER overnight after being found hypotensive by her psychiatrist. She c/o unsteady gait, decreased appetite, N/V/D, and blurry vision x 1-2 weeks. In the ER she was found to be very tremulous. She reports she is still actively drinking.  In the ER she was found to have BP 76/60 (MAP 64), and concern was raised for possible sepsis. Patient was afebrile with no leukocytosis and lactate only 1.85      PLAN:   Respiratory:  No active issues    Cardiovascular:  1. Sinus tachycardia: improved after changing levophed to rodrick  2. Shock (resolved) superimposed on Chronic HYPOtension: MAP goal to >55 given that she has cirrhosis; baseline SBP on multiple prior clinic visits typically in 90's-100. Worsening of baseline blood pressure likely related to hypovolemia and less likely due to sepsis as patient has no fever, leukocytosis, and lactate was only 1.85 before IVF's given. Now off pressors. Renal:  1. HENRRY (resolved): likely pre-renal in setting of intravascular depletion from N/V/D  - creatinine 2.87 on admit, up from baseline of 0.63, now back to baseline.   - monitor UOP; avoid nephrotoxic agents. 2. Hypophosphatemia:  - repleted with NaPhos IV    Infectious Disease:  1. Questionable Septic shock (resolved): no signs of sepsis; was shock but may be related to hypovolemia. Only possible source of infxn is her abdomen as she has pain in her epigastric and RUQ. RUQ US indeterminate for acute sherrie. Surgery consult felt was not acute sherrie. HIDA is negative. Blood cultures no growth. CXR showed no pneumonia. UA contaminated with epithelial cells; new sample shows bacteria but no signs of infxn (no WBC, leuk, nitrites); in setting of shock though it is reasonable to treat. Urine Culture growing Ecoli (pan-sensitive)  - continue Zosyn for now but if no other intra-abdominal source of infxn is found by GI, would recommend narrowing coverage and changing to a PO antibiotic. Endocrine:  No active issues     GI:  1. Abdominal pain; Etoh cirrhosis; Possible Etoh hepatitis:  - RUQ and Epigastric abd pain. CT Abd showed peripancreatic inflammation vs infection which could be related to pancreatitis vs duodenitis vs acute sherrie.  Lipase normal. Elevated LFT's possible related to Etoh hepatitis. RUQ US indeterminate for acute sherrie, but HIDA negative. - EGD showed gastritis which would not explain her abd pain  - MRCP ordered   - continue trending LFT's daily. - continue thiamine and folate daily; monitor CIWA. 2. Diarrhea: possibly from combination of lactulose, Neutraphos, and PO Mag Oxide. Now all have been repleted. If patient needs Magnesium or Phos repletions, please give IV. Hematologic:  1. Thrombocytopenia: related to her cirrhosis; continue to monitor. SCD's for GI prophylaxis    Neurologic:  1. Tremors (resolved): unclear if this is due to Etoh withdraw vs Asterixis related to HE.   - check ammonia level; monitor CIWA. Ativan PRN. 2. Generalized weakness; Subjective unsteady gait:  - consult PT/OT       Best Practice:  · Sepsis Bundle per Hospital Protocol  · Glycemic control; avoid Hypoglycemia  · Stress ulcer prophylaxis. Famotidine   · DVT prophylaxis. SCD's  · Need for Lines, chairez assessed. · Restraints need. · Palliative care evaluation. NA  · Code Status: FULL        Total of 35 min critical care time spent at bedside during the course of care providing evaluation,management and care decisions and ordering appropriate treatment related to critical care problems exclusive of procedures. The reason for providing this level of medical care for this critically ill patient was due a critical illness that impaired one or more vital organ systems such that there was a high probability of imminent or life threatening deterioration in the patients condition. This care involved high complexity decision making to assess, manipulate, and support vital system functions, to treat this degree vital organ system failure and to prevent further life threatening deterioration of the patients condition.       Guero Hernandez MD  Pulmonary & Critical Care

## 2020-01-11 NOTE — PROGRESS NOTES
Assumed care of patient. Patient arrived from 2606 via bed. Patient is AOx4, resting in bed, in stable condition. Patient denies having any pain or discomfort. 1650:  Dual skin assessment conducted with ANA Corado. Skin is intact with the exception of excoriation in the kim area. MD notified of MEW score of 4, continue to monitor.

## 2020-01-11 NOTE — PROGRESS NOTES
Internal Medicine Progress Note    Patient's Name: Ashley Gutiérrez  Admit Date: 1/8/2020  Length of Stay: 2      Assessment/Plan     Active Hospital Problems    Diagnosis Date Noted    Hypokalemia 01/09/2020    Macrocytic anemia 01/09/2020    Leukopenia 01/09/2020    UTI (urinary tract infection) 01/09/2020    Thrombocytopenia ([facility] 01/09/2020    HENRRY (acute kidney injury) ([facility] 01/09/2020    Pancytopenia ([facility] 01/09/2020    Acute hypotension 01/09/2020    Acute respiratory failure ([facility] 01/09/2020    Cirrhosis ([facility] 01/09/2020    COPD (chronic obstructive pulmonary disease) (HCC)     Bipolar mood disorder (HCC)     ETOH abuse      - Questionable infection, remains afebrile, white count stable but becoming more leukopenic  - Urine cult w/ ecoli  - Blood cult NGTD  - Cont zosyn w/ plan to change if no intra-abdominal source of infection  - BP labile, t/c midodrine  - Cr WNL  - Trend BMP  - LFTS improving slowly  - EGD showed non-specific gastritis   - HIDA negative for cystic duct obstruction  - MRI/MRCP pending  - Lactulose stopped by GI  - Cont xifaxin, magox,neutraphos  - Outpt colonoscopy recommended  - Appreciate GI  - Cont thiamine/folate/MVI  - CIWA protocol  - PT/OT  - Cont acceptable home medications for chronic conditions   - DVT protocol    I have personally reviewed all pertinent labs and films that have officially resulted over the last 24 hours. I have personally checked for all pending labs that are awaiting final results.     Subjective     Pt s/e @ bedside  No major events overnight  States feeling better than yest  Abd pain about the same  Tachycardic in 110s  Off pressors  Denies CP or SOB    Objective     Visit Vitals  /67   Pulse (!) 119   Temp 98.3 °F (36.8 °C)   Resp 22   Ht 5' 6\" (1.676 m)   Wt 75 kg (165 lb 4.8 oz)   SpO2 95%   BMI 26.68 kg/m²       Physical Exam:  General Appearance: NAD, conversant  Lungs: CTA with normal respiratory effort  CV: RRR, no m/r/g  Abdomen: soft, mild TTP RUQ, normal bowel sounds  Extremities: no cyanosis, no peripheral edema  Neuro: No focal deficits, motor/sensory intact    Lab/Data Reviewed:  BMP:   Lab Results   Component Value Date/Time     01/11/2020 05:20 AM    K 3.8 01/11/2020 05:20 AM     01/11/2020 05:20 AM    CO2 25 01/11/2020 05:20 AM    AGAP 7 01/11/2020 05:20 AM    GLU 92 01/11/2020 05:20 AM    BUN 6 (L) 01/11/2020 05:20 AM    CREA 0.76 01/11/2020 05:20 AM    GFRAA >60 01/11/2020 05:20 AM    GFRNA >60 01/11/2020 05:20 AM     CBC:   Lab Results   Component Value Date/Time    WBC 3.3 (L) 01/11/2020 05:20 AM    HGB 10.5 (L) 01/11/2020 05:20 AM    HCT 31.7 (L) 01/11/2020 05:20 AM    PLT 60 (L) 01/11/2020 05:20 AM       Imaging Reviewed:  Nm Hepatobiliary Duct Scan    Result Date: 1/10/2020  EXAM: Hepatobiliary Scintigraphy : INDICATION: Abnormal finding on CT scan, cholelithiasis, evaluate for cystic duct obstruction/acute cholecystitis COMPARISON: CT abdomen pelvis from 1/8/2020 RADIOPHARMACEUTICAL: 7.3 mCi Tc-99m mebrofenin IV INJECTION SITE: Right breast _______________ FINDINGS: Hepatomegaly with mild heterogeneous radiopharmaceutical hepatic uptake. There is prompt excretion of tracer by the liver with normal visualization of the intra-hepatic biliary ducts, the common hepatic duct, the common bile duct, and the small bowel. Repeat overhead delayed imaging at 60 minutes was performed with no gallbladder opacification. > 4 hour American delayed static image which she will demonstrates localizing tracer uptake in the right upper quadrant, expected position of the gallbladder. _______________     IMPRESSION: 1. No findings of cystic duct obstruction. 2. Hepatomegaly, with heterogeneous radiotracer uptake, in keeping with intrinsic liver disease.        Medications Reviewed:  Current Facility-Administered Medications   Medication Dose Route Frequency    potassium, sodium phosphates (NEUTRA-PHOS) 280-160-250 mg packet        potassium, sodium phosphates (NEUTRA-PHOS) 280-160-250 mg packet        potassium, sodium phosphates (NEUTRA-PHOS) 280-160-250 mg packet        potassium bicarb-citric acid (EFFER-K) 20 mEq tablet        ELECTROLYTE REPLACEMENT PROTOCOL - Phosphorus  Standard Dosing  1 Each Other PRN    rifAXIMin (XIFAXAN) tablet 550 mg  550 mg Oral BID    sodium phosphate 30 mmol in 0.9% sodium chloride 250 mL infusion  30 mmol IntraVENous ONCE    nicotine (NICODERM CQ) 21 mg/24 hr patch 1 Patch  1 Patch TransDERmal DAILY    lactated Ringers infusion  150 mL/hr IntraVENous CONTINUOUS    busPIRone (BUSPAR) tablet 10 mg  10 mg Oral BID PRN    escitalopram oxalate (LEXAPRO) tablet 10 mg  10 mg Oral DAILY    risperiDONE (RisperDAL) tablet 2 mg  2 mg Oral DAILY    thiamine mononitrate (B-1) tablet 100 mg  100 mg Oral DAILY    tiotropium bromide (SPIRIVA RESPIMAT) 2.5 mcg /actuation  1 Puff Inhalation DAILY    ondansetron (ZOFRAN) injection 4 mg  4 mg IntraVENous Q4H PRN    melatonin tablet 12 mg  12 mg Oral QHS PRN    acetaminophen (TYLENOL) tablet 500 mg  500 mg Oral Q6H PRN    LORazepam (ATIVAN) tablet 1 mg  1 mg Oral Q1H PRN    Or    LORazepam (ATIVAN) injection 1 mg  1 mg IntraVENous Q1H PRN    LORazepam (ATIVAN) tablet 2 mg  2 mg Oral Q1H PRN    Or    LORazepam (ATIVAN) injection 2 mg  2 mg IntraVENous Q1H PRN    LORazepam (ATIVAN) injection 3 mg  3 mg IntraVENous Q15MIN PRN    piperacillin-tazobactam (ZOSYN) 3.375 g in 0.9% sodium chloride (MBP/ADV) 100 mL MBP  3.375 g IntraVENous Q8H    ELECTROLYTE REPLACEMENT PROTOCOL - Potassium Standard Dosing   1 Each Other PRN    ELECTROLYTE REPLACEMENT PROTOCOL - Magnesium   1 Each Other PRN    ELECTROLYTE REPLACEMENT PROTOCOL - Phosphorus  Standard Dosing  1 Each Other PRN    ELECTROLYTE REPLACEMENT PROTOCOL - Calcium   1 Each Other PRN    budesonide (PULMICORT) 500 mcg/2 ml nebulizer suspension  500 mcg Nebulization BID RT    arformoterol (BROVANA) neb solution 15 mcg  15 mcg Nebulization BID RT    folic acid (FOLVITE) tablet 1 mg  1 mg Oral DAILY    ipratropium (ATROVENT) 0.02 % nebulizer solution 0.5 mg  0.5 mg Nebulization Q4H PRN    sodium chloride (NS) flush 5-10 mL  5-10 mL IntraVENous PRN           Latoya Nichols DO  Internal Medicine, Hospitalist  Pager: [facility]

## 2020-01-11 NOTE — PROGRESS NOTES
Gastrointestinal Progress Note    Patient Name: Kristie Marie    WYPVY'B Date: 1/11/2020    Admit Date: 1/8/2020    Subjective:     Kristie Marie is a 63 y.o. Female who we are seeing b/o alcohol related cirrhosis and epigastric pain, N/V. Patient reports improved abdominal pain since admission and no further vomiting. She does report nausea. Her primary Gi complaint today is diarrhea. She reports hemorrhoidal irritation as a result of the diarrhea. She is tolerating small amounts of full liquid diet.         Current Facility-Administered Medications   Medication Dose Route Frequency    potassium, sodium phosphates (NEUTRA-PHOS) 280-160-250 mg packet        potassium, sodium phosphates (NEUTRA-PHOS) 280-160-250 mg packet        piperacillin-tazobactam (ZOSYN) 3.375 gram injection        potassium bicarb-citric acid (EFFER-K) 20 mEq tablet        ELECTROLYTE REPLACEMENT PROTOCOL - Phosphorus  Standard Dosing  1 Each Other PRN    potassium, sodium phosphates (NEUTRA-PHOS) packet 2 Packet  2 Packet Oral Q2H    nicotine (NICODERM CQ) 21 mg/24 hr patch 1 Patch  1 Patch TransDERmal DAILY    lactated Ringers infusion  150 mL/hr IntraVENous CONTINUOUS    busPIRone (BUSPAR) tablet 10 mg  10 mg Oral BID PRN    escitalopram oxalate (LEXAPRO) tablet 10 mg  10 mg Oral DAILY    famotidine (PEPCID) tablet 20 mg  20 mg Oral BID    risperiDONE (RisperDAL) tablet 2 mg  2 mg Oral DAILY    thiamine mononitrate (B-1) tablet 100 mg  100 mg Oral DAILY    tiotropium bromide (SPIRIVA RESPIMAT) 2.5 mcg /actuation  1 Puff Inhalation DAILY    ondansetron (ZOFRAN) injection 4 mg  4 mg IntraVENous Q4H PRN    melatonin tablet 12 mg  12 mg Oral QHS PRN    acetaminophen (TYLENOL) tablet 500 mg  500 mg Oral Q6H PRN    LORazepam (ATIVAN) tablet 1 mg  1 mg Oral Q1H PRN    Or    LORazepam (ATIVAN) injection 1 mg  1 mg IntraVENous Q1H PRN    LORazepam (ATIVAN) tablet 2 mg  2 mg Oral Q1H PRN    Or    LORazepam (ATIVAN) injection 2 mg  2 mg IntraVENous Q1H PRN    LORazepam (ATIVAN) injection 3 mg  3 mg IntraVENous Q15MIN PRN    piperacillin-tazobactam (ZOSYN) 3.375 g in 0.9% sodium chloride (MBP/ADV) 100 mL MBP  3.375 g IntraVENous Q8H    ELECTROLYTE REPLACEMENT PROTOCOL - Potassium Standard Dosing   1 Each Other PRN    ELECTROLYTE REPLACEMENT PROTOCOL - Magnesium   1 Each Other PRN    ELECTROLYTE REPLACEMENT PROTOCOL - Phosphorus  Standard Dosing  1 Each Other PRN    ELECTROLYTE REPLACEMENT PROTOCOL - Calcium   1 Each Other PRN    budesonide (PULMICORT) 500 mcg/2 ml nebulizer suspension  500 mcg Nebulization BID RT    arformoterol (BROVANA) neb solution 15 mcg  15 mcg Nebulization BID RT    folic acid (FOLVITE) tablet 1 mg  1 mg Oral DAILY    ipratropium (ATROVENT) 0.02 % nebulizer solution 0.5 mg  0.5 mg Nebulization Q4H PRN    PHENYLephrine (PF)(ISAMAR-SYNEPHRINE) 30 mg in 0.9% sodium chloride 250 mL infusion   mcg/min IntraVENous TITRATE    sodium chloride (NS) flush 5-10 mL  5-10 mL IntraVENous PRN          Objective:     Visit Vitals  /65   Pulse (!) 132   Temp 98.3 °F (36.8 °C)   Resp 25   Ht 5' 6\" (1.676 m)   Wt 71.7 kg (158 lb)   SpO2 93%   BMI 25.50 kg/m²       General appearance: alert, cooperative, no distress, appears stated age  Abdomen: soft, mild tenderness in the epigastrium to deep palpation. Bowel sounds normal. No masses,  no organomegaly    Data Review:  MRI/MRCP yesterday:  Pending  HIDA:  Negative  CT Abdomen/Pelvis:  ??  Pancreatitis vs peripancreatic inflammatory process    Labs: Results:       Chemistry Recent Labs     01/11/20  0520 01/10/20  2130 01/10/20  0200  01/09/20  1202   GLU 92  --  95  --  184*     --  143  --  140   K 3.8 3.4* 3.4*   < > 3.1*     --  109  --  106   CO2 25  --  25  --  25   BUN 6*  --  9  --  13   CREA 0.76  --  0.88  --  1.23   CA 8.6  --  7.2*  --  7.6*   AGAP 7  --  9  --  9   BUCR 8*  --  10*  --  11*    < > = values in this interval not displayed. CBC w/Diff Recent Labs     01/11/20  0520 01/10/20  0200 01/09/20  1202   WBC 3.3* 3.8* 3.3*   RBC 3.02* 3.08* 3.01*   HGB 10.5* 10.4* 10.2*   HCT 31.7* 31.7* 31.1*   PLT 60* 49* 45*   GRANS 61 69 81*   LYMPH 26 14* 5*   EOS 0 0 0      Coagulation Recent Labs     01/10/20  0200 01/08/20  1815   PTP 15.5* 14.3   INR 1.3* 1.1       Liver Enzymes Recent Labs     01/11/20  0520 01/10/20  0200 01/09/20  1202 01/08/20  1815   TP 5.8* 5.4* 5.4* 6.6   ALB 2.3* 2.3* 2.4* 3.1*   TBILI 1.8* 2.2* 2.7* 3.5*   * 142* 141* 185*   SGOT 204* 246* 256* 333*   * 172* 165* 180*      Lipase Recent Labs     01/08/20  1745   LPSE 366               Assessment:   1. Epigastric pain:  Improved. EGD showed mild non-specific gastritis and no cause of pain. CT shows kim-pancreatic inflammation and EGD showed no inflammatory process in the  Duodenal second portion and no ulcer or mass. MRI/MRCP:  Performed yesterday per patient but no report in chart  2. Diarrhea likely secondary to lactulose, oral magnesia replacement and neutraphos. 3.  Spots of rectal bleeding likely related to anal irritation from diarrhea--stool is medium/ligh brown    Recommendation:   1. Await MRI/MRCP results  2. Monitor abdominal pain   3. Advance diet as tolerated  4. Start Xifaxin and stop Lactulose, magoxide PO, neutraphos in order to resolve diarrhea  5. Outpatient colonoscopy b/o rectal bleeding  6. Stop alcohol and avoid NSAIDs once discharged        Tereso Goldberg MD, [facility]hw[facility] Bonnie, [address] [employer]  January 11, 2020  Mary Macdonald

## 2020-01-11 NOTE — ANESTHESIA POSTPROCEDURE EVALUATION
Procedure(s):  ESOPHAGOGASTRODUODENOSCOPY (EGD). MAC    Anesthesia Post Evaluation      Multimodal analgesia: multimodal analgesia used between 6 hours prior to anesthesia start to PACU discharge  Patient location during evaluation: bedside  Patient participation: complete - patient participated  Level of consciousness: awake  Pain management: adequate  Airway patency: patent  Anesthetic complications: no  Cardiovascular status: stable  Respiratory status: acceptable  Hydration status: acceptable  Post anesthesia nausea and vomiting:  controlled      Vitals Value Taken Time   BP     Temp     Pulse 118 1/10/2020  7:47 PM   Resp 22 1/10/2020  7:47 PM   SpO2 96 % 1/10/2020  7:47 PM   Vitals shown include unvalidated device data.

## 2020-01-11 NOTE — ROUTINE PROCESS
TRANSFER - IN REPORT:    Verbal report received from Angela RN(name) on Jazmin Hare  being received from 2606(unit) for routine progression of care      Report consisted of patients Situation, Background, Assessment and   Recommendations(SBAR). Information from the following report(s) SBAR, Kardex, [facility] and Recent Results was reviewed with the receiving nurse. Opportunity for questions and clarification was provided. Assessment completed upon patients arrival to unit and care assumed.

## 2020-01-11 NOTE — PROGRESS NOTES
TRANSFER - OUT REPORT:    Verbal report given to Stella Lopez RN(name) on Tang Kim  being transferred to [facility] (unit) for routine progression of care       Report consisted of patients Situation, Background, Assessment and   Recommendations(SBAR). Information from the following report(s) SBAR and Kardex was reviewed with the receiving nurse. Lines:   Peripheral IV 01/08/20 Left Antecubital (Active)   Site Assessment Clean, dry, & intact 1/11/2020  4:00 AM   Phlebitis Assessment 0 1/11/2020  4:00 AM   Infiltration Assessment 0 1/11/2020  4:00 AM   Dressing Status Clean, dry, & intact 1/10/2020  4:00 PM   Dressing Type Transparent 1/10/2020  4:00 PM   Hub Color/Line Status Pink; Infusing;Patent 1/10/2020  4:00 PM   Action Taken Open ports on tubing capped 1/10/2020  4:00 PM   Alcohol Cap Used Yes 1/10/2020  4:00 PM       Peripheral IV 01/09/20 Left; Lower Cephalic (Active)   Site Assessment Clean, dry, & intact 1/11/2020  4:00 AM   Phlebitis Assessment 0 1/11/2020  4:00 AM   Infiltration Assessment 0 1/11/2020  4:00 AM   Dressing Status Clean, dry, & intact 1/10/2020  4:00 PM   Dressing Type Transparent 1/10/2020  4:00 PM   Hub Color/Line Status Pink; Infusing;Patent 1/10/2020  4:00 PM   Action Taken Open ports on tubing capped 1/10/2020  4:00 PM   Alcohol Cap Used Yes 1/10/2020  4:00 PM       Peripheral IV 01/11/20 Posterior;Right Hand (Active)        Opportunity for questions and clarification was provided.       Patient transported with:   O2 @ 3 liters

## 2020-01-11 NOTE — PROGRESS NOTES
conducted an initial consultation and Spiritual Assessment for Denny Covarrubias, who is a 63 y.o.,female. According to the patients chart Nondenominational Affiliation is: Andrae.     The reason the Patient came to the hospital is:   Patient Active Problem List    Diagnosis Date Noted    Hypokalemia 01/09/2020    Macrocytic anemia 01/09/2020    Leukopenia 01/09/2020    UTI (urinary tract infection) 01/09/2020    Thrombocytopenia ([address] 01/09/2020    HENRRY (acute kidney injury) ([address] 01/09/2020    Pancytopenia ([address] 01/09/2020    Acute hypotension 01/09/2020    Acute respiratory failure ([address] 01/09/2020    Cirrhosis ([address] 01/09/2020    Alcohol withdrawal ([address] 05/10/2019    Intractable nausea and vomiting 51/98/3423    Alcoholic cirrhosis of liver without ascites ([address] 10/21/2018    Tobacco abuse 10/21/2018    Acute maxillary sinusitis 09/14/2016    Former smoker 09/14/2016    Hyperlipidemia     ETOH abuse     COPD (chronic obstructive pulmonary disease) ([address]     Bipolar mood disorder ([address]     Panic disorder     Left breast mass 05/13/2011    Wrist fracture, right 01/06/2010    Vitamin D insufficiency 11/20/2009        The  provided the following Interventions:  Initiated a relationship of care and support. Explored issues of kade, belief, spirituality and Latter-day/ritual needs while hospitalized. Listened empathically. Provided information about Spiritual Care Services. Offered prayer and assurance of continued prayers on patients behalf. Chart reviewed. The following outcomes were achieved:  Patient shared limited information about their medical narrative, spiritual journey and beliefs. Patient processed feelings about current hospitalization. Patient expressed gratitude for Spiritual Care visit. Assessment:  There are no significant spiritual or Latter-day issues which require further intervention at this time.    Patient does not have any Latter-day or cultural needs that will affect patients preferences in health care. Plan:  Chaplains will continue to follow and will provide pastoral care as needed or requested.  recommends bedside caregivers page  on duty if patient shows signs of acute spiritual or emotional distress. [address] [facility] Analia Dumas M.Div.   [id number]  [address] - Office

## 2020-01-12 ENCOUNTER — APPOINTMENT (OUTPATIENT)
Dept: GENERAL RADIOLOGY | Age: 55
DRG: 463 | End: 2020-01-12
Attending: HOSPITALIST
Payer: COMMERCIAL

## 2020-01-12 LAB
ALBUMIN SERPL-MCNC: 2.1 G/DL (ref 3.4–5)
ALBUMIN/GLOB SERPL: 0.7 {RATIO} (ref 0.8–1.7)
ALP SERPL-CCNC: 132 U/L (ref 45–117)
ALT SERPL-CCNC: 150 U/L (ref 13–56)
AMMONIA PLAS-SCNC: 45 UMOL/L (ref 11–32)
ANION GAP SERPL CALC-SCNC: 8 MMOL/L (ref 3–18)
AST SERPL-CCNC: 153 U/L (ref 10–38)
BASOPHILS # BLD: 0 K/UL (ref 0–0.1)
BASOPHILS NFR BLD: 1 % (ref 0–2)
BILIRUB SERPL-MCNC: 1.7 MG/DL (ref 0.2–1)
BNP SERPL-MCNC: 439 PG/ML (ref 0–900)
BUN SERPL-MCNC: 4 MG/DL (ref 7–18)
BUN/CREAT SERPL: 9 (ref 12–20)
CA-I SERPL-SCNC: 1.06 MMOL/L (ref 1.12–1.32)
CALCIUM SERPL-MCNC: 7.8 MG/DL (ref 8.5–10.1)
CHLORIDE SERPL-SCNC: 110 MMOL/L (ref 100–111)
CO2 SERPL-SCNC: 24 MMOL/L (ref 21–32)
CREAT SERPL-MCNC: 0.47 MG/DL (ref 0.6–1.3)
DIFFERENTIAL METHOD BLD: ABNORMAL
EOSINOPHIL # BLD: 0 K/UL (ref 0–0.4)
EOSINOPHIL NFR BLD: 1 % (ref 0–5)
ERYTHROCYTE [DISTWIDTH] IN BLOOD BY AUTOMATED COUNT: 15.9 % (ref 11.6–14.5)
GLOBULIN SER CALC-MCNC: 2.9 G/DL (ref 2–4)
GLUCOSE SERPL-MCNC: 79 MG/DL (ref 74–99)
HCT VFR BLD AUTO: 29.9 % (ref 35–45)
HGB BLD-MCNC: 9.9 G/DL (ref 12–16)
LYMPHOCYTES # BLD: 0.8 K/UL (ref 0.9–3.6)
LYMPHOCYTES NFR BLD: 18 % (ref 21–52)
MAGNESIUM SERPL-MCNC: 1.6 MG/DL (ref 1.6–2.6)
MCH RBC QN AUTO: 34.1 PG (ref 24–34)
MCHC RBC AUTO-ENTMCNC: 33.1 G/DL (ref 31–37)
MCV RBC AUTO: 103.1 FL (ref 74–97)
MONOCYTES # BLD: 1 K/UL (ref 0.05–1.2)
MONOCYTES NFR BLD: 23 % (ref 3–10)
NEUTS SEG # BLD: 2.5 K/UL (ref 1.8–8)
NEUTS SEG NFR BLD: 57 % (ref 40–73)
PHOSPHATE SERPL-MCNC: 2.5 MG/DL (ref 2.5–4.9)
PLATELET # BLD AUTO: 55 K/UL (ref 135–420)
PMV BLD AUTO: 11.4 FL (ref 9.2–11.8)
POTASSIUM SERPL-SCNC: 3.1 MMOL/L (ref 3.5–5.5)
PROT SERPL-MCNC: 5 G/DL (ref 6.4–8.2)
RBC # BLD AUTO: 2.9 M/UL (ref 4.2–5.3)
SODIUM SERPL-SCNC: 142 MMOL/L (ref 136–145)
WBC # BLD AUTO: 4.3 K/UL (ref 4.6–13.2)

## 2020-01-12 PROCEDURE — 74011250637 HC RX REV CODE- 250/637: Performed by: INTERNAL MEDICINE

## 2020-01-12 PROCEDURE — 84100 ASSAY OF PHOSPHORUS: CPT

## 2020-01-12 PROCEDURE — 36415 COLL VENOUS BLD VENIPUNCTURE: CPT

## 2020-01-12 PROCEDURE — 94760 N-INVAS EAR/PLS OXIMETRY 1: CPT

## 2020-01-12 PROCEDURE — 74011000250 HC RX REV CODE- 250: Performed by: HOSPITALIST

## 2020-01-12 PROCEDURE — 74011250636 HC RX REV CODE- 250/636: Performed by: INTERNAL MEDICINE

## 2020-01-12 PROCEDURE — 77030038269 HC DRN EXT URIN PURWCK BARD -A

## 2020-01-12 PROCEDURE — 74011000250 HC RX REV CODE- 250: Performed by: INTERNAL MEDICINE

## 2020-01-12 PROCEDURE — 94640 AIRWAY INHALATION TREATMENT: CPT

## 2020-01-12 PROCEDURE — 82330 ASSAY OF CALCIUM: CPT

## 2020-01-12 PROCEDURE — 77030021352 HC CBL LD SYS DISP COVD -B

## 2020-01-12 PROCEDURE — 71045 X-RAY EXAM CHEST 1 VIEW: CPT

## 2020-01-12 PROCEDURE — 74011000258 HC RX REV CODE- 258: Performed by: INTERNAL MEDICINE

## 2020-01-12 PROCEDURE — 74011250637 HC RX REV CODE- 250/637: Performed by: HOSPITALIST

## 2020-01-12 PROCEDURE — 65270000029 HC RM PRIVATE

## 2020-01-12 PROCEDURE — 74011250636 HC RX REV CODE- 250/636: Performed by: HOSPITALIST

## 2020-01-12 PROCEDURE — 80053 COMPREHEN METABOLIC PANEL: CPT

## 2020-01-12 PROCEDURE — 85025 COMPLETE CBC W/AUTO DIFF WBC: CPT

## 2020-01-12 PROCEDURE — 94761 N-INVAS EAR/PLS OXIMETRY MLT: CPT

## 2020-01-12 PROCEDURE — 82140 ASSAY OF AMMONIA: CPT

## 2020-01-12 PROCEDURE — 94762 N-INVAS EAR/PLS OXIMTRY CONT: CPT

## 2020-01-12 PROCEDURE — 77010033678 HC OXYGEN DAILY

## 2020-01-12 PROCEDURE — 83880 ASSAY OF NATRIURETIC PEPTIDE: CPT

## 2020-01-12 PROCEDURE — 83735 ASSAY OF MAGNESIUM: CPT

## 2020-01-12 RX ORDER — POTASSIUM CHLORIDE 20 MEQ/1
40 TABLET, EXTENDED RELEASE ORAL EVERY 6 HOURS
Status: COMPLETED | OUTPATIENT
Start: 2020-01-12 | End: 2020-01-12

## 2020-01-12 RX ORDER — MAGNESIUM SULFATE HEPTAHYDRATE 40 MG/ML
2 INJECTION, SOLUTION INTRAVENOUS ONCE
Status: COMPLETED | OUTPATIENT
Start: 2020-01-12 | End: 2020-01-12

## 2020-01-12 RX ORDER — FUROSEMIDE 10 MG/ML
40 INJECTION INTRAMUSCULAR; INTRAVENOUS
Status: COMPLETED | OUTPATIENT
Start: 2020-01-12 | End: 2020-01-12

## 2020-01-12 RX ORDER — FUROSEMIDE 20 MG/1
20 TABLET ORAL ONCE
Status: COMPLETED | OUTPATIENT
Start: 2020-01-12 | End: 2020-01-12

## 2020-01-12 RX ORDER — METOPROLOL TARTRATE 5 MG/5ML
5 INJECTION INTRAVENOUS
Status: COMPLETED | OUTPATIENT
Start: 2020-01-12 | End: 2020-01-12

## 2020-01-12 RX ORDER — MAGNESIUM SULFATE HEPTAHYDRATE 500 MG/ML
2 INJECTION, SOLUTION INTRAMUSCULAR; INTRAVENOUS
Status: DISCONTINUED | OUTPATIENT
Start: 2020-01-12 | End: 2020-01-12

## 2020-01-12 RX ADMIN — FOLIC ACID 1 MG: 1 TABLET ORAL at 10:11

## 2020-01-12 RX ADMIN — TIOTROPIUM BROMIDE INHALATION SPRAY 1 PUFF: 3.12 SPRAY, METERED RESPIRATORY (INHALATION) at 12:39

## 2020-01-12 RX ADMIN — FUROSEMIDE 40 MG: 10 INJECTION, SOLUTION INTRAMUSCULAR; INTRAVENOUS at 10:41

## 2020-01-12 RX ADMIN — PIPERACILLIN AND TAZOBACTAM 3.38 G: 3; .375 INJECTION, POWDER, LYOPHILIZED, FOR SOLUTION INTRAVENOUS at 12:38

## 2020-01-12 RX ADMIN — BUDESONIDE 500 MCG: 0.5 SUSPENSION RESPIRATORY (INHALATION) at 20:21

## 2020-01-12 RX ADMIN — ESCITALOPRAM 10 MG: 10 TABLET, FILM COATED ORAL at 10:23

## 2020-01-12 RX ADMIN — RIFAXIMIN 550 MG: 550 TABLET ORAL at 17:47

## 2020-01-12 RX ADMIN — RISPERIDONE 2 MG: 0.5 TABLET, FILM COATED ORAL at 10:11

## 2020-01-12 RX ADMIN — BUDESONIDE 500 MCG: 0.5 SUSPENSION RESPIRATORY (INHALATION) at 08:14

## 2020-01-12 RX ADMIN — LORAZEPAM 1 MG: 1 TABLET ORAL at 20:49

## 2020-01-12 RX ADMIN — FUROSEMIDE 20 MG: 20 TABLET ORAL at 23:15

## 2020-01-12 RX ADMIN — RIFAXIMIN 550 MG: 550 TABLET ORAL at 10:11

## 2020-01-12 RX ADMIN — METOPROLOL TARTRATE 5 MG: 5 INJECTION INTRAVENOUS at 12:38

## 2020-01-12 RX ADMIN — ARFORMOTEROL TARTRATE 15 MCG: 15 SOLUTION RESPIRATORY (INHALATION) at 20:21

## 2020-01-12 RX ADMIN — POTASSIUM CHLORIDE 40 MEQ: 1500 TABLET, EXTENDED RELEASE ORAL at 10:10

## 2020-01-12 RX ADMIN — MAGNESIUM SULFATE HEPTAHYDRATE 2 G: 40 INJECTION, SOLUTION INTRAVENOUS at 10:06

## 2020-01-12 RX ADMIN — ARFORMOTEROL TARTRATE 15 MCG: 15 SOLUTION RESPIRATORY (INHALATION) at 08:13

## 2020-01-12 RX ADMIN — Medication 100 MG: at 10:11

## 2020-01-12 RX ADMIN — PIPERACILLIN AND TAZOBACTAM 3.38 G: 3; .375 INJECTION, POWDER, LYOPHILIZED, FOR SOLUTION INTRAVENOUS at 04:40

## 2020-01-12 RX ADMIN — SODIUM CHLORIDE, SODIUM LACTATE, POTASSIUM CHLORIDE, AND CALCIUM CHLORIDE 150 ML/HR: 600; 310; 30; 20 INJECTION, SOLUTION INTRAVENOUS at 03:42

## 2020-01-12 RX ADMIN — POTASSIUM CHLORIDE 40 MEQ: 1500 TABLET, EXTENDED RELEASE ORAL at 12:36

## 2020-01-12 RX ADMIN — PIPERACILLIN AND TAZOBACTAM 3.38 G: 3; .375 INJECTION, POWDER, LYOPHILIZED, FOR SOLUTION INTRAVENOUS at 20:45

## 2020-01-12 NOTE — PROGRESS NOTES
Transfer pt via bed x1 assistance. Transfer pt from Rm 2606 to Rm 3032. MRI screening form completed prior to transfer. Pt is stable.

## 2020-01-12 NOTE — PROGRESS NOTES
Internal Medicine Progress Note    Patient's Name: Kiara Buckner  Admit Date: 1/8/2020  Length of Stay: 3      Assessment/Plan     Active Hospital Problems    Diagnosis Date Noted    Hypokalemia 01/09/2020    Macrocytic anemia 01/09/2020    Leukopenia 01/09/2020    UTI (urinary tract infection) 01/09/2020    Thrombocytopenia [facility] 01/09/2020    HENRRY (acute kidney injury) ([facility] 01/09/2020    Pancytopenia ([facility] 01/09/2020    Acute hypotension 01/09/2020    Acute respiratory failure ([facility] 01/09/2020    Cirrhosis ([facility] 01/09/2020    COPD (chronic obstructive pulmonary disease) (HCC)     Bipolar mood disorder (HCC)     ETOH abuse      - SOB this AM, w/ crackles on exam, likely 2/2 IV fluids while in shock  - CXR appears wet  - IV lasix 40mg x 1  - Strict I/Os  - Daily wts  - Echo  - Questionable infection, remains afebrile, white count stable but becoming more leukopenic  - Urine cult w/ ecoli  - Blood cult NGTD  - Cont zosyn w/ plan to change if no intra-abdominal source of infection   - Cr WNL  - Trend BMP  - LFTS improving slowly  - EGD showed non-specific gastritis   - HIDA negative for cystic duct obstruction  - Pt refusing MRI/MRCP due to inability to lay down flat  - Is hungry and wants to advance diet, given negative HIDA and improvement in abd sx w/ advanc  - Cont xifaxin, magox,neutraphos  - Outpt colonoscopy recommended  - Appreciate GI  - Cont thiamine/folate/MVI  - CIWA protocol  - PT/OT  - Cont acceptable home medications for chronic conditions   - DVT protocol    I have personally reviewed all pertinent labs and films that have officially resulted over the last 24 hours. I have personally checked for all pending labs that are awaiting final results.     Subjective     Pt s/e @ bedside  No major events overnight  Abd pain improving  Refused MRCP 2/2 back  C/o SOB today  Denies CP    Objective     Visit Vitals  /79   Pulse (!) 117   Temp 98.6 °F (37 °C)   Resp 18   Ht 5' 6\" (1.676 m) Wt 75 kg (165 lb 4.8 oz)   SpO2 91%   BMI 26.68 kg/m²       Physical Exam:  General Appearance: NAD, conversant  Lungs: B/L diffuse crackles with normal respiratory effort  CV: Tachy w/ RR, no m/r/g  Abdomen: soft, mild TTP RUQ, normal bowel sounds  Extremities: no cyanosis, no peripheral edema  Neuro: No focal deficits, motor/sensory intact    Lab/Data Reviewed:  BMP:   Lab Results   Component Value Date/Time     01/12/2020 04:15 AM    K 3.1 (L) 01/12/2020 04:15 AM     01/12/2020 04:15 AM    CO2 24 01/12/2020 04:15 AM    AGAP 8 01/12/2020 04:15 AM    GLU 79 01/12/2020 04:15 AM    BUN 4 (L) 01/12/2020 04:15 AM    CREA 0.47 (L) 01/12/2020 04:15 AM    GFRAA >60 01/12/2020 04:15 AM    GFRNA >60 01/12/2020 04:15 AM     CBC:   Lab Results   Component Value Date/Time    WBC 4.3 (L) 01/12/2020 04:15 AM    HGB 9.9 (L) 01/12/2020 04:15 AM    HCT 29.9 (L) 01/12/2020 04:15 AM    PLT 55 (L) 01/12/2020 04:15 AM       Imaging Reviewed:  No results found.     Medications Reviewed:  Current Facility-Administered Medications   Medication Dose Route Frequency    potassium chloride (K-DUR, KLOR-CON) SR tablet 40 mEq  40 mEq Oral Q6H    magnesium sulfate 2 g/50 ml IVPB (premix or compounded)  2 g IntraVENous ONCE    furosemide (LASIX) injection 40 mg  40 mg IntraVENous NOW    rifAXIMin (XIFAXAN) tablet 550 mg  550 mg Oral BID    nicotine (NICODERM CQ) 21 mg/24 hr patch 1 Patch  1 Patch TransDERmal DAILY    busPIRone (BUSPAR) tablet 10 mg  10 mg Oral BID PRN    escitalopram oxalate (LEXAPRO) tablet 10 mg  10 mg Oral DAILY    risperiDONE (RisperDAL) tablet 2 mg  2 mg Oral DAILY    thiamine mononitrate (B-1) tablet 100 mg  100 mg Oral DAILY    tiotropium bromide (SPIRIVA RESPIMAT) 2.5 mcg /actuation  1 Puff Inhalation DAILY    ondansetron (ZOFRAN) injection 4 mg  4 mg IntraVENous Q4H PRN    melatonin tablet 12 mg  12 mg Oral QHS PRN    acetaminophen (TYLENOL) tablet 500 mg  500 mg Oral Q6H PRN    LORazepam (ATIVAN) tablet 1 mg  1 mg Oral Q1H PRN    Or    LORazepam (ATIVAN) injection 1 mg  1 mg IntraVENous Q1H PRN    LORazepam (ATIVAN) tablet 2 mg  2 mg Oral Q1H PRN    Or    LORazepam (ATIVAN) injection 2 mg  2 mg IntraVENous Q1H PRN    LORazepam (ATIVAN) injection 3 mg  3 mg IntraVENous Q15MIN PRN    piperacillin-tazobactam (ZOSYN) 3.375 g in 0.9% sodium chloride (MBP/ADV) 100 mL MBP  3.375 g IntraVENous Q8H    budesonide (PULMICORT) 500 mcg/2 ml nebulizer suspension  500 mcg Nebulization BID RT    arformoterol (BROVANA) neb solution 15 mcg  15 mcg Nebulization BID RT    folic acid (FOLVITE) tablet 1 mg  1 mg Oral DAILY    ipratropium (ATROVENT) 0.02 % nebulizer solution 0.5 mg  0.5 mg Nebulization Q4H PRN    sodium chloride (NS) flush 5-10 mL  5-10 mL IntraVENous PRN           Aixa Figueredo, DO  Internal Medicine, Hospitalist  Pager: [phone]  [address] [place] [address] [facility] [employer]

## 2020-01-12 NOTE — PROGRESS NOTES
Spiriva    - due to patient transfer medication late to floor   - patient demonstrate a fair ability to use

## 2020-01-12 NOTE — PROGRESS NOTES
Respiratory Therapy Assessment Care Plan    Patient:  Lourdes Worthy 63 y.o. female 1/12/2020 8:22 AM    Acute hypotension [I95.9]  Macrocytic anemia [D53.9]  Thrombocytopenia (HCC) [D69.6]  Hypokalemia [E87.6]  HENRRY (acute kidney injury) ([facility] [N17.9]  Leukopenia [D72.819]  Pancytopenia ([address] [D61.818]  UTI (urinary tract infection) [N39.0]      Chest X-RAY:   Results from Hospital Encounter encounter on 01/08/20   XR CHEST PORT    Impression IMPRESSION:    Subtle nodular opacity in the peripheral right lower lung zone, likely  superimposition. No definite focal consolidation. Results from [place] encounter on 09/14/16   XR CHEST PA LAT    Impression IMPRESSION:    Moderate to marked COPD lungs. Moderate pulmonary fibrosis in upper lobes bilaterally. Mild streaky atelectatic/fibrotic changes at the basal right lung although  subtle infiltrates are not excluded. Normal cardiac size. No evidence of cardiac decompensation. No evidence of pleural effusion or pneumothorax. Mild compression of T8 vertebral body, of undetermined age.             Vital Signs:     Visit Vitals  /80 (BP 1 Location: Left arm, BP Patient Position: At rest)   Pulse (!) 118   Temp 98.6 °F (37 °C)   Resp 18   Ht 5' 6\" (1.676 m)   Wt 75 kg (165 lb 4.8 oz)   SpO2 91%   BMI 26.68 kg/m²         Indications for treatment:  COPD / Poor SPO2 Hypoxia / Coarse Congestive cough / SOB with minor exertion       Plan of care: Brovana / Pulmicort Q12 / Atrovent prn / Hypoxia / Possible assessment for Home O2 / Scheduled Atrovent / Cough and clear        Goal: reduced SOB / reduced congestion

## 2020-01-12 NOTE — PROGRESS NOTES
O2    - may need home O2   - Spot check off O2 at rest 85% /    - Patient C/O SOB with minor exertion

## 2020-01-12 NOTE — PROGRESS NOTES
Gastrointestinal Progress Note    Patient Name: Denny Covarrubias    EWUXY'Y Date: 1/12/2020    Admit Date: 1/8/2020    Subjective:     Denny Covarrubias is a 63 y.o. Female who we are seeing b/o abdominal pain, N/V and Ct showing ? peripancreatic inflammation. Patient reports not being able to tolerated MRI/MRCP yesterday b/lo back discomfort and anxiety. She reports less abdominal pain today and good appetite. . She continues to have diarrhea despite my stopping lactulose, neutraphos, and oral magnesia replacement yesterday although she does note that her diarrhea is a little bit better. She believes that she is clear headed today and her abdominal pain is improved as well.         Current Facility-Administered Medications   Medication Dose Route Frequency    rifAXIMin (XIFAXAN) tablet 550 mg  550 mg Oral BID    nicotine (NICODERM CQ) 21 mg/24 hr patch 1 Patch  1 Patch TransDERmal DAILY    busPIRone (BUSPAR) tablet 10 mg  10 mg Oral BID PRN    escitalopram oxalate (LEXAPRO) tablet 10 mg  10 mg Oral DAILY    risperiDONE (RisperDAL) tablet 2 mg  2 mg Oral DAILY    thiamine mononitrate (B-1) tablet 100 mg  100 mg Oral DAILY    tiotropium bromide (SPIRIVA RESPIMAT) 2.5 mcg /actuation  1 Puff Inhalation DAILY    ondansetron (ZOFRAN) injection 4 mg  4 mg IntraVENous Q4H PRN    melatonin tablet 12 mg  12 mg Oral QHS PRN    acetaminophen (TYLENOL) tablet 500 mg  500 mg Oral Q6H PRN    LORazepam (ATIVAN) tablet 1 mg  1 mg Oral Q1H PRN    Or    LORazepam (ATIVAN) injection 1 mg  1 mg IntraVENous Q1H PRN    LORazepam (ATIVAN) tablet 2 mg  2 mg Oral Q1H PRN    Or    LORazepam (ATIVAN) injection 2 mg  2 mg IntraVENous Q1H PRN    LORazepam (ATIVAN) injection 3 mg  3 mg IntraVENous Q15MIN PRN    piperacillin-tazobactam (ZOSYN) 3.375 g in 0.9% sodium chloride (MBP/ADV) 100 mL MBP  3.375 g IntraVENous Q8H    budesonide (PULMICORT) 500 mcg/2 ml nebulizer suspension  500 mcg Nebulization BID RT    arformoterol (BROVANA) neb solution 15 mcg  15 mcg Nebulization BID RT    folic acid (FOLVITE) tablet 1 mg  1 mg Oral DAILY    ipratropium (ATROVENT) 0.02 % nebulizer solution 0.5 mg  0.5 mg Nebulization Q4H PRN    sodium chloride (NS) flush 5-10 mL  5-10 mL IntraVENous PRN          Objective:     Visit Vitals  /76 (BP 1 Location: Left arm)   Pulse (!) 128   Temp (!) 86 °F (30 °C)   Resp 18   Ht 5' 6\" (1.676 m)   Wt 75 kg (165 lb 4.8 oz)   SpO2 91%   BMI 26.68 kg/m²       General appearance: alert, cooperative, no distress, appears stated age  Abdomen: soft, non-tender. Bowel sounds normal. No masses,  no organomegaly    Data Review:  MRI/MRCP:  Patient could not undergo test b/o back pain. Anxiety per her report    Labs: Results:       Chemistry Recent Labs     01/12/20  0415 01/11/20  0520 01/10/20  2130 01/10/20  0200   GLU 79 92  --  95    141  --  143   K 3.1* 3.8 3.4* 3.4*    109  --  109   CO2 24 25  --  25   BUN 4* 6*  --  9   CREA 0.47* 0.76  --  0.88   CA 7.8* 8.6  --  7.2*   AGAP 8 7  --  9   BUCR 9* 8*  --  10*      CBC w/Diff Recent Labs     01/12/20  0415 01/11/20  0520 01/10/20  0200   WBC 4.3* 3.3* 3.8*   RBC 2.90* 3.02* 3.08*   HGB 9.9* 10.5* 10.4*   HCT 29.9* 31.7* 31.7*   PLT 55* 60* 49*   GRANS 57 61 69   LYMPH 18* 26 14*   EOS 1 0 0      Coagulation Recent Labs     01/10/20  0200   PTP 15.5*   INR 1.3*       Liver Enzymes Recent Labs     01/12/20 0415 01/11/20  0520 01/10/20  0200   TP 5.0* 5.8* 5.4*   ALB 2.1* 2.3* 2.3*   TBILI 1.7* 1.8* 2.2*   * 147* 142*   SGOT 153* 204* 246*   * 178* 172*      Lipase No results for input(s): LPSE in the last 72 hours.               Assessment:   1. Epigastric pain:  Improved. EGD showed mild non-specific gastritis and no cause of pain. CT shows kim-pancreatic inflammation and EGD showed no inflammatory process in the  Duodenal second portion and no ulcer or mass. MRI/MRCP:  Performed yesterday per patient but no report in chart  2. Diarrhea likely secondary to lactulose, oral magnesia replacement and neutraphos-----some improvement since yesterday when these meds were discontinued . 3. Spots of rectal bleeding likely related to anal irritation from diarrhea--stool is medium/ligh brown     Recommendation:   1. Consider rescheduling MRI/MRCP with more anxiolytic prior  2. Monitor abdominal pain   3. Continue regular low fat diet as tolerated  4. Continue PO Xifaxin and monitor diarrhea off lactulose, neutraphos and oral magnesia replacement  5. Outpatient colonoscopy b/o rectal bleeding  6. Stop alcohol and avoid NSAIDs once discharged          Malachy Fruits.  Sarah Stinson MD, [facility], [address]  January 12, 2020  [facility]

## 2020-01-12 NOTE — ROUTINE PROCESS
Bedside and Verbal shift change report given to Ronan Lizama RN (oncoming nurse) by Jose M Holm RN   (offgoing nurse). Report included the following information SBAR, Kardex, MAR and Recent Results.

## 2020-01-12 NOTE — PROGRESS NOTES
Patient received in bed awake. Patient alert and oriented X4, denies pain and discomfort. Patient resting quietly. Frequent use items within reach. Bed locked in low position. Call bell within reach and patient verbalized understanding of use for assistance and needs. Dual skin assessment conducted with Sister Brad Bains RN. Skin is intact with the exception of bruising on left arm and excoriation in the perineal area. 1457:  Patient sleeping.

## 2020-01-12 NOTE — PROGRESS NOTES
110 [facility] care of pt from  [facility]. Pt in bed awake. Pt alert and oriented x 4  Pt c/o of abdominal pain, reports having had diarhea due to meds given as per pt. Asseasment completed plan of care for the shift expl      A visitor at bedside.- Pt used a bed ban. MRI tech  Called to request pt be taken for MRI  Pt anxious Dr Td Baltazar paged order received for ativan 0.5 mg    Ativan 0.5 mg iv given    Pt taken for mri via bed  Called by the MRI tech - pt unable to stay still for the mri to be completed. He asked nurse to go and bring pt back to unit,   Pt brought back to unit made comfortable in bed. IVF resumed. No concerns voiced at this time    0200- Pt assisted to bed pan had loose stools care completed. 0300- Pt has loose stools x  5 throughout the night. 0730- Bedside and Verbal shift change report given to  Marilin Sotomayor (oncoming nurse) by John Sibley RN (offgoing nurse). Report included the following information SBAR, Kardex, Intake/Output, MAR and Recent Results. 1/13/2020-Late entry    On 1/12/20 at  0000 I took the patient  for MRI assisted by another RN. pt had 2 pairs of earings which she took them out at MRI-  I put the earrings on a tissue paper and secured them in place with a glove and put in my pocket  as  Came back to the floor with the aim to put in a plastic bag for the pt. I forgot a bout it in my pocket only to realize later on when pt came back from MRI that I didn't have it in  my pocket. Possibly I might have accidentally thrown it into the thrush un aware. I tried to search in the thrush cans but could not find them. I notified the charge nurse and nursing supervisor( surendra ) about it. I also told the patient that I had lost the earrings and  That I was very sorry and the efforts I made in search for them in thrush cans but couldn't find. Pt said \" never mind, I bought them when on sale at [employer]. \" pt's fiancee at bedside also made aware.  Will notify the nurse manager.

## 2020-01-13 ENCOUNTER — APPOINTMENT (OUTPATIENT)
Dept: NON INVASIVE DIAGNOSTICS | Age: 55
DRG: 463 | End: 2020-01-13
Attending: HOSPITALIST
Payer: COMMERCIAL

## 2020-01-13 LAB
ALBUMIN SERPL-MCNC: 1.9 G/DL (ref 3.4–5)
ALBUMIN/GLOB SERPL: 0.6 {RATIO} (ref 0.8–1.7)
ALP SERPL-CCNC: 126 U/L (ref 45–117)
ALT SERPL-CCNC: 126 U/L (ref 13–56)
ANION GAP SERPL CALC-SCNC: 8 MMOL/L (ref 3–18)
AST SERPL-CCNC: 127 U/L (ref 10–38)
AV VELOCITY RATIO: 0.89
BASOPHILS # BLD: 0 K/UL (ref 0–0.1)
BASOPHILS NFR BLD: 0 % (ref 0–2)
BILIRUB SERPL-MCNC: 1.6 MG/DL (ref 0.2–1)
BUN SERPL-MCNC: 4 MG/DL (ref 7–18)
BUN/CREAT SERPL: 8 (ref 12–20)
CALCIUM SERPL-MCNC: 7.6 MG/DL (ref 8.5–10.1)
CHLORIDE SERPL-SCNC: 110 MMOL/L (ref 100–111)
CO2 SERPL-SCNC: 24 MMOL/L (ref 21–32)
CREAT SERPL-MCNC: 0.49 MG/DL (ref 0.6–1.3)
DIFFERENTIAL METHOD BLD: ABNORMAL
ECHO AO ASC DIAM: 3.26 CM
ECHO AO ROOT DIAM: 3.3 CM
ECHO AV AREA PEAK VELOCITY: 3.2 CM2
ECHO AV AREA/BSA PEAK VELOCITY: 1.7 CM2/M2
ECHO AV PEAK GRADIENT: 8.1 MMHG
ECHO AV PEAK VELOCITY: 141.88 CM/S
ECHO LA MAJOR AXIS: 2.46 CM
ECHO LA TO AORTIC ROOT RATIO: 0.74
ECHO LV EDV A2C: 83.4 ML
ECHO LV EDV A4C: 79.8 ML
ECHO LV EDV BP: 81.6 ML (ref 56–104)
ECHO LV EDV INDEX A4C: 43.3 ML/M2
ECHO LV EDV INDEX BP: 44.3 ML/M2
ECHO LV EDV NDEX A2C: 45.2 ML/M2
ECHO LV EDV TEICHHOLZ: 0.39 ML
ECHO LV EJECTION FRACTION A2C: 51 %
ECHO LV EJECTION FRACTION A4C: 73 %
ECHO LV EJECTION FRACTION BIPLANE: 62.5 % (ref 55–100)
ECHO LV ESV A2C: 40.9 ML
ECHO LV ESV A4C: 21.7 ML
ECHO LV ESV BP: 30.6 ML (ref 19–49)
ECHO LV ESV INDEX A2C: 22.2 ML/M2
ECHO LV ESV INDEX A4C: 11.8 ML/M2
ECHO LV ESV INDEX BP: 16.6 ML/M2
ECHO LV ESV TEICHHOLZ: 0.29 ML
ECHO LV INTERNAL DIMENSION DIASTOLIC: 3.89 CM (ref 3.9–5.3)
ECHO LV INTERNAL DIMENSION SYSTOLIC: 3.44 CM
ECHO LV IVSD: 0.97 CM (ref 0.6–0.9)
ECHO LV MASS 2D: 109.2 G (ref 67–162)
ECHO LV MASS INDEX 2D: 59.2 G/M2 (ref 43–95)
ECHO LV POSTERIOR WALL DIASTOLIC: 0.76 CM (ref 0.6–0.9)
ECHO LVOT DIAM: 2.14 CM
ECHO LVOT PEAK GRADIENT: 6.3 MMHG
ECHO LVOT PEAK VELOCITY: 125.82 CM/S
ECHO LVOT SV: 85.6 ML
ECHO LVOT VTI: 23.88 CM
ECHO RA MINOR AXIS: 3.78 CM
EOSINOPHIL # BLD: 0 K/UL (ref 0–0.4)
EOSINOPHIL NFR BLD: 1 % (ref 0–5)
ERYTHROCYTE [DISTWIDTH] IN BLOOD BY AUTOMATED COUNT: 16.4 % (ref 11.6–14.5)
GLOBULIN SER CALC-MCNC: 3.1 G/DL (ref 2–4)
GLUCOSE SERPL-MCNC: 73 MG/DL (ref 74–99)
HCT VFR BLD AUTO: 29.1 % (ref 35–45)
HGB BLD-MCNC: 9.6 G/DL (ref 12–16)
LVFS 2D: 11.72 %
LVOT MG: 4.26 MMHG
LVOT MV: 1 CM/S
LVSV (MOD BI): 27.33 ML
LVSV (MOD SINGLE 4C): 31.13 ML
LVSV (MOD SINGLE): 22.77 ML
LVSV (TEICH): 9.09 ML
LYMPHOCYTES # BLD: 0.8 K/UL (ref 0.9–3.6)
LYMPHOCYTES NFR BLD: 17 % (ref 21–52)
MAGNESIUM SERPL-MCNC: 1.8 MG/DL (ref 1.6–2.6)
MCH RBC QN AUTO: 34.4 PG (ref 24–34)
MCHC RBC AUTO-ENTMCNC: 33 G/DL (ref 31–37)
MCV RBC AUTO: 104.3 FL (ref 74–97)
MONOCYTES # BLD: 1.3 K/UL (ref 0.05–1.2)
MONOCYTES NFR BLD: 29 % (ref 3–10)
NEUTS SEG # BLD: 2.4 K/UL (ref 1.8–8)
NEUTS SEG NFR BLD: 53 % (ref 40–73)
PLATELET # BLD AUTO: 65 K/UL (ref 135–420)
PMV BLD AUTO: 11.7 FL (ref 9.2–11.8)
POTASSIUM SERPL-SCNC: 3.5 MMOL/L (ref 3.5–5.5)
PROT SERPL-MCNC: 5 G/DL (ref 6.4–8.2)
RBC # BLD AUTO: 2.79 M/UL (ref 4.2–5.3)
SODIUM SERPL-SCNC: 142 MMOL/L (ref 136–145)
WBC # BLD AUTO: 4.5 K/UL (ref 4.6–13.2)

## 2020-01-13 PROCEDURE — 74011000250 HC RX REV CODE- 250: Performed by: HOSPITALIST

## 2020-01-13 PROCEDURE — 74011250637 HC RX REV CODE- 250/637: Performed by: INTERNAL MEDICINE

## 2020-01-13 PROCEDURE — 65270000029 HC RM PRIVATE

## 2020-01-13 PROCEDURE — 93306 TTE W/DOPPLER COMPLETE: CPT

## 2020-01-13 PROCEDURE — 74011000250 HC RX REV CODE- 250: Performed by: INTERNAL MEDICINE

## 2020-01-13 PROCEDURE — 77010033678 HC OXYGEN DAILY

## 2020-01-13 PROCEDURE — 77030038269 HC DRN EXT URIN PURWCK BARD -A

## 2020-01-13 PROCEDURE — 94761 N-INVAS EAR/PLS OXIMETRY MLT: CPT

## 2020-01-13 PROCEDURE — 80053 COMPREHEN METABOLIC PANEL: CPT

## 2020-01-13 PROCEDURE — 94640 AIRWAY INHALATION TREATMENT: CPT

## 2020-01-13 PROCEDURE — 36415 COLL VENOUS BLD VENIPUNCTURE: CPT

## 2020-01-13 PROCEDURE — 74011250636 HC RX REV CODE- 250/636: Performed by: INTERNAL MEDICINE

## 2020-01-13 PROCEDURE — 74011000258 HC RX REV CODE- 258: Performed by: INTERNAL MEDICINE

## 2020-01-13 PROCEDURE — 85025 COMPLETE CBC W/AUTO DIFF WBC: CPT

## 2020-01-13 PROCEDURE — 83735 ASSAY OF MAGNESIUM: CPT

## 2020-01-13 RX ORDER — BUMETANIDE 0.25 MG/ML
1 INJECTION INTRAMUSCULAR; INTRAVENOUS 2 TIMES DAILY
Status: COMPLETED | OUTPATIENT
Start: 2020-01-13 | End: 2020-01-14

## 2020-01-13 RX ADMIN — ARFORMOTEROL TARTRATE 15 MCG: 15 SOLUTION RESPIRATORY (INHALATION) at 08:08

## 2020-01-13 RX ADMIN — PIPERACILLIN AND TAZOBACTAM 3.38 G: 3; .375 INJECTION, POWDER, LYOPHILIZED, FOR SOLUTION INTRAVENOUS at 20:16

## 2020-01-13 RX ADMIN — RIFAXIMIN 550 MG: 550 TABLET ORAL at 18:18

## 2020-01-13 RX ADMIN — BUDESONIDE 500 MCG: 0.5 SUSPENSION RESPIRATORY (INHALATION) at 08:08

## 2020-01-13 RX ADMIN — ESCITALOPRAM 10 MG: 10 TABLET, FILM COATED ORAL at 09:01

## 2020-01-13 RX ADMIN — RISPERIDONE 2 MG: 0.5 TABLET, FILM COATED ORAL at 09:02

## 2020-01-13 RX ADMIN — IPRATROPIUM BROMIDE 0.5 MG: 0.5 SOLUTION RESPIRATORY (INHALATION) at 18:25

## 2020-01-13 RX ADMIN — Medication 100 MG: at 09:02

## 2020-01-13 RX ADMIN — BUMETANIDE 1 MG: 0.25 INJECTION INTRAMUSCULAR; INTRAVENOUS at 12:40

## 2020-01-13 RX ADMIN — RIFAXIMIN 550 MG: 550 TABLET ORAL at 09:02

## 2020-01-13 RX ADMIN — BUDESONIDE 500 MCG: 0.5 SUSPENSION RESPIRATORY (INHALATION) at 20:10

## 2020-01-13 RX ADMIN — PIPERACILLIN AND TAZOBACTAM 3.38 G: 3; .375 INJECTION, POWDER, LYOPHILIZED, FOR SOLUTION INTRAVENOUS at 12:36

## 2020-01-13 RX ADMIN — PIPERACILLIN AND TAZOBACTAM 3.38 G: 3; .375 INJECTION, POWDER, LYOPHILIZED, FOR SOLUTION INTRAVENOUS at 06:02

## 2020-01-13 RX ADMIN — FOLIC ACID 1 MG: 1 TABLET ORAL at 09:01

## 2020-01-13 RX ADMIN — ARFORMOTEROL TARTRATE 15 MCG: 15 SOLUTION RESPIRATORY (INHALATION) at 20:10

## 2020-01-13 RX ADMIN — BUMETANIDE 1 MG: 0.25 INJECTION INTRAMUSCULAR; INTRAVENOUS at 18:17

## 2020-01-13 RX ADMIN — TIOTROPIUM BROMIDE INHALATION SPRAY 1 PUFF: 3.12 SPRAY, METERED RESPIRATORY (INHALATION) at 08:11

## 2020-01-13 RX ADMIN — MELATONIN TAB 3 MG 12 MG: 3 TAB at 23:57

## 2020-01-13 NOTE — PROGRESS NOTES
Assumed care from [facility]. Patient is awake and alert, denies any pain or discomfort. CIWA = 9, ativan given per order. Patient is so short of breath, sats maintained between 84-87%, informed Dr. Ida Peres. Orders to change of NC to HFNC. Informed RT.     0000> Patient's O2sats maintained from 91-95% on 10L via HFNC. Will continue to monitor. 0400> Stable. Bedside and Verbal shift change report given to ANA Corado (oncoming nurse) by Ina Johnson RN (offgoing nurse). Report included the following information SBAR, Kardex, Intake/Output, MAR, Recent Results and Cardiac Rhythm Sinus tach.

## 2020-01-13 NOTE — PROGRESS NOTES
Problem: Tissue Perfusion - Cardiopulmonary, Altered  Goal: *Optimize tissue perfusion  Outcome: Progressing Towards Goal  Goal: *Absence of hypoxia  Outcome: Progressing Towards Goal     Problem: Alcohol Withdrawal  Goal: *STG: Participates in treatment plan  Outcome: Progressing Towards Goal  Goal: *STG: Remains safe in hospital  Outcome: Progressing Towards Goal  Goal: *STG: Seeks staff when symptoms of withdrawal increase  Outcome: Progressing Towards Goal  Goal: *STG: Complies with medication therapy  Outcome: Progressing Towards Goal  Goal: *STG: Attends activities and groups  Outcome: Progressing Towards Goal  Goal: *STG: Will identify negative impact of chemical dependency including the use of tobacco, alcohol, and other substances  Outcome: Progressing Towards Goal  Goal: *STG: Verbalizes abstinence as an achievable goal  Outcome: Progressing Towards Goal  Goal: *STG: Agrees to participate in outpatient after care program to support ongoing mental health  Outcome: Progressing Towards Goal  Goal: *STG: Able to indentify relapse triggers including interpersonal/social and familial factors  Outcome: Progressing Towards Goal  Goal: *STG: Identify lifestyle changes to support long term sobriety such as vocation, employment, education, and legal issues  Outcome: Progressing Towards Goal  Goal: *STG: Maintains appropriate nutrition and hydration  Outcome: Progressing Towards Goal  Goal: *STG: Vital signs within defined limits  Outcome: Progressing Towards Goal  Goal: *STG/LTG: Relapse prevention plan in place to include housing/aftercare, leisure activities, and spirituality  Outcome: Progressing Towards Goal  Goal: Interventions  Outcome: Progressing Towards Goal     Problem: Pain  Goal: *Control of Pain  Outcome: Progressing Towards Goal     Problem: Falls - Risk of  Goal: *Absence of Falls  Description  Document Redgie Curet Fall Risk and appropriate interventions in the flowsheet.   Outcome: Progressing Towards Goal  Note: Fall Risk Interventions:  Mobility Interventions: Bed/chair exit alarm, Communicate number of staff needed for ambulation/transfer, Patient to call before getting OOB         Medication Interventions: Bed/chair exit alarm, Evaluate medications/consider consulting pharmacy, Teach patient to arise slowly    Elimination Interventions: Bed/chair exit alarm, Call light in reach, Patient to call for help with toileting needs, Toileting schedule/hourly rounds    History of Falls Interventions: Bed/chair exit alarm, Door open when patient unattended, Investigate reason for fall         Problem: Pressure Injury - Risk of  Goal: *Prevention of pressure injury  Description  Document Lex Scale and appropriate interventions in the flowsheet.   Outcome: Progressing Towards Goal  Note: Pressure Injury Interventions:  Sensory Interventions: Assess changes in LOC, Check visual cues for pain    Moisture Interventions: Absorbent underpads, Check for incontinence Q2 hours and as needed, Moisture barrier    Activity Interventions: PT/OT evaluation    Mobility Interventions: HOB 30 degrees or less, PT/OT evaluation    Nutrition Interventions: Document food/fluid/supplement intake    Friction and Shear Interventions: HOB 30 degrees or less, Foam dressings/transparent film/skin sealants                Problem: Discharge Planning  Goal: *Discharge to safe environment  Outcome: Progressing Towards Goal     Problem: Patient Education: Go to Patient Education Activity  Goal: Patient/Family Education  Outcome: Progressing Towards Goal     Problem: Patient Education: Go to Patient Education Activity  Goal: Patient/Family Education  Outcome: Progressing Towards Goal

## 2020-01-13 NOTE — PROGRESS NOTES
0740: Bedside shift change report given to Mak PEREZ  (oncoming nurse) by Kristi Stein  (offgoing nurse). Report included the following information SBAR, Kardex, Procedure Summary, Intake/Output, MAR and Cardiac Rhythm ST. Patient NC 10L, Patient in bed resting quietly. Alert and Oriented x 4. Patient denies pain. Call light in reach and bed locked and in lowest position. Educated patient to call for assistance. 0820: Received a phone call from MRI about the patient needing procedure and needing to be NPO for 4 hours. Informed MRI tech about the patient having breakfast.     0906: Patient in bed eating breakfast. Assessment completed on patient. Medications given to patient. Patient stated that they were concerned with getting the MRI done due to the patient having to be on HI Flow and having to maintain being still for extended period of time. 1045: Patient had BM, watery. Patient incontinence care provided to patient. 1150: Vitals rechecked for Bumex. 1238: Reassessment completed. MD at bedside, clarified that MD okay with patient receiving Bumex with BP of 104/62. He stated okay. Informed MD that patient is having watery stools, no orders given. Also Spoke with MD about the patient having MRI ordered, but I expressed concerns about the patient being on hi flow and having a decreased oxygen saturation episode last night, he stated to retry tomorrow. 1315: 5 P's Addressed. Call light in reach, bed locked and in lowest position. 1410: Patient resting in bed.5 P's Addressed. Call light in reach, bed locked and in lowest position. 1530: Reassessment completed. 1600: CIWA assessment completed. 1750: Settled patient for dinner tray, visitor at bedside. Patient's oxygen 88% at 8L, Patient placed back at 9 L, 93 %    1817: Medications given, family at bedside. Patient stated that they were feeling short of breath, PRN Neb treatment provided. BP Rechecked for Bumex.      1940: Bedside shift change report given to Asia Luque RN  (oncoming nurse) by Vito Cohen RN  (offgoing nurse).  Report included the following information SBAR, Kardex, Procedure Summary, Intake/Output, MAR and Cardiac Rhythm ST.

## 2020-01-13 NOTE — PROGRESS NOTES
Respiratory Therapy Assessment Care Plan    Patient:  Wily Bee 63 y.o. female 1/13/2020 8:20 AM    Acute hypotension [I95.9]  Macrocytic anemia [D53.9]  Thrombocytopenia (HCC) [D69.6]  Hypokalemia [E87.6]  HENRRY (acute kidney injury) ([facility] [address] [N17.9]  Leukopenia [D72.819]  Pancytopenia ([address] [D61.818]  UTI (urinary tract infection) [N39.0]      Chest X-RAY:   Results from Hospital Encounter encounter on 01/08/20   XR CHEST PORT    Impression IMPRESSION:    New basilar haziness, right more so than left, appearance suggestive of pleural  effusions and associated atelectasis. XR CHEST PORT    Impression IMPRESSION:    Subtle nodular opacity in the peripheral right lower lung zone, likely  superimposition. No definite focal consolidation. Results from [place] encounter on 09/14/16   XR CHEST PA LAT    Impression IMPRESSION:    Moderate to marked COPD lungs. Moderate pulmonary fibrosis in upper lobes bilaterally. Mild streaky atelectatic/fibrotic changes at the basal right lung although  subtle infiltrates are not excluded. Normal cardiac size. No evidence of cardiac decompensation. No evidence of pleural effusion or pneumothorax. Mild compression of T8 vertebral body, of undetermined age.             Vital Signs:     Visit Vitals  /71   Pulse (!) 112   Temp 99.1 °F (37.3 °C)   Resp 20   Ht 5' 6\" (1.676 m)   Wt 71.3 kg (157 lb 1.6 oz)   SpO2 92%   BMI 25.36 kg/m²         Indications for treatment:  COPD / SOB / Hypoxia       Plan of care: Kishan Strickland / Laverne / Kehinde Gardner / Ilir Koroma / Macie Lagos        Goal: Reduce SOB at rest and exertion / improved SPO2 on reduced O2

## 2020-01-13 NOTE — PROGRESS NOTES
INTERIM UPDATE - 2117 EST on 1/12/2020    Nursing Staff reports that Patient is currently requiring 5 L/min O2 via NC with SpO2 currently 84-87%. Nursing Staff reports that Patient can only be turned up to 6 L/min O2 on High Flow NC on the Floor. Nursing Staff states that Salter can be used. Plan:  IV Furosemide 20 mg xONCE. Nursing Staff instructed to place Salter and titrate up Oxygen and call Physician On-Call when appropriately saturating with the number of L/min O2 required. INTERIM UPDATE - 2146 EST on 1/12/2020    Respiratory Therapist states that Patient is stable without symptoms on 10 L/min O2 via Salter NC. Patient has no known history of COPD. Plan:  Target SpO2 90-91%. Instructed Respiratory Therapist and Nursing Staff to call if O2 requirement climbs to 15 L/min O2 via Salter NC. Should this occur, Patient will require Stepdown.

## 2020-01-13 NOTE — CDMP QUERY
Pt noted to have \"shock\" documented. After study, could you please further specify type of Shock in the medical record:    > hypovolemic shock  > septic shock  > other shock, please specify  > clinically unable to determine    The medical record reflects the following:      ---> Risk Factors: 63 yr old with multiple comorbidities including chronic hypotension       --->Clinical Indicators:      * 1- Hospitalist PN:  \". ..acute hypotension. ... SOB this AM, w/ crackles on exam, likely 2/2 IV fluids while in shock. Emmanuelle Bateman \"     * 1- Pulmonary PN:  \". Emmanuelle Bateman Shock (resolved) superimposed on Chronic HYPOtension: MAP goal to >55 given that she has cirrhosis; baseline SBP on multiple prior clinic visits typically in 90's-100. Worsening of baseline blood pressure likely related to hypovolemia and less likely due to sepsis as patient has no fever, leukocytosis, and lactate was only 1.85 before IVF's given. Emmanuelle Bateman \"    ---> Treatment: pressors; IVF      Thank you for your time,   Sharon Hillman, [facility], [address]

## 2020-01-13 NOTE — PROGRESS NOTES
Problem: Discharge Planning  Goal: *Discharge to safe environment  Outcome: Polly    Patient has Medicaid as insurance    Patient on Hi flow 10 lpm  CM to continue to follow.

## 2020-01-13 NOTE — PROGRESS NOTES
Called to assess patient low Sp02. Patient placed on High Flow canula at 10 l. Spo2 improved to 90%.  Spoke to Dr Dino Cornelius and he wants to maintain her at 90% and above

## 2020-01-13 NOTE — PROGRESS NOTES
Problem: Tissue Perfusion - Cardiopulmonary, Altered  Goal: *Optimize tissue perfusion  Outcome: Progressing Towards Goal  Goal: *Absence of hypoxia  Outcome: Progressing Towards Goal     Problem: Alcohol Withdrawal  Goal: *STG: Participates in treatment plan  Outcome: Progressing Towards Goal  Goal: *STG: Remains safe in hospital  Outcome: Progressing Towards Goal  Goal: *STG: Seeks staff when symptoms of withdrawal increase  Outcome: Progressing Towards Goal  Goal: *STG: Complies with medication therapy  Outcome: Progressing Towards Goal  Goal: *STG: Attends activities and groups  Outcome: Progressing Towards Goal  Goal: *STG: Will identify negative impact of chemical dependency including the use of tobacco, alcohol, and other substances  Outcome: Progressing Towards Goal  Goal: *STG: Verbalizes abstinence as an achievable goal  Outcome: Progressing Towards Goal  Goal: *STG: Agrees to participate in outpatient after care program to support ongoing mental health  Outcome: Progressing Towards Goal  Goal: *STG: Able to indentify relapse triggers including interpersonal/social and familial factors  Outcome: Progressing Towards Goal  Goal: *STG: Identify lifestyle changes to support long term sobriety such as vocation, employment, education, and legal issues  Outcome: Progressing Towards Goal  Goal: *STG: Maintains appropriate nutrition and hydration  Outcome: Progressing Towards Goal  Goal: *STG: Vital signs within defined limits  Outcome: Progressing Towards Goal  Goal: *STG/LTG: Relapse prevention plan in place to include housing/aftercare, leisure activities, and spirituality  Outcome: Progressing Towards Goal  Goal: Interventions  Outcome: Progressing Towards Goal     Problem: Pain  Goal: *Control of Pain  Outcome: Progressing Towards Goal     Problem: Falls - Risk of  Goal: *Absence of Falls  Description  Document [facility] Fall Risk and appropriate interventions in the flowsheet.   Outcome: Progressing Towards Goal  Note: Fall Risk Interventions:  Mobility Interventions: Bed/chair exit alarm, Communicate number of staff needed for ambulation/transfer, Patient to call before getting OOB         Medication Interventions: Bed/chair exit alarm, Evaluate medications/consider consulting pharmacy, Teach patient to arise slowly    Elimination Interventions: Bed/chair exit alarm, Call light in reach, Patient to call for help with toileting needs, Toileting schedule/hourly rounds    History of Falls Interventions: Bed/chair exit alarm, Door open when patient unattended, Investigate reason for fall         Problem: Pressure Injury - Risk of  Goal: *Prevention of pressure injury  Description  Document Elx Scale and appropriate interventions in the flowsheet.   Outcome: Progressing Towards Goal  Note: Pressure Injury Interventions:  Sensory Interventions: Assess changes in LOC, Check visual cues for pain    Moisture Interventions: Absorbent underpads, Check for incontinence Q2 hours and as needed, Moisture barrier    Activity Interventions: PT/OT evaluation    Mobility Interventions: HOB 30 degrees or less, PT/OT evaluation    Nutrition Interventions: Document food/fluid/supplement intake    Friction and Shear Interventions: HOB 30 degrees or less, Foam dressings/transparent film/skin sealants                Problem: Discharge Planning  Goal: *Discharge to safe environment  Outcome: Progressing Towards Goal     Problem: Patient Education: Go to Patient Education Activity  Goal: Patient/Family Education  Outcome: Progressing Towards Goal     Problem: Patient Education: Go to Patient Education Activity  Goal: Patient/Family Education  Outcome: Progressing Towards Goal

## 2020-01-13 NOTE — PROGRESS NOTES
Problem: Pain  Goal: *Control of Pain  Outcome: Progressing Towards Goal     Problem: Falls - Risk of  Goal: *Absence of Falls  Description  Document Rachana Hernándeztianna Fall Risk and appropriate interventions in the flowsheet.   Outcome: Progressing Towards Goal  Note: Fall Risk Interventions:  Mobility Interventions: Bed/chair exit alarm, Patient to call before getting OOB, PT Consult for mobility concerns, PT Consult for assist device competence         Medication Interventions: Patient to call before getting OOB, Bed/chair exit alarm    Elimination Interventions: Call light in reach, Bed/chair exit alarm, Patient to call for help with toileting needs    History of Falls Interventions: Bed/chair exit alarm

## 2020-01-13 NOTE — ROUTINE PROCESS
Bedside and Verbal shift change report given to Hilaria Martinez (oncoming nurse) by Padmaja Morocho RN   (offgoing nurse). Report included the following information SBAR, Kardex, MAR and Recent Results.

## 2020-01-13 NOTE — PROGRESS NOTES
Internal Medicine Progress Note    Patient's Name: Khadra Yun  Admit Date: 1/8/2020  Length of Stay: 4      Assessment/Plan     C/John Graham 1106 Problems    Diagnosis Date Noted    Hypokalemia 01/09/2020    Macrocytic anemia 01/09/2020    Leukopenia 01/09/2020    UTI (urinary tract infection) 01/09/2020    Thrombocytopenia ([facility] [address] 01/09/2020    HENRRY (acute kidney injury) ([facility] [address] 01/09/2020    Pancytopenia ([facility] [address] 01/09/2020    Acute hypotension 01/09/2020    Acute respiratory failure ([facility] [address] 01/09/2020    Cirrhosis ([facility] [address] 01/09/2020    COPD (chronic obstructive pulmonary disease) (HCC)     Bipolar mood disorder (HCC)     ETOH abuse      - Push diuretics today, effusions on xray  - Echo pending  - Urine cult w/ ecoli  - Blood cult NGTD  - Cont zosyn w/ plan to change if no intra-abdominal source of infection   - Cr WNL  - Trend BMP  - LFTS improving slowly  - EGD showed non-specific gastritis   - HIDA negative for cystic duct obstruction  - Pt refusing MRI/MRCP due to inability to lay down flat  - Cont xifaxin, magox,neutraphos  - Outpt colonoscopy recommended  - Appreciate GI  - Cont thiamine/folate/MVI      Subjective     Still having some SOB, basilar crackles    Objective     Visit Vitals  /62   Pulse (!) 113   Temp 98.6 °F (37 °C)   Resp 20   Ht 5' 6\" (1.676 m)   Wt 74.8 kg (165 lb)   SpO2 90%   BMI 26.63 kg/m²       Physical Exam:  General Appearance: NAD, conversant  Lungs: B/L diffuse crackles with normal respiratory effort  CV: Tachy w/ RR, no m/r/g  Abdomen: soft, mild TTP RUQ, normal bowel sounds  Extremities: no cyanosis, no peripheral edema  Neuro: No focal deficits, motor/sensory intact    Lab/Data Reviewed:  BMP:   Lab Results   Component Value Date/Time     01/13/2020 06:00 AM    K 3.5 01/13/2020 06:00 AM     01/13/2020 06:00 AM    CO2 24 01/13/2020 06:00 AM    AGAP 8 01/13/2020 06:00 AM    GLU 73 (L) 01/13/2020 06:00 AM    BUN 4 (L) 01/13/2020 06:00 AM    CREA 0.49 (L) 01/13/2020 06:00 AM    GFRAA >60 01/13/2020 06:00 AM    GFRNA >60 01/13/2020 06:00 AM     CBC:   Lab Results   Component Value Date/Time    WBC 4.5 (L) 01/13/2020 06:00 AM    HGB 9.6 (L) 01/13/2020 06:00 AM    HCT 29.1 (L) 01/13/2020 06:00 AM    PLT 65 (L) 01/13/2020 06:00 AM       Imaging Reviewed:  No results found.     Medications Reviewed:  Current Facility-Administered Medications   Medication Dose Route Frequency    bumetanide (BUMEX) injection 1 mg  1 mg IntraVENous BID    rifAXIMin (XIFAXAN) tablet 550 mg  550 mg Oral BID    nicotine (NICODERM CQ) 21 mg/24 hr patch 1 Patch  1 Patch TransDERmal DAILY    busPIRone (BUSPAR) tablet 10 mg  10 mg Oral BID PRN    escitalopram oxalate (LEXAPRO) tablet 10 mg  10 mg Oral DAILY    risperiDONE (RisperDAL) tablet 2 mg  2 mg Oral DAILY    thiamine mononitrate (B-1) tablet 100 mg  100 mg Oral DAILY    tiotropium bromide (SPIRIVA RESPIMAT) 2.5 mcg /actuation  1 Puff Inhalation DAILY    ondansetron (ZOFRAN) injection 4 mg  4 mg IntraVENous Q4H PRN    melatonin tablet 12 mg  12 mg Oral QHS PRN    acetaminophen (TYLENOL) tablet 500 mg  500 mg Oral Q6H PRN    LORazepam (ATIVAN) tablet 1 mg  1 mg Oral Q1H PRN    Or    LORazepam (ATIVAN) injection 1 mg  1 mg IntraVENous Q1H PRN    LORazepam (ATIVAN) tablet 2 mg  2 mg Oral Q1H PRN    Or    LORazepam (ATIVAN) injection 2 mg  2 mg IntraVENous Q1H PRN    LORazepam (ATIVAN) injection 3 mg  3 mg IntraVENous Q15MIN PRN    piperacillin-tazobactam (ZOSYN) 3.375 g in 0.9% sodium chloride (MBP/ADV) 100 mL MBP  3.375 g IntraVENous Q8H    budesonide (PULMICORT) 500 mcg/2 ml nebulizer suspension  500 mcg Nebulization BID RT    arformoterol (BROVANA) neb solution 15 mcg  15 mcg Nebulization BID RT    folic acid (FOLVITE) tablet 1 mg  1 mg Oral DAILY    ipratropium (ATROVENT) 0.02 % nebulizer solution 0.5 mg  0.5 mg Nebulization Q4H PRN    sodium chloride (NS) flush 5-10 mL  5-10 mL IntraVENous PRN

## 2020-01-13 NOTE — PROGRESS NOTES
PROGRESS NOTE   PATIENT:  Ulysses Hernández           MRN: [id number]           [facility], [address]           1/13/2020, 4:06 PM          SUBJECTIVE:  No significant abdominal pain. No vomiting. Tolerating po. Loose stools. OBJECTIVE:  Patient Vitals for the past 24 hrs:   Temp Pulse Resp BP SpO2   01/13/20 1240  (!) 113  104/62    01/13/20 1120 98.6 °F (37 °C) (!) 114 20 110/73 90 %   01/13/20 0950    104/69    01/13/20 0809     92 %   01/13/20 0749 99.1 °F (37.3 °C) (!) 112 20 106/71 90 %   01/13/20 0400 99 °F (37.2 °C) (!) 110 18 104/69 92 %   01/13/20 0000 97.9 °F (36.6 °C) (!) 112 18 119/77 94 %   01/12/20 2022 99 °F (37.2 °C) (!) 109 18 100/66 91 %   01/12/20 2000  (!) 110      01/12/20 1705 98.9 °F (37.2 °C) (!) 106 18 104/71 90 %         Intake/Output Summary (Last 24 hours) at 1/13/2020 1606  Last data filed at 1/13/2020 1424  Gross per 24 hour   Intake 720 ml   Output    Net 720 ml         Lungs: Clear B/L   CVS exam: Regular rate and rhythm   Abd  : Soft, non tender, BS +, No masses felt. Neuro: Alert, oriented X 3, No asterexis. Labs: Results:   Chemistry Recent Labs     01/13/20  0600 01/12/20  0415 01/11/20  0520   GLU 73* 79 92    142 141   K 3.5 3.1* 3.8    110 109   CO2 24 24 25   BUN 4* 4* 6*   CREA 0.49* 0.47* 0.76   CA 7.6* 7.8* 8.6   AGAP 8 8 7   BUCR 8* 9* 8*   * 132* 147*   TP 5.0* 5.0* 5.8*   ALB 1.9* 2.1* 2.3*   GLOB 3.1 2.9 3.5   AGRAT 0.6* 0.7* 0.7*    Estimated Creatinine Clearance: 92.8 mL/min (A) (by C-G formula based on SCr of 0.49 mg/dL (L)). CBC w/Diff Recent Labs     01/13/20  0600 01/12/20  0415 01/11/20  0520   WBC 4.5* 4.3* 3.3*   RBC 2.79* 2.90* 3.02*   HGB 9.6* 9.9* 10.5*   HCT 29.1* 29.9* 31.7*   PLT 65* 55* 60*   GRANS 53 57 61   LYMPH 17* 18* 26   EOS 1 1 0      Cardiac Enzymes No results for input(s): CPK, CKND1, PAPO in the last 72 hours.     No lab exists for component: CKRMB, TROIP   Coagulation No results for input(s): PTP, INR, APTT, INREXT in the last 72 hours.     Hepatitis Panel No results found for: HAMAT, HAAB, HABT, HAAT, HBSAG, HBSB, HBSAT, HBABN, HBCM, HBCAB, HBCAT, XBCABS, HBEAB, HBEAG, XHEPCS, [id number], HBEGLT, HBCMLT, HBCLT, HBEBLT, [id number], [id number], HAVMLT, [id number], HBCMLT, [id number], HCGAT   Amylase Lipase    Liver Enzymes Recent Labs     01/13/20  0600 01/12/20  0415 01/11/20  0520   TP 5.0* 5.0* 5.8*   ALB 1.9* 2.1* 2.3*   TBILI 1.6* 1.7* 1.8*   * 132* 147*   SGOT 127* 153* 204*   * 150* 178*          Allergies   Allergen Reactions    Animal Dander Itching    Egg Nausea and Vomiting       Current Facility-Administered Medications   Medication Dose Route Frequency    bumetanide (BUMEX) injection 1 mg  1 mg IntraVENous BID    rifAXIMin (XIFAXAN) tablet 550 mg  550 mg Oral BID    nicotine (NICODERM CQ) 21 mg/24 hr patch 1 Patch  1 Patch TransDERmal DAILY    busPIRone (BUSPAR) tablet 10 mg  10 mg Oral BID PRN    escitalopram oxalate (LEXAPRO) tablet 10 mg  10 mg Oral DAILY    risperiDONE (RisperDAL) tablet 2 mg  2 mg Oral DAILY    thiamine mononitrate (B-1) tablet 100 mg  100 mg Oral DAILY    tiotropium bromide (SPIRIVA RESPIMAT) 2.5 mcg /actuation  1 Puff Inhalation DAILY    ondansetron (ZOFRAN) injection 4 mg  4 mg IntraVENous Q4H PRN    melatonin tablet 12 mg  12 mg Oral QHS PRN    acetaminophen (TYLENOL) tablet 500 mg  500 mg Oral Q6H PRN    LORazepam (ATIVAN) tablet 1 mg  1 mg Oral Q1H PRN    Or    LORazepam (ATIVAN) injection 1 mg  1 mg IntraVENous Q1H PRN    LORazepam (ATIVAN) tablet 2 mg  2 mg Oral Q1H PRN    Or    LORazepam (ATIVAN) injection 2 mg  2 mg IntraVENous Q1H PRN    LORazepam (ATIVAN) injection 3 mg  3 mg IntraVENous Q15MIN PRN    piperacillin-tazobactam (ZOSYN) 3.375 g in 0.9% sodium chloride (MBP/ADV) 100 mL MBP  3.375 g IntraVENous Q8H    budesonide (PULMICORT) 500 mcg/2 ml nebulizer suspension  500 mcg Nebulization BID RT    arformoterol (BROVANA) neb solution 15 mcg  15 mcg Nebulization BID RT    folic acid (FOLVITE) tablet 1 mg  1 mg Oral DAILY    ipratropium (ATROVENT) 0.02 % nebulizer solution 0.5 mg  0.5 mg Nebulization Q4H PRN    sodium chloride (NS) flush 5-10 mL  5-10 mL IntraVENous PRN       ASSESSMENT:    Alcoholic cirrhosis with probable mild decompensation. LFTs improving. Peripancreatic head inflammation, likely ETOH pancreatitis, improving. Cholelithiasis, without acute cholecystitis (HIDA scan)  Unable to tolerate MRI/MRCP    EUS of the pancreas and bile duct later.     Zeina Weiss MD

## 2020-01-14 PROBLEM — E87.70 FLUID OVERLOAD: Status: ACTIVE | Noted: 2020-01-14

## 2020-01-14 PROBLEM — K85.20 ALCOHOL-INDUCED PANCREATITIS: Status: ACTIVE | Noted: 2020-01-14

## 2020-01-14 PROBLEM — J96.01 ACUTE RESPIRATORY FAILURE WITH HYPOXIA (HCC): Status: ACTIVE | Noted: 2020-01-09

## 2020-01-14 LAB
ANION GAP SERPL CALC-SCNC: 9 MMOL/L (ref 3–18)
BACTERIA SPEC CULT: NORMAL
BACTERIA SPEC CULT: NORMAL
BUN SERPL-MCNC: 4 MG/DL (ref 7–18)
BUN/CREAT SERPL: 8 (ref 12–20)
CALCIUM SERPL-MCNC: 7.6 MG/DL (ref 8.5–10.1)
CHLORIDE SERPL-SCNC: 106 MMOL/L (ref 100–111)
CO2 SERPL-SCNC: 25 MMOL/L (ref 21–32)
CREAT SERPL-MCNC: 0.49 MG/DL (ref 0.6–1.3)
GLUCOSE SERPL-MCNC: 74 MG/DL (ref 74–99)
MAGNESIUM SERPL-MCNC: 1.6 MG/DL (ref 1.6–2.6)
POTASSIUM SERPL-SCNC: 3.2 MMOL/L (ref 3.5–5.5)
SERVICE CMNT-IMP: NORMAL
SERVICE CMNT-IMP: NORMAL
SODIUM SERPL-SCNC: 140 MMOL/L (ref 136–145)

## 2020-01-14 PROCEDURE — 80048 BASIC METABOLIC PNL TOTAL CA: CPT

## 2020-01-14 PROCEDURE — 97530 THERAPEUTIC ACTIVITIES: CPT

## 2020-01-14 PROCEDURE — 74011000250 HC RX REV CODE- 250: Performed by: PHYSICIAN ASSISTANT

## 2020-01-14 PROCEDURE — 77010033678 HC OXYGEN DAILY

## 2020-01-14 PROCEDURE — 83735 ASSAY OF MAGNESIUM: CPT

## 2020-01-14 PROCEDURE — 94640 AIRWAY INHALATION TREATMENT: CPT

## 2020-01-14 PROCEDURE — 74011250637 HC RX REV CODE- 250/637: Performed by: INTERNAL MEDICINE

## 2020-01-14 PROCEDURE — 74011000258 HC RX REV CODE- 258: Performed by: INTERNAL MEDICINE

## 2020-01-14 PROCEDURE — 74011250637 HC RX REV CODE- 250/637: Performed by: PHYSICIAN ASSISTANT

## 2020-01-14 PROCEDURE — 74011000250 HC RX REV CODE- 250: Performed by: INTERNAL MEDICINE

## 2020-01-14 PROCEDURE — 97535 SELF CARE MNGMENT TRAINING: CPT

## 2020-01-14 PROCEDURE — 36415 COLL VENOUS BLD VENIPUNCTURE: CPT

## 2020-01-14 PROCEDURE — 94761 N-INVAS EAR/PLS OXIMETRY MLT: CPT

## 2020-01-14 PROCEDURE — 74011000250 HC RX REV CODE- 250: Performed by: HOSPITALIST

## 2020-01-14 PROCEDURE — 74011250636 HC RX REV CODE- 250/636: Performed by: INTERNAL MEDICINE

## 2020-01-14 PROCEDURE — 74011250636 HC RX REV CODE- 250/636: Performed by: HOSPITALIST

## 2020-01-14 PROCEDURE — 65270000029 HC RM PRIVATE

## 2020-01-14 RX ORDER — POTASSIUM CHLORIDE 20 MEQ/1
40 TABLET, EXTENDED RELEASE ORAL
Status: COMPLETED | OUTPATIENT
Start: 2020-01-14 | End: 2020-01-14

## 2020-01-14 RX ORDER — BUMETANIDE 0.25 MG/ML
1 INJECTION INTRAMUSCULAR; INTRAVENOUS 2 TIMES DAILY
Status: DISPENSED | OUTPATIENT
Start: 2020-01-14 | End: 2020-01-15

## 2020-01-14 RX ORDER — MAGNESIUM SULFATE HEPTAHYDRATE 40 MG/ML
2 INJECTION, SOLUTION INTRAVENOUS ONCE
Status: COMPLETED | OUTPATIENT
Start: 2020-01-14 | End: 2020-01-14

## 2020-01-14 RX ADMIN — POTASSIUM CHLORIDE 40 MEQ: 1500 TABLET, EXTENDED RELEASE ORAL at 17:44

## 2020-01-14 RX ADMIN — BUMETANIDE 1 MG: 0.25 INJECTION INTRAMUSCULAR; INTRAVENOUS at 09:45

## 2020-01-14 RX ADMIN — PIPERACILLIN AND TAZOBACTAM 3.38 G: 3; .375 INJECTION, POWDER, LYOPHILIZED, FOR SOLUTION INTRAVENOUS at 11:44

## 2020-01-14 RX ADMIN — RIFAXIMIN 550 MG: 550 TABLET ORAL at 17:44

## 2020-01-14 RX ADMIN — LORAZEPAM 1 MG: 2 INJECTION, SOLUTION INTRAMUSCULAR; INTRAVENOUS at 10:34

## 2020-01-14 RX ADMIN — ARFORMOTEROL TARTRATE 15 MCG: 15 SOLUTION RESPIRATORY (INHALATION) at 20:41

## 2020-01-14 RX ADMIN — RIFAXIMIN 550 MG: 550 TABLET ORAL at 09:44

## 2020-01-14 RX ADMIN — BUMETANIDE 1 MG: 0.25 INJECTION INTRAMUSCULAR; INTRAVENOUS at 17:44

## 2020-01-14 RX ADMIN — Medication 100 MG: at 09:44

## 2020-01-14 RX ADMIN — FOLIC ACID 1 MG: 1 TABLET ORAL at 09:44

## 2020-01-14 RX ADMIN — RISPERIDONE 2 MG: 0.5 TABLET, FILM COATED ORAL at 09:44

## 2020-01-14 RX ADMIN — BUDESONIDE 500 MCG: 0.5 SUSPENSION RESPIRATORY (INHALATION) at 08:22

## 2020-01-14 RX ADMIN — LORAZEPAM 1 MG: 1 TABLET ORAL at 21:56

## 2020-01-14 RX ADMIN — BUDESONIDE 500 MCG: 0.5 SUSPENSION RESPIRATORY (INHALATION) at 20:41

## 2020-01-14 RX ADMIN — ARFORMOTEROL TARTRATE 15 MCG: 15 SOLUTION RESPIRATORY (INHALATION) at 08:22

## 2020-01-14 RX ADMIN — LORAZEPAM 1 MG: 1 TABLET ORAL at 03:40

## 2020-01-14 RX ADMIN — MAGNESIUM SULFATE HEPTAHYDRATE 2 G: 40 INJECTION, SOLUTION INTRAVENOUS at 10:34

## 2020-01-14 RX ADMIN — ESCITALOPRAM 10 MG: 10 TABLET, FILM COATED ORAL at 09:44

## 2020-01-14 RX ADMIN — PIPERACILLIN AND TAZOBACTAM 3.38 G: 3; .375 INJECTION, POWDER, LYOPHILIZED, FOR SOLUTION INTRAVENOUS at 03:40

## 2020-01-14 RX ADMIN — TIOTROPIUM BROMIDE INHALATION SPRAY 1 PUFF: 3.12 SPRAY, METERED RESPIRATORY (INHALATION) at 08:33

## 2020-01-14 NOTE — PROGRESS NOTES
NUTRITION FOLLOW-UP/PLAN OF CARE 
 
RECOMMENDATIONS:  
1. Continue current diet, encouraged to implement preferences 2. Monitor labs, weight and PO intake 3. RD to follow GOALS:  
1. Ongoing: PO intake meets >75% of protein/calorie needs by 1/17 ASSESSMENT:  
Wt status is classified as overweight per Body mass index is 26.63 kg/m². Pt reports varied intake due to food preferences, spoke with kitchen. Diagnosis: hypokalemia, anemia, leukopenia, UTI, thrombocytopenia, HENRRY, pancytopenia, cirrhosis, resp failure, COPD. Bipolar, ETOH abuse. Per GI EUS of pancrease/bile duct later. Nutrition recommendations listed. RD to follow. Previous Nutrition Diagnoses:  
Remains appropriate/improvement shown but still varied due to preferences: Inadequate oral food and beverage intake due to recent N/V/D as evidenced by pt reporting poor po intake past several days and overall poor appetite past 2 months. SUBJECTIVE/OBJECTIVE:  
(1/14/20) Pt sitting up in bed during time of assessment, pt states appetite is fine but with varied intake due to food preferences, obtained food preferences and spoke with kitchen. Pt consuming 25-50% of most meals. Encouraged pt to implement preferences with [employer]. Pt states she does not have a egg allergy anymore and would like it removed from her chart so that she can eat eggs in the morning - will speak with RN. Pt tolerating dental soft diet well with no issues chewing/swallowing, dentures fit well per her report. Denies current n/v/d/c, per I/Os BM 1/13 (loose). CBW: 165 lb 1/13 showing weight gain from reported UBW. Encouraged intake/preferences. Will continue to monitor intake, weight, labs, POC. Per previous RD notes: 
1/9/20: Pt is 122% ideal weight;  BMI (calculated): 25.5 kg/m2 (overweight classification). Pt appears well nourished but is at nutrition risk with recent reported history of N/V/D and poor appetite.   Although pt reports weight loss of unknown amount, current weight is 31 lbs > than her weight in June 2019. With large weight discrepancy between  current weight or past documented weights, question their accuracy. Pt with PMHx including etoh cirrhosis, Chronic Hypotension, Active Etoh abuse, COPD, and Bipolar disorder, who presented to the ER after being found hypotensive by her MD (pt c/o unsteady gait,  N/V/D and poor appetite).  RUQ and Epigastric abd pain. CT Abd showed peripancreatic inflammation vs infection 1/9:  Pt reports her usual weight is 136 lbs and she thinks she has lost weight over the past couple of months but is unable to quantify. She states she was once allergic to eggs but that she now is able to eat them without side effects. She denies having issues with chewing or swallowing. She reports that a friend brought breakfast to her this AM before she knew she was NPO/full liquids. Per pt reports of po intake at breakfast, she consumed 425 calories, 22 grams protein. Information Obtained From:  
[x] Chart Review [x] Patient 
[] Family/Caregiver 
[] Nurse/Physician  
[] Patient Rounds/Interdisciplinary Meeting Diet: dental soft solid Patient Vitals for the past 100 hrs: 
 % Diet Eaten 01/14/20 0823 50 % 01/13/20 1819 40 % 01/13/20 1424 25 % 01/13/20 0906 25 % 01/12/20 1747 5 % 01/12/20 1248 5 % 01/12/20 0943 10 % Medications: [x] Reviewed Noted: bumex, buspar, lexapro, folvite, mag sulf, zosyn, B1 Encounter Diagnoses ICD-10-CM ICD-9-CM 1. Septic shock (HCC) A41.9 038.9  
 R65.21 785.52  
  995.92  
2. Acute abdominal pain in right upper quadrant R10.11 789.01  
  338.19  
3. Non-intractable vomiting with nausea, unspecified vomiting type R11.2 787.01  
4. HENRRY (acute kidney injury) ([facility] N17.9 584.9 5. Calculus of gallbladder with cholecystitis without biliary obstruction, unspecified cholecystitis acuity K80.10 574.00  
6. Acute UTI N39.0 599.0 7. COPD with acute exacerbation ([facility] J44.1 491.21 Past Medical History:  
Diagnosis Date  Bipolar affective ([facility]  Chronic obstructive pulmonary disease ([facility]  Cirrhosis ([facility]  ETOH abuse  Former smoker 1 PPD x40 years  History of ascites  History of sinusitis  Hyperlipidemia 11/20/2009 Patient denies  Left breast mass 05/13/2011 U/S Left Breast: No definite mass identified. Recommended recheck in 6 months  Manic depression ([facility]  Panic disorder  Vitamin D insufficiency 11/20/2009  
 23 ng/mL  Wrist fracture, right 01/06/2010 Non-Displaced Distal Radius/Ulnar Styloid Fx Labs:   
Lab Results Component Value Date/Time Sodium 140 01/14/2020 05:15 AM  
 Potassium 3.2 (L) 01/14/2020 05:15 AM  
 Chloride 106 01/14/2020 05:15 AM  
 CO2 25 01/14/2020 05:15 AM  
 Anion gap 9 01/14/2020 05:15 AM  
 Glucose 74 01/14/2020 05:15 AM  
 BUN 4 (L) 01/14/2020 05:15 AM  
 Creatinine 0.49 (L) 01/14/2020 05:15 AM  
 Calcium 7.6 (L) 01/14/2020 05:15 AM  
 Magnesium 1.6 01/14/2020 05:15 AM  
 Phosphorus 2.5 01/12/2020 04:15 AM  
 Albumin 1.9 (L) 01/13/2020 06:00 AM  
 
Anthropometrics: BMI (calculated): 25.4 Last 3 Recorded Weights in this Encounter 01/11/20 1200 01/13/20 0554 01/13/20 0950 Weight: 75 kg (165 lb 4.8 oz) 71.3 kg (157 lb 1.6 oz) 74.8 kg (165 lb) Ht Readings from Last 1 Encounters:  
01/13/20 5' 6\" (1.676 m) Documented Weight History: 
Weight Metrics 1/13/2020 6/12/2019 5/13/2019 9/1/2018 11/26/2016 9/14/2016 Weight 165 lb 126 lb 12.8 oz 132 lb 135 lb 140 lb 117 lb BMI 26.63 kg/m2 20.47 kg/m2 21.31 kg/m2 21.79 kg/m2 22.6 kg/m2 19.47 kg/m2 []  Weight Loss 
[x]  Weight Gain per history above 
[]  Weight Stable  
[]  New wt n/a on record Estimated Nutrition Needs:  
0580 MPURU/HQS Protein (g): 77 g Nutrition Problems Identified:  
[x] Suboptimal PO intake  
[] Food Allergies [x] Difficulty chewing/swallowing/poor dentition 
[] Constipation/Diarrhea  
[] Nausea/Vomiting  
[] None 
[] Other:  
 
Plan:  
[] Therapeutic Diet [x]  Obtained/adjusted food preferences/tolerances and/or snacks options  
[]  Supplements added  
[] Occupational therapy following for feeding techniques []  HS snack added  
[x]  Modify diet texture  
[]  Modify diet for food allergies []  Educate patient  
[]  Assist with menu selection  
[x]  Monitor PO intake on meal rounds  
[x]  Continue inpatient monitoring and intervention  
[x]  Participated in discharge planning/Interdisciplinary rounds/Team meetings  
[]  Other:  
 
Education Needs: 
 [] Not appropriate for teaching at this time due to: 
 [] Identified and addressed Nutrition Monitoring and Evaluation: 
 [] Continue inpatient monitoring and interventions [] Other:  
 
Mata Saliva

## 2020-01-14 NOTE — PROGRESS NOTES
Problem: Self Care Deficits Care Plan (Adult) Goal: *Acute Goals and Plan of Care (Insert Text) Description Occupational Therapy Goals Initiated 1/9/2020 within 7 day(s). 1.  Patient will perform grooming with independence while sitting at edge of bed and maintaining vital signs within asymptomatic range. 2.  Patient will perform upper body dressing and bathing with supervision/set-up. 3.  Patient will perform lower body dressing and bathing with minimal assistance/contact guard assist and appropriate AE prn. 
4.  Patient will perform toilet transfers with minimal assistance/contact guard assist. 
5.  Patient will perform all aspects of toileting with supervision/set-up. 6.  Patient will participate in upper extremity therapeutic exercise/activities with supervision/set-up for 8 minutes. 7.  Patient will utilize energy conservation techniques during functional activities with verbal cues. Prior Level of Function:  Patient was living with fiance and was I for ADLs and simple IADLs, as well as driving. Outcome: Progressing Towards Goal 
 OCCUPATIONAL THERAPY TREATMENT Patient: Jazmyn Reyes (78 y.o. female) Date: 1/14/2020 Diagnosis: Acute hypotension [I95.9] Macrocytic anemia [D53.9] Thrombocytopenia ([address] [D69.6] Hypokalemia [E87.6] HENRRY (acute kidney injury) ([address] [N17.9] Leukopenia [D72.819] Pancytopenia ([address] [X46.829] UTI (urinary tract infection) [N39.0] Acute respiratory failure ([address] Procedure(s) (LRB): ESOPHAGOGASTRODUODENOSCOPY (EGD) (N/A) 4 Days Post-Op Precautions: Fall, Skin, Seizure PLOF: See above Chart, occupational therapy assessment, plan of care, and goals were reviewed. ASSESSMENT: 
Patient demonstrating good progress toward goals. Patient able to transfer from supine to sitting EOB with supervision and from bed to bed side commode using walker given CGA to SBA today.   Patient demonstrated good standing tolerance following use of BSC for changing gown and undergarments. Patient required MOD A for UB dressing in standing today and MAX A for LB dressing from standing at walker today. Patient performed toilet hygiene with set up from seated position and required MIN A for wiping upper thigh. Patient used bath wipes to clean hands Alyssa today. Patient reported feeling shaky after standing for >30 seconds. Patient noted to demonstrate decreased safety awareness for transfer from commode back to bed likely due to fatigue. Reviewed fall prevention with patient and importance of having staff available during transfers to maximize safety and decrease risk of falls. Patient demonstrated good understanding. Patient left with needs in reach and nursing was notified for changing perwick. Progression toward goals: 
[x]          Improving appropriately and progressing toward goals 
[]          Improving slowly and progressing toward goals 
[]          Not making progress toward goals and plan of care will be adjusted PLAN: 
Patient continues to benefit from skilled intervention to address the above impairments. Continue treatment per established plan of care. Discharge Recommendations:  Inpatient Rehab vs SNF based on ability to tolerate therapeutic interventions and exercises Further Equipment Recommendations for Discharge:  TBD next level of care SUBJECTIVE:  
Patient stated I don't think I would be safe to go home right now.  OBJECTIVE DATA SUMMARY:  
Cognitive/Behavioral Status: 
Neurologic State: Alert Orientation Level: Oriented X4 Cognition: Follows commands Safety/Judgement: Fall prevention Functional Mobility and Transfers for ADLs: 
 Bed Mobility: 
 Supine to Sit: Supervision Sit to Supine: Moderate assistance Likely secondary to fatigue s/p standing and transfers Transfers: 
Sit to Stand: Stand-by assistance Stand to Sit: Stand-by assistance Toilet Transfer : Contact guard assistance;Stand-by assistance Balance: 
Sitting: Intact; Without support Sitting - Static: Good (unsupported) Sitting - Dynamic: Good (unsupported) Standing: Impaired; With support Standing - Static: Good;Constant support Standing - Dynamic : Fair;Constant support ADL Intervention: 
 Performed toilet transfers and toileting - see assessment for detailed report. Pain: 
Pain level pre-treatment: 0/10 Pain level post-treatment: 0/10 Pain Intervention(s): Medication (see MAR); Rest, Ice, Repositioning Response to intervention: Nurse notified, See doc flow Activity Tolerance:   
Decreased with standing Please refer to the flowsheet for vital signs taken during this treatment. After treatment:  
[]  Patient left in no apparent distress sitting up in chair 
[x]  Patient left in no apparent distress in bed 
[x]  Call bell left within reach [x]  Nursing notified 
[]  Caregiver present 
[]  Bed alarm activated COMMUNICATION/EDUCATION:  
[x] Role of Occupational Therapy in the acute care setting 
[x] Home safety education was provided and the patient/caregiver indicated understanding. [x] Patient/family have participated as able in working towards goals and plan of care. [] Patient/family agree to work toward stated goals and plan of care. [] Patient understands intent and goals of therapy, but is neutral about his/her participation. [] Patient is unable to participate in goal setting and plan of care. Thank you for this referral. 
Shea Scott, OTR/L Time Calculation: 32 mins

## 2020-01-14 NOTE — PROGRESS NOTES
Assume care of patient sitting up in bed talking with visitors. Alert and oriented X 4. Denies pain or discomfort at present. Bed locked in lowest position. Call light within reach and understand to use for assistance and needs. CIWA in progress. 2200 Patient watching TV with visitor and appears slightly anxious. CIWA in progress with scoring 4.     01/14/2020    0000 Patient napping at intervals but appears restless at times. Watching TV between napping. 0330 Patient very restless and anxious. Noted hand tremor and slight agitation in voice. Ativan 1 mg administered orally for withdrawal.    0430 Patient resting quietly at present. 7768 Bedside and Verbal shift change report given to Henry Monsivais RN (oncoming nurse) by John Aguilar RN (offgoing nurse). Report given with SBAR, Kardex, Intake/Output, MAR and Recent Results.

## 2020-01-14 NOTE — PROGRESS NOTES
PROGRESS NOTE PATIENT:  Rosa Maria Harris [place]N: [id number] [facility] [place] 1/14/2020, 4:44 PM 
   
 
 
SUBJECTIVE: 
No abdominal pain, nausea, or vomiting. Tolerating po. OBJECTIVE: 
Patient Vitals for the past 24 hrs: 
 Temp Pulse Resp BP SpO2  
01/14/20 1530 98.4 °F (36.9 °C) (!) 106 18 106/74 92 % 01/14/20 1205 98.2 °F (36.8 °C) 99 20 114/62 95 % 01/14/20 0823     93 % 01/14/20 0800 97.8 °F (36.6 °C) (!) 109 20 110/81 91 % 01/14/20 0359 99.1 °F (37.3 °C) 95 18 111/74 94 % 01/13/20 2349 99.4 °F (37.4 °C) 98 16 103/76 92 % 01/13/20 2057 98.2 °F (36.8 °C) (!) 112 18 104/68 95 % 01/13/20 2030     95 % 01/13/20 2000 98.2 °F (36.8 °C) (!) 118 20 99/66 91 % 01/13/20 1748     93 % 01/13/20 1747 98.9 °F (37.2 °C) (!) 114 20 108/72 (!) 88 % Intake/Output Summary (Last 24 hours) at 1/14/2020 1644 Last data filed at 1/14/2020 2679 Gross per 24 hour Intake 240 ml Output  Net 240 ml Gen: NAD Heent: No pallor, icterus Lungs: Clear B/L  
CVS exam: Regular rate and rhythm Abd  : Soft, non tender, BS +, No masses felt. Labs: Results:  
Chemistry Recent Labs  
  01/14/20 
0515 01/13/20 
0600 01/12/20 
0415 GLU 74 73* 79  142 142  
K 3.2* 3.5 3.1*  
 110 110 CO2 25 24 24 BUN 4* 4* 4*  
CREA 0.49* 0.49* 0.47* CA 7.6* 7.6* 7.8* AGAP 9 8 8 BUCR 8* 8* 9* AP  --  126* 132* TP  --  5.0* 5.0* ALB  --  1.9* 2.1*  
GLOB  --  3.1 2.9 AGRAT  --  0.6* 0.7* Estimated Creatinine Clearance: 92.8 mL/min (A) (by C-G formula based on SCr of 0.49 mg/dL (L)). CBC w/Diff Recent Labs  
  01/13/20 
0600 01/12/20 
0415 WBC 4.5* 4.3*  
RBC 2.79* 2.90* HGB 9.6* 9.9*  
HCT 29.1* 29.9* PLT 65* 55* GRANS 53 57 LYMPH 17* 18* EOS 1 1 Cardiac Enzymes No results for input(s): CPK, CKND1, PAPO in the last 72 hours.  
 
No lab exists for component: Shirley Lowery  
 Coagulation No results for input(s): PTP, INR, APTT, INREXT in the last 72 hours. Hepatitis Panel No results found for: HAMAT, HAAB, HABT, HAAT, HBSAG, HBSB, HBSAT, HBABN, HBCM, HBCAB, HBCAT, Ranny Muff, HBEAB, [facility], XHEPCS, [id number], [facility], [facility], HBCLT, [address] [facility], [id number], [id number], [address] [place] [address], [id number], [facility], [id number], HCGAT Amylase Lipase Liver Enzymes Recent Labs  
  01/13/20 
0600 01/12/20 
0415 TP 5.0* 5.0* ALB 1.9* 2.1* TBILI 1.6* 1.7* * 132* SGOT 127* 153* * 150* Thyroid Studies No results for input(s): T4, T3U, TSH, TSHEXT in the last 72 hours. No lab exists for component: T3RU Pathology pathology Allergies Allergen Reactions  Animal Dander Itching  Egg Nausea and Vomiting Current Facility-Administered Medications Medication Dose Route Frequency  bumetanide (BUMEX) injection 1 mg  1 mg IntraVENous BID  potassium chloride (K-DUR, KLOR-CON) SR tablet 40 mEq  40 mEq Oral NOW  
 [START ON 1/15/2020] cefTRIAXone (ROCEPHIN) 1 g in 0.9% sodium chloride (MBP/ADV) 50 mL MBP  1 g IntraVENous Q24H  
 rifAXIMin (XIFAXAN) tablet 550 mg  550 mg Oral BID  nicotine (NICODERM CQ) 21 mg/24 hr patch 1 Patch  1 Patch TransDERmal DAILY  busPIRone (BUSPAR) tablet 10 mg  10 mg Oral BID PRN  
 escitalopram oxalate (LEXAPRO) tablet 10 mg  10 mg Oral DAILY  risperiDONE (RisperDAL) tablet 2 mg  2 mg Oral DAILY  thiamine mononitrate (B-1) tablet 100 mg  100 mg Oral DAILY  tiotropium bromide (SPIRIVA RESPIMAT) 2.5 mcg /actuation  1 Puff Inhalation DAILY  ondansetron (ZOFRAN) injection 4 mg  4 mg IntraVENous Q4H PRN  
 melatonin tablet 12 mg  12 mg Oral QHS PRN  
 acetaminophen (TYLENOL) tablet 500 mg  500 mg Oral Q6H PRN  
 LORazepam (ATIVAN) tablet 1 mg  1 mg Oral Q1H PRN Or  
 LORazepam (ATIVAN) injection 1 mg  1 mg IntraVENous Q1H PRN  
 LORazepam (ATIVAN) tablet 2 mg  2 mg Oral Q1H PRN  Or  
  LORazepam (ATIVAN) injection 2 mg  2 mg IntraVENous Q1H PRN  
 LORazepam (ATIVAN) injection 3 mg  3 mg IntraVENous Q15MIN PRN  
 budesonide (PULMICORT) 500 mcg/2 ml nebulizer suspension  500 mcg Nebulization BID RT  
 arformoterol (BROVANA) neb solution 15 mcg  15 mcg Nebulization BID RT  
 folic acid (FOLVITE) tablet 1 mg  1 mg Oral DAILY  ipratropium (ATROVENT) 0.02 % nebulizer solution 0.5 mg  0.5 mg Nebulization Q4H PRN  
 sodium chloride (NS) flush 5-10 mL  5-10 mL IntraVENous PRN  
 
 
ASSESSMENT: 
Alcoholic cirrhosis with probable mild decompensation. LFTs improving. Peripancreatic head inflammation, likely ETOH pancreatitis, improving. Cholelithiasis, without acute cholecystitis (HIDA scan) Unable to tolerate MRI/MRCP 
  
EUS of the pancreas and bile duct later. Overdue for a surveillance colonoscopy. Will see in outpatient setting.   
Will sign off for now.  
  
Caro Skinner MD

## 2020-01-14 NOTE — PROGRESS NOTES
Problem: Mobility Impaired (Adult and Pediatric)  Goal: *Acute Goals and Plan of Care (Insert Text)  Description  Physical Therapy Goals  Initiated 1/10/2020 and to be accomplished within 7 day(s)  1. Patient will move from supine to sit and sit to supine in bed with modified independence. 2.  Patient will transfer from bed to chair and chair to bed with modified independence using the least restrictive device. 3.  Patient will perform sit to stand with modified independence. 4.  Patient will ambulate with modified independence for 150 feet with the least restrictive device. 5.  Patient will ascend/descend 10 stairs with handrail(s) with modified independence. Outcome: Progressing Towards Goal   PHYSICAL THERAPY TREATMENT    Patient: Linn Connolly (63 y.o. female)  Date: 1/14/2020  Diagnosis: Acute hypotension [I95.9]  Macrocytic anemia [D53.9]  Thrombocytopenia (HCC) [D69.6]  Hypokalemia [E87.6]  HENRRY (acute kidney injury) ([facility] [address] [N17.9]  Leukopenia [D72.819]  Pancytopenia (HCC) [D61.818]  UTI (urinary tract infection) [N39.0]   Acute respiratory failure (HCC)  Procedure(s) (LRB):  ESOPHAGOGASTRODUODENOSCOPY (EGD) (N/A) 4 Days Post-Op  Precautions: Fall  PLOF: Independent    ASSESSMENT:  Pt very motivated this am, progressing well, limited by SOB with minimal activity, supervision for bed mobility, SBA sit<>stand from bed and commode, pt able to stand and manage clothing and take several steps independently however due to tremors utilized RW and HHA for to/from bathroom. Per tele  during activity.   Encouraged pt to sit in chair position and discussed with CNA mobility status so pt could begin to use restroom rather than purewick/bedpan to improve activity tolerance and per pt request.  Progression toward goals:   [x]      Improving appropriately and progressing toward goals  []      Improving slowly and progressing toward goals  []      Not making progress toward goals and plan of care will be adjusted     PLAN:  Patient continues to benefit from skilled intervention to address the above impairments. Continue treatment per established plan of care. Discharge Recommendations:  Home Health  Further Equipment Recommendations for Discharge:  rolling walker if pt requests     SUBJECTIVE:   Patient stated I want to get up and use the bathroom, they wouldn't let me.     OBJECTIVE DATA SUMMARY:   Critical Behavior:  Neurologic State: Appropriate for age, Alert, Eyes open spontaneously  Orientation Level: Oriented X4  Cognition: Follows commands  Safety/Judgement: Fall prevention  Functional Mobility Training:  Bed Mobility:  Supine to Sit: Supervision  Sit to Supine: Supervision  Transfers:  Sit to Stand: Stand-by assistance  Stand to Sit: Stand-by assistance  Balance:  Sitting: Impaired  Sitting - Static: Good (unsupported)  Sitting - Dynamic: Good (unsupported)  Standing: Impaired  Standing - Static: Good;Constant support  Standing - Dynamic : Good;Constant support     Ambulation/Gait Training:  Distance (ft): 10 Feet (ft)(X2)  Assistive Device: Walker, rolling  Ambulation - Level of Assistance: Stand-by assistance  Speed/Allie: Pace decreased (<100 feet/min)  Pain:  Pain level pre-treatment: 0/10  Pain level post-treatment: 0/10   Pain Intervention(s): n/a      Activity Tolerance:   Fair   Please refer to the flowsheet for vital signs taken during this treatment. After treatment:   [] Patient left in no apparent distress sitting up in chair  [x] Patient left in no apparent distress in bed in chair position  [x] Call bell left within reach  [x] CNA notified  [] Caregiver present  [] Bed alarm activated  [x] SCDs applied      COMMUNICATION/EDUCATION:   []           []         Fall prevention education was provided and the patient/caregiver indicated understanding. []         Patient/family have participated as able in working toward goals and plan of care.   []         Patient/family agree to work toward stated goals and plan of care. []         Patient understands intent and goals of therapy, but is neutral about his/her participation. []         Patient is unable to participate in stated goals/plan of care: ongoing with therapy staff. [x]         Role of Physical Therapy in the acute care setting.         Rc Prasad, BOLIVAR   Time Calculation: 18 mins

## 2020-01-14 NOTE — PROGRESS NOTES
Problem: Alcohol Withdrawal  Goal: *STG: Participates in treatment plan  Outcome: Progressing Towards Goal     Problem: Tissue Perfusion - Cardiopulmonary, Altered  Goal: *Absence of hypoxia  Outcome: Progressing Towards Goal     Problem: Pressure Injury - Risk of  Goal: *Prevention of pressure injury  Description  Document Lex Scale and appropriate interventions in the flowsheet.   Outcome: Progressing Towards Goal  Note: Pressure Injury Interventions:  Sensory Interventions: Assess changes in LOC, Keep linens dry and wrinkle-free    Moisture Interventions: Absorbent underpads, Apply protective barrier, creams and emollients, Internal/External urinary devices    Activity Interventions: Increase time out of bed, Pressure redistribution bed/mattress(bed type)    Mobility Interventions: Pressure redistribution bed/mattress (bed type), HOB 30 degrees or less    Nutrition Interventions: Document food/fluid/supplement intake    Friction and Shear Interventions: Apply protective barrier, creams and emollients, HOB 30 degrees or less

## 2020-01-14 NOTE — PROGRESS NOTES
Internal Medicine Progress Note Patient's Name: Iraida Palacios Admit Date: 1/8/2020 Length of Stay: 5 Assessment/Plan Principal Problem: 
  Acute respiratory failure with hypoxia ([address] (1/9/2020) Active Problems: COPD (chronic obstructive pulmonary disease) (HCC) () Alcohol-induced pancreatitis (1/14/2020) Cirrhosis ([address] (1/9/2020) HENRRY (acute kidney injury) ([address] (1/9/2020) ETOH abuse () Bipolar mood disorder (HCC) () Hypokalemia (1/9/2020) UTI (urinary tract infection) (1/9/2020) Pancytopenia ([address] (1/9/2020) Acute hypotension (1/9/2020) Fluid overload (1/14/2020) Acute resp failure 
- likely 2/2 fluid overload due to fluid resuscitation from shock. - cont bumex and monitor response 
- monitor BMP 
- echo with NL systolic function and inconclusive diastolic function 
- on HFNC, wean down as tolerated COPD 
- nebs 
- wean O2 as tolerated ETOH induce pancreatitis and cirrhosis - symptomatically improved, tolerating PO intake - appreciate GI 
- EGD showed non-specific gastritis  
- HIDA negative 
- refused MRCP 
- Cont xifaxin, magox,neutraphos - LFTs improving UTI 
- cont abx - de-escalate to rocephin 
- UCx with ecoli HENRRY 
- resolved - Cont acceptable home medications for chronic conditions  
- DVT protocol I have personally reviewed all pertinent labs and films that have officially resulted over the last 24 hours. I have personally checked for all pending labs that are awaiting final results. HPI Per H&P, \"Bronwyn Armendariz is a 63 y.o. female who presents to [facility] ER with complaint of Hypotension and Nausea. Patient was reportedly referred to [facility] ER by her Psychiatrist when she was seen on 1/08/2020 and her Blood Pressure was low.   Patient states that she has been having fevers, chills, diaphoresis, BLE edema, a cough productive of clear mucus, and intermittent chest congestion. Patient also reports a UTI 2 weeks ago and continuing urinary urgency. Patient also reports nausea and vomiting with reduced oral intake for the last 3 days accompanied by \"a little bit of diarrhea. \"  Patient states that she has had chronically low blood pressure even before she was diagnosed with liver cirrhosis. Patient reports that she has had ascites before and that she does continue to drink 3 shots of vodka a night to improve her sleeping. 
  
In [facility] ER, Patient was noted to have Blood Pressures of 70/38 and 49/90 mm Hg and a borderline Urinalysis for UTI. Patient was started on IV Norepinephrine for hypotension. 
  
Patient is admitted to [facility] ICU for management of Hypotension requiring Pressors, HENRRY likely due to Dehydration, and borderline UTI. \" Interval History There was some concern on imaging for possible pancreatitis vs acute sherrie. GI and surgery were consulted. No surgical intervention needed per GS. EGD done 1/10 showing gastritis. HIDA scan negative. Patient refused to have MRCP. LFTs and abd pain improved. HENRRY resolved with IV fluids. UCx resulted with E coli. BCx remained negative. She developed mild hypoxic resp failure requiring HFNC likely 2/2 fluid overload due to fluid resuscitation from shock. Improving with diuretics. Echo with NL systolic function and inconclusive diastolic function. Subjective Pt s/e @ bedside. No major events overnight. Pt reports SOB improving. Denies CP. Denies abd pain, nvd. Objective Visit Vitals /62 Pulse 99 Temp 98.2 °F (36.8 °C) Resp 20 Ht 5' 6\" (1.676 m) Wt 74.8 kg (165 lb) SpO2 95% BMI 26.63 kg/m² Physical Exam: 
General Appearance: NAD, conversant HENT: normocephalic/atraumatic, moist mucus membranes Neck: No JVD, supple Lungs: faint crackles at bases with normal respiratory effort CV: RRR, no m/r/g Abdomen: soft, non-tender, normal bowel sounds Extremities: no cyanosis, no peripheral edema Neuro: No focal deficits, motor/sensory intact Skin: Normal color, intact Intake and Output: 
Current Shift:  01/14 0701 - 01/14 1900 In: 240 [P.O.:240] Out: - Last three shifts:  01/12 1901 - 01/14 0700 In: 480 [P.O.:480] Out: 1400 [[id number]] Lab/Data Reviewed: 
BMP:  
Lab Results Component Value Date/Time  01/14/2020 05:15 AM  
 K 3.2 (L) 01/14/2020 05:15 AM  
  01/14/2020 05:15 AM  
 CO2 25 01/14/2020 05:15 AM  
 AGAP 9 01/14/2020 05:15 AM  
 GLU 74 01/14/2020 05:15 AM  
 BUN 4 (L) 01/14/2020 05:15 AM  
 CREA 0.49 (L) 01/14/2020 05:15 AM  
 GFRAA >60 01/14/2020 05:15 AM  
 GFRNA >60 01/14/2020 05:15 AM  
 
CBC: No results found for: WBC, HGB, HGBEXT, HCT, HCTEXT, PLT, PLTEXT, HGBEXT, HCTEXT, PLTEXT Imaging Reviewed: 
No results found. Medications Reviewed: 
Current Facility-Administered Medications Medication Dose Route Frequency  bumetanide (BUMEX) injection 1 mg  1 mg IntraVENous BID  potassium chloride (K-DUR, KLOR-CON) SR tablet 40 mEq  40 mEq Oral NOW  
 [START ON 1/15/2020] cefTRIAXone (ROCEPHIN) 1 g in 0.9% sodium chloride (MBP/ADV) 50 mL MBP  1 g IntraVENous Q24H  
 rifAXIMin (XIFAXAN) tablet 550 mg  550 mg Oral BID  nicotine (NICODERM CQ) 21 mg/24 hr patch 1 Patch  1 Patch TransDERmal DAILY  busPIRone (BUSPAR) tablet 10 mg  10 mg Oral BID PRN  
 escitalopram oxalate (LEXAPRO) tablet 10 mg  10 mg Oral DAILY  risperiDONE (RisperDAL) tablet 2 mg  2 mg Oral DAILY  thiamine mononitrate (B-1) tablet 100 mg  100 mg Oral DAILY  tiotropium bromide (SPIRIVA RESPIMAT) 2.5 mcg /actuation  1 Puff Inhalation DAILY  ondansetron (ZOFRAN) injection 4 mg  4 mg IntraVENous Q4H PRN  
 melatonin tablet 12 mg  12 mg Oral QHS PRN  
 acetaminophen (TYLENOL) tablet 500 mg  500 mg Oral Q6H PRN  
 LORazepam (ATIVAN) tablet 1 mg  1 mg Oral Q1H PRN  Or  
  LORazepam (ATIVAN) injection 1 mg  1 mg IntraVENous Q1H PRN  
 LORazepam (ATIVAN) tablet 2 mg  2 mg Oral Q1H PRN Or  
 LORazepam (ATIVAN) injection 2 mg  2 mg IntraVENous Q1H PRN  
 LORazepam (ATIVAN) injection 3 mg  3 mg IntraVENous Q15MIN PRN  
 budesonide (PULMICORT) 500 mcg/2 ml nebulizer suspension  500 mcg Nebulization BID RT  
 arformoterol (BROVANA) neb solution 15 mcg  15 mcg Nebulization BID RT  
 folic acid (FOLVITE) tablet 1 mg  1 mg Oral DAILY  ipratropium (ATROVENT) 0.02 % nebulizer solution 0.5 mg  0.5 mg Nebulization Q4H PRN  
 sodium chloride (NS) flush 5-10 mL  5-10 mL IntraVENous PRN Damon Munroe PA-C [address] [place], [place] [address] Hospitalist [facility] Office:  [phone] Pager: [phone]

## 2020-01-15 LAB
ANION GAP SERPL CALC-SCNC: 4 MMOL/L (ref 3–18)
BUN SERPL-MCNC: 4 MG/DL (ref 7–18)
BUN/CREAT SERPL: 10 (ref 12–20)
CALCIUM SERPL-MCNC: 7.8 MG/DL (ref 8.5–10.1)
CHLORIDE SERPL-SCNC: 107 MMOL/L (ref 100–111)
CO2 SERPL-SCNC: 27 MMOL/L (ref 21–32)
CREAT SERPL-MCNC: 0.41 MG/DL (ref 0.6–1.3)
GLUCOSE SERPL-MCNC: 88 MG/DL (ref 74–99)
MAGNESIUM SERPL-MCNC: 1.7 MG/DL (ref 1.6–2.6)
POTASSIUM SERPL-SCNC: 3.4 MMOL/L (ref 3.5–5.5)
SODIUM SERPL-SCNC: 138 MMOL/L (ref 136–145)

## 2020-01-15 PROCEDURE — 94761 N-INVAS EAR/PLS OXIMETRY MLT: CPT

## 2020-01-15 PROCEDURE — 74011000250 HC RX REV CODE- 250: Performed by: INTERNAL MEDICINE

## 2020-01-15 PROCEDURE — 36415 COLL VENOUS BLD VENIPUNCTURE: CPT

## 2020-01-15 PROCEDURE — 74011000258 HC RX REV CODE- 258: Performed by: PHYSICIAN ASSISTANT

## 2020-01-15 PROCEDURE — 94640 AIRWAY INHALATION TREATMENT: CPT

## 2020-01-15 PROCEDURE — 74011250636 HC RX REV CODE- 250/636: Performed by: PHYSICIAN ASSISTANT

## 2020-01-15 PROCEDURE — 74011000250 HC RX REV CODE- 250: Performed by: HOSPITALIST

## 2020-01-15 PROCEDURE — 74011250637 HC RX REV CODE- 250/637: Performed by: INTERNAL MEDICINE

## 2020-01-15 PROCEDURE — 97530 THERAPEUTIC ACTIVITIES: CPT

## 2020-01-15 PROCEDURE — 80048 BASIC METABOLIC PNL TOTAL CA: CPT

## 2020-01-15 PROCEDURE — 77010033711 HC HIGH FLOW OXYGEN

## 2020-01-15 PROCEDURE — 77030038269 HC DRN EXT URIN PURWCK BARD -A

## 2020-01-15 PROCEDURE — 83735 ASSAY OF MAGNESIUM: CPT

## 2020-01-15 PROCEDURE — 74011000250 HC RX REV CODE- 250: Performed by: PHYSICIAN ASSISTANT

## 2020-01-15 PROCEDURE — 65270000029 HC RM PRIVATE

## 2020-01-15 RX ORDER — BUMETANIDE 0.25 MG/ML
1 INJECTION INTRAMUSCULAR; INTRAVENOUS 2 TIMES DAILY
Status: DISCONTINUED | OUTPATIENT
Start: 2020-01-15 | End: 2020-01-19

## 2020-01-15 RX ADMIN — LORAZEPAM 1 MG: 1 TABLET ORAL at 21:51

## 2020-01-15 RX ADMIN — Medication 100 MG: at 10:00

## 2020-01-15 RX ADMIN — BUMETANIDE 1 MG: 0.25 INJECTION INTRAMUSCULAR; INTRAVENOUS at 18:00

## 2020-01-15 RX ADMIN — CEFTRIAXONE 1 G: 1 INJECTION, POWDER, FOR SOLUTION INTRAMUSCULAR; INTRAVENOUS at 10:00

## 2020-01-15 RX ADMIN — RIFAXIMIN 550 MG: 550 TABLET ORAL at 10:00

## 2020-01-15 RX ADMIN — RIFAXIMIN 550 MG: 550 TABLET ORAL at 17:50

## 2020-01-15 RX ADMIN — LORAZEPAM 1 MG: 1 TABLET ORAL at 06:56

## 2020-01-15 RX ADMIN — ARFORMOTEROL TARTRATE 15 MCG: 15 SOLUTION RESPIRATORY (INHALATION) at 08:10

## 2020-01-15 RX ADMIN — TIOTROPIUM BROMIDE INHALATION SPRAY 1 PUFF: 3.12 SPRAY, METERED RESPIRATORY (INHALATION) at 08:22

## 2020-01-15 RX ADMIN — RISPERIDONE 2 MG: 0.5 TABLET, FILM COATED ORAL at 10:01

## 2020-01-15 RX ADMIN — FOLIC ACID 1 MG: 1 TABLET ORAL at 10:00

## 2020-01-15 RX ADMIN — BUDESONIDE 500 MCG: 0.5 SUSPENSION RESPIRATORY (INHALATION) at 08:10

## 2020-01-15 RX ADMIN — ARFORMOTEROL TARTRATE 15 MCG: 15 SOLUTION RESPIRATORY (INHALATION) at 20:55

## 2020-01-15 RX ADMIN — BUDESONIDE 500 MCG: 0.5 SUSPENSION RESPIRATORY (INHALATION) at 20:55

## 2020-01-15 RX ADMIN — ESCITALOPRAM 10 MG: 10 TABLET, FILM COATED ORAL at 10:00

## 2020-01-15 RX ADMIN — BUMETANIDE 1 MG: 0.25 INJECTION INTRAMUSCULAR; INTRAVENOUS at 10:01

## 2020-01-15 NOTE — PROGRESS NOTES
Internal Medicine Progress Note Patient's Name: Randolph Macias Admit Date: 1/8/2020 Length of Stay: 6 Assessment/Plan Principal Problem: 
  Acute respiratory failure with hypoxia ([address] (1/9/2020) Active Problems: COPD (chronic obstructive pulmonary disease) (HCC) () Alcohol-induced pancreatitis (1/14/2020) Cirrhosis ([address] (1/9/2020) HENRRY (acute kidney injury) ([address] (1/9/2020) ETOH abuse () Bipolar mood disorder (HCC) () Hypokalemia (1/9/2020) UTI (urinary tract infection) (1/9/2020) Pancytopenia ([address] (1/9/2020) Acute hypotension (1/9/2020) Fluid overload (1/14/2020) Acute resp failure 
- likely 2/2 fluid overload due to fluid resuscitation from shock. - cont bumex and monitor response 
- strict I&Os 
- monitor BMP 
- echo with NL systolic function and inconclusive diastolic function 
- on HFNC, wean down as tolerated COPD 
- nebs 
- wean O2 as tolerated ETOH induce pancreatitis and cirrhosis - symptomatically improved, tolerating PO intake - appreciate GI 
- EGD showed non-specific gastritis  
- HIDA negative 
- refused MRCP 
- Cont xifaxin, magox,neutraphos - LFTs improving - OP f/u with GI 
 
UTI 
- cont abx - de-escalate to rocephin, last dose tomorrow - UCx with ecoli HENRRY 
- resolved - Cont acceptable home medications for chronic conditions  
- DVT protocol I have personally reviewed all pertinent labs and films that have officially resulted over the last 24 hours. I have personally checked for all pending labs that are awaiting final results. HPI Per H&P, \"Bronwyn Jimenez is a 63 y.o. female who presents to [facility] ER with complaint of Hypotension and Nausea. Patient was reportedly referred to [facility] ER by her Psychiatrist when she was seen on 1/08/2020 and her Blood Pressure was low.   Patient states that she has been having fevers, chills, diaphoresis, BLE edema, a cough productive of clear mucus, and intermittent chest congestion. Patient also reports a UTI 2 weeks ago and continuing urinary urgency. Patient also reports nausea and vomiting with reduced oral intake for the last 3 days accompanied by \"a little bit of diarrhea. \"  Patient states that she has had chronically low blood pressure even before she was diagnosed with liver cirrhosis. Patient reports that she has had ascites before and that she does continue to drink 3 shots of vodka a night to improve her sleeping. 
  
In [facility] ER, Patient was noted to have Blood Pressures of 70/38 and 49/90 mm Hg and a borderline Urinalysis for UTI. Patient was started on IV Norepinephrine for hypotension. 
  
Patient is admitted to [facility] ICU for management of Hypotension requiring Pressors, HENRRY likely due to Dehydration, and borderline UTI. \" Interval History There was some concern on imaging for possible pancreatitis vs acute sherrie. GI and surgery were consulted. No surgical intervention needed per GS. EGD done 1/10 showing gastritis. HIDA scan negative. Patient refused to have MRCP. LFTs and abd pain improved. HENRRY resolved with IV fluids. UCx resulted with E coli. BCx remained negative. She developed mild hypoxic resp failure requiring HFNC likely 2/2 fluid overload due to fluid resuscitation from shock. Improving with diuretics. Echo with NL systolic function and inconclusive diastolic function. Subjective Pt s/e @ bedside. No major events overnight. Pt reports SOB improving. Denies CP. Denies abd pain, nvd. Objective Visit Vitals BP 91/53 Pulse 95 Temp 99 °F (37.2 °C) Resp 18 Ht 5' 6\" (1.676 m) Wt 66.7 kg (147 lb) SpO2 91% BMI 23.73 kg/m² Physical Exam: 
General Appearance: NAD, conversant HENT: normocephalic/atraumatic, moist mucus membranes Neck: No JVD, supple Lungs: faint crackles at bases with normal respiratory effort CV: RRR, no m/r/g Abdomen: soft, non-tender, normal bowel sounds Extremities: no cyanosis, no peripheral edema Neuro: No focal deficits, motor/sensory intact Skin: Normal color, intact Intake and Output: 
Current Shift:  01/15 0701 - 01/15 1900 In: 510 [P.O.:510] Out: - Last three shifts:  01/13 1901 - 01/15 0700 In: 240 [P.O.:240] Out: 2800 [Urine:2800] Lab/Data Reviewed: 
BMP:  
Lab Results Component Value Date/Time  01/15/2020 07:00 AM  
 K 3.4 (L) 01/15/2020 07:00 AM  
  01/15/2020 07:00 AM  
 CO2 27 01/15/2020 07:00 AM  
 AGAP 4 01/15/2020 07:00 AM  
 GLU 88 01/15/2020 07:00 AM  
 BUN 4 (L) 01/15/2020 07:00 AM  
 CREA 0.41 (L) 01/15/2020 07:00 AM  
 GFRAA >60 01/15/2020 07:00 AM  
 GFRNA >60 01/15/2020 07:00 AM  
 
CBC: No results found for: WBC, HGB, HGBEXT, HCT, HCTEXT, PLT, PLTEXT, HGBEXT, HCTEXT, PLTEXT Imaging Reviewed: 
No results found. Medications Reviewed: 
Current Facility-Administered Medications Medication Dose Route Frequency  bumetanide (BUMEX) injection 1 mg  1 mg IntraVENous BID  cefTRIAXone (ROCEPHIN) 1 g in 0.9% sodium chloride (MBP/ADV) 50 mL MBP  1 g IntraVENous Q24H  
 rifAXIMin (XIFAXAN) tablet 550 mg  550 mg Oral BID  nicotine (NICODERM CQ) 21 mg/24 hr patch 1 Patch  1 Patch TransDERmal DAILY  busPIRone (BUSPAR) tablet 10 mg  10 mg Oral BID PRN  
 escitalopram oxalate (LEXAPRO) tablet 10 mg  10 mg Oral DAILY  risperiDONE (RisperDAL) tablet 2 mg  2 mg Oral DAILY  thiamine mononitrate (B-1) tablet 100 mg  100 mg Oral DAILY  tiotropium bromide (SPIRIVA RESPIMAT) 2.5 mcg /actuation  1 Puff Inhalation DAILY  ondansetron (ZOFRAN) injection 4 mg  4 mg IntraVENous Q4H PRN  
 melatonin tablet 12 mg  12 mg Oral QHS PRN  
 acetaminophen (TYLENOL) tablet 500 mg  500 mg Oral Q6H PRN  
 LORazepam (ATIVAN) tablet 1 mg  1 mg Oral Q1H PRN Or  
 LORazepam (ATIVAN) injection 1 mg  1 mg IntraVENous Q1H PRN  
 LORazepam (ATIVAN) tablet 2 mg  2 mg Oral Q1H PRN  Or  
  LORazepam (ATIVAN) injection 2 mg  2 mg IntraVENous Q1H PRN  
 LORazepam (ATIVAN) injection 3 mg  3 mg IntraVENous Q15MIN PRN  
 budesonide (PULMICORT) 500 mcg/2 ml nebulizer suspension  500 mcg Nebulization BID RT  
 arformoterol (BROVANA) neb solution 15 mcg  15 mcg Nebulization BID RT  
 folic acid (FOLVITE) tablet 1 mg  1 mg Oral DAILY  ipratropium (ATROVENT) 0.02 % nebulizer solution 0.5 mg  0.5 mg Nebulization Q4H PRN  
 sodium chloride (NS) flush 5-10 mL  5-10 mL IntraVENous PRN Ethan Cummins PA-C [facility] Office:  [phone] Pager: [phone]

## 2020-01-15 NOTE — PROGRESS NOTES
Problem: Tissue Perfusion - Cardiopulmonary, Altered Goal: *Optimize tissue perfusion Outcome: Progressing Towards Goal 
Goal: *Absence of hypoxia Outcome: Progressing Towards Goal 
  
Problem: Alcohol Withdrawal 
Goal: *STG: Participates in treatment plan Outcome: Progressing Towards Goal 
Goal: *STG: Remains safe in hospital 
Outcome: Progressing Towards Goal 
Goal: *STG: Seeks staff when symptoms of withdrawal increase Outcome: Progressing Towards Goal 
Goal: *STG: Complies with medication therapy Outcome: Progressing Towards Goal 
Goal: *STG: Attends activities and groups Outcome: Progressing Towards Goal 
Goal: *STG: Will identify negative impact of chemical dependency including the use of tobacco, alcohol, and other substances Outcome: Progressing Towards Goal 
Goal: *STG: Verbalizes abstinence as an achievable goal 
Outcome: Progressing Towards Goal 
Goal: *STG: Agrees to participate in outpatient after care program to support ongoing mental health Outcome: Progressing Towards Goal 
Goal: *STG: Able to indentify relapse triggers including interpersonal/social and familial factors Outcome: Progressing Towards Goal 
Goal: *STG: Identify lifestyle changes to support long term sobriety such as vocation, employment, education, and legal issues Outcome: Progressing Towards Goal 
Goal: *STG: Maintains appropriate nutrition and hydration Outcome: Progressing Towards Goal 
Goal: *STG: Vital signs within defined limits Outcome: Progressing Towards Goal 
Goal: *STG/LTG: Relapse prevention plan in place to include housing/aftercare, leisure activities, and spirituality Outcome: Progressing Towards Goal 
Goal: Interventions Outcome: Progressing Towards Goal 
  
Problem: Pain Goal: *Control of Pain Outcome: Progressing Towards Goal 
  
Problem: Falls - Risk of 
Goal: *Absence of Falls Description Document Amber Alvarenag Fall Risk and appropriate interventions in the flowsheet. Outcome: Progressing Towards Goal 
Note: Fall Risk Interventions: 
Mobility Interventions: Communicate number of staff needed for ambulation/transfer, Patient to call before getting OOB Medication Interventions: Patient to call before getting OOB Elimination Interventions: Call light in reach, Patient to call for help with toileting needs, Toilet paper/wipes in reach History of Falls Interventions: Bed/chair exit alarm, Door open when patient unattended, Evaluate medications/consider consulting pharmacy, Investigate reason for fall Problem: Pressure Injury - Risk of 
Goal: *Prevention of pressure injury Description Document Lex Scale and appropriate interventions in the flowsheet. Outcome: Progressing Towards Goal 
Note: Pressure Injury Interventions: 
Sensory Interventions: Assess changes in LOC, Check visual cues for pain Moisture Interventions: Check for incontinence Q2 hours and as needed Activity Interventions: Pressure redistribution bed/mattress(bed type) Mobility Interventions: Pressure redistribution bed/mattress (bed type), HOB 30 degrees or less Nutrition Interventions: Document food/fluid/supplement intake, Offer support with meals,snacks and hydration Friction and Shear Interventions: HOB 30 degrees or less, Apply protective barrier, creams and emollients Problem: Discharge Planning Goal: *Discharge to safe environment Outcome: Progressing Towards Goal 
  
Problem: Patient Education: Go to Patient Education Activity Goal: Patient/Family Education Outcome: Progressing Towards Goal 
  
Problem: Patient Education: Go to Patient Education Activity Goal: Patient/Family Education Outcome: Progressing Towards Goal 
  
Problem: Breathing Pattern - Ineffective Goal: *Absence of hypoxia Outcome: Progressing Towards Goal 
Goal: *Use of effective breathing techniques Outcome: Progressing Towards Goal 
Goal: *PALLIATIVE CARE:  Alleviation of Dyspnea Outcome: Progressing Towards Goal 
  
Problem: Patient Education: Go to Patient Education Activity Goal: Patient/Family Education Outcome: Progressing Towards Goal 
  
Problem: Nutrition Deficit Goal: *Optimize nutritional status Outcome: Progressing Towards Goal

## 2020-01-15 NOTE — PROGRESS NOTES
Respiratory Therapy Assessment Care Plan Patient: 
Gloria Madsen 63 y.o. female 1/15/2020 8:20 AM 
 
Acute hypotension [I95.9] Macrocytic anemia [D53.9] Thrombocytopenia ([address] [D69.6] Hypokalemia [E87.6] HENRRY (acute kidney injury) ([address] [N17.9] Leukopenia [D72.819] Pancytopenia ([address] [T91.218] UTI (urinary tract infection) [N39.0] Chest X-RAY:  
Results from Hospital Encounter encounter on 01/08/20 XR CHEST PORT Impression IMPRESSION: 
 
New basilar haziness, right more so than left, appearance suggestive of pleural 
effusions and associated atelectasis. XR CHEST PORT Impression IMPRESSION: 
 
Subtle nodular opacity in the peripheral right lower lung zone, likely 
superimposition. No definite focal consolidation. Results from Hospital Encounter encounter on 09/14/16 XR CHEST PA LAT Impression IMPRESSION: 
 
Moderate to marked COPD lungs. Moderate pulmonary fibrosis in upper lobes bilaterally. Mild streaky atelectatic/fibrotic changes at the basal right lung although 
subtle infiltrates are not excluded. Normal cardiac size. No evidence of cardiac decompensation. No evidence of pleural effusion or pneumothorax. Mild compression of T8 vertebral body, of undetermined age. Vital Signs:   Visit Vitals BP 98/66 Pulse 95 Temp 98.5 °F (36.9 °C) Resp 20 Ht 5' 6\" (1.676 m) Wt 66.7 kg (147 lb) SpO2 94% BMI 23.73 kg/m² Indications for treatment: History of COPD Plan of care: Brovana Q12H and Pulmicort Q12H and Spiriva as ordered per physician. Continue use of HFNC as needed per patient to reduce WOB. Goal: Titrate oxygen as tolerated per patient. Continue to improve and maintain oxygenation and ventilation.

## 2020-01-15 NOTE — PROGRESS NOTES
MEW score at 3 d/t  - 111, will continue to monitor. Bedside shift change report given to Cristobal Sepulveda RN (oncoming nurse) by Ivonne Koch RN (offgoing nurse). Report included the following information SBAR, Intake/Output, MAR and Recent Results.

## 2020-01-15 NOTE — PROGRESS NOTES
Problem: Mobility Impaired (Adult and Pediatric) Goal: *Acute Goals and Plan of Care (Insert Text) Description Physical Therapy Goals Initiated 1/10/2020 and to be accomplished within 7 day(s) 1. Patient will move from supine to sit and sit to supine in bed with modified independence. 2.  Patient will transfer from bed to chair and chair to bed with modified independence using the least restrictive device. 3.  Patient will perform sit to stand with modified independence. 4.  Patient will ambulate with modified independence for 150 feet with the least restrictive device. 5.  Patient will ascend/descend 10 stairs with handrail(s) with modified independence. Outcome: Progressing Towards Goal 
 PHYSICAL THERAPY TREATMENT Patient: Edmond Verma (27 y.o. female) Date: 1/15/2020 Diagnosis: Acute hypotension [I95.9] Macrocytic anemia [D53.9] Thrombocytopenia ([address] [D69.6] Hypokalemia [E87.6] HENRRY (acute kidney injury) ([address] [N17.9] Leukopenia [D72.819] Pancytopenia ([address] [R10.598] UTI (urinary tract infection) [N39.0] Acute respiratory failure with hypoxia ([address] Procedure(s) (LRB): ESOPHAGOGASTRODUODENOSCOPY (EGD) (N/A) 5 Days Post-Op Precautions: Fall, Skin, Seizure PLOF: Independent ASSESSMENT: 
Pt progressing well today, decreased need for assist, decreased tremors and improved balance during gait. Pt amb 10 ft, stood marching in place X 2 min with less increase in HR from previous day (today 116). Educated pt on goals of therapy and progression of activity. Progression toward goals:  
[x]      Improving appropriately and progressing toward goals 
[]      Improving slowly and progressing toward goals 
[]      Not making progress toward goals and plan of care will be adjusted PLAN: 
Patient continues to benefit from skilled intervention to address the above impairments. Continue treatment per established plan of care. Discharge Recommendations:  Home Health Further Equipment Recommendations for Discharge:  rolling walker SUBJECTIVE:  
Patient stated I feel better today.  OBJECTIVE DATA SUMMARY:  
Critical Behavior: 
Neurologic State: Alert Orientation Level: Oriented X4 Cognition: Follows commands Safety/Judgement: Fall prevention Functional Mobility Training: 
Bed Mobility: 
Supine to Sit: Supervision Sit to Supine: Supervision Transfers: 
Sit to Stand: Supervision Stand to Sit: Supervision Balance: 
Sitting: Intact Standing: Impaired Standing - Static: Good Standing - Dynamic : Good Ambulation/Gait Training: 
Distance (ft): 10 Feet (ft) Assistive Device: Walker, rolling Ambulation - Level of Assistance: Stand-by assistance Speed/Allie: Pace decreased (<100 feet/min) Pain: 
Pain level pre-treatment: 0/10 Pain level post-treatment: 0/10 Pain Intervention(s): n/a Activity Tolerance:  
Good Please refer to the flowsheet for vital signs taken during this treatment. After treatment:  
[] Patient left in no apparent distress sitting up in chair 
[x] Patient left in no apparent distress in bed 
[x] Call bell left within reach 
[] Nursing notified 
[] Caregiver present 
[] Bed alarm activated 
[] SCDs applied COMMUNICATION/EDUCATION:  
[]          
[]         Fall prevention education was provided and the patient/caregiver indicated understanding. []         Patient/family have participated as able in working toward goals and plan of care. []         Patient/family agree to work toward stated goals and plan of care. []         Patient understands intent and goals of therapy, but is neutral about his/her participation. []         Patient is unable to participate in stated goals/plan of care: ongoing with therapy staff. [x]         Role of Physical Therapy in the acute care setting. Anirudh Mckeon PTA Time Calculation: 10 mins

## 2020-01-15 NOTE — PROGRESS NOTES
conducted a Follow up consultation and Spiritual Assessment for Tiny Jerez, who is a 63 y.o.,female. The  provided the following Interventions: 
Continued the relationship of care and support. Listened empathically. Offered prayer and assurance of continued prayer on patients behalf. Chart reviewed. The following outcomes were achieved: 
Patient expressed gratitude for pastoral care visit. Assessment: 
There are no further spiritual or Holiness issues which require Spiritual Care Services interventions at this time. Plan: 
Chaplains will continue to follow and will provide pastoral care on an as needed/requested basis.  recommends bedside caregivers page  on duty if patient shows signs of acute spiritual or emotional distress. [facility] Staff  Spiritual Care  
[phone]

## 2020-01-16 LAB
ANION GAP SERPL CALC-SCNC: 5 MMOL/L (ref 3–18)
BUN SERPL-MCNC: 4 MG/DL (ref 7–18)
BUN/CREAT SERPL: 10 (ref 12–20)
CALCIUM SERPL-MCNC: 8 MG/DL (ref 8.5–10.1)
CHLORIDE SERPL-SCNC: 104 MMOL/L (ref 100–111)
CO2 SERPL-SCNC: 28 MMOL/L (ref 21–32)
CREAT SERPL-MCNC: 0.4 MG/DL (ref 0.6–1.3)
GLUCOSE SERPL-MCNC: 71 MG/DL (ref 74–99)
MAGNESIUM SERPL-MCNC: 1.6 MG/DL (ref 1.6–2.6)
POTASSIUM SERPL-SCNC: 3.1 MMOL/L (ref 3.5–5.5)
SODIUM SERPL-SCNC: 137 MMOL/L (ref 136–145)

## 2020-01-16 PROCEDURE — 74011250637 HC RX REV CODE- 250/637: Performed by: INTERNAL MEDICINE

## 2020-01-16 PROCEDURE — 83735 ASSAY OF MAGNESIUM: CPT

## 2020-01-16 PROCEDURE — 80048 BASIC METABOLIC PNL TOTAL CA: CPT

## 2020-01-16 PROCEDURE — 74011000258 HC RX REV CODE- 258: Performed by: PHYSICIAN ASSISTANT

## 2020-01-16 PROCEDURE — 74011000250 HC RX REV CODE- 250: Performed by: HOSPITALIST

## 2020-01-16 PROCEDURE — 77010033711 HC HIGH FLOW OXYGEN

## 2020-01-16 PROCEDURE — 74011000250 HC RX REV CODE- 250: Performed by: INTERNAL MEDICINE

## 2020-01-16 PROCEDURE — 74011250637 HC RX REV CODE- 250/637: Performed by: PHYSICIAN ASSISTANT

## 2020-01-16 PROCEDURE — 77030038269 HC DRN EXT URIN PURWCK BARD -A

## 2020-01-16 PROCEDURE — 94761 N-INVAS EAR/PLS OXIMETRY MLT: CPT

## 2020-01-16 PROCEDURE — 94640 AIRWAY INHALATION TREATMENT: CPT

## 2020-01-16 PROCEDURE — 74011250636 HC RX REV CODE- 250/636: Performed by: PHYSICIAN ASSISTANT

## 2020-01-16 PROCEDURE — 36415 COLL VENOUS BLD VENIPUNCTURE: CPT

## 2020-01-16 PROCEDURE — 65270000029 HC RM PRIVATE

## 2020-01-16 RX ORDER — POTASSIUM CHLORIDE 750 MG/1
40 TABLET, EXTENDED RELEASE ORAL
Status: COMPLETED | OUTPATIENT
Start: 2020-01-16 | End: 2020-01-16

## 2020-01-16 RX ADMIN — Medication 100 MG: at 09:20

## 2020-01-16 RX ADMIN — ARFORMOTEROL TARTRATE 15 MCG: 15 SOLUTION RESPIRATORY (INHALATION) at 21:35

## 2020-01-16 RX ADMIN — BUMETANIDE 1 MG: 0.25 INJECTION INTRAMUSCULAR; INTRAVENOUS at 09:20

## 2020-01-16 RX ADMIN — ESCITALOPRAM 10 MG: 10 TABLET, FILM COATED ORAL at 09:20

## 2020-01-16 RX ADMIN — BUDESONIDE 500 MCG: 0.5 SUSPENSION RESPIRATORY (INHALATION) at 08:32

## 2020-01-16 RX ADMIN — FOLIC ACID 1 MG: 1 TABLET ORAL at 09:20

## 2020-01-16 RX ADMIN — LORAZEPAM 1 MG: 1 TABLET ORAL at 22:20

## 2020-01-16 RX ADMIN — BUDESONIDE 500 MCG: 0.5 SUSPENSION RESPIRATORY (INHALATION) at 21:35

## 2020-01-16 RX ADMIN — POTASSIUM CHLORIDE 40 MEQ: 10 TABLET, EXTENDED RELEASE ORAL at 12:42

## 2020-01-16 RX ADMIN — CEFTRIAXONE 1 G: 1 INJECTION, POWDER, FOR SOLUTION INTRAMUSCULAR; INTRAVENOUS at 09:20

## 2020-01-16 RX ADMIN — TIOTROPIUM BROMIDE INHALATION SPRAY 1 PUFF: 3.12 SPRAY, METERED RESPIRATORY (INHALATION) at 08:32

## 2020-01-16 RX ADMIN — BUMETANIDE 1 MG: 0.25 INJECTION INTRAMUSCULAR; INTRAVENOUS at 17:35

## 2020-01-16 RX ADMIN — ARFORMOTEROL TARTRATE 15 MCG: 15 SOLUTION RESPIRATORY (INHALATION) at 08:32

## 2020-01-16 RX ADMIN — LORAZEPAM 1 MG: 1 TABLET ORAL at 09:30

## 2020-01-16 RX ADMIN — RIFAXIMIN 550 MG: 550 TABLET ORAL at 17:35

## 2020-01-16 RX ADMIN — RIFAXIMIN 550 MG: 550 TABLET ORAL at 09:20

## 2020-01-16 RX ADMIN — RISPERIDONE 2 MG: 0.5 TABLET, FILM COATED ORAL at 09:20

## 2020-01-16 NOTE — PROGRESS NOTES
Problem: Discharge Planning Goal: *Discharge to safe environment Outcome: Progressing Towards Goal 
  
Home/ hh if indicated 
  
Patient has Medicaid as insurance 
  
Patient on Hi flow 8 lpm 
CM to continue to follow.

## 2020-01-16 NOTE — PROGRESS NOTES
Problem: Tissue Perfusion - Cardiopulmonary, Altered Goal: *Optimize tissue perfusion Outcome: Progressing Towards Goal 
Goal: *Absence of hypoxia Outcome: Progressing Towards Goal 
  
Problem: Alcohol Withdrawal 
Goal: *STG: Participates in treatment plan Outcome: Progressing Towards Goal 
Goal: *STG: Remains safe in hospital 
Outcome: Progressing Towards Goal 
Goal: *STG: Seeks staff when symptoms of withdrawal increase Outcome: Progressing Towards Goal 
Goal: *STG: Complies with medication therapy Outcome: Progressing Towards Goal 
Goal: *STG: Attends activities and groups Outcome: Progressing Towards Goal 
Goal: *STG: Will identify negative impact of chemical dependency including the use of tobacco, alcohol, and other substances Outcome: Progressing Towards Goal 
Goal: *STG: Verbalizes abstinence as an achievable goal 
Outcome: Progressing Towards Goal 
Goal: *STG: Agrees to participate in outpatient after care program to support ongoing mental health Outcome: Progressing Towards Goal 
Goal: *STG: Able to indentify relapse triggers including interpersonal/social and familial factors Outcome: Progressing Towards Goal 
Goal: *STG: Identify lifestyle changes to support long term sobriety such as vocation, employment, education, and legal issues Outcome: Progressing Towards Goal 
Goal: *STG: Maintains appropriate nutrition and hydration Outcome: Progressing Towards Goal 
Goal: *STG: Vital signs within defined limits Outcome: Progressing Towards Goal 
Goal: *STG/LTG: Relapse prevention plan in place to include housing/aftercare, leisure activities, and spirituality Outcome: Progressing Towards Goal 
Goal: Interventions Outcome: Progressing Towards Goal 
  
Problem: Pain Goal: *Control of Pain Outcome: Progressing Towards Goal 
  
Problem: Falls - Risk of 
Goal: *Absence of Falls Description Document Edgar Brunner Fall Risk and appropriate interventions in the flowsheet. Outcome: Progressing Towards Goal 
Note: Fall Risk Interventions: 
Mobility Interventions: Bed/chair exit alarm, PT Consult for mobility concerns Medication Interventions: Evaluate medications/consider consulting pharmacy, Patient to call before getting OOB Elimination Interventions: Patient to call for help with toileting needs, Call light in reach, Bed/chair exit alarm History of Falls Interventions: Investigate reason for fall, Room close to nurse's station, Evaluate medications/consider consulting pharmacy Problem: Pressure Injury - Risk of 
Goal: *Prevention of pressure injury Description Document Lex Scale and appropriate interventions in the flowsheet. Outcome: Progressing Towards Goal 
Note: Pressure Injury Interventions: 
Sensory Interventions: Assess changes in LOC, Assess need for specialty bed Moisture Interventions: Internal/External urinary devices Activity Interventions: PT/OT evaluation, Pressure redistribution bed/mattress(bed type), Increase time out of bed Mobility Interventions: PT/OT evaluation, Pressure redistribution bed/mattress (bed type), HOB 30 degrees or less Nutrition Interventions: Document food/fluid/supplement intake, Discuss nutritional consult with provider Friction and Shear Interventions: Lift sheet, HOB 30 degrees or less Problem: Discharge Planning Goal: *Discharge to safe environment Outcome: Progressing Towards Goal 
  
Problem: Patient Education: Go to Patient Education Activity Goal: Patient/Family Education Outcome: Progressing Towards Goal 
  
Problem: Patient Education: Go to Patient Education Activity Goal: Patient/Family Education Outcome: Progressing Towards Goal 
  
Problem: Breathing Pattern - Ineffective Goal: *Absence of hypoxia Outcome: Progressing Towards Goal 
Goal: *Use of effective breathing techniques Outcome: Progressing Towards Goal 
  
 Problem: Patient Education: Go to Patient Education Activity Goal: Patient/Family Education Outcome: Progressing Towards Goal 
  
Problem: Nutrition Deficit Goal: *Optimize nutritional status Outcome: Progressing Towards Goal

## 2020-01-16 NOTE — PROGRESS NOTES
1056 dr Luis Enrique Lucas reduced pt's O2 to 6L 
 
1738 reduced pt O2 to 5 L 
 
1800 pt O2 sat 95% 1930 Bedside and Verbal shift change report given to [address] (oncoming nurse) by Darrin Ash RN 
 (offgoing nurse). Report included the following information Kardex, MAR and Med Rec Status.

## 2020-01-16 NOTE — PROGRESS NOTES
Internal Medicine Progress Note Patient's Name: Quinton Millan Admit Date: 1/8/2020 Length of Stay: 7 Assessment/Plan Principal Problem: 
  Acute respiratory failure with hypoxia ([address] (1/9/2020) Active Problems: COPD (chronic obstructive pulmonary disease) (HCC) () Alcohol-induced pancreatitis (1/14/2020) Cirrhosis ([address] (1/9/2020) HENRRY (acute kidney injury) ([address] (1/9/2020) ETOH abuse () Bipolar mood disorder (HCC) () Hypokalemia (1/9/2020) UTI (urinary tract infection) (1/9/2020) Pancytopenia ([address] (1/9/2020) Acute hypotension (1/9/2020) Fluid overload (1/14/2020) Acute hypoxic resp failure 
- likely 2/2 fluid overload due to fluid resuscitation from shock. - cont bumex and monitor response 
- strict I&Os 
- monitor BMP and replace lytes PRN 
- echo with NL systolic function and inconclusive diastolic function 
- on NC, wean down as tolerated. Not on home O2 
 
COPD 
- nebs 
- wean O2 as tolerated ETOH induce pancreatitis and cirrhosis - symptomatically improved, tolerating PO intake - appreciate GI 
- EGD showed non-specific gastritis  
- HIDA negative 
- refused MRCP 
- Cont xifaxin, magox,neutraphos - LFTs improving - OP f/u with GI 
 
UTI 
- completed course of rocephin 
- UCx with ecoli HENRRY 
- resolved - Cont acceptable home medications for chronic conditions  
- DVT protocol I have personally reviewed all pertinent labs and films that have officially resulted over the last 24 hours. I have personally checked for all pending labs that are awaiting final results. HPI Per H&P, \"Patricia Sharren Kayser is a 63 y.o. female who presents to [facility] ER with complaint of Hypotension and Nausea. Patient was reportedly referred to [facility] ER by her Psychiatrist when she was seen on 1/08/2020 and her Blood Pressure was low.   Patient states that she has been having fevers, chills, diaphoresis, BLE edema, a cough productive of clear mucus, and intermittent chest congestion. Patient also reports a UTI 2 weeks ago and continuing urinary urgency. Patient also reports nausea and vomiting with reduced oral intake for the last 3 days accompanied by \"a little bit of diarrhea. \"  Patient states that she has had chronically low blood pressure even before she was diagnosed with liver cirrhosis. Patient reports that she has had ascites before and that she does continue to drink 3 shots of vodka a night to improve her sleeping. 
  
In [facility] ER, Patient was noted to have Blood Pressures of 70/38 and 49/90 mm Hg and a borderline Urinalysis for UTI. Patient was started on IV Norepinephrine for hypotension. 
  
Patient is admitted to [facility] ICU for management of Hypotension requiring Pressors, HENRRY likely due to Dehydration, and borderline UTI. \" Interval History There was some concern on imaging for possible pancreatitis vs acute sherrie. GI and surgery were consulted. No surgical intervention needed per GS. EGD done 1/10 showing gastritis. HIDA scan negative. Patient refused to have MRCP. LFTs and abd pain improved. HENRRY resolved with IV fluids. UCx resulted with E coli. BCx remained negative. Patient completed course of rocephin. She developed mild hypoxic resp failure requiring HFNC likely 2/2 fluid overload due to fluid resuscitation from shock. Improving with diuretics. Echo with NL systolic function and inconclusive diastolic function. Subjective Pt s/e @ bedside. No major events overnight. Pt reports SOB improving. Denies CP. Denies abd pain, nvd. Objective Visit Vitals BP 99/66 (BP 1 Location: Left arm, BP Patient Position: At rest) Pulse 90 Temp 98.6 °F (37 °C) Resp 18 Ht 5' 6\" (1.676 m) Wt 67.1 kg (147 lb 14.9 oz) SpO2 94% BMI 23.88 kg/m² Physical Exam: 
General Appearance: NAD, conversant HENT: normocephalic/atraumatic, moist mucus membranes Neck: No JVD, supple Lungs: CTA with normal respiratory effort CV: RRR, no m/r/g Abdomen: soft, non-tender, normal bowel sounds Extremities: no cyanosis, no peripheral edema Neuro: No focal deficits, motor/sensory intact Skin: Normal color, intact Intake and Output: 
Current Shift:  01/16 0701 - 01/16 1900 In: 3571 [P.O.:1240; I.V.:50] Out: 1500 [Urine:1500] Last three shifts:  01/14 1901 - 01/16 0700 In: 510 [P.O.:510] Out: 1000 [Urine:1000] Lab/Data Reviewed: 
BMP:  
Lab Results Component Value Date/Time  01/16/2020 06:40 AM  
 K 3.1 (L) 01/16/2020 06:40 AM  
  01/16/2020 06:40 AM  
 CO2 28 01/16/2020 06:40 AM  
 AGAP 5 01/16/2020 06:40 AM  
 GLU 71 (L) 01/16/2020 06:40 AM  
 BUN 4 (L) 01/16/2020 06:40 AM  
 CREA 0.40 (L) 01/16/2020 06:40 AM  
 GFRAA >60 01/16/2020 06:40 AM  
 GFRNA >60 01/16/2020 06:40 AM  
 
 
Imaging Reviewed: 
No results found. Medications Reviewed: 
Current Facility-Administered Medications Medication Dose Route Frequency  bumetanide (BUMEX) injection 1 mg  1 mg IntraVENous BID  rifAXIMin (XIFAXAN) tablet 550 mg  550 mg Oral BID  nicotine (NICODERM CQ) 21 mg/24 hr patch 1 Patch  1 Patch TransDERmal DAILY  busPIRone (BUSPAR) tablet 10 mg  10 mg Oral BID PRN  
 escitalopram oxalate (LEXAPRO) tablet 10 mg  10 mg Oral DAILY  risperiDONE (RisperDAL) tablet 2 mg  2 mg Oral DAILY  thiamine mononitrate (B-1) tablet 100 mg  100 mg Oral DAILY  tiotropium bromide (SPIRIVA RESPIMAT) 2.5 mcg /actuation  1 Puff Inhalation DAILY  ondansetron (ZOFRAN) injection 4 mg  4 mg IntraVENous Q4H PRN  
 melatonin tablet 12 mg  12 mg Oral QHS PRN  
 acetaminophen (TYLENOL) tablet 500 mg  500 mg Oral Q6H PRN  
 LORazepam (ATIVAN) tablet 1 mg  1 mg Oral Q1H PRN Or  
 LORazepam (ATIVAN) injection 1 mg  1 mg IntraVENous Q1H PRN  
 LORazepam (ATIVAN) tablet 2 mg  2 mg Oral Q1H PRN  Or  
  LORazepam (ATIVAN) injection 2 mg  2 mg IntraVENous Q1H PRN  
 LORazepam (ATIVAN) injection 3 mg  3 mg IntraVENous Q15MIN PRN  
 budesonide (PULMICORT) 500 mcg/2 ml nebulizer suspension  500 mcg Nebulization BID RT  
 arformoterol (BROVANA) neb solution 15 mcg  15 mcg Nebulization BID RT  
 folic acid (FOLVITE) tablet 1 mg  1 mg Oral DAILY  ipratropium (ATROVENT) 0.02 % nebulizer solution 0.5 mg  0.5 mg Nebulization Q4H PRN  
 sodium chloride (NS) flush 5-10 mL  5-10 mL IntraVENous PRN Anju Barillas PA-C [facility] Office:  [phone] Pager: [phone]

## 2020-01-16 NOTE — PROGRESS NOTES
Assumed care from Victoria Downer, [place]. Patient is awake and alert, denies any pain or discomfort. Will continue to monitor. 0000> Patient remains stable. 0400> Patient remains stable. Bedside and Verbal shift change report given to ANA Phelan (oncoming nurse) by Nikhil Martinez RN (offgoing nurse). Report included the following information SBAR, Kardex, Intake/Output, MAR, Recent Results and Cardiac Rhythm James, NSR.

## 2020-01-16 NOTE — PROGRESS NOTES
Bedside shift change report given to Kristi Stein (oncoming nurse) by Isac Goldmann (offgoing nurse). Report included the following information SBAR and Kardex.

## 2020-01-16 NOTE — PROGRESS NOTES
NUTRITION FOLLOW-UP/PLAN OF CARE 
 
RECOMMENDATIONS:  
1. Continue current diet, encouraged to implement preferences, snacks BID 2. Monitor labs, weight and PO intake 3. RD to follow GOALS:  
1. Ongoing: PO intake meets >75% of protein/calorie needs by 1/21 ASSESSMENT:  
Wt status is classified as overweight per Body mass index is 23.88 kg/m². Improved PO intake since last assessment, snacks added. Diagnosis: hypokalemia, anemia, leukopenia, UTI, thrombocytopenia, HENRRY, pancytopenia, cirrhosis, resp failure, COPD. Bipolar, ETOH abuse. Per GI EUS of pancrease/bile duct later. Nutrition recommendations listed. RD to follow. Previous Nutrition Diagnoses:  
Remains appropriate/improvement shown: Inadequate oral food and beverage intake due to recent N/V/D as evidenced by pt reporting poor po intake past several days and overall poor appetite past 2 months. SUBJECTIVE/OBJECTIVE:  
(1/16/20) Pt resting in bed during time of assessment, pt states to be feeling much better today and large improvement in intake, consumed 75% of breakfast this morning. Pt does report hunger between meals at time, will send snacks BID. Denies n/v at this time. Pt states small BM this AM and was not loose. CBW: ~148 lb 1/16, large fluctuations noted. Encouraged continued increased PO intake, Will continue to monitor intake, weight, labs. (1/14/20) Pt sitting up in bed during time of assessment, pt states appetite is fine but with varied intake due to food preferences, obtained food preferences and spoke with kitchen. Pt consuming 25-50% of most meals. Encouraged pt to implement preferences with [place]. Pt states she does not have a egg allergy anymore and would like it removed from her chart so that she can eat eggs in the morning - will speak with RN. Pt tolerating dental soft diet well with no issues chewing/swallowing, dentures fit well per her report.  Denies current n/v/d/c, per I/Os BM 1/13 (loose). CBW: 165 lb 1/13 showing weight gain from reported UBW. Encouraged intake/preferences. Will continue to monitor intake, weight, labs, POC. Per previous RD notes: 
1/9/20: Pt is 122% ideal weight;  BMI (calculated): 25.5 kg/m2 (overweight classification). Pt appears well nourished but is at nutrition risk with recent reported history of N/V/D and poor appetite. Although pt reports weight loss of unknown amount, current weight is 31 lbs > than her weight in June 2019. With large weight discrepancy between  current weight or past documented weights, question their accuracy. Pt with PMHx including etoh cirrhosis, Chronic Hypotension, Active Etoh abuse, COPD, and Bipolar disorder, who presented to the ER after being found hypotensive by her MD (pt c/o unsteady gait,  N/V/D and poor appetite).  RUQ and Epigastric abd pain. CT Abd showed peripancreatic inflammation vs infection 1/9:  Pt reports her usual weight is 136 lbs and she thinks she has lost weight over the past couple of months but is unable to quantify. She states she was once allergic to eggs but that she now is able to eat them without side effects. She denies having issues with chewing or swallowing. She reports that a friend brought breakfast to her this AM before she knew she was NPO/full liquids. Per pt reports of po intake at breakfast, she consumed 425 calories, 22 grams protein. Information Obtained From:  
[x] Chart Review [x] Patient 
[] Family/Caregiver 
[] Nurse/Physician  
[] Patient Rounds/Interdisciplinary Meeting Diet: dental soft solid Patient Vitals for the past 100 hrs: 
 % Diet Eaten 01/16/20 1059 75 % 01/14/20 0823 50 % 01/13/20 1819 40 % 01/13/20 1424 25 % 01/13/20 0906 25 % 01/12/20 1747 5 % 01/12/20 1248 5 % 01/12/20 0943 10 % Medications: [x] Reviewed Noted: bumex, buspar, lexapro, folvite, KCl, B1 Encounter Diagnoses ICD-10-CM ICD-9-CM 1. Septic shock (HCC) A41.9 038.9 R65.21 785.52  
  995.92  
2. Acute abdominal pain in right upper quadrant R10.11 789.01  
  338.19  
3. Non-intractable vomiting with nausea, unspecified vomiting type R11.2 787.01  
4. HENRRY (acute kidney injury) ([facility] N17.9 584.9 5. Calculus of gallbladder with cholecystitis without biliary obstruction, unspecified cholecystitis acuity K80.10 574.00  
6. Acute UTI N39.0 599.0 7. COPD with acute exacerbation ([facility] J44.1 491.21 Past Medical History:  
Diagnosis Date  Bipolar affective ([facility]  Chronic obstructive pulmonary disease ([facility]  Cirrhosis ([facility]  ETOH abuse  Former smoker 1 PPD x40 years  History of ascites  History of sinusitis  Hyperlipidemia 11/20/2009 Patient denies  Left breast mass 05/13/2011 U/S Left Breast: No definite mass identified. Recommended recheck in 6 months  Manic depression ([facility]  Panic disorder  Vitamin D insufficiency 11/20/2009  
 23 ng/mL  Wrist fracture, right 01/06/2010 Non-Displaced Distal Radius/Ulnar Styloid Fx Labs:   
Lab Results Component Value Date/Time Sodium 137 01/16/2020 06:40 AM  
 Potassium 3.1 (L) 01/16/2020 06:40 AM  
 Chloride 104 01/16/2020 06:40 AM  
 CO2 28 01/16/2020 06:40 AM  
 Anion gap 5 01/16/2020 06:40 AM  
 Glucose 71 (L) 01/16/2020 06:40 AM  
 BUN 4 (L) 01/16/2020 06:40 AM  
 Creatinine 0.40 (L) 01/16/2020 06:40 AM  
 Calcium 8.0 (L) 01/16/2020 06:40 AM  
 Magnesium 1.6 01/16/2020 06:40 AM  
 Phosphorus 2.5 01/12/2020 04:15 AM  
 Albumin 1.9 (L) 01/13/2020 06:00 AM  
 
Anthropometrics: BMI (calculated): 23.9 Last 3 Recorded Weights in this Encounter 01/14/20 1743 01/15/20 1007 01/16/20 8372 Weight: 71.9 kg (158 lb 9.6 oz) 66.7 kg (147 lb) 67.1 kg (147 lb 14.9 oz) Ht Readings from Last 1 Encounters:  
01/13/20 5' 6\" (1.676 m) Documented Weight History: 
Weight Metrics 1/16/2020 6/12/2019 5/13/2019 9/1/2018 11/26/2016 9/14/2016 Weight 147 lb 14.9 oz 126 lb 12.8 oz 132 lb 135 lb 140 lb 117 lb BMI 23.88 kg/m2 20.47 kg/m2 21.31 kg/m2 21.79 kg/m2 22.6 kg/m2 19.47 kg/m2 [x]  Weight Loss ? []  Weight Gain  
[]  Weight Stable  
[]  New wt n/a on record Estimated Nutrition Needs:  
9004 [id number] Protein (g): 77 g Nutrition Problems Identified:  
[x] Suboptimal PO intake - improving 
[] Food Allergies [x] Difficulty chewing/swallowing/poor dentition 
[] Constipation/Diarrhea  
[] Nausea/Vomiting  
[] None 
[] Other:  
 
Plan:  
[] Therapeutic Diet [x]  Obtained/adjusted food preferences/tolerances and/or snacks options  
[]  Supplements added  
[] Occupational therapy following for feeding techniques []  HS snack added  
[x]  Modify diet texture  
[]  Modify diet for food allergies []  Educate patient  
[]  Assist with menu selection  
[x]  Monitor PO intake on meal rounds  
[x]  Continue inpatient monitoring and intervention  
[x]  Participated in discharge planning/Interdisciplinary rounds/Team meetings  
[]  Other:  
 
Education Needs: 
 [] Not appropriate for teaching at this time due to: 
 [x] Identified and addressed encouraged continued appropriate intake Nutrition Monitoring and Evaluation: 
 [x] Continue inpatient monitoring and interventions [] Other:  
 
Creig Saliva

## 2020-01-16 NOTE — PROGRESS NOTES
Respiratory Therapy Assessment Care Plan Patient: 
Iraida Palacios 63 y.o. female 1/16/2020 8:41 AM 
 
Acute hypotension [I95.9] Macrocytic anemia [D53.9] Thrombocytopenia ([address] [D69.6] Hypokalemia [E87.6] HENRRY (acute kidney injury) ([address] [N17.9] Leukopenia [D72.819] Pancytopenia ([address] [Q42.710] UTI (urinary tract infection) [N39.0] Chest X-RAY:  
Results from Hospital Encounter encounter on 01/08/20 XR CHEST PORT Impression IMPRESSION: 
 
New basilar haziness, right more so than left, appearance suggestive of pleural 
effusions and associated atelectasis. XR CHEST PORT Impression IMPRESSION: 
 
Subtle nodular opacity in the peripheral right lower lung zone, likely 
superimposition. No definite focal consolidation. Results from Hospital Encounter encounter on 09/14/16 XR CHEST PA LAT Impression IMPRESSION: 
 
Moderate to marked COPD lungs. Moderate pulmonary fibrosis in upper lobes bilaterally. Mild streaky atelectatic/fibrotic changes at the basal right lung although 
subtle infiltrates are not excluded. Normal cardiac size. No evidence of cardiac decompensation. No evidence of pleural effusion or pneumothorax. Mild compression of T8 vertebral body, of undetermined age. Vital Signs:   Visit Vitals BP 94/66 (BP 1 Location: Left arm, BP Patient Position: At rest) Pulse 83 Temp 98.7 °F (37.1 °C) Resp 18 Ht 5' 6\" (1.676 m) Wt 67.1 kg (147 lb 14.9 oz) SpO2 93% BMI 23.88 kg/m² Indications for treatment: History of COPD Plan of care: Brovana Q12H/Pulmicort Q12H and Spiriva as ordered per physician. Continue use of HFNC to reduce WOB. Goal: Titrate oxygen as tolerated per patient. Continue to improve and maintain WOB.

## 2020-01-17 LAB
ANION GAP SERPL CALC-SCNC: 6 MMOL/L (ref 3–18)
BUN SERPL-MCNC: 4 MG/DL (ref 7–18)
BUN/CREAT SERPL: 10 (ref 12–20)
CALCIUM SERPL-MCNC: 8.2 MG/DL (ref 8.5–10.1)
CHLORIDE SERPL-SCNC: 104 MMOL/L (ref 100–111)
CO2 SERPL-SCNC: 29 MMOL/L (ref 21–32)
CREAT SERPL-MCNC: 0.39 MG/DL (ref 0.6–1.3)
GLUCOSE SERPL-MCNC: 79 MG/DL (ref 74–99)
MAGNESIUM SERPL-MCNC: 1.4 MG/DL (ref 1.6–2.6)
POTASSIUM SERPL-SCNC: 3.3 MMOL/L (ref 3.5–5.5)
SODIUM SERPL-SCNC: 139 MMOL/L (ref 136–145)

## 2020-01-17 PROCEDURE — 80048 BASIC METABOLIC PNL TOTAL CA: CPT

## 2020-01-17 PROCEDURE — 74011250637 HC RX REV CODE- 250/637: Performed by: INTERNAL MEDICINE

## 2020-01-17 PROCEDURE — 97530 THERAPEUTIC ACTIVITIES: CPT

## 2020-01-17 PROCEDURE — 74011000250 HC RX REV CODE- 250: Performed by: HOSPITALIST

## 2020-01-17 PROCEDURE — 94640 AIRWAY INHALATION TREATMENT: CPT

## 2020-01-17 PROCEDURE — 77010033678 HC OXYGEN DAILY

## 2020-01-17 PROCEDURE — 83735 ASSAY OF MAGNESIUM: CPT

## 2020-01-17 PROCEDURE — 65270000029 HC RM PRIVATE

## 2020-01-17 PROCEDURE — 97168 OT RE-EVAL EST PLAN CARE: CPT

## 2020-01-17 PROCEDURE — 36415 COLL VENOUS BLD VENIPUNCTURE: CPT

## 2020-01-17 PROCEDURE — 97535 SELF CARE MNGMENT TRAINING: CPT

## 2020-01-17 PROCEDURE — 94761 N-INVAS EAR/PLS OXIMETRY MLT: CPT

## 2020-01-17 PROCEDURE — 74011000250 HC RX REV CODE- 250: Performed by: INTERNAL MEDICINE

## 2020-01-17 PROCEDURE — 77030038269 HC DRN EXT URIN PURWCK BARD -A

## 2020-01-17 RX ADMIN — ARFORMOTEROL TARTRATE 15 MCG: 15 SOLUTION RESPIRATORY (INHALATION) at 19:54

## 2020-01-17 RX ADMIN — TIOTROPIUM BROMIDE INHALATION SPRAY 1 PUFF: 3.12 SPRAY, METERED RESPIRATORY (INHALATION) at 09:00

## 2020-01-17 RX ADMIN — BUDESONIDE 500 MCG: 0.5 SUSPENSION RESPIRATORY (INHALATION) at 08:31

## 2020-01-17 RX ADMIN — BUMETANIDE 1 MG: 0.25 INJECTION INTRAMUSCULAR; INTRAVENOUS at 17:15

## 2020-01-17 RX ADMIN — LORAZEPAM 1 MG: 1 TABLET ORAL at 10:34

## 2020-01-17 RX ADMIN — RISPERIDONE 2 MG: 0.5 TABLET, FILM COATED ORAL at 08:55

## 2020-01-17 RX ADMIN — ARFORMOTEROL TARTRATE 15 MCG: 15 SOLUTION RESPIRATORY (INHALATION) at 08:31

## 2020-01-17 RX ADMIN — ESCITALOPRAM 10 MG: 10 TABLET, FILM COATED ORAL at 08:55

## 2020-01-17 RX ADMIN — FOLIC ACID 1 MG: 1 TABLET ORAL at 08:55

## 2020-01-17 RX ADMIN — RIFAXIMIN 550 MG: 550 TABLET ORAL at 08:55

## 2020-01-17 RX ADMIN — BUDESONIDE 500 MCG: 0.5 SUSPENSION RESPIRATORY (INHALATION) at 19:54

## 2020-01-17 RX ADMIN — Medication 100 MG: at 08:55

## 2020-01-17 RX ADMIN — RIFAXIMIN 550 MG: 550 TABLET ORAL at 17:15

## 2020-01-17 RX ADMIN — LORAZEPAM 1 MG: 1 TABLET ORAL at 21:09

## 2020-01-17 RX ADMIN — BUMETANIDE 1 MG: 0.25 INJECTION INTRAMUSCULAR; INTRAVENOUS at 08:56

## 2020-01-17 NOTE — PROGRESS NOTES
Bedside shift change report given to this RN (oncoming nurse) by Ebenezer Mckeon RN (offgoing nurse).  Report included the following information SBAR, Kardex and Cardiac Rhythm SR.

## 2020-01-17 NOTE — PROGRESS NOTES
Problem: Self Care Deficits Care Plan (Adult) Goal: *Acute Goals and Plan of Care (Insert Text) Description Reevaluation 1/17/2020: Goals reviewed and modified to fit current needs 1. Patient will perform grooming with independence while sitting at edge of bed and maintaining vital signs within asymptomatic range. 2.  Patient will perform face washing at sink with supervision and no LOB 3. Patient will simulate shower transfer with supervision without LOB 4. Patient will participate in upper extremity therapeutic exercise/activities with supervision/set-up for 8 minutes. 5.  Patient will utilize energy conservation techniques during functional activities with verbal cues. Occupational Therapy Goals Initiated 1/9/2020 within 7 day(s). 1.  Patient will perform grooming with independence while sitting at edge of bed and maintaining vital signs within asymptomatic range. 2.  Patient will perform upper body dressing and bathing with supervision/set-up 3. Patient will perform lower body dressing and bathing with minimal assistance/contact guard assist and appropriate AE prn. 
4.  Patient will perform toilet transfers with minimal assistance/contact guard assist. 
5.  Patient will perform all aspects of toileting with supervision/set-up. 6.  Patient will participate in upper extremity therapeutic exercise/activities with supervision/set-up for 8 minutes. 7.  Patient will utilize energy conservation techniques during functional activities with verbal cues. 1/17/2020 1533 by Amarilis Yates OT Outcome: Progressing Towards Goal 
 OCCUPATIONAL THERAPY RE-EVALUATION Patient: Lourdes Worthy (22 y.o. female) Date: 1/17/2020 Primary Diagnosis: Acute hypotension [I95.9] Macrocytic anemia [D53.9] Thrombocytopenia ([address] [D69.6] Hypokalemia [E87.6] HENRRY (acute kidney injury) ([address] [N17.9] Leukopenia [D72.819] Pancytopenia ([address] [C62.701] UTI (urinary tract infection) [N39.0] Procedure(s) (LRB): ESOPHAGOGASTRODUODENOSCOPY (EGD) (N/A) 7 Days Post-Op Precautions: Fall PLOF: Living with fiance completing ADLs and IADLs with MOD I 
 
ASSESSMENT : 
Pt in bed and agreeable to therapy. Pt able to move supine to EOB with supervision. Pt completes sit to stand to [facility] with supervision and ambulates into bathroom with CGA for line management. Completes toilet transfer and toileting with supervision. Washes hands at sink and ambulates back to bed. No episodes of LOB. Pt able to doff and don socks. Patient has met dressing goals. Would continue to benefit from OT to address activity tolerance for ADLs and IADLs prior to return home. Patient will benefit from skilled intervention to address the above impairments. Patient's rehabilitation potential is considered to be Good Factors which may influence rehabilitation potential include:  
[]             None noted []             Mental ability/status [x]             Medical condition [x]             Home/family situation and support systems [x]             Safety awareness []             Pain tolerance/management 
[]             Other: PLAN : 
Recommendations and Planned Interventions:  
[x]               Self Care Training                  [x]      Therapeutic Activities [x]               Functional Mobility Training   []      Cognitive Retraining 
[x]               Therapeutic Exercises           [x]      Endurance Activities [x]               Balance Training                    []      Neuromuscular Re-Education []               Visual/Perceptual Training     [x]      Home Safety Training 
[]               Patient Education                   [x]      Family Training/Education []               Other (comment): Frequency/Duration: Patient will be followed by occupational therapy 3 times a week to address goals. Discharge Recommendations: To Be Determined Further Equipment Recommendations for Discharge: N/A  
 
 SUBJECTIVE:  
Patient stated i'm not even out of breath.  OBJECTIVE DATA SUMMARY:  
Hospital course since last seen and reason for reevaluation:  
Past Medical History:  
Diagnosis Date Bipolar affective ([facility] Chronic obstructive pulmonary disease (HCC) Cirrhosis ([facility] ETOH abuse Former smoker 1 PPD x40 years History of ascites History of sinusitis Hyperlipidemia 11/20/2009 Patient denies Left breast mass 05/13/2011 U/S Left Breast: No definite mass identified. Recommended recheck in 6 months Manic depression ([facility] Panic disorder Vitamin D insufficiency 11/20/2009  
 23 ng/mL Wrist fracture, right 01/06/2010 Non-Displaced Distal Radius/Ulnar Styloid Fx Past Surgical History:  
Procedure Laterality Date HX WISDOM TEETH EXTRACTION Barriers to Learning/Limitations: None Compensate with: visual, verbal, tactile, kinesthetic cues/model Home Situation:  
Home Situation Home Environment: Apartment(2nd floor) # Steps to Enter: 2 Rails to Enter: No 
One/Two Story Residence: Two story # of Interior Steps: 15 Interior Rails: Both Living Alone: No(Lives with Fiance) Support Systems: Spouse/Significant Other/Partner, Family member(s), Friends \ neighbors Patient Expects to be Discharged to[de-identified] [id number] Current DME Used/Available at Home: Shower chair Tub or Shower Type: Shower [x]  Right hand dominant   []  Left hand dominant Cognitive/Behavioral Status: 
Neurologic State: Alert Orientation Level: Oriented X4 Cognition: Follows commands Skin: Intact BUE Edema: None noted BUE Vision/Perceptual: WFL Coordination: [facility] Balance: 
Sitting: Intact Standing: Impaired Standing - Static: Good Standing - Dynamic : Good Strength: [facility] Tone & Sensation: [facility] Range of Motion: [facility] Functional Mobility and Transfers for ADLs: 
Bed Mobility: 
 Supine to Sit: Supervision Sit to Supine: Supervision Transfers: 
Sit to Stand: Supervision Stand to Sit: Supervision ADL Assessment:  
Pt is progressing with strength, balance, and endurance for participation in ADLs and IADLs. Pt limited by activity tolerance with tachycardia with activity. ADL Intervention: Pt completes bathroom mobility and toileting with CGA Pain: 
Pain level pre-treatment: 0/10 Pain level post-treatment: 0/10 Pain Intervention(s): Medication (see MAR); Rest, Ice, Repositioning Response to intervention: Nurse notified, See doc flow Activity Tolerance:  
Fair Please refer to the flowsheet for vital signs taken during this treatment. After treatment:  
[] Patient left in no apparent distress sitting up in chair 
[x] Patient left in no apparent distress in bed 
[x] Call bell left within reach [x] Nursing notified 
[] Caregiver present 
[] Bed alarm activated COMMUNICATION/EDUCATION:  
[x] Role of Occupational Therapy in the acute care setting 
[x] Home safety education was provided and the patient/caregiver indicated understanding. [x] Patient/family have participated as able in goal setting and plan of care. [x] Patient/family agree to work toward stated goals and plan of care. [] Patient understands intent and goals of therapy, but is neutral about his/her participation. [] Patient is unable to participate in goal setting and plan of care. Thank you for this referral. 
Giancarlo Gomes OT Time Calculation: 30 mins

## 2020-01-17 NOTE — PROGRESS NOTES
01/17/20 1209 Vital Signs Temp 99.5 °F (37.5 °C) Temp Source Oral  
Pulse (Heart Rate) (!) 108 Heart Rate Source Monitor Resp Rate 18  
O2 Sat (%) 92 % Level of Consciousness Alert BP 96/62 MAP (Calculated) 73 BP 1 Method Automatic  
BP 1 Location Left arm BP Patient Position At rest  
MEWS Score 3 MD aware of elevated HR.

## 2020-01-17 NOTE — PROGRESS NOTES
Internal Medicine Progress Note Patient's Name: Delbert Paez Admit Date: 1/8/2020 Length of Stay: 8 Assessment/Plan Principal Problem: 
  Acute respiratory failure with hypoxia ([address] (1/9/2020) Active Problems: COPD (chronic obstructive pulmonary disease) (HCC) () Alcohol-induced pancreatitis (1/14/2020) Cirrhosis ([address] (1/9/2020) HENRRY (acute kidney injury) ([address] (1/9/2020) ETOH abuse () Bipolar mood disorder (HCC) () Hypokalemia (1/9/2020) UTI (urinary tract infection) (1/9/2020) Pancytopenia ([address] (1/9/2020) Acute hypotension (1/9/2020) Fluid overload (1/14/2020) Acute hypoxic resp failure 
- likely 2/2 fluid overload due to fluid resuscitation from shock. - cont bumex and monitor response 
- strict I&Os - good urine output. Hoping tomorrow will be able to wean off O2, if not, might need home O2 
- monitor BMP and replace lytes PRN 
- echo with NL systolic function and inconclusive diastolic function 
- on NC, wean down as tolerated. Not on home O2 
 
COPD 
- cont brovana/pulmicort 
- PRN atrovent 
- wean O2 as tolerated ETOH induce pancreatitis and cirrhosis - symptomatically improved, tolerating PO intake - appreciate GI 
- EGD showed non-specific gastritis  
- HIDA negative 
- refused MRCP 
- Cont xifaxin, magox,neutraphos - LFTs improving - OP f/u with GI 
 
UTI 
- completed course of rocephin 
- UCx with ecoli HENRRY 
- resolved - Cont acceptable home medications for chronic conditions  
- DVT protocol I have personally reviewed all pertinent labs and films that have officially resulted over the last 24 hours. I have personally checked for all pending labs that are awaiting final results. HPI Per H&P, \"Bronwyn Hennessy is a 63 y.o. female who presents to [facility] ER with complaint of Hypotension and Nausea.   Patient was reportedly referred to [facility] ER by her Psychiatrist when she was seen on 1/08/2020 and her Blood Pressure was low. Patient states that she has been having fevers, chills, diaphoresis, BLE edema, a cough productive of clear mucus, and intermittent chest congestion. Patient also reports a UTI 2 weeks ago and continuing urinary urgency. Patient also reports nausea and vomiting with reduced oral intake for the last 3 days accompanied by \"a little bit of diarrhea. \"  Patient states that she has had chronically low blood pressure even before she was diagnosed with liver cirrhosis. Patient reports that she has had ascites before and that she does continue to drink 3 shots of vodka a night to improve her sleeping. 
  
In [facility] ER, Patient was noted to have Blood Pressures of 70/38 and 49/90 mm Hg and a borderline Urinalysis for UTI. Patient was started on IV Norepinephrine for hypotension. 
  
Patient is admitted to [facility] ICU for management of Hypotension requiring Pressors, HENRRY likely due to Dehydration, and borderline UTI. \" Interval History There was some concern on imaging for possible pancreatitis vs acute sherrie. GI and surgery were consulted. No surgical intervention needed per GS. EGD done 1/10 showing gastritis. HIDA scan negative. Patient refused to have MRCP. LFTs and abd pain improved. HENRRY resolved with IV fluids. UCx resulted with E coli. BCx remained negative. Patient completed course of rocephin. She developed mild hypoxic resp failure requiring HFNC likely 2/2 fluid overload due to fluid resuscitation from shock. Improving with diuretics. Echo with NL systolic function and inconclusive diastolic function. Subjective Pt s/e @ bedside. No major events overnight. Pt reports SOB improving. Denies CP. Denies abd pain, nvd. Objective Visit Vitals BP 98/65 (BP 1 Location: Left arm, BP Patient Position: At rest) Pulse 90 Temp 98.5 °F (36.9 °C) Resp 18 Ht 5' 6\" (1.676 m) Wt 67.1 kg (147 lb 14.9 oz) SpO2 90% BMI 23.88 kg/m² Physical Exam: General Appearance: NAD, conversant HENT: normocephalic/atraumatic, moist mucus membranes Neck: No JVD, supple Lungs: CTA with normal respiratory effort CV: RRR, no m/r/g Abdomen: soft, non-tender, normal bowel sounds Extremities: no cyanosis, no peripheral edema Neuro: No focal deficits, motor/sensory intact Skin: Normal color, intact Intake and Output: 
Current Shift:  01/17 0701 - 01/17 1900 In: -  
Out: 5493 [[id number]] Last three shifts:  01/15 1901 - 01/17 0700 In: 410 [P.O.:360; I.V.:50] Out: 4000 [[id number]] Lab/Data Reviewed: 
BMP:  
Lab Results Component Value Date/Time  01/17/2020 05:12 AM  
 K 3.3 (L) 01/17/2020 05:12 AM  
  01/17/2020 05:12 AM  
 CO2 29 01/17/2020 05:12 AM  
 AGAP 6 01/17/2020 05:12 AM  
 GLU 79 01/17/2020 05:12 AM  
 BUN 4 (L) 01/17/2020 05:12 AM  
 CREA 0.39 (L) 01/17/2020 05:12 AM  
 GFRAA >60 01/17/2020 05:12 AM  
 GFRNA >60 01/17/2020 05:12 AM  
 
 
Imaging Reviewed: 
No results found. Medications Reviewed: 
Current Facility-Administered Medications Medication Dose Route Frequency  bumetanide (BUMEX) injection 1 mg  1 mg IntraVENous BID  rifAXIMin (XIFAXAN) tablet 550 mg  550 mg Oral BID  nicotine (NICODERM CQ) 21 mg/24 hr patch 1 Patch  1 Patch TransDERmal DAILY  busPIRone (BUSPAR) tablet 10 mg  10 mg Oral BID PRN  
 escitalopram oxalate (LEXAPRO) tablet 10 mg  10 mg Oral DAILY  risperiDONE (RisperDAL) tablet 2 mg  2 mg Oral DAILY  thiamine mononitrate (B-1) tablet 100 mg  100 mg Oral DAILY  tiotropium bromide (SPIRIVA RESPIMAT) 2.5 mcg /actuation  1 Puff Inhalation DAILY  ondansetron (ZOFRAN) injection 4 mg  4 mg IntraVENous Q4H PRN  
 melatonin tablet 12 mg  12 mg Oral QHS PRN  
 acetaminophen (TYLENOL) tablet 500 mg  500 mg Oral Q6H PRN  
 LORazepam (ATIVAN) tablet 1 mg  1 mg Oral Q1H PRN  Or  
 LORazepam (ATIVAN) injection 1 mg  1 mg IntraVENous Q1H PRN  
  LORazepam (ATIVAN) tablet 2 mg  2 mg Oral Q1H PRN Or  
 LORazepam (ATIVAN) injection 2 mg  2 mg IntraVENous Q1H PRN  
 LORazepam (ATIVAN) injection 3 mg  3 mg IntraVENous Q15MIN PRN  
 budesonide (PULMICORT) 500 mcg/2 ml nebulizer suspension  500 mcg Nebulization BID RT  
 arformoterol (BROVANA) neb solution 15 mcg  15 mcg Nebulization BID RT  
 folic acid (FOLVITE) tablet 1 mg  1 mg Oral DAILY  ipratropium (ATROVENT) 0.02 % nebulizer solution 0.5 mg  0.5 mg Nebulization Q4H PRN  
 sodium chloride (NS) flush 5-10 mL  5-10 mL IntraVENous PRN Yusra Shafer PA-C [address] [place], [place] [address] Office:  [phone] Pager: [phone]

## 2020-01-17 NOTE — PROGRESS NOTES
Problem: Tissue Perfusion - Cardiopulmonary, Altered Goal: *Optimize tissue perfusion Outcome: Progressing Towards Goal 
Goal: *Absence of hypoxia Outcome: Progressing Towards Goal 
  
Problem: Alcohol Withdrawal 
Goal: *STG: Participates in treatment plan Outcome: Progressing Towards Goal 
Goal: *STG: Remains safe in hospital 
Outcome: Progressing Towards Goal 
Goal: *STG: Seeks staff when symptoms of withdrawal increase Outcome: Progressing Towards Goal 
Goal: *STG: Complies with medication therapy Outcome: Progressing Towards Goal 
Goal: *STG: Attends activities and groups Outcome: Progressing Towards Goal 
Goal: *STG: Will identify negative impact of chemical dependency including the use of tobacco, alcohol, and other substances Outcome: Progressing Towards Goal 
Goal: *STG: Verbalizes abstinence as an achievable goal 
Outcome: Progressing Towards Goal 
Goal: *STG: Agrees to participate in outpatient after care program to support ongoing mental health Outcome: Progressing Towards Goal 
Goal: *STG: Able to indentify relapse triggers including interpersonal/social and familial factors Outcome: Progressing Towards Goal 
Goal: *STG: Identify lifestyle changes to support long term sobriety such as vocation, employment, education, and legal issues Outcome: Progressing Towards Goal 
Goal: *STG: Maintains appropriate nutrition and hydration Outcome: Progressing Towards Goal 
Goal: *STG: Vital signs within defined limits Outcome: Progressing Towards Goal 
Goal: *STG/LTG: Relapse prevention plan in place to include housing/aftercare, leisure activities, and spirituality Outcome: Progressing Towards Goal 
Goal: Interventions Outcome: Progressing Towards Goal 
  
Problem: Pain Goal: *Control of Pain Outcome: Progressing Towards Goal 
  
Problem: Falls - Risk of 
Goal: *Absence of Falls Description Document Rob De León Fall Risk and appropriate interventions in the flowsheet. Outcome: Progressing Towards Goal 
Note: Fall Risk Interventions: 
Mobility Interventions: Patient to call before getting OOB Medication Interventions: Evaluate medications/consider consulting pharmacy, Patient to call before getting OOB, Teach patient to arise slowly Elimination Interventions: Call light in reach, Elevated toilet seat, Patient to call for help with toileting needs, Toileting schedule/hourly rounds History of Falls Interventions: Investigate reason for fall, Door open when patient unattended Problem: Pressure Injury - Risk of 
Goal: *Prevention of pressure injury Description Document Lex Scale and appropriate interventions in the flowsheet. Outcome: Progressing Towards Goal 
Note: Pressure Injury Interventions: 
Sensory Interventions: Assess changes in LOC, Keep linens dry and wrinkle-free, Minimize linen layers, Maintain/enhance activity level Moisture Interventions: Absorbent underpads, Internal/External urinary devices, Check for incontinence Q2 hours and as needed Activity Interventions: Increase time out of bed Mobility Interventions: HOB 30 degrees or less, Pressure redistribution bed/mattress (bed type) Nutrition Interventions: Document food/fluid/supplement intake Friction and Shear Interventions: HOB 30 degrees or less Problem: Discharge Planning Goal: *Discharge to safe environment Outcome: Progressing Towards Goal 
  
Problem: Patient Education: Go to Patient Education Activity Goal: Patient/Family Education Outcome: Progressing Towards Goal 
  
Problem: Patient Education: Go to Patient Education Activity Goal: Patient/Family Education Outcome: Progressing Towards Goal 
  
Problem: Breathing Pattern - Ineffective Goal: *Absence of hypoxia Outcome: Progressing Towards Goal 
Goal: *Use of effective breathing techniques Outcome: Progressing Towards Goal 
Goal: *PALLIATIVE CARE:  Alleviation of Dyspnea Outcome: Progressing Towards Goal 
  
Problem: Patient Education: Go to Patient Education Activity Goal: Patient/Family Education Outcome: Progressing Towards Goal 
  
Problem: Nutrition Deficit Goal: *Optimize nutritional status Outcome: Progressing Towards Goal

## 2020-01-17 NOTE — PROGRESS NOTES
Discharge/Transition Planning Plan remains Home and [place]. Acute illness would need to be resolved and o2 needs for Home(if required) due to chronic for insurance to cover. Medicaid does not allow self pay Cris Coombs RN BSN Outcomes Manager Pager # [phone]

## 2020-01-17 NOTE — PROGRESS NOTES
Respiratory Therapy Assessment Care Plan Patient: 
Manoj Crawford 63 y.o. female 1/17/2020 8:48 AM 
 
Acute hypotension [I95.9] Macrocytic anemia [D53.9] Thrombocytopenia ([address] [D69.6] Hypokalemia [E87.6] HENRRY (acute kidney injury) ([address] [N17.9] Leukopenia [D72.819] Pancytopenia ([address] [C39.185] UTI (urinary tract infection) [N39.0] Chest X-RAY:  
Results from Hospital Encounter encounter on 01/08/20 XR CHEST PORT Impression IMPRESSION: 
 
New basilar haziness, right more so than left, appearance suggestive of pleural 
effusions and associated atelectasis. XR CHEST PORT Impression IMPRESSION: 
 
Subtle nodular opacity in the peripheral right lower lung zone, likely 
superimposition. No definite focal consolidation. Results from Hospital Encounter encounter on 09/14/16 XR CHEST PA LAT Impression IMPRESSION: 
 
Moderate to marked COPD lungs. Moderate pulmonary fibrosis in upper lobes bilaterally. Mild streaky atelectatic/fibrotic changes at the basal right lung although 
subtle infiltrates are not excluded. Normal cardiac size. No evidence of cardiac decompensation. No evidence of pleural effusion or pneumothorax. Mild compression of T8 vertebral body, of undetermined age. Vital Signs:    
Visit Vitals BP 97/61 (BP 1 Location: Left arm, BP Patient Position: At rest) Pulse 95 Temp 98.6 °F (37 °C) Resp 18 Ht 5' 6\" (1.676 m) Wt 67.1 kg (147 lb 14.9 oz) SpO2 93% BMI 23.88 kg/m² Indications for treatment:  SOB / COPB / Chronic Cough / O2 dependent Plan of care: Leanne Mares / Robbie Carmona / Linda Jackson Goal: Reduction of O2 needs / Reduction of SOB with minor exertion and at rest

## 2020-01-17 NOTE — PROGRESS NOTES
0740-Bedside and Verbal shift change report given to Eben Avila RN (oncoming nurse) by Beulah Collins RN (offgoing nurse). Report included the following information SBAR, Kardex, Intake/Output, MAR and Recent Results.

## 2020-01-17 NOTE — PROGRESS NOTES
1930-Bedside and Verbal shift change report given to Chidi Sanchez (oncoming nurse) by Maria L Miranda RN (offgoing nurse). Report included the following information SBAR, Kardex, Intake/Output, MAR and Recent Results. 9731- Assisted to bathroom with standby assistance.

## 2020-01-17 NOTE — PROGRESS NOTES
Problem: Mobility Impaired (Adult and Pediatric) Goal: *Acute Goals and Plan of Care (Insert Text) Description Physical Therapy Goals Re-assessed 1/17/2020; goals remain appropriate Initiated 1/10/2020 and to be accomplished within 7 day(s) 1. Patient will move from supine to sit and sit to supine in bed with modified independence. 2.  Patient will transfer from bed to chair and chair to bed with modified independence using the least restrictive device. 3.  Patient will perform sit to stand with modified independence. 4.  Patient will ambulate with modified independence for 150 feet with the least restrictive device. 5.  Patient will ascend/descend 10 stairs with handrail(s) with modified independence. Outcome: Progressing Towards Goal 
 PHYSICAL THERAPY TREATMENT Patient: Oneil Santoyo (89 y.o. female) Date: 1/17/2020 Diagnosis: Acute hypotension [I95.9] Macrocytic anemia [D53.9] Thrombocytopenia ([address] [D69.6] Hypokalemia [E87.6] HENRRY (acute kidney injury) ([address] [N17.9] Leukopenia [D72.819] Pancytopenia ([address] [T89.119] UTI (urinary tract infection) [N39.0] Acute respiratory failure with hypoxia ([address] Procedure(s) (LRB): ESOPHAGOGASTRODUODENOSCOPY (EGD) (N/A) 7 Days Post-Op Precautions: Fall PLOF: Independent ASSESSMENT: 
 upon entry; seated up in bed. Supervision for supine to sit. Seated balance intact. Supervision for sit to stand. Amb 6ft with no AD and good safety awareness; supervision. Remained standing, 1 minute. . Returned to seated. Completed sit to stand, 2 additional trials to changes soiled briefs. Dynamic balance in sitting and standing required supervision. Supervision for sit to supine with HOB elevated.  at end of session. On 5L O2 during session. Reports amb to restroom with nursing staff during admission; however periwick in place at time of PT treatment. Would benefit from chair in room for OOB activity.   Educated on need for RN assistance with mobility; verbalized understanding. Call bell in reach. Progression toward goals:  
[]      Improving appropriately and progressing toward goals [x]      Improving slowly and progressing toward goals 
[]      Not making progress toward goals and plan of care will be adjusted PLAN: 
Patient continues to benefit from skilled intervention to address the above impairments. Continue treatment per established plan of care. Discharge Recommendations:  Home Health Further Equipment Recommendations for Discharge:  rolling walker SUBJECTIVE:  
Patient stated Truong Cat had a bowel movement this morning.  OBJECTIVE DATA SUMMARY:  
Critical Behavior: 
Neurologic State: Alert Orientation Level: Oriented X4 Cognition: Follows commands Psychosocial 
Patient Behaviors: Cooperative Functional Mobility: 
Bed Mobility: 
Supine to Sit: Supervision Sit to Supine: Supervision Transfers: 
Sit to Stand: Supervision Stand to Sit: Supervision Balance:  
Sitting: Intact Standing: Impaired Standing - Static: Good Standing - Dynamic : Good Ambulation/Gait Training: 
Distance (ft): 6 Feet (ft) Ambulation - Level of Assistance: Supervision Neuro Re-Education: 
Seated EOB 5 minutes Standing 1 minute Therapeutic Exercises:  
Sit to stand x3 Pain: 
Pain level pre-treatment: 0/10 Pain level post-treatment: 0/10 Activity Tolerance:  
Fair After treatment:  
[] Patient left in no apparent distress sitting up in chair 
[x] Patient left in no apparent distress in bed 
[x] Call bell left within reach [x] Nursing notified 
[] Caregiver present 
[] Bed alarm activated 
[] SCDs applied COMMUNICATION/EDUCATION:  
[x]         Role of physical therapy in the acute care setting. [x]         Fall prevention education was provided and the patient/caregiver indicated understanding. [x]         Patient/family have participated as able in working toward goals and plan of care. [x]         Patient/family agree to work toward stated goals and plan of care. []         Patient understands intent and goals of therapy, but is neutral about his/her participation. []         Patient is unable to participate in stated goals/plan of care: ongoing with therapy staff. Brissa Farrell, PT Time Calculation: 10 mins

## 2020-01-18 LAB
ANION GAP SERPL CALC-SCNC: 5 MMOL/L (ref 3–18)
BUN SERPL-MCNC: 5 MG/DL (ref 7–18)
BUN/CREAT SERPL: 11 (ref 12–20)
CALCIUM SERPL-MCNC: 8.3 MG/DL (ref 8.5–10.1)
CHLORIDE SERPL-SCNC: 102 MMOL/L (ref 100–111)
CO2 SERPL-SCNC: 31 MMOL/L (ref 21–32)
CREAT SERPL-MCNC: 0.46 MG/DL (ref 0.6–1.3)
GLUCOSE SERPL-MCNC: 79 MG/DL (ref 74–99)
MAGNESIUM SERPL-MCNC: 1.6 MG/DL (ref 1.6–2.6)
POTASSIUM SERPL-SCNC: 3.2 MMOL/L (ref 3.5–5.5)
SODIUM SERPL-SCNC: 138 MMOL/L (ref 136–145)

## 2020-01-18 PROCEDURE — 77030038269 HC DRN EXT URIN PURWCK BARD -A

## 2020-01-18 PROCEDURE — 94761 N-INVAS EAR/PLS OXIMETRY MLT: CPT

## 2020-01-18 PROCEDURE — 74011000250 HC RX REV CODE- 250: Performed by: INTERNAL MEDICINE

## 2020-01-18 PROCEDURE — 74011000250 HC RX REV CODE- 250: Performed by: HOSPITALIST

## 2020-01-18 PROCEDURE — 74011250637 HC RX REV CODE- 250/637: Performed by: INTERNAL MEDICINE

## 2020-01-18 PROCEDURE — 65270000029 HC RM PRIVATE

## 2020-01-18 PROCEDURE — 83735 ASSAY OF MAGNESIUM: CPT

## 2020-01-18 PROCEDURE — 94640 AIRWAY INHALATION TREATMENT: CPT

## 2020-01-18 PROCEDURE — 36415 COLL VENOUS BLD VENIPUNCTURE: CPT

## 2020-01-18 PROCEDURE — 97110 THERAPEUTIC EXERCISES: CPT

## 2020-01-18 PROCEDURE — 77010033678 HC OXYGEN DAILY

## 2020-01-18 PROCEDURE — 80048 BASIC METABOLIC PNL TOTAL CA: CPT

## 2020-01-18 PROCEDURE — 97116 GAIT TRAINING THERAPY: CPT

## 2020-01-18 RX ORDER — POTASSIUM CHLORIDE 750 MG/1
40 TABLET, FILM COATED, EXTENDED RELEASE ORAL
Status: DISCONTINUED | OUTPATIENT
Start: 2020-01-18 | End: 2020-01-18

## 2020-01-18 RX ORDER — POTASSIUM CHLORIDE 20 MEQ/1
40 TABLET, EXTENDED RELEASE ORAL
Status: COMPLETED | OUTPATIENT
Start: 2020-01-18 | End: 2020-01-18

## 2020-01-18 RX ADMIN — RIFAXIMIN 550 MG: 550 TABLET ORAL at 09:48

## 2020-01-18 RX ADMIN — ESCITALOPRAM 10 MG: 10 TABLET, FILM COATED ORAL at 09:47

## 2020-01-18 RX ADMIN — RISPERIDONE 2 MG: 0.5 TABLET, FILM COATED ORAL at 09:47

## 2020-01-18 RX ADMIN — LORAZEPAM 1 MG: 1 TABLET ORAL at 10:02

## 2020-01-18 RX ADMIN — BUMETANIDE 1 MG: 0.25 INJECTION INTRAMUSCULAR; INTRAVENOUS at 09:48

## 2020-01-18 RX ADMIN — MELATONIN TAB 3 MG 12 MG: 3 TAB at 21:47

## 2020-01-18 RX ADMIN — BUDESONIDE 500 MCG: 0.5 SUSPENSION RESPIRATORY (INHALATION) at 19:16

## 2020-01-18 RX ADMIN — BUMETANIDE 1 MG: 0.25 INJECTION INTRAMUSCULAR; INTRAVENOUS at 17:58

## 2020-01-18 RX ADMIN — RIFAXIMIN 550 MG: 550 TABLET ORAL at 17:58

## 2020-01-18 RX ADMIN — ARFORMOTEROL TARTRATE 15 MCG: 15 SOLUTION RESPIRATORY (INHALATION) at 19:16

## 2020-01-18 RX ADMIN — POTASSIUM CHLORIDE 40 MEQ: 1500 TABLET, EXTENDED RELEASE ORAL at 09:46

## 2020-01-18 RX ADMIN — ARFORMOTEROL TARTRATE 15 MCG: 15 SOLUTION RESPIRATORY (INHALATION) at 08:59

## 2020-01-18 RX ADMIN — TIOTROPIUM BROMIDE INHALATION SPRAY 1 PUFF: 3.12 SPRAY, METERED RESPIRATORY (INHALATION) at 08:59

## 2020-01-18 RX ADMIN — Medication 100 MG: at 09:47

## 2020-01-18 RX ADMIN — FOLIC ACID 1 MG: 1 TABLET ORAL at 09:47

## 2020-01-18 RX ADMIN — BUDESONIDE 500 MCG: 0.5 SUSPENSION RESPIRATORY (INHALATION) at 08:59

## 2020-01-18 NOTE — PROGRESS NOTES
Problem: Mobility Impaired (Adult and Pediatric) Goal: *Acute Goals and Plan of Care (Insert Text) Description Physical Therapy Goals Re-assessed 1/17/2020; goals remain appropriate Initiated 1/10/2020 and to be accomplished within 7 day(s) 1. Patient will move from supine to sit and sit to supine in bed with modified independence. 2.  Patient will transfer from bed to chair and chair to bed with modified independence using the least restrictive device. 3.  Patient will perform sit to stand with modified independence. 4.  Patient will ambulate with modified independence for 150 feet with the least restrictive device. 5.  Patient will ascend/descend 10 stairs with handrail(s) with modified independence. Outcome: Progressing Towards Goal 
 PHYSICAL THERAPY TREATMENT Patient: Tang Kim (74 y.o. female) Date: 1/18/2020 Diagnosis: Acute hypotension [I95.9] Macrocytic anemia [D53.9] Thrombocytopenia ([address] [D69.6] Hypokalemia [E87.6] HENRRY (acute kidney injury) ([address] [N17.9] Leukopenia [D72.819] Pancytopenia ([address] [Y00.455] UTI (urinary tract infection) [N39.0] Acute respiratory failure with hypoxia ([address] Procedure(s) (LRB): ESOPHAGOGASTRODUODENOSCOPY (EGD) (N/A) 8 Days Post-Op Precautions: Fall PLOF:  
 
ASSESSMENT: 
Pt agreeing to PT. Presents in bed. Participated bed level ex., seated LE ex.,  gait and transfer training. Current 4 L 02. VS: pre activity BP 94/67, 02 4L 95%, and HR 97; post ambulation seated HR initially 129, 02 92%,  and BP 92/64; HR decreasing to 106 w 5 min rest break, no SOB. Reports \"thighs wobbly\" with ambulation. Encouraged to perform LE ex., q 2hr while awake and in bed. Pt up in recliner end of session. Progression toward goals:  
[x]      Improving appropriately and progressing toward goals PLAN: 
Patient continues to benefit from skilled intervention to address the above impairments. Continue treatment per established plan of care. Discharge Recommendations:  home w/ assistance. Pt. reports her fiance able to assist in mornings and evenings. Further Equipment Recommendations for Discharge: RW  
 
SUBJECTIVE:  
Patient stated Rolf Wood want to go home.  OBJECTIVE DATA SUMMARY:  
Critical Behavior: 
Neurologic State: Alert Orientation Level: Oriented X4 Cognition: Appropriate decision making, Appropriate for age attention/concentration, Appropriate safety awareness, Follows commands Safety/Judgement: Fall prevention Functional Mobility Training: 
Bed Mobility: 
Rolling: Independent; Modified independent Supine to Sit: Independent; Modified independent Transfers: 
Sit to Stand: Independent; Modified independent Stand to Sit: Independent; Modified independent Bed to Chair: Independent; Modified independent Interventions: Safety awareness training Balance: 
Sitting: Intact Standing: Impaired; With support Standing - Static: Good Standing - Dynamic : Good Ambulation/Gait Training: 
Distance (ft): 100 Feet (ft) Assistive Device: Gait belt;Walker, rolling; Other (comment)(portable 02) Ambulation - Level of Assistance: Supervision Gait Abnormalities: Altered arm swing;Decreased step clearance Speed/Allie: Slow Step Length: Left shortened;Right shortened Interventions: Safety awareness training;Verbal cues; Other (comment)(monitor VS; Pursed lip breathing) Therapeutic Exercises:  
 
 
EXERCISE Sets Reps Active Active Assist  
Passive Self ROM Comments Ankle Pumps 1 10  [x] [] [] [] Quad Sets 1 10  [x] [] [] [] Hold for 5 secs Long Arc Quads 1 10 [x] [] [] []   
 
 
Pain: 
Pain level pre-treatment: 0/10 Pain level post-treatment: 0/10 Activity Tolerance:  
Poor: HR to 129 with activity on 4L Please refer to the flowsheet for vital signs taken during this treatment. After treatment:  
[x] Patient left in no apparent distress sitting up in recliner chair and LE's elevated 
[x] Call bell left within reach [x] Nursing notified COMMUNICATION/EDUCATION:  
[x]         Role of Physical Therapy in the acute care setting. [x]         Fall prevention education was provided and the patient/caregiver indicated understanding. [x]         Patient/family agree to work toward stated goals and plan of care. [x]         Other:  Instructed PLPRASAD Jaime PTA Time Calculation: 37 mins

## 2020-01-18 NOTE — PROGRESS NOTES
Respiratory Therapy Assessment Care Plan Patient: 
Michell De León 63 y.o. female 1/18/2020 9:04 AM 
 
Acute hypotension [I95.9] Macrocytic anemia [D53.9] Thrombocytopenia ([address] [D69.6] Hypokalemia [E87.6] HENRRY (acute kidney injury) ([address] [N17.9] Leukopenia [D72.819] Pancytopenia ([address] [F34.914] UTI (urinary tract infection) [N39.0] Chest X-RAY:  
Results from Hospital Encounter encounter on 01/08/20 XR CHEST PORT Impression IMPRESSION: 
 
New basilar haziness, right more so than left, appearance suggestive of pleural 
effusions and associated atelectasis. XR CHEST PORT Impression IMPRESSION: 
 
Subtle nodular opacity in the peripheral right lower lung zone, likely 
superimposition. No definite focal consolidation. Results from Hospital Encounter encounter on 09/14/16 XR CHEST PA LAT Impression IMPRESSION: 
 
Moderate to marked COPD lungs. Moderate pulmonary fibrosis in upper lobes bilaterally. Mild streaky atelectatic/fibrotic changes at the basal right lung although 
subtle infiltrates are not excluded. Normal cardiac size. No evidence of cardiac decompensation. No evidence of pleural effusion or pneumothorax. Mild compression of T8 vertebral body, of undetermined age. Vital Signs:   Visit Vitals BP 92/61 Pulse 82 Temp 98 °F (36.7 °C) Resp 18 Ht 5' 6\" (1.676 m) Wt 64.7 kg (142 lb 9.6 oz) SpO2 98% BMI 23.02 kg/m² Indications for treatment: History of COPD Plan of care: Brovana Q12H and Pulmicort Q12H and Spiriva as ordered per physician. Continue use of oxygen therapy as needed per patient to decrease WOB. Goal: Titrate oxygen as tolerated per patient. Continue to improve oxygenation and ventilation.

## 2020-01-18 NOTE — PROGRESS NOTES
01/18/20 1140 Vital Signs Temp 98.1 °F (36.7 °C) Temp Source Oral  
Pulse (Heart Rate) (!) 104 Heart Rate Source Monitor Cardiac Rhythm NSR Resp Rate 18  
O2 Sat (%) 96 % Level of Consciousness Alert BP 96/66 
(actual number) MAP (Calculated) 76 BP 1 Method Automatic  
BP 1 Location Left arm BP Patient Position At rest  
MEWS Score 3 Alarms Set and Audible Cardiac alarms Box Number 28 Electrodes Replaced Yes Pain 1 Pain Scale 1 Numeric (0 - 10) Pain Intensity 1 0 Patient Stated Pain Goal 0 Pain Reassessment 1 Yes MD aware elevated HR.

## 2020-01-18 NOTE — PROGRESS NOTES
Internal Medicine Progress Note Patient's Name: Zach Aaron Admit Date: 1/8/2020 Length of Stay: 9 Assessment/Plan Principal Problem: 
  Acute respiratory failure with hypoxia ([address] (1/9/2020) Active Problems: COPD (chronic obstructive pulmonary disease) (HCC) () Alcohol-induced pancreatitis (1/14/2020) Cirrhosis ([address] (1/9/2020) HENRRY (acute kidney injury) [facility] (1/9/2020) ETOH abuse () Bipolar mood disorder (HCC) () Hypokalemia (1/9/2020) UTI (urinary tract infection) (1/9/2020) Pancytopenia ([address] (1/9/2020) Acute hypotension (1/9/2020) Fluid overload (1/14/2020) Acute hypoxic resp failure 
- likely 2/2 fluid overload due to fluid resuscitation from shock. - cont bumex and monitor response 
- strict I&Os - good urine output. - monitor BMP and replace lytes PRN 
- echo with NL systolic function and inconclusive diastolic function 
- on NC, wean down as tolerated. Not on home O2; goal O2 sats 89% or above. Have asked she be weaned to 2L from 4L. - hoping to be able to discharge tomorrow COPD 
- cont brovana/pulmicort 
- PRN atrovent 
- wean O2 as tolerated ETOH induce pancreatitis and cirrhosis - symptomatically improved, tolerating PO intake - appreciate GI 
- EGD showed non-specific gastritis  
- HIDA negative 
- refused MRCP 
- Cont xifaxin, magox,neutraphos - LFTs improving - OP f/u with GI 
 
UTI 
- completed course of rocephin 
- UCx with ecoli HENRRY 
- resolved - Cont acceptable home medications for chronic conditions  
- DVT protocol I have personally reviewed all pertinent labs and films that have officially resulted over the last 24 hours. I have personally checked for all pending labs that are awaiting final results. HPI Per H&P, \"Bronwyn Pisano is a 63 y.o. female who presents to [facility] ER with complaint of Hypotension and Nausea.   Patient was reportedly referred to [facility] ER by her Psychiatrist when she was seen on 1/08/2020 and her Blood Pressure was low. Patient states that she has been having fevers, chills, diaphoresis, BLE edema, a cough productive of clear mucus, and intermittent chest congestion. Patient also reports a UTI 2 weeks ago and continuing urinary urgency. Patient also reports nausea and vomiting with reduced oral intake for the last 3 days accompanied by \"a little bit of diarrhea. \"  Patient states that she has had chronically low blood pressure even before she was diagnosed with liver cirrhosis. Patient reports that she has had ascites before and that she does continue to drink 3 shots of vodka a night to improve her sleeping. 
  
In [facility] ER, Patient was noted to have Blood Pressures of 70/38 and 49/90 mm Hg and a borderline Urinalysis for UTI. Patient was started on IV Norepinephrine for hypotension. 
  
Patient is admitted to [facility] ICU for management of Hypotension requiring Pressors, HENRRY likely due to Dehydration, and borderline UTI. \" Interval History There was some concern on imaging for possible pancreatitis vs acute sherrie. GI and surgery were consulted. No surgical intervention needed per GS. EGD done 1/10 showing gastritis. HIDA scan negative. Patient refused to have MRCP. LFTs and abd pain improved. HENRRY resolved with IV fluids. UCx resulted with E coli. BCx remained negative. Patient completed course of rocephin. She developed mild hypoxic resp failure requiring HFNC likely 2/2 fluid overload due to fluid resuscitation from shock. Improving with diuretics. Echo with NL systolic function and inconclusive diastolic function. Subjective Pt s/e @ bedside. No major events overnight. Pt reports SOB improving. Denies CP. Denies abd pain, nvd. Stable on 4L, weaning down Objective Visit Vitals /75 Pulse (!) 109 Temp 98.7 °F (37.1 °C) Resp 18 Ht 5' 6\" (1.676 m) Wt 64.7 kg (142 lb 9.6 oz) SpO2 95% BMI 23.02 kg/m² Physical Exam: 
General Appearance: NAD, conversant HENT: normocephalic/atraumatic, moist mucus membranes Neck: No JVD, supple Lungs: CTA with normal respiratory effort CV: RRR, no m/r/g Abdomen: soft, non-tender, normal bowel sounds Extremities: no cyanosis, no peripheral edema Neuro: No focal deficits, motor/sensory intact Skin: Normal color, intact Intake and Output: 
Current Shift:  01/18 0701 - 01/18 1900 In: -  
Out: 1100 [Urine:1100] Last three shifts:  01/16 1901 - 01/18 0700 In: -  
Out: 9480 [FTSCS:1305] Lab/Data Reviewed: 
BMP:  
Lab Results Component Value Date/Time  01/18/2020 05:30 AM  
 K 3.2 (L) 01/18/2020 05:30 AM  
  01/18/2020 05:30 AM  
 CO2 31 01/18/2020 05:30 AM  
 AGAP 5 01/18/2020 05:30 AM  
 GLU 79 01/18/2020 05:30 AM  
 BUN 5 (L) 01/18/2020 05:30 AM  
 CREA 0.46 (L) 01/18/2020 05:30 AM  
 GFRAA >60 01/18/2020 05:30 AM  
 GFRNA >60 01/18/2020 05:30 AM  
 
 
Imaging Reviewed: 
No results found. Medications Reviewed: 
Current Facility-Administered Medications Medication Dose Route Frequency  bumetanide (BUMEX) injection 1 mg  1 mg IntraVENous BID  rifAXIMin (XIFAXAN) tablet 550 mg  550 mg Oral BID  nicotine (NICODERM CQ) 21 mg/24 hr patch 1 Patch  1 Patch TransDERmal DAILY  busPIRone (BUSPAR) tablet 10 mg  10 mg Oral BID PRN  
 escitalopram oxalate (LEXAPRO) tablet 10 mg  10 mg Oral DAILY  risperiDONE (RisperDAL) tablet 2 mg  2 mg Oral DAILY  thiamine mononitrate (B-1) tablet 100 mg  100 mg Oral DAILY  tiotropium bromide (SPIRIVA RESPIMAT) 2.5 mcg /actuation  1 Puff Inhalation DAILY  ondansetron (ZOFRAN) injection 4 mg  4 mg IntraVENous Q4H PRN  
 melatonin tablet 12 mg  12 mg Oral QHS PRN  
 acetaminophen (TYLENOL) tablet 500 mg  500 mg Oral Q6H PRN  
 LORazepam (ATIVAN) tablet 1 mg  1 mg Oral Q1H PRN  Or  
 LORazepam (ATIVAN) injection 1 mg  1 mg IntraVENous Q1H PRN  
  LORazepam (ATIVAN) tablet 2 mg  2 mg Oral Q1H PRN Or  
 LORazepam (ATIVAN) injection 2 mg  2 mg IntraVENous Q1H PRN  
 LORazepam (ATIVAN) injection 3 mg  3 mg IntraVENous Q15MIN PRN  
 budesonide (PULMICORT) 500 mcg/2 ml nebulizer suspension  500 mcg Nebulization BID RT  
 arformoterol (BROVANA) neb solution 15 mcg  15 mcg Nebulization BID RT  
 folic acid (FOLVITE) tablet 1 mg  1 mg Oral DAILY  ipratropium (ATROVENT) 0.02 % nebulizer solution 0.5 mg  0.5 mg Nebulization Q4H PRN  
 sodium chloride (NS) flush 5-10 mL  5-10 mL IntraVENous PRN Dominique Norwood PA-C [address] [place], [place] [address] Division Office:  [phone] Pager: [phone]

## 2020-01-18 NOTE — PROGRESS NOTES
Assume care of patient lying in bed with visitor at bedside. Alert and oriented X 4. Bed locked in lowest position. Call light within reach and understand to use for assistance and needs. 2107 Ativan 1 mg administered orally for CIWA of 9. Patient extremely anxious. 2230 Patient resting quietly with eyes closed without complaint voiced. 01/18/2020 
 
0710 Bedside and Verbal shift change report given to Kevin Mattson RN (oncoming nurse) by Prakash Hall RN (offgoing nurse). Report given with SBAR, Kardex, Intake/Output, MAR and Recent Results.

## 2020-01-18 NOTE — PROGRESS NOTES
Problem: Alcohol Withdrawal 
Goal: *STG: Participates in treatment plan Outcome: Progressing Towards Goal 
  
Problem: Falls - Risk of 
Goal: *Absence of Falls Description Document Sofie Graves Fall Risk and appropriate interventions in the flowsheet. Outcome: Progressing Towards Goal 
Note: Fall Risk Interventions: 
Mobility Interventions: Communicate number of staff needed for ambulation/transfer, Patient to call before getting OOB Mentation Interventions: Adequate sleep, hydration, pain control Medication Interventions: Patient to call before getting OOB Elimination Interventions: Call light in reach, Patient to call for help with toileting needs, Stay With Me (per policy) History of Falls Interventions: Door open when patient unattended, Room close to nurse's station Problem: Pressure Injury - Risk of 
Goal: *Prevention of pressure injury Description Document Lex Scale and appropriate interventions in the flowsheet. Outcome: Progressing Towards Goal 
Note: Pressure Injury Interventions: 
Sensory Interventions: Assess changes in LOC Moisture Interventions: Apply protective barrier, creams and emollients, Absorbent underpads Activity Interventions: Increase time out of bed, Pressure redistribution bed/mattress(bed type) Mobility Interventions: HOB 30 degrees or less Nutrition Interventions: Document food/fluid/supplement intake Friction and Shear Interventions: Foam dressings/transparent film/skin sealants, HOB 30 degrees or less Problem: Discharge Planning Goal: *Discharge to safe environment Outcome: Progressing Towards Goal

## 2020-01-18 NOTE — PROGRESS NOTES
Received telephone orders from [place], [place] stating to wean the patient's O2  from 4 liters to 2 liters via nasal cannula. PA stated that the patient is not on O2 at home and to maintain O2 saturation at 89% or above continuous. Will continue to monitor.

## 2020-01-19 LAB
ANION GAP SERPL CALC-SCNC: 2 MMOL/L (ref 3–18)
BUN SERPL-MCNC: 4 MG/DL (ref 7–18)
BUN/CREAT SERPL: 11 (ref 12–20)
CALCIUM SERPL-MCNC: 8.3 MG/DL (ref 8.5–10.1)
CHLORIDE SERPL-SCNC: 104 MMOL/L (ref 100–111)
CO2 SERPL-SCNC: 31 MMOL/L (ref 21–32)
CREAT SERPL-MCNC: 0.35 MG/DL (ref 0.6–1.3)
GLUCOSE SERPL-MCNC: 79 MG/DL (ref 74–99)
MAGNESIUM SERPL-MCNC: 1.5 MG/DL (ref 1.6–2.6)
POTASSIUM SERPL-SCNC: 3.5 MMOL/L (ref 3.5–5.5)
SODIUM SERPL-SCNC: 137 MMOL/L (ref 136–145)

## 2020-01-19 PROCEDURE — 94761 N-INVAS EAR/PLS OXIMETRY MLT: CPT

## 2020-01-19 PROCEDURE — 74011250637 HC RX REV CODE- 250/637: Performed by: INTERNAL MEDICINE

## 2020-01-19 PROCEDURE — 83735 ASSAY OF MAGNESIUM: CPT

## 2020-01-19 PROCEDURE — 97110 THERAPEUTIC EXERCISES: CPT

## 2020-01-19 PROCEDURE — 74011000250 HC RX REV CODE- 250: Performed by: HOSPITALIST

## 2020-01-19 PROCEDURE — 65270000029 HC RM PRIVATE

## 2020-01-19 PROCEDURE — 74011000250 HC RX REV CODE- 250: Performed by: INTERNAL MEDICINE

## 2020-01-19 PROCEDURE — 97530 THERAPEUTIC ACTIVITIES: CPT

## 2020-01-19 PROCEDURE — 94640 AIRWAY INHALATION TREATMENT: CPT

## 2020-01-19 PROCEDURE — 77030038269 HC DRN EXT URIN PURWCK BARD -A

## 2020-01-19 PROCEDURE — 77010033678 HC OXYGEN DAILY

## 2020-01-19 PROCEDURE — 80048 BASIC METABOLIC PNL TOTAL CA: CPT

## 2020-01-19 PROCEDURE — 36415 COLL VENOUS BLD VENIPUNCTURE: CPT

## 2020-01-19 RX ADMIN — Medication 100 MG: at 09:20

## 2020-01-19 RX ADMIN — RISPERIDONE 2 MG: 0.5 TABLET, FILM COATED ORAL at 09:20

## 2020-01-19 RX ADMIN — ESCITALOPRAM 10 MG: 10 TABLET, FILM COATED ORAL at 09:20

## 2020-01-19 RX ADMIN — BUDESONIDE 500 MCG: 0.5 SUSPENSION RESPIRATORY (INHALATION) at 12:12

## 2020-01-19 RX ADMIN — FOLIC ACID 1 MG: 1 TABLET ORAL at 09:20

## 2020-01-19 RX ADMIN — BUMETANIDE 1 MG: 0.25 INJECTION INTRAMUSCULAR; INTRAVENOUS at 09:21

## 2020-01-19 RX ADMIN — ARFORMOTEROL TARTRATE 15 MCG: 15 SOLUTION RESPIRATORY (INHALATION) at 20:46

## 2020-01-19 RX ADMIN — ARFORMOTEROL TARTRATE 15 MCG: 15 SOLUTION RESPIRATORY (INHALATION) at 12:12

## 2020-01-19 RX ADMIN — BUDESONIDE 500 MCG: 0.5 SUSPENSION RESPIRATORY (INHALATION) at 20:46

## 2020-01-19 RX ADMIN — RIFAXIMIN 550 MG: 550 TABLET ORAL at 17:32

## 2020-01-19 RX ADMIN — TIOTROPIUM BROMIDE INHALATION SPRAY 1 PUFF: 3.12 SPRAY, METERED RESPIRATORY (INHALATION) at 12:00

## 2020-01-19 RX ADMIN — RIFAXIMIN 550 MG: 550 TABLET ORAL at 09:20

## 2020-01-19 NOTE — PROGRESS NOTES
Problem: Tissue Perfusion - Cardiopulmonary, Altered Goal: *Optimize tissue perfusion Outcome: Progressing Towards Goal 
Goal: *Absence of hypoxia Outcome: Progressing Towards Goal 
  
Goal: *STG: Remains safe in hospital 
Outcome: Progressing Towards Goal 
Goal: *STG: Complies with medication therapy Outcome: Progressing Towards Goal 
Goal: *STG: Attends activities and groups Outcome: Progressing Towards Goal 
Goal: *STG: Will identify negative impact of chemical dependency including the use of tobacco, alcohol, and other substances Outcome: Progressing Towards Goal 
Goal: *STG: Verbalizes abstinence as an achievable goal 
Outcome: Progressing Towards Goal 
Goal: *STG: Agrees to participate in outpatient after care program to support ongoing mental health Outcome: Progressing Towards Goal 
Goal: *STG: Able to indentify relapse triggers including interpersonal/social and familial factors Outcome: Progressing Towards Goal 
Goal: *STG: Identify lifestyle changes to support long term sobriety such as vocation, employment, education, and legal issues Outcome: Progressing Towards Goal 
Goal: *STG: Maintains appropriate nutrition and hydration Outcome: Progressing Towards Goal 
Goal: *STG: Vital signs within defined limits Outcome: Progressing Towards Goal 
Goal: *STG/LTG: Relapse prevention plan in place to include housing/aftercare, leisure activities, and spirituality Outcome: Progressing Towards Goal 
Goal: Interventions Outcome: Progressing Towards Goal 
  
Problem: Pain Goal: *Control of Pain Outcome: Progressing Towards Goal 
  
Problem: Falls - Risk of 
Goal: *Absence of Falls Description Document Jacky Bucio Fall Risk and appropriate interventions in the flowsheet. Outcome: Progressing Towards Goal 
Note: Fall Risk Interventions: 
Mobility Interventions: Patient to call before getting OOB Mentation Interventions: Adequate sleep, hydration, pain control Medication Interventions: Teach patient to arise slowly Elimination Interventions: Call light in reach History of Falls Interventions: Room close to nurse's station Problem: Pressure Injury - Risk of 
Goal: *Prevention of pressure injury Description Document Lex Scale and appropriate interventions in the flowsheet. Outcome: Progressing Towards Goal 
Note: Pressure Injury Interventions: 
Sensory Interventions: Assess changes in LOC Moisture Interventions: Absorbent underpads Activity Interventions: Increase time out of bed Mobility Interventions: HOB 30 degrees or less Nutrition Interventions: Document food/fluid/supplement intake Friction and Shear Interventions: HOB 30 degrees or less Problem: Discharge Planning Goal: *Discharge to safe environment Outcome: Progressing Towards Goal 
  
Problem: Patient Education: Go to Patient Education Activity Goal: Patient/Family Education Outcome: Progressing Towards Goal 
  
Problem: Patient Education: Go to Patient Education Activity Goal: Patient/Family Education Outcome: Progressing Towards Goal 
  
Problem: Breathing Pattern - Ineffective Goal: *Absence of hypoxia Outcome: Progressing Towards Goal 
Goal: *Use of effective breathing techniques Outcome: Progressing Towards Goal 
 
Problem: Patient Education: Go to Patient Education Activity Goal: Patient/Family Education Outcome: Progressing Towards Goal 
  
Problem: Nutrition Deficit Goal: *Optimize nutritional status Outcome: Progressing Towards Goal 
  
Problem: Tissue Perfusion - Cardiopulmonary, Altered Goal: *Absence of hypoxia Outcome: Progressing Towards Goal 
  
 
Problem: Falls - Risk of 
Goal: *Absence of Falls Description Document Teo Arroyo Fall Risk and appropriate interventions in the flowsheet. Outcome: Progressing Towards Goal 
Note: Fall Risk Interventions: 
Mobility Interventions: Patient to call before getting OOB Mentation Interventions: Adequate sleep, hydration, pain control Medication Interventions: Teach patient to arise slowly Elimination Interventions: Call light in reach History of Falls Interventions: Room close to nurse's station Problem: Breathing Pattern - Ineffective Goal: *Use of effective breathing techniques Outcome: Progressing Towards Goal

## 2020-01-19 NOTE — PROGRESS NOTES
Internal Medicine Progress Note Patient's Name: Edmond Verma Admit Date: 1/8/2020 Length of Stay: 10 
 
 
Assessment/Plan Principal Problem: 
  Acute respiratory failure with hypoxia ([address] (1/9/2020) Active Problems: COPD (chronic obstructive pulmonary disease) (HCC) () Alcohol-induced pancreatitis (1/14/2020) Cirrhosis ([address] (1/9/2020) HENRRY (acute kidney injury) ([address] (1/9/2020) ETOH abuse () Bipolar mood disorder (HCC) () Hypokalemia (1/9/2020) UTI (urinary tract infection) (1/9/2020) Pancytopenia ([address] (1/9/2020) Acute hypotension (1/9/2020) Fluid overload (1/14/2020) Acute hypoxic resp failure 
- likely 2/2 fluid overload due to fluid resuscitation from shock. - strict I&Os - good urine output. - monitor BMP and replace lytes PRN 
- echo with NL systolic function and inconclusive diastolic function 
- on NC, wean down as tolerated. Not on home O2; goal O2 sats 89% or above 
- continuous O2 on RA at rest on 1/19 - 91-94% 
- down to 83-87% with ambulation. PT recommending IPR 
- BP borderline now, will stop diuresis. Patient will likely need home O2. Was able to wean from 10L --> 2L NC with diuresis COPD 
- cont brovana/pulmicort 
- PRN atrovent 
- wean O2 as tolerated ETOH induce pancreatitis and cirrhosis - symptomatically improved, tolerating PO intake - appreciate GI 
- EGD showed non-specific gastritis  
- HIDA negative 
- refused MRCP 
- Cont xifaxin, magox,neutraphos - LFTs improving - OP f/u with GI 
 
UTI 
- completed course of rocephin 
- UCx with ecoli HENRRY 
- resolved - Cont acceptable home medications for chronic conditions  
- DVT protocol I have personally reviewed all pertinent labs and films that have officially resulted over the last 24 hours. I have personally checked for all pending labs that are awaiting final results.  
 
HPI  
 
 Per H&P, \"Bronwyn Keenan is a 63 y.o. female who presents to [facility] ER with complaint of Hypotension and Nausea. Patient was reportedly referred to [facility] ER by her Psychiatrist when she was seen on 1/08/2020 and her Blood Pressure was low. Patient states that she has been having fevers, chills, diaphoresis, BLE edema, a cough productive of clear mucus, and intermittent chest congestion. Patient also reports a UTI 2 weeks ago and continuing urinary urgency. Patient also reports nausea and vomiting with reduced oral intake for the last 3 days accompanied by \"a little bit of diarrhea. \"  Patient states that she has had chronically low blood pressure even before she was diagnosed with liver cirrhosis. Patient reports that she has had ascites before and that she does continue to drink 3 shots of vodka a night to improve her sleeping. 
  
In [facility] ER, Patient was noted to have Blood Pressures of 70/38 and 49/90 mm Hg and a borderline Urinalysis for UTI. Patient was started on IV Norepinephrine for hypotension. 
  
Patient is admitted to [facility] ICU for management of Hypotension requiring Pressors, HENRRY likely due to Dehydration, and borderline UTI. \" Interval History There was some concern on imaging for possible pancreatitis vs acute sherrie. GI and surgery were consulted. No surgical intervention needed per GS. EGD done 1/10 showing gastritis. HIDA scan negative. Patient refused to have MRCP. LFTs and abd pain improved. HENRRY resolved with IV fluids. UCx resulted with E coli. BCx remained negative. Patient completed course of rocephin. She developed mild hypoxic resp failure requiring HFNC likely 2/2 fluid overload due to fluid resuscitation from shock. Improved with diuretics, but patient still requiring 2L NC. Echo with NL systolic function and inconclusive diastolic function. PT recs IPR. Subjective Pt s/e @ bedside. No major events overnight. Pt reports SOB improving. Denies CP. Denies abd pain, nvd. Stable at rest on RA, but desats with exertion. Objective Visit Vitals BP 94/71 Pulse 100 Temp 99.8 °F (37.7 °C) Resp 18 Ht 5' 6\" (1.676 m) Wt 63.5 kg (140 lb) SpO2 91% BMI 22.60 kg/m² Physical Exam: 
General Appearance: NAD, conversant HENT: normocephalic/atraumatic, moist mucus membranes Neck: No JVD, supple Lungs: CTA with normal respiratory effort CV: RRR, no m/r/g Abdomen: soft, non-tender, normal bowel sounds Extremities: no cyanosis, no peripheral edema Neuro: No focal deficits, motor/sensory intact Skin: Normal color, intact Intake and Output: 
Current Shift:  No intake/output data recorded. Last three shifts:  01/17 1901 - 01/19 0700 In: 360 [P.O.:360] Out: 2700 [Urine:2700] Lab/Data Reviewed: 
BMP:  
Lab Results Component Value Date/Time  01/19/2020 05:15 AM  
 K 3.5 01/19/2020 05:15 AM  
  01/19/2020 05:15 AM  
 CO2 31 01/19/2020 05:15 AM  
 AGAP 2 (L) 01/19/2020 05:15 AM  
 GLU 79 01/19/2020 05:15 AM  
 BUN 4 (L) 01/19/2020 05:15 AM  
 CREA 0.35 (L) 01/19/2020 05:15 AM  
 GFRAA >60 01/19/2020 05:15 AM  
 GFRNA >60 01/19/2020 05:15 AM  
 
 
Imaging Reviewed: 
No results found. Medications Reviewed: 
Current Facility-Administered Medications Medication Dose Route Frequency  rifAXIMin (XIFAXAN) tablet 550 mg  550 mg Oral BID  nicotine (NICODERM CQ) 21 mg/24 hr patch 1 Patch  1 Patch TransDERmal DAILY  busPIRone (BUSPAR) tablet 10 mg  10 mg Oral BID PRN  
 escitalopram oxalate (LEXAPRO) tablet 10 mg  10 mg Oral DAILY  risperiDONE (RisperDAL) tablet 2 mg  2 mg Oral DAILY  thiamine mononitrate (B-1) tablet 100 mg  100 mg Oral DAILY  tiotropium bromide (SPIRIVA RESPIMAT) 2.5 mcg /actuation  1 Puff Inhalation DAILY  ondansetron (ZOFRAN) injection 4 mg  4 mg IntraVENous Q4H PRN  
 melatonin tablet 12 mg  12 mg Oral QHS PRN  
 acetaminophen (TYLENOL) tablet 500 mg  500 mg Oral Q6H PRN  
  LORazepam (ATIVAN) tablet 1 mg  1 mg Oral Q1H PRN Or  
 LORazepam (ATIVAN) injection 1 mg  1 mg IntraVENous Q1H PRN  
 LORazepam (ATIVAN) tablet 2 mg  2 mg Oral Q1H PRN Or  
 LORazepam (ATIVAN) injection 2 mg  2 mg IntraVENous Q1H PRN  
 LORazepam (ATIVAN) injection 3 mg  3 mg IntraVENous Q15MIN PRN  
 budesonide (PULMICORT) 500 mcg/2 ml nebulizer suspension  500 mcg Nebulization BID RT  
 arformoterol (BROVANA) neb solution 15 mcg  15 mcg Nebulization BID RT  
 folic acid (FOLVITE) tablet 1 mg  1 mg Oral DAILY  ipratropium (ATROVENT) 0.02 % nebulizer solution 0.5 mg  0.5 mg Nebulization Q4H PRN  
 sodium chloride (NS) flush 5-10 mL  5-10 mL IntraVENous PRN Elidia Jasso PA-C [id number] [facility] Office:  [phone] Pager: [phone]

## 2020-01-19 NOTE — PROGRESS NOTES
Problem: Mobility Impaired (Adult and Pediatric) Goal: *Acute Goals and Plan of Care (Insert Text) Description Physical Therapy Goals Re-assessed 1/17/2020; goals remain appropriate Initiated 1/10/2020 and to be accomplished within 7 day(s) 1. Patient will move from supine to sit and sit to supine in bed with modified independence. 2.  Patient will transfer from bed to chair and chair to bed with modified independence using the least restrictive device. 3.  Patient will perform sit to stand with modified independence. 4.  Patient will ambulate with modified independence for 150 feet with the least restrictive device. 5.  Patient will ascend/descend 10 stairs with handrail(s) with modified independence. Outcome: Progressing Towards Goal 
 PHYSICAL THERAPY TREATMENT Patient: Александр Schulte (70 y.o. female) Date: 1/19/2020 Diagnosis: Acute hypotension [I95.9] Macrocytic anemia [D53.9] Thrombocytopenia ([address] [D69.6] Hypokalemia [E87.6] HENRRY (acute kidney injury) ([address] [N17.9] Leukopenia [D72.819] Pancytopenia ([address] [K18.151] UTI (urinary tract infection) [N39.0] Acute respiratory failure with hypoxia ([address] Procedure(s) (LRB): ESOPHAGOGASTRODUODENOSCOPY (EGD) (N/A) 9 Days Post-Op Precautions: Fall PLOF:IND 
 
ASSESSMENT: Pt agreeing to PT. Participated bed level ex required VC for carryover from yesterday. Spoke w PA who placed pt on RA. Pre-activity in bed on RA BP 92/58, HR 83, 02 91%; ambulating to<>from bathroom RA and -140, 02 87%; placed pt on 2L per PA for ambulation and after two minutes amb approx 60 ft. >low 140's, 02 83%; three min seated rest break and  02 91%. Second trial of gait after seated rest  02 90%; seated rest  , 02 90%. Pt not reporting SOB. Cont to reinforce pursed lip breathing throughout session. Per RN patient left on 2L while  up in recliner.  Feel would benefit from IPR if able to authorize secondary to significant deconditioning. Reports she would be alone during day  could assist AM and PM. Has 14 stairs to enter. If pt does DC home will need ambulance transport. Progression toward goals:  
[x]      Improving slowly and progressing toward goals PLAN: 
Patient continues to benefit from skilled intervention to address the above impairments. Continue treatment per established plan of care. Discharge Recommendations:  IPR. Would need auth through [address] Further Equipment Recommendations for Discharge:  RW  
 
SUBJECTIVE:  
Patient stated it feels good to be up moving. \"legs feel a little better today. \" OBJECTIVE DATA SUMMARY:  
Critical Behavior: 
Neurologic State: Alert Orientation Level: Oriented X4 Cognition: Appropriate decision making, Appropriate for age attention/concentration, Appropriate safety awareness, Follows commands Safety/Judgement: Fall prevention Functional Mobility Training: 
Bed Mobility: 
Rolling: Independent; Modified independent Supine to Sit: Independent; Modified independent; Additional time; Other (comment)(rail) Transfers: 
Sit to Stand: Independent; Modified independent Stand to Sit: Independent; Modified independent Bed to Chair: Independent; Modified independent;Supervision(sup for lines) Interventions: Safety awareness training Balance: 
Sitting: Intact Standing: Impaired; Without support Standing - Static: Good Standing - Dynamic : Good;Fair; Other (comment)(amb in rm. without device occ wall/bed for balance assist) Ambulation/Gait Training: 
Distance (ft): 120 Feet (ft)(60 ft x 2 w seated rest break for recovery) Ambulation - Level of Assistance: Supervision Gait Abnormalities: Altered arm swing;Decreased step clearance Speed/Allie: Slow Step Length: Left shortened;Right shortened Interventions: Verbal cues; Other (comment)(VC for PLB) Therapeutic Exercises:  
 
 
EXERCISE Sets Reps Active Active Assist  
 Passive Self ROM Comments Ankle Pumps 1 10  [x] [] [] [] Quad Sets 1 10  [x] [] [] [] Hold for 5 secs Long Arc Quads 2 10 [x] [] [] []   
 
 
Pain: 
Pain level pre-treatment: 0/10 Pain level post-treatment: 0/10 Activity Tolerance:  
poor Please refer to the flowsheet for vital signs taken during this treatment. After treatment:  
[x] Patient left in no apparent distress sitting up in recliner chair 
[x] Call bell left within reach [x] Nursing Anna Lopes RN notified COMMUNICATION/EDUCATION:  
 
[x]         Other: PLB, LONDON Keys, PTA Time Calculation: 47 mins

## 2020-01-19 NOTE — PROGRESS NOTES
Respiratory Therapy Assessment Care Plan Patient: 
Kristie Marie 63 y.o. female 1/19/2020 3:21 PM 
 
Acute hypotension [I95.9] Macrocytic anemia [D53.9] Thrombocytopenia ([address] [D69.6] Hypokalemia [E87.6] HENRRY (acute kidney injury) ([address] [N17.9] Leukopenia [D72.819] Pancytopenia ([address] [N84.877] UTI (urinary tract infection) [N39.0] Chest X-RAY:  
Results from Hospital Encounter encounter on 01/08/20 XR CHEST PORT Impression IMPRESSION: 
 
New basilar haziness, right more so than left, appearance suggestive of pleural 
effusions and associated atelectasis. XR CHEST PORT Impression IMPRESSION: 
 
Subtle nodular opacity in the peripheral right lower lung zone, likely 
superimposition. No definite focal consolidation. Results from Hospital Encounter encounter on 09/14/16 XR CHEST PA LAT Impression IMPRESSION: 
 
Moderate to marked COPD lungs. Moderate pulmonary fibrosis in upper lobes bilaterally. Mild streaky atelectatic/fibrotic changes at the basal right lung although 
subtle infiltrates are not excluded. Normal cardiac size. No evidence of cardiac decompensation. No evidence of pleural effusion or pneumothorax. Mild compression of T8 vertebral body, of undetermined age. Vital Signs:   Visit Vitals /69 Pulse 95 Temp 99 °F (37.2 °C) Resp 16 Ht 5' 6\" (1.676 m) Wt 63.5 kg (140 lb) SpO2 92% BMI 22.60 kg/m² Indications for treatment: COPD Plan of care: Pulmicort Brovana BID/Spiriva/ Oxygen Therapy Goal: Improved work of breathing Titrate FIO2

## 2020-01-19 NOTE — PROGRESS NOTES
Bedside shift report with Noah Aleman RN. Patient asking for ativan for \"her breathing treatment. \" Patient educated with second RN present that the ativan indication is not for post breathing treatment. Patient insisting the ativan is for after her breathing treatments because her breathing treatment makes her anxious. Patient educated on importance of home preparation. Patient confirmed that she does not take ativan at home. Patient resting in bed, browsing her cell phone without apparent distress. Patient does not appear anxious at this time. VSS. HR 91, O2 91% on 2 L Will continue to monitor. 2145- Patient requesting melatonin to assist with sleep. Medication administered. All needs met at this time. Patient appears comfortable and without complaints. Patient did inform this RN that she is not ready to go home because there are 14 steps she has to climb in order to get into her apartment. Patient states that she will probably have to go stay with a friend for a while. 5- Tele tech contacted RN d/t patient HR increasing. Patient up to bathroom at this time. Does not sustain and patient recovers well. Patient ambulated to  and assisted back to bed. No further needs at this time. 0325- HR in the 90's and oxygen saturation 92-93% on 2L. Patient continues to rest soundly.

## 2020-01-19 NOTE — PROGRESS NOTES
Received a call from telemetry monitor tech stating that patient oxygen saturation is 83 % on room air. TAY Angeles removed the patient from oxygen at 2 liter in an attempt to wean the patient. Also, the patient heart rate reached to 140's. Patient placed back on the oxygen at 2 liters via nasal cannula. Will continue to monitor.

## 2020-01-19 NOTE — PROGRESS NOTES
Bedside shift change report given to this RN (oncoming nurse) by Courtney Powell RN (offgoing nurse).  Report included the following information SBAR, Kardex and Cardiac Rhythm SR.

## 2020-01-20 LAB
ANION GAP SERPL CALC-SCNC: 7 MMOL/L (ref 3–18)
BUN SERPL-MCNC: 5 MG/DL (ref 7–18)
BUN/CREAT SERPL: 13 (ref 12–20)
CALCIUM SERPL-MCNC: 8.3 MG/DL (ref 8.5–10.1)
CHLORIDE SERPL-SCNC: 102 MMOL/L (ref 100–111)
CO2 SERPL-SCNC: 29 MMOL/L (ref 21–32)
CREAT SERPL-MCNC: 0.38 MG/DL (ref 0.6–1.3)
GLUCOSE SERPL-MCNC: 73 MG/DL (ref 74–99)
MAGNESIUM SERPL-MCNC: 1.6 MG/DL (ref 1.6–2.6)
POTASSIUM SERPL-SCNC: 2.9 MMOL/L (ref 3.5–5.5)
SODIUM SERPL-SCNC: 138 MMOL/L (ref 136–145)

## 2020-01-20 PROCEDURE — 94640 AIRWAY INHALATION TREATMENT: CPT

## 2020-01-20 PROCEDURE — 74011250637 HC RX REV CODE- 250/637: Performed by: INTERNAL MEDICINE

## 2020-01-20 PROCEDURE — 97116 GAIT TRAINING THERAPY: CPT

## 2020-01-20 PROCEDURE — 80048 BASIC METABOLIC PNL TOTAL CA: CPT

## 2020-01-20 PROCEDURE — 77010033678 HC OXYGEN DAILY

## 2020-01-20 PROCEDURE — 36415 COLL VENOUS BLD VENIPUNCTURE: CPT

## 2020-01-20 PROCEDURE — 74011250637 HC RX REV CODE- 250/637: Performed by: HOSPITALIST

## 2020-01-20 PROCEDURE — 83735 ASSAY OF MAGNESIUM: CPT

## 2020-01-20 PROCEDURE — 74011000250 HC RX REV CODE- 250: Performed by: INTERNAL MEDICINE

## 2020-01-20 PROCEDURE — 65270000029 HC RM PRIVATE

## 2020-01-20 PROCEDURE — 94761 N-INVAS EAR/PLS OXIMETRY MLT: CPT

## 2020-01-20 RX ORDER — POTASSIUM CHLORIDE 20 MEQ/1
40 TABLET, EXTENDED RELEASE ORAL
Status: COMPLETED | OUTPATIENT
Start: 2020-01-20 | End: 2020-01-20

## 2020-01-20 RX ORDER — POTASSIUM CHLORIDE 20 MEQ/1
40 TABLET, EXTENDED RELEASE ORAL ONCE
Status: COMPLETED | OUTPATIENT
Start: 2020-01-20 | End: 2020-01-20

## 2020-01-20 RX ADMIN — POTASSIUM CHLORIDE 40 MEQ: 1500 TABLET, EXTENDED RELEASE ORAL at 14:22

## 2020-01-20 RX ADMIN — POTASSIUM CHLORIDE 40 MEQ: 1500 TABLET, EXTENDED RELEASE ORAL at 21:54

## 2020-01-20 RX ADMIN — MELATONIN TAB 3 MG 12 MG: 3 TAB at 22:00

## 2020-01-20 RX ADMIN — RISPERIDONE 2 MG: 0.5 TABLET, FILM COATED ORAL at 09:49

## 2020-01-20 RX ADMIN — BUDESONIDE 500 MCG: 0.5 SUSPENSION RESPIRATORY (INHALATION) at 20:24

## 2020-01-20 RX ADMIN — ESCITALOPRAM 10 MG: 10 TABLET, FILM COATED ORAL at 09:49

## 2020-01-20 RX ADMIN — POTASSIUM CHLORIDE 40 MEQ: 1500 TABLET, EXTENDED RELEASE ORAL at 09:49

## 2020-01-20 RX ADMIN — BUDESONIDE 500 MCG: 0.5 SUSPENSION RESPIRATORY (INHALATION) at 07:37

## 2020-01-20 RX ADMIN — RIFAXIMIN 550 MG: 550 TABLET ORAL at 18:03

## 2020-01-20 RX ADMIN — RIFAXIMIN 550 MG: 550 TABLET ORAL at 09:49

## 2020-01-20 RX ADMIN — FOLIC ACID 1 MG: 1 TABLET ORAL at 09:49

## 2020-01-20 RX ADMIN — ARFORMOTEROL TARTRATE 15 MCG: 15 SOLUTION RESPIRATORY (INHALATION) at 20:24

## 2020-01-20 RX ADMIN — ARFORMOTEROL TARTRATE 15 MCG: 15 SOLUTION RESPIRATORY (INHALATION) at 07:37

## 2020-01-20 RX ADMIN — Medication 100 MG: at 09:49

## 2020-01-20 RX ADMIN — TIOTROPIUM BROMIDE INHALATION SPRAY 1 PUFF: 3.12 SPRAY, METERED RESPIRATORY (INHALATION) at 08:00

## 2020-01-20 NOTE — PROGRESS NOTES
Pulse oximetry assessment 89% at rest on room air (if 88% or less, skip next steps) 87% while ambulating on room air 91% while ambulating on 2L 
95% at rest on 2L

## 2020-01-20 NOTE — PROGRESS NOTES
Problem: Mobility Impaired (Adult and Pediatric) Goal: *Acute Goals and Plan of Care (Insert Text) Description Physical Therapy Goals Re-assessed 1/17/2020; goals remain appropriate Initiated 1/10/2020 and to be accomplished within 7 day(s) 1. Patient will move from supine to sit and sit to supine in bed with modified independence. 2.  Patient will transfer from bed to chair and chair to bed with modified independence using the least restrictive device. 3.  Patient will perform sit to stand with modified independence. 4.  Patient will ambulate with modified independence for 150 feet with the least restrictive device. 5.  Patient will ascend/descend 10 stairs with handrail(s) with modified independence. Outcome: Progressing Towards Goal 
 PHYSICAL THERAPY TREATMENT Patient: Jazmyn Reyes (34 y.o. female) Date: 1/20/2020 Diagnosis: Acute hypotension [I95.9] Macrocytic anemia [D53.9] Thrombocytopenia ([address] [D69.6] Hypokalemia [E87.6] HENRRY (acute kidney injury) ([address] [N17.9] Leukopenia [D72.819] Pancytopenia ([address] [H04.625] UTI (urinary tract infection) [N39.0] Acute respiratory failure with hypoxia ([address] Procedure(s) (LRB): ESOPHAGOGASTRODUODENOSCOPY (EGD) (N/A) 10 Days Post-Op Precautions: Fall PLOF: Independent ASSESSMENT: 
Pt has met goals 1-4, gait training X 250 ft with RW and portable O2; pt reporting no SOB during activity, just decreased activity tolerance overall secondary to illness. Progression toward goals:  
[x]      Improving appropriately and progressing toward goals 
[]      Improving slowly and progressing toward goals 
[]      Not making progress toward goals and plan of care will be adjusted PLAN: 
Patient continues to benefit from skilled intervention to address the above impairments. Continue treatment per established plan of care. Discharge Recommendations:  None Further Equipment Recommendations for Discharge:  rolling walker SUBJECTIVE:  
 Patient stated I am feeling much better.  OBJECTIVE DATA SUMMARY:  
Critical Behavior: 
Neurologic State: Alert, Eyes open spontaneously Orientation Level: Oriented X4 Cognition: Follows commands Safety/Judgement: Fall prevention Functional Mobility Training: 
Bed Mobility: 
Rolling: Modified independent Supine to Sit: Modified independent Transfers: 
Sit to Stand: Independent Balance: 
Sitting: Intact Standing: Intact Ambulation/Gait Training: 
Distance (ft): 250 Feet (ft) Assistive Device: Walker, rolling(portable O2, no AD in room) Ambulation - Level of Assistance: Modified independent Pain: 
Pain level pre-treatment: 0/10 Pain level post-treatment: 0/10 Pain Intervention(s): n/a Activity Tolerance:  
Good Please refer to the flowsheet for vital signs taken during this treatment. After treatment:  
[x] Patient left in no apparent distress sitting up in chair 
[] Patient left in no apparent distress in bed 
[x] Call bell left within reach 
[] Nursing notified 
[] Caregiver present 
[] Bed alarm activated 
[] SCDs applied COMMUNICATION/EDUCATION:  
[]          
[]         Fall prevention education was provided and the patient/caregiver indicated understanding. []         Patient/family have participated as able in working toward goals and plan of care. []         Patient/family agree to work toward stated goals and plan of care. []         Patient understands intent and goals of therapy, but is neutral about his/her participation. []         Patient is unable to participate in stated goals/plan of care: ongoing with therapy staff. [x]         Role of Physical Therapy in the acute care setting. Jazmine Wakefield PTA Time Calculation: 9 mins

## 2020-01-20 NOTE — PROGRESS NOTES
Problem: Tissue Perfusion - Cardiopulmonary, Altered Goal: *Optimize tissue perfusion Outcome: Progressing Towards Goal 
Goal: *Absence of hypoxia Outcome: Progressing Towards Goal 
  
Problem: Alcohol Withdrawal 
Goal: *STG: Participates in treatment plan Outcome: Progressing Towards Goal 
Goal: *STG: Remains safe in hospital 
Outcome: Progressing Towards Goal 
Goal: *STG: Seeks staff when symptoms of withdrawal increase Outcome: Progressing Towards Goal 
Goal: *STG: Complies with medication therapy Outcome: Progressing Towards Goal 
Goal: *STG: Attends activities and groups Outcome: Progressing Towards Goal 
Goal: *STG: Will identify negative impact of chemical dependency including the use of tobacco, alcohol, and other substances Outcome: Progressing Towards Goal 
Goal: *STG: Verbalizes abstinence as an achievable goal 
Outcome: Progressing Towards Goal 
Goal: *STG: Agrees to participate in outpatient after care program to support ongoing mental health Outcome: Progressing Towards Goal 
Goal: *STG: Able to indentify relapse triggers including interpersonal/social and familial factors Outcome: Progressing Towards Goal 
Goal: *STG: Identify lifestyle changes to support long term sobriety such as vocation, employment, education, and legal issues Outcome: Progressing Towards Goal 
Goal: *STG: Maintains appropriate nutrition and hydration Outcome: Progressing Towards Goal 
Goal: *STG: Vital signs within defined limits Outcome: Progressing Towards Goal 
Goal: *STG/LTG: Relapse prevention plan in place to include housing/aftercare, leisure activities, and spirituality Outcome: Progressing Towards Goal 
Goal: Interventions Outcome: Progressing Towards Goal 
  
Problem: Pain Goal: *Control of Pain Outcome: Progressing Towards Goal 
  
Problem: Falls - Risk of 
Goal: *Absence of Falls Description Document Matty Del Real Fall Risk and appropriate interventions in the flowsheet. Outcome: Progressing Towards Goal 
Note: Fall Risk Interventions: 
Mobility Interventions: Assess mobility with egress test, Bed/chair exit alarm, Communicate number of staff needed for ambulation/transfer, Patient to call before getting OOB, PT Consult for mobility concerns Mentation Interventions: Adequate sleep, hydration, pain control, Bed/chair exit alarm, Door open when patient unattended, Increase mobility, More frequent rounding, Reorient patient, Room close to nurse's station Medication Interventions: Bed/chair exit alarm, Evaluate medications/consider consulting pharmacy, Patient to call before getting OOB Elimination Interventions: Bed/chair exit alarm, Call light in reach, Patient to call for help with toileting needs, Toilet paper/wipes in reach, Toileting schedule/hourly rounds History of Falls Interventions: Bed/chair exit alarm, Consult care management for discharge planning, Door open when patient unattended, Evaluate medications/consider consulting pharmacy, Room close to nurse's station Problem: Pressure Injury - Risk of 
Goal: *Prevention of pressure injury Description Document Lex Scale and appropriate interventions in the flowsheet. Outcome: Progressing Towards Goal 
Note: Pressure Injury Interventions: 
Sensory Interventions: Assess changes in LOC, Assess need for specialty bed, Avoid rigorous massage over bony prominences, Check visual cues for pain, Discuss PT/OT consult with provider, Keep linens dry and wrinkle-free, Maintain/enhance activity level, Minimize linen layers Moisture Interventions: Absorbent underpads, Apply protective barrier, creams and emollients, Assess need for specialty bed, Maintain skin hydration (lotion/cream), Minimize layers, Moisture barrier Activity Interventions: Increase time out of bed, Pressure redistribution bed/mattress(bed type), PT/OT evaluation Mobility Interventions: HOB 30 degrees or less, Pressure redistribution bed/mattress (bed type), PT/OT evaluation Nutrition Interventions: Document food/fluid/supplement intake, Discuss nutritional consult with provider, Offer support with meals,snacks and hydration Friction and Shear Interventions: HOB 30 degrees or less Problem: Discharge Planning Goal: *Discharge to safe environment Outcome: Progressing Towards Goal 
  
Problem: Breathing Pattern - Ineffective Goal: *Absence of hypoxia Outcome: Progressing Towards Goal 
Goal: *Use of effective breathing techniques Outcome: Progressing Towards Goal 
Goal: *PALLIATIVE CARE:  Alleviation of Dyspnea Outcome: Progressing Towards Goal 
  
Problem: Patient Education: Go to Patient Education Activity Goal: Patient/Family Education Outcome: Progressing Towards Goal 
  
Problem: Nutrition Deficit Goal: *Optimize nutritional status Outcome: Progressing Towards Goal

## 2020-01-20 NOTE — ROUTINE PROCESS
Bedside and Verbal shift change report given to [address] [place] [address] (oncoming nurse) by Jack Jiang RN (offgoing nurse). Report included the following information SBAR, Kardex, Intake/Output, Recent Results and Cardiac Rhythm NSR.

## 2020-01-20 NOTE — PROGRESS NOTES
Discharge/Transition Planning Care Management following and chart reviewed. Pt completed IV antibiotics. Down to 2 liters of o2. Recommend \"6 min walk test\" at this time. Plan Home and oupt follow up 
 
[phone]: Notified of failed walk test and RN Terri Peña documented result of test. Dr Celina Montaño completing oxygen orders. 1550: Faxed orders with required documentation to [facility] and called Elaine at [facility] to notify orders will be coming through. Everyone aware oxygen processing will not occur today Dee Nassar RN BSN Outcomes Manager Pager # [phone]

## 2020-01-20 NOTE — PROGRESS NOTES
conducted a Follow up consultation and Spiritual Assessment for Randolph Macias, who is a 63 y.o.,female. The  provided the following Interventions: 
Continued the relationship of care and support. Listened empathically. Offered prayer and assurance of continued prayer on patients behalf. Chart reviewed. The following outcomes were achieved: 
Patient expressed gratitude for pastoral care visit. Assessment: 
There are no further spiritual or Islam issues which require Spiritual Care Services interventions at this time. Plan: 
Chaplains will continue to follow and will provide pastoral care on an as needed/requested basis.  recommends bedside caregivers page  on duty if patient shows signs of acute spiritual or emotional distress. [facility] Staff  Spiritual Care  
[phone]

## 2020-01-20 NOTE — PROGRESS NOTES
Respiratory Therapy Assessment Care Plan Patient: 
Julien Rohit 63 y.o. female 1/20/2020 7:42 AM 
 
Acute hypotension [I95.9] Macrocytic anemia [D53.9] Thrombocytopenia ([address] [D69.6] Hypokalemia [E87.6] HENRRY (acute kidney injury) ([address] [N17.9] Leukopenia [D72.819] Pancytopenia ([address] [N79.202] UTI (urinary tract infection) [N39.0] Chest X-RAY:  
Results from Hospital Encounter encounter on 01/08/20 XR CHEST PORT Impression IMPRESSION: 
 
New basilar haziness, right more so than left, appearance suggestive of pleural 
effusions and associated atelectasis. XR CHEST PORT Impression IMPRESSION: 
 
Subtle nodular opacity in the peripheral right lower lung zone, likely 
superimposition. No definite focal consolidation. Results from Hospital Encounter encounter on 09/14/16 XR CHEST PA LAT Impression IMPRESSION: 
 
Moderate to marked COPD lungs. Moderate pulmonary fibrosis in upper lobes bilaterally. Mild streaky atelectatic/fibrotic changes at the basal right lung although 
subtle infiltrates are not excluded. Normal cardiac size. No evidence of cardiac decompensation. No evidence of pleural effusion or pneumothorax. Mild compression of T8 vertebral body, of undetermined age. Vital Signs:   Visit Vitals BP 96/65 (BP 1 Location: Left arm, BP Patient Position: At rest) Pulse 86 Temp 97.4 °F (36.3 °C) Resp 18 Ht 5' 6\" (1.676 m) Wt 63.6 kg (140 lb 3.2 oz) SpO2 90% BMI 22.63 kg/m² Indications for treatment: History of COPD Plan of care: Brovana Q12H and Pulmicort Q12 and Spiriva as ordered per physician. Continue oxygen therapy as needed per patient. Goal: Titrate oxygen as tolerated per patient. Continue to improve WOB and oxygenation.

## 2020-01-20 NOTE — PROGRESS NOTES
Internal Medicine Progress Note Patient's Name: Wily Bee Admit Date: 1/8/2020 Length of Stay: 11 Assessment/Plan Active Hospital Problems Diagnosis Date Noted  Fluid overload 01/14/2020  Alcohol-induced pancreatitis 01/14/2020  Hypokalemia 01/09/2020  UTI (urinary tract infection) 01/09/2020  HENRRY (acute kidney injury) ([facility] 01/09/2020  Pancytopenia ([facility] 01/09/2020  Acute hypotension 01/09/2020  Acute respiratory failure with hypoxia ([facility] 01/09/2020  Cirrhosis ([facility] 01/09/2020  COPD (chronic obstructive pulmonary disease) (HCC)  Bipolar mood disorder ([facility]  ETOH abuse - Strict I/Os 
- Daily wts 
- Echo - Completed course of IVAB  
- Cr WNL - Trend BMP 
- Cont xifaxin, magox, neutraphos - Outpt colonoscopy recommended - Appreciate GI 
- Cont thiamine/folate/MVI - CIWA protocol 
- PT/OT 
- Pt is not in acute copd exacerbation and is not on antibx, her hypoxia is from chronic COPD and will need home O2 
- Cont acceptable home medications for chronic conditions  
- DVT protocol I have personally reviewed all pertinent labs and films that have officially resulted over the last 24 hours. I have personally checked for all pending labs that are awaiting final results. Subjective Pt s/e @ bedside No major events overnight Doing well [place] to get home Denies CP Objective Visit Vitals BP 95/69 Pulse 88 Temp 99 °F (37.2 °C) Resp 20 Ht 5' 6\" (1.676 m) Wt 63.6 kg (140 lb 3.2 oz) SpO2 92% BMI 22.63 kg/m² Physical Exam: 
General Appearance: NAD, conversant Lungs: CTA with normal respiratory effort CV: RRR, no m/r/g Abdomen: soft, NT/ND, normal bowel sounds Extremities: no cyanosis, no peripheral edema Neuro: No focal deficits, motor/sensory intact Lab/Data Reviewed: 
BMP:  
Lab Results Component Value Date/Time   01/20/2020 05:04 AM  
 K 2.9 (LL) 01/20/2020 05:04 AM  
  01/20/2020 05:04 AM  
 CO2 29 01/20/2020 05:04 AM  
 AGAP 7 01/20/2020 05:04 AM  
 GLU 73 (L) 01/20/2020 05:04 AM  
 BUN 5 (L) 01/20/2020 05:04 AM  
 CREA 0.38 (L) 01/20/2020 05:04 AM  
 GFRAA >60 01/20/2020 05:04 AM  
 GFRNA >60 01/20/2020 05:04 AM  
 
CBC:  
No results found for: WBC, HGB, HGBEXT, HCT, HCTEXT, PLT, PLTEXT, HGBEXT, HCTEXT, PLTEXT Imaging Reviewed: 
No results found. Medications Reviewed: 
Current Facility-Administered Medications Medication Dose Route Frequency  rifAXIMin (XIFAXAN) tablet 550 mg  550 mg Oral BID  nicotine (NICODERM CQ) 21 mg/24 hr patch 1 Patch  1 Patch TransDERmal DAILY  busPIRone (BUSPAR) tablet 10 mg  10 mg Oral BID PRN  
 escitalopram oxalate (LEXAPRO) tablet 10 mg  10 mg Oral DAILY  risperiDONE (RisperDAL) tablet 2 mg  2 mg Oral DAILY  thiamine mononitrate (B-1) tablet 100 mg  100 mg Oral DAILY  tiotropium bromide (SPIRIVA RESPIMAT) 2.5 mcg /actuation  1 Puff Inhalation DAILY  ondansetron (ZOFRAN) injection 4 mg  4 mg IntraVENous Q4H PRN  
 melatonin tablet 12 mg  12 mg Oral QHS PRN  
 acetaminophen (TYLENOL) tablet 500 mg  500 mg Oral Q6H PRN  
 LORazepam (ATIVAN) tablet 1 mg  1 mg Oral Q1H PRN Or  
 LORazepam (ATIVAN) injection 1 mg  1 mg IntraVENous Q1H PRN  
 LORazepam (ATIVAN) tablet 2 mg  2 mg Oral Q1H PRN Or  
 LORazepam (ATIVAN) injection 2 mg  2 mg IntraVENous Q1H PRN  
 LORazepam (ATIVAN) injection 3 mg  3 mg IntraVENous Q15MIN PRN  
 budesonide (PULMICORT) 500 mcg/2 ml nebulizer suspension  500 mcg Nebulization BID RT  
 arformoterol (BROVANA) neb solution 15 mcg  15 mcg Nebulization BID RT  
 folic acid (FOLVITE) tablet 1 mg  1 mg Oral DAILY  ipratropium (ATROVENT) 0.02 % nebulizer solution 0.5 mg  0.5 mg Nebulization Q4H PRN  
 sodium chloride (NS) flush 5-10 mL  5-10 mL IntraVENous PRN Karen Kil, DO Internal Medicine, Hospitalist 
Pager: [phone] [facility]

## 2020-01-21 ENCOUNTER — HOME HEALTH ADMISSION (OUTPATIENT)
Dept: HOME HEALTH SERVICES | Facility: HOME HEALTH | Age: 55
End: 2020-01-21

## 2020-01-21 VITALS
SYSTOLIC BLOOD PRESSURE: 96 MMHG | HEIGHT: 66 IN | OXYGEN SATURATION: 95 % | DIASTOLIC BLOOD PRESSURE: 54 MMHG | BODY MASS INDEX: 23.56 KG/M2 | HEART RATE: 89 BPM | TEMPERATURE: 98.9 F | WEIGHT: 146.61 LBS | RESPIRATION RATE: 18 BRPM

## 2020-01-21 LAB
ANION GAP SERPL CALC-SCNC: 4 MMOL/L (ref 3–18)
BUN SERPL-MCNC: 4 MG/DL (ref 7–18)
BUN/CREAT SERPL: 11 (ref 12–20)
CALCIUM SERPL-MCNC: 8.4 MG/DL (ref 8.5–10.1)
CHLORIDE SERPL-SCNC: 111 MMOL/L (ref 100–111)
CO2 SERPL-SCNC: 27 MMOL/L (ref 21–32)
CREAT SERPL-MCNC: 0.35 MG/DL (ref 0.6–1.3)
GLUCOSE SERPL-MCNC: 81 MG/DL (ref 74–99)
MAGNESIUM SERPL-MCNC: 1.7 MG/DL (ref 1.6–2.6)
POTASSIUM SERPL-SCNC: 4 MMOL/L (ref 3.5–5.5)
SODIUM SERPL-SCNC: 142 MMOL/L (ref 136–145)

## 2020-01-21 PROCEDURE — 80048 BASIC METABOLIC PNL TOTAL CA: CPT

## 2020-01-21 PROCEDURE — 74011000250 HC RX REV CODE- 250: Performed by: INTERNAL MEDICINE

## 2020-01-21 PROCEDURE — 74011250637 HC RX REV CODE- 250/637: Performed by: INTERNAL MEDICINE

## 2020-01-21 PROCEDURE — 77010033678 HC OXYGEN DAILY

## 2020-01-21 PROCEDURE — 94640 AIRWAY INHALATION TREATMENT: CPT

## 2020-01-21 PROCEDURE — 97116 GAIT TRAINING THERAPY: CPT

## 2020-01-21 PROCEDURE — 36415 COLL VENOUS BLD VENIPUNCTURE: CPT

## 2020-01-21 PROCEDURE — 83735 ASSAY OF MAGNESIUM: CPT

## 2020-01-21 PROCEDURE — 94761 N-INVAS EAR/PLS OXIMETRY MLT: CPT

## 2020-01-21 RX ADMIN — ACETAMINOPHEN 500 MG: 500 TABLET, FILM COATED ORAL at 10:16

## 2020-01-21 RX ADMIN — RIFAXIMIN 550 MG: 550 TABLET ORAL at 09:35

## 2020-01-21 RX ADMIN — RIFAXIMIN 550 MG: 550 TABLET ORAL at 17:14

## 2020-01-21 RX ADMIN — TIOTROPIUM BROMIDE INHALATION SPRAY 1 PUFF: 3.12 SPRAY, METERED RESPIRATORY (INHALATION) at 08:37

## 2020-01-21 RX ADMIN — RISPERIDONE 2 MG: 0.5 TABLET, FILM COATED ORAL at 09:35

## 2020-01-21 RX ADMIN — ESCITALOPRAM 10 MG: 10 TABLET, FILM COATED ORAL at 09:35

## 2020-01-21 RX ADMIN — BUDESONIDE 500 MCG: 0.5 SUSPENSION RESPIRATORY (INHALATION) at 08:27

## 2020-01-21 RX ADMIN — FOLIC ACID 1 MG: 1 TABLET ORAL at 09:35

## 2020-01-21 RX ADMIN — Medication 100 MG: at 09:35

## 2020-01-21 RX ADMIN — ARFORMOTEROL TARTRATE 15 MCG: 15 SOLUTION RESPIRATORY (INHALATION) at 08:27

## 2020-01-21 NOTE — PROGRESS NOTES
Assume care of patient lying in bed watching TV. Denies pain or discomfort at present. Alert and oriented X 4. Bed locked in lowest position. Call light within reach and understand to use for assistance and needs. 01/21/2020 
0746 Bedside and Verbal shift change report given to ANA Corado (oncoming nurse) by Rebecca Aceves RN (offgoing nurse). Report given with SBAR, Kardex, Intake/Output, MAR and Recent Results.

## 2020-01-21 NOTE — PROGRESS NOTES
Problem: Breathing Pattern - Ineffective Goal: *Absence of hypoxia Outcome: Progressing Towards Goal 
 Respiratory Therapy Assessment Care Plan Patient: 
Zach Aaron 63 y.o. female 1/21/2020 8:30 AM 
 
Acute hypotension [I95.9] Macrocytic anemia [D53.9] Thrombocytopenia ([address] [D69.6] Hypokalemia [E87.6] HENRRY (acute kidney injury) ([address] [N17.9] Leukopenia [D72.819] Pancytopenia ([address] [Q45.695] UTI (urinary tract infection) [N39.0] Chest X-RAY:  
Results from Hospital Encounter encounter on 01/08/20 XR CHEST PORT Impression IMPRESSION: 
 
New basilar haziness, right more so than left, appearance suggestive of pleural 
effusions and associated atelectasis. XR CHEST PORT Impression IMPRESSION: 
 
Subtle nodular opacity in the peripheral right lower lung zone, likely 
superimposition. No definite focal consolidation. Results from Hospital Encounter encounter on 09/14/16 XR CHEST PA LAT Impression IMPRESSION: 
 
Moderate to marked COPD lungs. Moderate pulmonary fibrosis in upper lobes bilaterally. Mild streaky atelectatic/fibrotic changes at the basal right lung although 
subtle infiltrates are not excluded. Normal cardiac size. No evidence of cardiac decompensation. No evidence of pleural effusion or pneumothorax. Mild compression of T8 vertebral body, of undetermined age. Vital Signs:   Visit Vitals BP 98/68 (BP 1 Location: Right arm) Pulse 85 Temp 98.7 °F (37.1 °C) Resp 20 Ht 5' 6\" (1.676 m) Wt 66.5 kg (146 lb 9.7 oz) SpO2 93% BMI 23.66 kg/m² Indications for treatment: History of COPD Plan of care: Brovana Q12H and Pulmicort Q12 and Spiriva. Goal: Titrate oxygen as tolerated per patient. Continue to improve WOB and oxygenation.

## 2020-01-21 NOTE — PROGRESS NOTES
Discharge/Transition Planning Certificate of Medical Necessity signed by Dr Shelbi Sheehan. Faxed into [facility] with confirmation. Delivered portable oxygen to patient. Educated on process of oxygen delivery and set up and she is aware to call [phone] when leaving hospital. She will call sig other and notify discharging today and he will drive home. [facility] for COPD placed and referral sent. Called [facility] and notified Care Management Interventions PCP Verified by CM: Yes 
Palliative Care Criteria Met (RRAT>21 & CHF Dx)?: No 
Mode of Transport at Discharge: Other (see comment)(sig other) Transition of Care Consult (CM Consult): Home Health [address] Yes MyChart Signup: No 
Discharge Durable Medical Equipment: No 
Physical Therapy Consult: Yes Occupational Therapy Consult: Yes Speech Therapy Consult: No 
Current Support Network: Other Confirm Follow Up Transport: Friends The Patient and/or Patient Representative was Provided with a Choice of Provider and Agrees with the Discharge Plan?: Yes Freedom of Choice List was Provided with Basic Dialogue that Supports the Patient's Individualized Plan of Care/Goals, Treatment Preferences and Shares the Quality Data Associated with the Providers?: Yes Discharge Location Discharge Placement: Home with home health([facility]) Ronald Hernandez RN BSN Outcomes Manager Pager # [phone] Ronald Hernandez RN BSN Outcomes Manager Pager # [phone]

## 2020-01-21 NOTE — PROGRESS NOTES
Assumed care of patient. Patient is AOx4, resting in recliner, in stable condition. Patient denies having any pain or discomfort. Dual skin assessment conducted with ANA Corado.

## 2020-01-21 NOTE — PROGRESS NOTES
NUTRITION FOLLOW-UP/PLAN OF CARE 
 
RECOMMENDATIONS:  
1. Continue current diet, encouraged to implement preferences, snacks BID 2. Monitor labs, weight and PO intake 3. RD to follow GOALS:  
1. Ongoing: PO intake meets >75% of protein/calorie needs by 1/26 ASSESSMENT:  
Wt status is classified as overweight per Body mass index is 23.66 kg/m². Improved PO intake since last assessment, snacks added. Plan to D/C today. Nutrition recommendations listed. RD to follow. Previous Nutrition Diagnoses:  
Remains appropriate/improvement shown: Inadequate oral food and beverage intake due to recent N/V/D as evidenced by pt reporting poor po intake past several days and overall poor appetite past 2 months. SUBJECTIVE/OBJECTIVE:  
(1/21/20) Pt sitting in chair at bedside during time of assessment. Pt reports continued good intake/leandra, consuming at least 50% of all meals/snacks. Denies n/v/d/c at this time. Per I/Os BM 1/20. CBW: 146 lb 1/21, fluctuations possibly fluid related (fluid overloaded). Encouraged continued adequate/increase in intake/snacks. Will continue to monitor intake, weight, labs. (1/16/20) Pt resting in bed during time of assessment, pt states to be feeling much better today and large improvement in intake, consumed 75% of breakfast this morning. Pt does report hunger between meals at time, will send snacks BID. Denies n/v at this time. Pt states small BM this AM and was not loose. CBW: ~148 lb 1/16, large fluctuations noted. Encouraged continued increased PO intake, Will continue to monitor intake, weight, labs. (1/14/20) Pt sitting up in bed during time of assessment, pt states appetite is fine but with varied intake due to food preferences, obtained food preferences and spoke with kitchen. Pt consuming 25-50% of most meals. Encouraged pt to implement preferences with [employer].  Pt states she does not have a egg allergy anymore and would like it removed from her chart so that she can eat eggs in the morning - will speak with RN. Pt tolerating dental soft diet well with no issues chewing/swallowing, dentures fit well per her report. Denies current n/v/d/c, per I/Os BM 1/13 (loose). CBW: 165 lb 1/13 showing weight gain from reported UBW. Encouraged intake/preferences. Will continue to monitor intake, weight, labs, POC. Per previous RD notes: 
1/9/20: Pt is 122% ideal weight;  BMI (calculated): 25.5 kg/m2 (overweight classification). Pt appears well nourished but is at nutrition risk with recent reported history of N/V/D and poor appetite. Although pt reports weight loss of unknown amount, current weight is 31 lbs > than her weight in June 2019. With large weight discrepancy between  current weight or past documented weights, question their accuracy. Pt with PMHx including etoh cirrhosis, Chronic Hypotension, Active Etoh abuse, COPD, and Bipolar disorder, who presented to the ER after being found hypotensive by her MD (pt c/o unsteady gait,  N/V/D and poor appetite).  RUQ and Epigastric abd pain. CT Abd showed peripancreatic inflammation vs infection 1/9:  Pt reports her usual weight is 136 lbs and she thinks she has lost weight over the past couple of months but is unable to quantify. She states she was once allergic to eggs but that she now is able to eat them without side effects. She denies having issues with chewing or swallowing. She reports that a friend brought breakfast to her this AM before she knew she was NPO/full liquids. Per pt reports of po intake at breakfast, she consumed 425 calories, 22 grams protein. Information Obtained From:  
[x] Chart Review [x] Patient 
[] Family/Caregiver 
[] Nurse/Physician  
[] Patient Rounds/Interdisciplinary Meeting Diet: dental soft solid, 1200 mL, light snacks BID Patient Vitals for the past 100 hrs: 
 % Diet Eaten 01/21/20 0827 50 % 01/20/20 1209 50 % 01/20/20 0900 50 % Medications: [x] Reviewed Noted: buspar, lexapro, folvite, xifaxan, B1 Encounter Diagnoses ICD-10-CM ICD-9-CM 1. Septic shock (HCC) A41.9 038.9  
 R65.21 785.52  
  995.92  
2. Acute abdominal pain in right upper quadrant R10.11 789.01  
  338.19  
3. Non-intractable vomiting with nausea, unspecified vomiting type R11.2 787.01  
4. HENRRY (acute kidney injury) ([facility] N17.9 584.9 5. Calculus of gallbladder with cholecystitis without biliary obstruction, unspecified cholecystitis acuity K80.10 574.00  
6. Acute UTI N39.0 599.0 7. COPD with acute exacerbation ([facility] J44.1 491.21 Past Medical History:  
Diagnosis Date  Bipolar affective ([facility]  Chronic obstructive pulmonary disease ([facility]  Cirrhosis ([facility]  ETOH abuse  Former smoker 1 PPD x40 years  History of ascites  History of sinusitis  Hyperlipidemia 11/20/2009 Patient denies  Left breast mass 05/13/2011 U/S Left Breast: No definite mass identified. Recommended recheck in 6 months  Manic depression ([facility]  Panic disorder  Vitamin D insufficiency 11/20/2009  
 23 ng/mL  Wrist fracture, right 01/06/2010 Non-Displaced Distal Radius/Ulnar Styloid Fx Labs:   
Lab Results Component Value Date/Time Sodium 142 01/21/2020 06:00 AM  
 Potassium 4.0 01/21/2020 06:00 AM  
 Chloride 111 01/21/2020 06:00 AM  
 CO2 27 01/21/2020 06:00 AM  
 Anion gap 4 01/21/2020 06:00 AM  
 Glucose 81 01/21/2020 06:00 AM  
 BUN 4 (L) 01/21/2020 06:00 AM  
 Creatinine 0.35 (L) 01/21/2020 06:00 AM  
 Calcium 8.4 (L) 01/21/2020 06:00 AM  
 Magnesium 1.7 01/21/2020 06:00 AM  
 Phosphorus 2.5 01/12/2020 04:15 AM  
 Albumin 1.9 (L) 01/13/2020 06:00 AM  
 
Anthropometrics: BMI (calculated): 23.7 Last 3 Recorded Weights in this Encounter 01/19/20 8603 01/20/20 1826 01/21/20 0732 Weight: 63.5 kg (140 lb) 63.6 kg (140 lb 3.2 oz) 66.5 kg (146 lb 9.7 oz) Ht Readings from Last 1 Encounters:  
01/13/20 5' 6\" (1.676 m) Documented Weight History: 
Weight Metrics 1/21/2020 6/12/2019 5/13/2019 9/1/2018 11/26/2016 9/14/2016 Weight 146 lb 9.7 oz 126 lb 12.8 oz 132 lb 135 lb 140 lb 117 lb BMI 23.66 kg/m2 20.47 kg/m2 21.31 kg/m2 21.79 kg/m2 22.6 kg/m2 19.47 kg/m2 []  Weight Loss  
[x]  Weight Gain  
[]  Weight Stable  
[]  New wt n/a on record Estimated Nutrition Needs:  
1857 PRQRQ/OUB Protein (g): 77 g Nutrition Problems Identified:  
[] Suboptimal PO intake  
[] Food Allergies [x] Difficulty chewing/swallowing/poor dentition 
[] Constipation/Diarrhea  
[] Nausea/Vomiting  
[] None 
[] Other:  
 
Plan:  
[] Therapeutic Diet [x]  Obtained/adjusted food preferences/tolerances and/or snacks options  
[]  Supplements added  
[] Occupational therapy following for feeding techniques [x]  snack added  
[x]  Modify diet texture  
[]  Modify diet for food allergies []  Educate patient  
[]  Assist with menu selection  
[x]  Monitor PO intake on meal rounds  
[x]  Continue inpatient monitoring and intervention  
[x]  Participated in discharge planning/Interdisciplinary rounds/Team meetings  
[]  Other:  
 
Education Needs: 
 [] Not appropriate for teaching at this time due to: 
 [x] Identified and addressed encouraged continued appropriate intake Nutrition Monitoring and Evaluation: 
 [x] Continue inpatient monitoring and interventions [] Other:  
 
Clementine Bright

## 2020-01-21 NOTE — PROGRESS NOTES
Problem: Mobility Impaired (Adult and Pediatric) Goal: *Acute Goals and Plan of Care (Insert Text) Description Physical Therapy Goals Re-assessed 1/17/2020; goals remain appropriate Initiated 1/10/2020 and to be accomplished within 7 day(s) 1. Patient will move from supine to sit and sit to supine in bed with modified independence. 2.  Patient will transfer from bed to chair and chair to bed with modified independence using the least restrictive device. 3.  Patient will perform sit to stand with modified independence. 4.  Patient will ambulate with modified independence for 150 feet with the least restrictive device. 5.  Patient will ascend/descend 10 stairs with handrail(s) with modified independence. Outcome: Resolved/Met PHYSICAL THERAPY TREATMENT AND DISCHARGE Patient: Nelly Walker (84 y.o. female) Date: 1/21/2020 Diagnosis: Acute hypotension [I95.9] Macrocytic anemia [D53.9] Thrombocytopenia ([address] [D69.6] Hypokalemia [E87.6] HENRRY (acute kidney injury) ([address] [N17.9] Leukopenia [D72.819] Pancytopenia ([address] [D15.292] UTI (urinary tract infection) [N39.0] Acute respiratory failure with hypoxia ([address] Procedure(s) (LRB): ESOPHAGOGASTRODUODENOSCOPY (EGD) (N/A) 11 Days Post-Op Precautions: Fall PLOF: Independent ASSESSMENT: 
Pt able to ambulate safely and independently in room. Gait training with RW per pt preference, educated on activity pacing. Pt navigated 10 stairs with verbal cues for safety and pacing, pt demonstrated understanding. Pt has met acute care goals and will be discharged at this time. PLAN: 
Patient will be discharged from physical therapy at this time. Rationale for discharge: 
[x]     Goals Achieved 
[]     [address] 
[]     Patient not participating in therapy 
[]     Other: 
Discharge Recommendations:  [facility] Further Equipment Recommendations for Discharge:  rolling walker SUBJECTIVE:  
 Patient stated I cant wait to get home.  OBJECTIVE DATA SUMMARY:  
Critical Behavior: 
Neurologic State: Appropriate for age, Alert Orientation Level: Oriented X4 Cognition: Appropriate for age attention/concentration, Follows commands, Appropriate decision making Safety/Judgement: Fall prevention Functional Mobility Training: 
Bed Mobility: 
Rolling: Modified independent Supine to Sit: Modified independent Transfers: 
Sit to Stand: Independent Stand to Sit: Independent Balance: 
Sitting: Intact Standing: Intact Ambulation/Gait Training: 
Distance (ft): 250 Feet (ft)(seated rest break after 150 ft and 10 stairs) Assistive Device: Walker, rolling(2L portable O2) Ambulation - Level of Assistance: Moderate assistance Stairs: 
Number of Stairs Trained: 10 Stairs - Level of Assistance: Stand-by assistance Pain: 
Pain level pre-treatment: 0/10 Pain level post-treatment: 0/10 Pain Intervention(s): n/a Activity Tolerance:  
Good Please refer to the flowsheet for vital signs taken during this treatment. After treatment:  
[x] Patient left in no apparent distress sitting up in chair 
[] Patient left in no apparent distress in bed 
[x] Call bell left within reach 
[] Nursing notified 
[] Caregiver present 
[] Bed alarm activated 
[] SCDs applied COMMUNICATION/EDUCATION:  
[]          
[]         Fall prevention education was provided and the patient/caregiver indicated understanding. []         Patient/family have participated as able and agree with findings and recommendations. []         Patient is unable to participate in plan of care at this time. [x]         Role of Physical Therapy in the acute care setting. Tiffanie Neal PTA Time Calculation: 9 mins

## 2020-01-21 NOTE — PROGRESS NOTES
Problem: Pain Goal: *Control of Pain Outcome: Progressing Towards Goal 
  
Problem: Falls - Risk of 
Goal: *Absence of Falls Description Document Kaden Vikas Fall Risk and appropriate interventions in the flowsheet. Outcome: Progressing Towards Goal 
Note: Fall Risk Interventions: 
Mobility Interventions: Communicate number of staff needed for ambulation/transfer Mentation Interventions: Door open when patient unattended Medication Interventions: Patient to call before getting OOB Elimination Interventions: Patient to call for help with toileting needs History of Falls Interventions: Door open when patient unattended

## 2020-01-21 NOTE — PROGRESS NOTES
4222: Bedside shift change report given to Mak PEREZ  (oncoming nurse) by Alvaro Cantu  (offgoing nurse). Report included the following information SBAR, Kardex, Procedure Summary, Intake/Output, MAR and Cardiac Rhythm SR. Patient has call light with in reach, denies pain and bed is locked and in lowest position. 1260: Therapy working with patient. 0935: Medications and Assessment completed. Patient sitting in chair, call light in reach. 1016: Pain Medications given. Patient sitting in chair, call light in reach. 1116: Pain reassessed. Patient sitting in chair, call light in reach. 1230: Reassessment Completed. 1345: Bedside shift change report given to [address] [place] [address]  (oncoming nurse) by Jarrod Stack RN  (offgoing nurse).  Report included the following information SBAR, Kardex, Procedure Summary, Intake/Output, MAR and Cardiac Rhythm SR.

## 2020-01-21 NOTE — DISCHARGE SUMMARY
Internal Medicine Discharge Summary Patient: Bo Lawson YOB: 1965 Age:  63 y.o. Admit Date: 1/8/2020 Discharge Date: 1/21/2020 LOS:  LOS: 12 days Discharge To: Home Consults: Gastroenterology and Pulmonary/Critical Care, general surgery Admission Diagnoses: Acute hypotension [I95.9] Macrocytic anemia [D53.9] Thrombocytopenia ([facility] [D69.6] Hypokalemia [E87.6] HENRRY (acute kidney injury) ([facility] [N17.9] Leukopenia [D72.819] Pancytopenia ([facility] [N66.375] UTI (urinary tract infection) [N39.0] Discharge Diagnoses:   
Problem List as of 1/21/2020 Date Reviewed: 1/9/2020 Codes Class Noted - Resolved Fluid overload ICD-10-CM: E87.70 ICD-9-CM: 276.69  1/14/2020 - Present Alcohol-induced pancreatitis ICD-10-CM: K85.20 ICD-9-CM: 198.5  1/14/2020 - Present Hypokalemia ICD-10-CM: E87.6 ICD-9-CM: 276.8  1/9/2020 - Present Macrocytic anemia ICD-10-CM: D53.9 ICD-9-CM: 281.9  1/9/2020 - Present Leukopenia ICD-10-CM: D72.819 ICD-9-CM: 288.50  1/9/2020 - Present UTI (urinary tract infection) ICD-10-CM: N39.0 ICD-9-CM: 599.0  1/9/2020 - Present Thrombocytopenia ([facility] ICD-10-CM: D69.6 ICD-9-CM: 287.5  1/9/2020 - Present HENRRY (acute kidney injury) ([facility] ICD-10-CM: N17.9 ICD-9-CM: 584.9  1/9/2020 - Present Pancytopenia ([facility] ICD-10-CM: T42.835 ICD-9-CM: 284.19  1/9/2020 - Present Acute hypotension ICD-10-CM: I95.9 ICD-9-CM: 458.9  1/9/2020 - Present * (Principal) Acute respiratory failure with hypoxia [facility]) ICD-10-CM: J96.01 
ICD-9-CM: 518.81  1/9/2020 - Present Cirrhosis ([facility] ICD-10-CM: K74.60 ICD-9-CM: 571.5  1/9/2020 - Present Alcohol withdrawal ([facility] ICD-10-CM: A30.713 ICD-9-CM: 291.81  5/10/2019 - Present Intractable nausea and vomiting ICD-10-CM: R11.2 ICD-9-CM: 536.2  5/10/2019 - Present Alcoholic cirrhosis of liver without ascites (HCC) ICD-10-CM: K70.30 ICD-9-CM: 571.2  10/21/2018 - Present Tobacco abuse ICD-10-CM: Z72.0 ICD-9-CM: 305.1  10/21/2018 - Present Hyperlipidemia ICD-10-CM: E78.5 ICD-9-CM: 272.4  Unknown - Present ETOH abuse ICD-10-CM: F10.10 ICD-9-CM: 305.00  Unknown - Present COPD (chronic obstructive pulmonary disease) (HCC) ICD-10-CM: J44.9 ICD-9-CM: 496  Unknown - Present Bipolar mood disorder ([facility] ICD-10-CM: F31.9 ICD-9-CM: 296.80  Unknown - Present Panic disorder ICD-10-CM: F41.0 ICD-9-CM: 300.01  Unknown - Present Acute maxillary sinusitis ICD-10-CM: J01.00 ICD-9-CM: 461.0  9/14/2016 - Present Former smoker ICD-10-CM: C11.249 ICD-9-CM: V15.82  9/14/2016 - Present Left breast mass ICD-10-CM: N63.20 ICD-9-CM: 611.72  5/13/2011 - Present Overview Signed 9/14/2016  9:26 AM by Vivian Larios  
  U/S Left Breast: No definite mass identified. Recommended recheck in 3 months. Wrist fracture, right ICD-10-CM: S62.101A ICD-9-CM: 814.00  1/6/2010 - Present Overview Signed 9/14/2016  1:24 PM by Vivian Larios Non-Displaced Distal Radius/Ulnar Styloid Fx Vitamin D insufficiency ICD-10-CM: E55.9 ICD-9-CM: 268.9  11/20/2009 - Present Overview Signed 9/14/2016  9:25 AM by Vivian Larios  
  23 ng/mL RESOLVED: Acute exacerbation of chronic obstructive pulmonary disease (COPD) ([facility] ICD-10-CM: J44.1 ICD-9-CM: 491.21  9/14/2016 - 5/26/2019 Discharge Condition:  Improved Procedures: EGD 
 
  
 
HPI: Lourdes Worthy is a 63 y.o. female who presents to [facility] ER with complaint of Hypotension and Nausea. Patient was reportedly referred to [facility] ER by her Psychiatrist when she was seen on 1/08/2020 and her Blood Pressure was low.   Patient states that she has been having fevers, chills, diaphoresis, BLE edema, a cough productive of clear mucus, and intermittent chest congestion. Patient also reports a UTI 2 weeks ago and continuing urinary urgency. Patient also reports nausea and vomiting with reduced oral intake for the last 3 days accompanied by \"a little bit of diarrhea. \"  Patient states that she has had chronically low blood pressure even before she was diagnosed with liver cirrhosis. Patient reports that she has had ascites before and that she does continue to drink 3 shots of vodka a night to improve her sleeping. 
  
In [facility] ER, Patient was noted to have Blood Pressures of 70/38 and 49/90 mm Hg and a borderline Urinalysis for UTI. Patient was started on IV Norepinephrine for hypotension. 
  
Patient is admitted to [facility] ICU for management of Hypotension requiring Pressors, HENRRY likely due to Dehydration, and borderline UTI. Hospital Course: 
 
Acute hypoxic resp failure 
- likely 2/2 fluid overload due to fluid resuscitation from shock. - strict I&Os - good urine output. - monitor BMP and replace lytes PRN 
- echo with NL systolic function and inconclusive diastolic function 
- on NC, wean down as tolerated. Not on home O2; goal O2 sats 89% or above 
- continuous O2 on RA at rest on 1/19 - 91-94% 
- down to 83-87% with ambulation. PT recommending IPR 
- BP borderline now, will stop diuresis. Patient will likely need home O2. Was able to wean from 10L --> 2L NC with diuresis - Walk test prior to discharge showed patient still with hypoxia but this was chronic copd as patient out of acute ordeal 
  
COPD 
- cont brovana/pulmicort 
- PRN atrovent 
- wean O2 as tolerated 
- Required O2 at discharge 
  
ETOH induce pancreatitis and cirrhosis - symptomatically improved, tolerating PO intake - appreciate GI 
- EGD showed non-specific gastritis  
- HIDA negative 
- refused MRCP during inpatient 
- Pain improved and was tolerating diet without issues - Cont xifaxin, magox,neutraphos - LFTs improving during admission - OP f/u with GI recomemended 
  
 UTI 
- completed course of rocephin 
- UCx with ecoli 
  
HENRRY 
- resolved with IV fluids The rest of the patient's chronic conditions were managed appropriately during their admission. They were medically stable at the time of discharge. Visit Vitals BP 96/54 Pulse 89 Temp 98.9 °F (37.2 °C) Resp 18 Ht 5' 6\" (1.676 m) Wt 66.5 kg (146 lb 9.7 oz) SpO2 95% BMI 23.66 kg/m² Physical Exam at Discharge: 
General Appearance: NAD, conversant HENT: normocephalic/atraumatic, moist mucus membranes Lungs: CTA with normal respiratory effort CV: RRR, no m/r/g Abdomen: soft, non-tender, normal bowel sounds Extremities: no cyanosis, no peripheral edema Neuro: moves all extremities, no focal deficits Psych: appropriate affect, alert and oriented to person, place and time Labs Prior to Discharge: 
Labs: Results:  
   
Chemistry Recent Labs  
  01/21/20 
0600 01/20/20 
0504 01/19/20 
0515 GLU 81 73* 79  138 137  
K 4.0 2.9* 3.5  102 104 CO2 27 29 31 BUN 4* 5* 4*  
CREA 0.35* 0.38* 0.35* CA 8.4* 8.3* 8.3* AGAP 4 7 2* BUCR 11* 13 11* CBC w/Diff No results for input(s): WBC, RBC, HGB, HCT, PLT, GRANS, LYMPH, EOS, HGBEXT, HCTEXT, PLTEXT in the last 72 hours. Cardiac Enzymes No results for input(s): CPK, CKND1, PAPO in the last 72 hours. No lab exists for component: Ame Yousif Coagulation No results for input(s): PTP, INR, APTT, INREXT in the last 72 hours. Lipid Panel Lab Results Component Value Date/Time Cholesterol, total 210 (H) 11/20/2009 11:39 AM  
 HDL Cholesterol 72 (H) 11/20/2009 11:39 AM  
 LDL, calculated 126.4 (H) 11/20/2009 11:39 AM  
 VLDL, calculated 11.6 11/20/2009 11:39 AM  
 Triglyceride 58 11/20/2009 11:39 AM  
 CHOL/HDL Ratio 2.9 11/20/2009 11:39 AM  
  
BNP No results for input(s): BNPP in the last 72 hours. Liver Enzymes No results for input(s): TP, ALB, TBIL, AP, SGOT, GPT in the last 72 hours.  
 
No lab exists for component: DBIL  
 Thyroid Studies No results found for: T4, T3U, TSH, TSHEXT Significant Imaging: 
Nm Hepatobiliary Duct Scan Result Date: 1/10/2020 EXAM: Hepatobiliary Scintigraphy : INDICATION: Abnormal finding on CT scan, cholelithiasis, evaluate for cystic duct obstruction/acute cholecystitis COMPARISON: CT abdomen pelvis from 1/8/2020 RADIOPHARMACEUTICAL: 7.3 mCi Tc-99m mebrofenin IV INJECTION SITE: Right breast _______________ FINDINGS: Hepatomegaly with mild heterogeneous radiopharmaceutical hepatic uptake. There is prompt excretion of tracer by the liver with normal visualization of the intra-hepatic biliary ducts, the common hepatic duct, the common bile duct, and the small bowel. Repeat overhead delayed imaging at 60 minutes was performed with no gallbladder opacification. > 4 hour Greek delayed static image which she will demonstrates localizing tracer uptake in the right upper quadrant, expected position of the gallbladder. _______________ IMPRESSION: 1. No findings of cystic duct obstruction. 2. Hepatomegaly, with heterogeneous radiotracer uptake, in keeping with intrinsic liver disease. Ct Abd Pelv Wo Cont Result Date: 1/9/2020 
_______________ EXAM: CT ABD PELV WO CONT CLINICAL INDICATION/HISTORY: abd pain, sepsis -Additional: None. COMPARISON: Correlation is made with abdominal ultrasound performed earlier 01/08/2020 and comparison is made with the abdominopelvic CT of 05/09/2019. TECHNIQUE: Abdominopelvic CT examination was performed without oral contrast. Evaluation of the bowel is limited in the absence of oral contrast. Intravenous contrast was not administered, limiting evaluation of solid organs and vascular structures. Sagittal and coronal series were reformatted from the axial source images.  One or more dose reduction techniques were used on this CT: automated exposure control, adjustment of the mAs and/or kVp according to patient size, and iterative reconstruction techniques. The specific techniques used on this CT exam have been documented in the patient's electronic medical record. Digital Imaging and Communications in Medicine (DICOM) format image data are available to nonaffiliated external healthcare facilities or entities on a secure, media free, reciprocally searchable basis with patient authorization for at least a 12-month period after this study. _______________ FINDINGS: VISUALIZED LUNG BASES and LOWER CHEST: Mild dependent hypoventilatory changes. Heart size is normal. LIVER: Enlarged measuring up to 22.5 cm in craniocaudal dimension with diffusely decreased attenuation of steatosis and nodularity of the contour suggestive of cirrhosis (supported by mild hypertrophy of the caudate). There is recanalization of the umbilical vein. There is enlargement of the portal vein which distally measures up to 1.8 cm in diameter in keeping with portal hypertension. GALLBLADDER and BILE DUCTS: The gallbladder is nondistended containing dependent calcified gallstones with minor areas of wall thickening. No pericholecystic inflammatory changes. No intrahepatic or extrahepatic biliary ductal dilatation. PANCREAS: Limited assessment in the absence of intravenous/oral contrast. Simple peripancreatic fluid obscures the majority of pancreatic borders, most pronounced in the pancreatic head region. No ductal dilation. SPLEEN: Within normal limits. ADRENALS: Within normal limits. KIDNEYS, URETERS, URINARY BLADDER: No hydronephrosis or calculi. Normal ureters and bladder. REPRODUCTIVE ORGANS: Within normal limits. STOMACH and BOWEL: Evaluation of the bowel is limited in the absence of oral contrast. Nonspecific minor bowel wall thickening of portions of the ascending colon. The duodenum is not well assessed noting an acute process which appears centered in this region, potentially representing duodenitis. LYMPH NODES: No lymphadenopathy. PERITONEUM/RETROPERITONEUM: Small volume simple perihepatic ascites and trace pelvic free fluid are new from prior. Peripancreatic/periduodenal fluid and stranding are present, also new from prior. No extraluminal free air or drainable collection. VESSELS: Aortoiliac atherosclerotic changes without aneurysm. There is recanalization of the umbilical vein. There is enlargement of the portal vein which distally measures up to 1.8 cm in diameter in keeping with portal hypertension. BODY WALL: Within normal limits. BONES: Degenerative changes with no acute or suspicious osseous findings. _______________ IMPRESSION: 1. An acute inflammatory or infectious process in the upper abdomen centered in the peripancreatic/periduodenal region represents a change from prior, not well assessed in the absence of intravenous or oral contrast. Differential possibilities include acute pancreatitis versus duodenitis, and less likely acute cholecystitis given nondistention of the gallbladder (although there are gallstones and areas of minor gallbladder wall thickening). 2. Small volume simple perihepatic ascites and trace pelvic free fluid are new from prior. Peripancreatic/periduodenal fluid and stranding are present, also new from prior. 3. Additional stable chronic findings include profound hepatic steatosis, cirrhosis, and sequela of portal hypertension. _______________ Note: Preliminary report sent to the Emergency Department by the radiology resident at the time of the study. _______________ [address] [place] [address] Addendum Date: 1/9/2020 Addendum: Note: Preliminary report sent to the Emergency Department by the radiology resident at the time of the study. Result Date: 1/9/2020 EXAM:  ABD LTD. CLINICAL INDICATION/HISTORY: Abnormal LFTs. -Additional: Cholelithiasis. COMPARISON: Correlation is made with the subsequently performed abdominopelvic CT and the abdominopelvic CT of 05/09/2019. TECHNIQUE: Grayscale and color Doppler ultrasound evaluation of the right upper quadrant was performed, with spectral Doppler waveform analysis of the portal vein. _______________ FINDINGS: Liver: Enlarged with heterogeneous, diffusely increased echotexture of hepatic steatosis. Nodularity is noted of the contour. No focal lesion. Color flow Doppler and velocity spectral waveform analysis of the portal vein shows normal (hepatopetal) direction of flow. . Gallbladder: Dependent shadowing gallstone are present in the nondistended gallbladder with nonspecific wall thickening up to approximately 5 mm. Bile ducts: No intra or extrahepatic biliary ductal dilatation. The common hepatic segment of the extrahepatic biliary system measures 4 mm in maximal diameter. Pancreas: Partially obscured by bowel gas, however the visualized portions reveal no abnormality. Right kidney: Normal in size, measuring 12.1 cm in length. Survey images reveal no hydronephrosis or abnormal findings. Vasculature: No significant findings of the visualized abdominal aorta and inferior vena cava. There is recanalization of the umbilical vein. Ascites: Trace perihepatic and upper abdominal ascites. _______________ IMPRESSION: 1. Hepatic steatosis and nodularity of the hepatic contour, suggestive of cirrhosis. 2. Dependent shadowing gallstones are present in the nondistended gallbladder. There is nonspecific gallbladder wall thickening, which may be related to hepatic dysfunction. Findings are equivocal for acute cholecystitis. If there is clinical concern for acute cholecystitis, consider nuclear medicine hepatobiliary imaging for further evaluation. 3. Trace perihepatic and upper abdominal ascites. _______________ Xr [facility] Result Date: 1/12/2020 EXAM: FRONTAL CHEST RADIOGRAPH CLINICAL INDICATION/HISTORY: Chest pain COMPARISON: 1/8/2020 TECHNIQUE: Frontal view of the chest _______________ FINDINGS: HEART AND MEDIASTINUM: Stable cardiomediastinal silhouette. LUNGS AND PLEURAL SPACES: Haziness of right mid/lower thorax likely due to layering pleural fluid and atelectasis. Probable smaller effusion on the left. Coarsening of lung interstitium especially in the apices, suspected secondary to emphysema. BONY THORAX AND SOFT TISSUES: Unremarkable. _______________ IMPRESSION: New basilar haziness, right more so than left, appearance suggestive of pleural effusions and associated atelectasis. Xr Chest [facility] Result Date: 1/9/2020 EXAM: XR CHEST PORT CLINICAL INDICATION/HISTORY: cough -Additional: None COMPARISON: 9/14/16 TECHNIQUE: Portable frontal view of the chest _______________ FINDINGS: SUPPORT DEVICES: None. HEART AND MEDIASTINUM: Unremarkable. LUNGS AND PLEURAL SPACES: Subtle nodular opacity in the peripheral right lower lung zone, likely superimposition. No definite focal consolidation, effusion or pneumothorax. Biapical scarring. BONY THORAX AND SOFT TISSUES: Unremarkable. _______________ IMPRESSION: Subtle nodular opacity in the peripheral right lower lung zone, likely superimposition. No definite focal consolidation. Discharge Medications:    
Current Discharge Medication List  
  
START taking these medications Details  
rifAXIMin (XIFAXAN) 550 mg tablet Take 1 Tab by mouth two (2) times a day. Qty: 60 Tab, Refills: 0 CONTINUE these medications which have NOT CHANGED Details  
albuterol (PROVENTIL HFA) 90 mcg/actuation inhaler Take 1-2 Puffs by inhalation. omeprazole (PRILOSEC) 40 mg capsule Take 40 mg by mouth daily. risperiDONE (RISPERDAL) 2 mg tablet Take 2 mg by mouth daily. Indications: Bipolar Disorder in Remission  
  
famotidine (PEPCID) 20 mg tablet Take 1 Tab by mouth two (2) times a day. Qty: 60 Tab, Refills: 0  
  
escitalopram oxalate (LEXAPRO) 10 mg tablet Take 10 mg by mouth. budesonide-formoterol (SYMBICORT) 160-4.5 mcg/actuation HFAA Take 2 Puffs by inhalation two (2) times a day. Qty: 1 Inhaler, Refills: 2 Associated Diagnoses: Chronic obstructive pulmonary disease, unspecified COPD type (HCC)  
  
tiotropium bromide (SPIRIVA RESPIMAT) 2.5 mcg/actuation inhaler Take 1 Puff by inhalation daily. Qty: 1 Inhaler, Refills: 2 Associated Diagnoses: Chronic obstructive pulmonary disease, unspecified COPD type (HCC)  
  
magnesium oxide (MAG-OX) 400 mg tablet Take 1 Tab by mouth daily. Qty: 30 Tab, Refills: 0  
  
thiamine HCL (B-1) 100 mg tablet Take 1 Tab by mouth daily. Qty: 30 Tab, Refills: 0 STOP taking these medications  
  
 naproxen sodium (ALEVE) 220 mg tablet Comments:  
Reason for Stopping:   
   
 ondansetron (ZOFRAN ODT) 4 mg disintegrating tablet Comments:  
Reason for Stopping:   
   
 busPIRone (BUSPAR) 10 mg tablet Comments:  
Reason for Stopping:   
   
  
 
 
Activity: Activity as tolerated Diet: Resume previous diet Wound Care: None needed Follow-up:  
Please follow up with your PCP within 7 days to discuss your recent hospitalization. Patient to arrange. GI in 2-3 weeks Total time spent including time spent on final examination and discharge discussion, discharge documentation and records reviewed and medication reconciliation: > 30 minutes Kristina Adams DO Internal Medicine, Hospitalist 
Pager: [phone] [facility]

## 2020-01-22 NOTE — DISCHARGE INSTRUCTIONS
Patient Education     DISCHARGE SUMMARY from Nurse    PATIENT INSTRUCTIONS:    After general anesthesia or intravenous sedation, for 24 hours or while taking prescription Narcotics:  · Limit your activities  · Do not drive and operate hazardous machinery  · Do not make important personal or business decisions  · Do  not drink alcoholic beverages  · If you have not urinated within 8 hours after discharge, please contact your surgeon on call. Report the following to your surgeon:  · Excessive pain, swelling, redness or odor of or around the surgical area  · Temperature over 100.5  · Nausea and vomiting lasting longer than 4 hours or if unable to take medications  · Any signs of decreased circulation or nerve impairment to extremity: change in color, persistent  numbness, tingling, coldness or increase pain  · Any questions    What to do at Home:  Recommended activity: Activity as tolerated. If you experience any of the following symptoms as described in the \"When should you call for help? \" section of the care instructions for COPD, please follow up with your primary care provider and/or call 911. *  Please give a list of your current medications to your Primary Care Provider. *  Please update this list whenever your medications are discontinued, doses are      changed, or new medications (including over-the-counter products) are added. *  Please carry medication information at all times in case of emergency situations. These are general instructions for a healthy lifestyle:    No smoking/ No tobacco products/ Avoid exposure to second hand smoke  Surgeon General's Warning:  Quitting smoking now greatly reduces serious risk to your health.     Obesity, smoking, and sedentary lifestyle greatly increases your risk for illness    A healthy diet, regular physical exercise & weight monitoring are important for maintaining a healthy lifestyle    You may be retaining fluid if you have a history of heart failure or if you experience any of the following symptoms:  Weight gain of 3 pounds or more overnight or 5 pounds in a week, increased swelling in our hands or feet or shortness of breath while lying flat in bed. Please call your doctor as soon as you notice any of these symptoms; do not wait until your next office visit. The discharge information has been reviewed with the patient. The patient verbalized understanding. Discharge medications reviewed with the patient and appropriate educational materials and side effects teaching were provided. ___________________________________________________________________________________________________________________________________    Patient armband removed and shredded       Learning About Acute Cholecystitis  What is cholecystitis? Cholecystitis (say \"koh-lih-sis-TY-tus\") is inflammation of the gallbladder. The gallbladder stores bile. Bile helps the body digest food. Normally, the bile flows from the gallbladder to the small intestine. A gallstone stuck in the cystic duct is most often the cause of sudden (acute) cholecystitis. The cystic duct is the tube that carries the bile out of the gallbladder. The gallstone blocks the bile from leaving the gallbladder. This results in an irritated and swollen gallbladder. The disease can also be caused by infection or trauma, such as an injury from a car accident. Cholecystitis has to be treated right away. You will probably have to go to the hospital. Surgery is the usual treatment. What are the symptoms? Symptoms include:  · Steady and severe pain in the upper right part of belly. This is the most common symptom. The pain can sometimes move to your back or right shoulder blade. It may last for more than 6 hours. · Nausea or vomiting. · A fever. How is it treated? The main way to treat this disease is surgery to remove the gallbladder.  This surgery can often be done through small cuts (incisions) in the belly. This is called a laparoscopic cholecystectomy. In some cases, you may need a more extensive surgery. You may need surgery as soon as possible. The doctor may try to reduce swelling and irritation in the gallbladder before removing it. You may be given fluids and antibiotics through an IV. You may also be given pain medicine. Follow-up care is a key part of your treatment and safety. Be sure to make and go to all appointments, and call your doctor if you are having problems. It's also a good idea to know your test results and keep a list of the medicines you take. Where can you learn more? Go to http://janae-rodrick.info/. Enter T940 in the search box to learn more about \"Learning About Acute Cholecystitis. \"  Current as of: November 7, 2018  Content Version: 12.2  © 7992-4670 [employer]. Care instructions adapted under license by [employer] (which disclaims liability or warranty for this information). If you have questions about a medical condition or this instruction, always ask your healthcare professional. [place] [address] any warranty or liability for your use of this information. Patient Education        Learning About Alcohol Use Disorder  What is alcohol use disorder? Alcohol use disorder means that a person drinks alcohol even though it causes harm to themselves or others. It can range from mild to severe. The more signs of this disorder you have, the more severe it may be. Moderate to severe alcohol use disorder is sometimes called addiction. People who have it may find it hard to control their use of alcohol. People who have this disorder may argue with others about how much they're drinking. Their job may be affected because of drinking. They may drink when it's dangerous or illegal, such as when they drive. They also may have a strong need, or craving, to drink. They may feel like they must drink just to get by.  Their drinking may increase their risk of getting hurt or being in a car crash. Over time, drinking too much alcohol may cause health problems. These may include high blood pressure, liver problems, or problems with digestion. What are the signs? Maybe you've wondered about your alcohol habits, or how to tell if your drinking is becoming a problem. Here are some of the signs of alcohol use disorder. You may have it if you have two or more of the following signs:  · You drink larger amounts of alcohol than you ever meant to. Or you've been drinking for a longer time than you ever meant to. · You can't cut down or control your use. Or you constantly wish you could cut down. · You spend a lot of time getting or drinking alcohol or recovering from its effects. · You have strong cravings for alcohol. · You can no longer do your main jobs at work, at school, or at home. · You keep drinking alcohol, even though your use hurts your relationships. · You have stopped doing important activities because of your alcohol use. · You drink alcohol in situations where doing so is dangerous. · You keep drinking alcohol even though you know it's causing health problems. · You need more and more alcohol to get the same effect, or you get less effect from the same amount over time. This is called tolerance. · You have uncomfortable symptoms when you stop drinking alcohol or use less. This is called withdrawal.  Alcohol use disorder can range from mild to severe. The more signs you have, the more severe the disorder may be. Moderate to severe alcohol use disorder is sometimes called addiction. You might not realize that your drinking is a problem. You might not drink large amounts when you drink. Or you might go for days or weeks between drinking episodes. But even if you don't drink very often, your drinking could still be harmful and put you at risk. How is alcohol use disorder treated? Getting help is up to you.  But you don't have to do it alone. There are many people and kinds of treatments that can help. Treatment for alcohol use disorder can include:  · Group therapy, one or more types of counseling, and alcohol education. · Medicines that help to:  ? Reduce withdrawal symptoms and help you safely stop drinking. ? Reduce cravings for alcohol. · Support groups. These groups include Alcoholics Anonymous and [employer] (Self-Management and Recovery Training). Some people are able to stop or cut back on drinking with help from a counselor. People who have moderate to severe alcohol use disorder may need medical treatment. They may need to stay in a hospital or treatment center. You may have a treatment team to help you. This team may include a psychologist or psychiatrist, counselors, doctors, social workers, nurses, and a . A  helps plan and manage your treatment. Follow-up care is a key part of your treatment and safety. Be sure to make and go to all appointments, and call your doctor if you are having problems. It's also a good idea to know your test results and keep a list of the medicines you take. Where can you learn more? Go to http://janae-rodrick.info/. Enter [id number] [id number] in the search box to learn more about \"Learning About Alcohol Use Disorder. \"  Current as of: February 5, 2019  Content Version: 12.2  © 0537-1981 [employer]. Care instructions adapted under license by [employer] (which disclaims liability or warranty for this information). If you have questions about a medical condition or this instruction, always ask your healthcare professional. [place] [address] any warranty or liability for your use of this information.          Patient Education        Chronic Obstructive Pulmonary Disease (COPD): Care Instructions  Your Care Instructions    Chronic obstructive pulmonary disease (COPD) is a general term for a group of lung diseases, including emphysema and chronic bronchitis. People with COPD have decreased airflow in and out of the lungs, which makes it hard to breathe. The airways also can get clogged with thick mucus. Cigarette smoking is a major cause of COPD. Although there is no cure for COPD, you can slow its progress. Following your treatment plan and taking care of yourself can help you feel better and live longer. Follow-up care is a key part of your treatment and safety. Be sure to make and go to all appointments, and call your doctor if you are having problems. It's also a good idea to know your test results and keep a list of the medicines you take. How can you care for yourself at home?   Staying healthy    · Do not smoke. This is the most important step you can take to prevent more damage to your lungs. If you need help quitting, talk to your doctor about stop-smoking programs and medicines. These can increase your chances of quitting for good.     · Avoid colds and flu. Get a pneumococcal vaccine shot. If you have had one before, ask your doctor whether you need a second dose. Get the flu vaccine every fall. If you must be around people with colds or the flu, wash your hands often.     · Avoid secondhand smoke, air pollution, and high altitudes. Also avoid cold, dry air and hot, humid air. Stay at home with your windows closed when air pollution is bad.    Medicines and oxygen therapy    · Take your medicines exactly as prescribed. Call your doctor if you think you are having a problem with your medicine.     · You may be taking medicines such as:  ? Bronchodilators. These help open your airways and make breathing easier. Bronchodilators are either short-acting (work for 6 to 9 hours) or long-acting (work for 24 hours). You inhale most bronchodilators, so they start to act quickly. Always carry your quick-relief inhaler with you in case you need it while you are away from home. ?  Corticosteroids (prednisone, budesonide). These reduce airway inflammation. They come in pill or inhaled form. You must take these medicines every day for them to work well.     · A spacer may help you get more inhaled medicine to your lungs. Ask your doctor or pharmacist if a spacer is right for you. If it is, ask how to use it properly.     · Do not take any vitamins, over-the-counter medicine, or herbal products without talking to your doctor first.     · If your doctor prescribed antibiotics, take them as directed. Do not stop taking them just because you feel better. You need to take the full course of antibiotics.     · Oxygen therapy boosts the amount of oxygen in your blood and helps you breathe easier. Use the flow rate your doctor has recommended, and do not change it without talking to your doctor first.   Activity    · Get regular exercise. Walking is an easy way to get exercise. Start out slowly, and walk a little more each day.     · Pay attention to your breathing. You are exercising too hard if you cannot talk while you are exercising.     · Take short rest breaks when doing household chores and other activities.     · Learn breathing methods--such as breathing through pursed lips--to help you become less short of breath.     · If your doctor has not set you up with a pulmonary rehabilitation program, talk to him or her about whether rehab is right for you. Rehab includes exercise programs, education about your disease and how to manage it, help with diet and other changes, and emotional support. Diet    · Eat regular, healthy meals. Use bronchodilators about 1 hour before you eat to make it easier to eat. Eat several small meals instead of three large ones.  Drink beverages at the end of the meal. Avoid foods that are hard to chew.     · Eat foods that contain protein so that you do not lose muscle mass.     · Talk with your doctor if you gain too much weight or if you lose weight without trying.    Mental health    · Talk to your family, friends, or a therapist about your feelings. It is normal to feel frightened, angry, hopeless, helpless, and even guilty. Talking openly about bad feelings can help you cope. If these feelings last, talk to your doctor. When should you call for help? Call 911 anytime you think you may need emergency care. For example, call if:    · You have severe trouble breathing.    Call your doctor now or seek immediate medical care if:    · You have new or worse trouble breathing.     · You cough up blood.     · You have a fever.    Watch closely for changes in your health, and be sure to contact your doctor if:    · You cough more deeply or more often, especially if you notice more mucus or a change in the color of your mucus.     · You have new or worse swelling in your legs or belly.     · You are not getting better as expected. Where can you learn more? Go to http://janae-rodrick.info/. Leonidas Jason in the search box to learn more about \"Chronic Obstructive Pulmonary Disease (COPD): Care Instructions. \"  Current as of: June 9, 2019  Content Version: 12.2  © 8099-5251 [employer]. Care instructions adapted under license by [employer] (which disclaims liability or warranty for this information). If you have questions about a medical condition or this instruction, always ask your healthcare professional. [address] any warranty or liability for your use of this information. Patient Education        Learning About Gallstones  What are gallstones? Gallstones are stones that form in the gallbladder. The gallbladder is a small sac located just under the liver. It stores bile released by the liver. Bile helps you digest fats. Gallstones can be smaller than a grain of sand or as large as a golf ball. Gallstones form when cholesterol and other things found in bile make stones.  They can also form if the gallbladder doesn't empty as it should. Gallstones can also form in the common bile duct or cystic duct. These tubes carry bile from the gallbladder and the liver to the small intestine. What happens when you have gallstones? Gallstones can cause many different problems, such as:  · A blockage in the common bile duct. · Inflammation or infection of the gallbladder (acute cholecystitis). This can happen when a gallstone blocks the cystic duct. · Inflammation or infection of the common bile duct (cholangitis). This can happen when gallstones get stuck in the common bile duct. In rare cases, this can damage the liver or spread infection. · Pancreatitis, an inflammation of the pancreas. What are the symptoms? · Most people who have gallstones don't have symptoms. · When symptoms occur, they can include:  ? Pain in the pit of your stomach or in the upper right part of your belly. It may spread to your right upper back or shoulder blade area. ? Pain that may come and go or be steady. It may get worse when you eat. ? Fever and chills, if a gallstone is blocking a bile duct and causing an infection. ? Yellowing of your skin and the whites of your eyes. How can you prevent gallstones? There is no sure way to prevent gallstones. But you can reduce your risk of forming gallstones that can cause symptoms. · Stay at a healthy weight. If you need to lose weight, do so slowly and sensibly. · Eat regular, balanced meals. · Be active, and exercise regularly. How are gallstones treated? · If you don't have symptoms, you probably don't need treatment. · For mild symptoms, your doctor may have you take pain medicine and wait to see if the pain goes away. · For severe pain or infection, or if you have more than one gallstone attack, your doctor may suggest surgery to have your gallbladder removed. The body works fine without a gallbladder. Follow-up care is a key part of your treatment and safety.  Be sure to make and go to all appointments, and call your doctor if you are having problems. It's also a good idea to know your test results and keep a list of the medicines you take. Where can you learn more? Go to http://janae-rodrick.info/. Enter  in the search box to learn more about \"Learning About Gallstones. \"  Current as of: November 7, 2018  Content Version: 12.2  © 7717-7346 [employer]. Care instructions adapted under license by [employer] (which disclaims liability or warranty for this information). If you have questions about a medical condition or this instruction, always ask your healthcare professional. [address] any warranty or liability for your use of this information.

## 2020-01-23 ENCOUNTER — HOME CARE VISIT (OUTPATIENT)
Dept: HOME HEALTH SERVICES | Facility: HOME HEALTH | Age: 55
End: 2020-01-23

## 2020-01-26 ENCOUNTER — HOME CARE VISIT (OUTPATIENT)
Dept: HOME HEALTH SERVICES | Facility: HOME HEALTH | Age: 55
End: 2020-01-26

## 2020-01-27 ENCOUNTER — HOME CARE VISIT (OUTPATIENT)
Dept: SCHEDULING | Facility: HOME HEALTH | Age: 55
End: 2020-01-27

## 2020-01-27 DIAGNOSIS — J44.9 CHRONIC OBSTRUCTIVE PULMONARY DISEASE, UNSPECIFIED COPD TYPE (HCC): ICD-10-CM

## 2020-01-27 PROCEDURE — G0299 HHS/HOSPICE OF RN EA 15 MIN: HCPCS

## 2020-02-14 ENCOUNTER — HOME HEALTH ADMISSION (OUTPATIENT)
Dept: HOME HEALTH SERVICES | Facility: HOME HEALTH | Age: 55
End: 2020-02-14

## 2020-02-14 ENCOUNTER — TELEPHONE (OUTPATIENT)
Dept: FAMILY MEDICINE CLINIC | Age: 55
End: 2020-02-14

## 2020-02-14 ENCOUNTER — OFFICE VISIT (OUTPATIENT)
Dept: FAMILY MEDICINE CLINIC | Age: 55
End: 2020-02-14

## 2020-02-14 VITALS
DIASTOLIC BLOOD PRESSURE: 75 MMHG | BODY MASS INDEX: 21.69 KG/M2 | HEART RATE: 89 BPM | TEMPERATURE: 98 F | OXYGEN SATURATION: 98 % | RESPIRATION RATE: 14 BRPM | WEIGHT: 135 LBS | HEIGHT: 66 IN | SYSTOLIC BLOOD PRESSURE: 110 MMHG

## 2020-02-14 DIAGNOSIS — K70.30 ALCOHOLIC CIRRHOSIS OF LIVER WITHOUT ASCITES (HCC): Primary | ICD-10-CM

## 2020-02-14 DIAGNOSIS — Z09 HOSPITAL DISCHARGE FOLLOW-UP: ICD-10-CM

## 2020-02-14 DIAGNOSIS — E55.9 VITAMIN D INSUFFICIENCY: ICD-10-CM

## 2020-02-14 DIAGNOSIS — Z72.0 TOBACCO ABUSE: ICD-10-CM

## 2020-02-14 DIAGNOSIS — D61.818 PANCYTOPENIA (HCC): ICD-10-CM

## 2020-02-14 DIAGNOSIS — E78.2 MIXED HYPERLIPIDEMIA: ICD-10-CM

## 2020-02-14 DIAGNOSIS — J44.9 CHRONIC OBSTRUCTIVE PULMONARY DISEASE, UNSPECIFIED COPD TYPE (HCC): ICD-10-CM

## 2020-02-14 DIAGNOSIS — N17.9 AKI (ACUTE KIDNEY INJURY) (HCC): ICD-10-CM

## 2020-02-14 DIAGNOSIS — Z12.31 SCREENING MAMMOGRAM, ENCOUNTER FOR: ICD-10-CM

## 2020-02-14 DIAGNOSIS — R53.1 WEAKNESS GENERALIZED: ICD-10-CM

## 2020-02-14 PROBLEM — E87.70 FLUID OVERLOAD: Status: RESOLVED | Noted: 2020-01-14 | Resolved: 2020-02-14

## 2020-02-14 PROBLEM — J96.01 ACUTE RESPIRATORY FAILURE WITH HYPOXIA (HCC): Status: RESOLVED | Noted: 2020-01-09 | Resolved: 2020-02-14

## 2020-02-14 PROBLEM — I95.9 ACUTE HYPOTENSION: Status: RESOLVED | Noted: 2020-01-09 | Resolved: 2020-02-14

## 2020-02-14 PROBLEM — E87.6 HYPOKALEMIA: Status: RESOLVED | Noted: 2020-01-09 | Resolved: 2020-02-14

## 2020-02-14 PROBLEM — F10.939 ALCOHOL WITHDRAWAL (HCC): Status: RESOLVED | Noted: 2019-05-10 | Resolved: 2020-02-14

## 2020-02-14 PROBLEM — K74.60 CIRRHOSIS (HCC): Status: RESOLVED | Noted: 2020-01-09 | Resolved: 2020-02-14

## 2020-02-14 PROBLEM — N39.0 UTI (URINARY TRACT INFECTION): Status: RESOLVED | Noted: 2020-01-09 | Resolved: 2020-02-14

## 2020-02-14 RX ORDER — VENLAFAXINE HYDROCHLORIDE 75 MG/1
150 CAPSULE, EXTENDED RELEASE ORAL DAILY
COMMUNITY
Start: 2020-02-04 | End: 2020-01-01

## 2020-02-14 RX ORDER — BUDESONIDE AND FORMOTEROL FUMARATE DIHYDRATE 160; 4.5 UG/1; UG/1
2 AEROSOL RESPIRATORY (INHALATION) 2 TIMES DAILY
Qty: 1 INHALER | Refills: 2 | Status: SHIPPED | OUTPATIENT
Start: 2020-02-14

## 2020-02-14 NOTE — TELEPHONE ENCOUNTER
Called Kathy back and she states that Juliane Clarke is covered under her insurance plan.  Thank you

## 2020-02-14 NOTE — TELEPHONE ENCOUNTER
Kathy from [facility] called to state that this patient was referred to her but they don't accept this patients insurance.

## 2020-02-14 NOTE — PROGRESS NOTES
Pt is a 63y.o. year old female who presents for transition of care. Assessment/ Plan:   Diagnoses and all orders for this visit:    1. Alcoholic cirrhosis of liver without ascites (HCC)  -     rifAXIMin (XIFAXAN) 550 mg tablet; Take 1 Tab by mouth two (2) times a day. -     REFERRAL TO GASTROENTEROLOGY  -     METABOLIC PANEL, COMPREHENSIVE; Future  Medication refill for her rifaximin given  She was previously given a referral to gastroenterology in May 2019 however she never followed through with this, new referral given  Labs ordered to monitor her hepatic and renal function    2. Tobacco abuse  Encouraged complete smoking cessation especially since she is now on chronic oxygen    3. Mixed hyperlipidemia  -     LIPID PANEL; Future  Follow-up on her lipid levels    4. Pancytopenia (HCC)  -     CBC W/O DIFF; Future  Follow-up on her pancytopenia diagnosed during the recent hospitalization    5. HENRRY (acute kidney injury) ([facility] [address]  -     METABOLIC PANEL, COMPREHENSIVE; Future  Follow-up on her renal function    6. Vitamin D insufficiency  Continue to monitor    7. Screening mammogram, encounter for  -     [facility] MAMMO BI SCREENING INCL CAD; Future  Health maintenance need for mammogram order placed    8. Chronic obstructive pulmonary disease, unspecified COPD type (HCC)  -     budesonide-formoteroL (SYMBICORT) 160-4.5 mcg/actuation HFAA; Take 2 Puffs by inhalation two (2) times a day. -     ipratropium (ATROVENT HFA) 17 mcg/actuation inhaler; Take 1 Puff by inhalation every four (4) hours as needed for Wheezing. Medication refills given    9. Weakness generalized  -     [facility] ordered for PT/OT    10. Hospital discharge follow-up  -     [address] [place] [address]    Greater than 50% of the 40 minutes was spent in counseling and coordination of care. Follow-up and Dispositions    · Return in about 3 months (around 5/14/2020) for hyperlipidemia, copd, cirrhosis, , 30 minutes. Hospitalization summary;     HPI: Willem Izaguirre a 63 y.o. female who presents to [facility] ER with complaint of Hypotension and Nausea.  Patient was reportedly referred to [facility] ER by her Psychiatrist when she was seen on 1/08/2020 and her Blood Pressure was low.  Patient states that she has been having fevers, chills, diaphoresis, BLE edema, a cough productive of clear mucus, and intermittent chest congestion.  Patient also reports a UTI 2 weeks ago and continuing urinary urgency.  Patient also reports nausea and vomiting with reduced oral intake for the last 3 days accompanied by \"a little bit of diarrhea. \" Percell Meckel states that she has had chronically low blood pressure even before she was diagnosed with liver cirrhosis.  Patient reports that she has had ascites before and that she does continue to drink 3 shots of vodka a night to improve her sleeping.     In [facility] ER, Patient was noted to have Blood Pressures of 70/38 and 49/90 mm Hg and a borderline Urinalysis for UTI.  Patient was started on IV Norepinephrine for hypotension.     Patient is admitted to [facility] ICU for management of Hypotension requiring Pressors, HENRRY likely due to Dehydration, and borderline UTI.     Hospital Course:     Acute hypoxic resp failure  - likely 2/2 fluid overload due to fluid resuscitation from shock. - strict I&Os - good urine output. - monitor BMP and replace lytes PRN  - echo with NL systolic function and inconclusive diastolic function  - on NC, wean down as tolerated. Not on home O2; goal O2 sats 89% or above  - continuous O2 on RA at rest on 1/19 - 91-94%  - down to 83-87% with ambulation. PT recommending IPR  - BP borderline now, will stop diuresis. Patient will likely need home O2.  Was able to wean from 10L --> 2L NC with diuresis  - Walk test prior to discharge showed patient still with hypoxia but this was chronic copd as patient out of acute ordeal     COPD  - cont brovana/pulmicort  - PRN atrovent  - wean O2 as tolerated  - Required O2 at discharge     ETOH induce pancreatitis and cirrhosis  - symptomatically improved, tolerating PO intake  - appreciate GI  - EGD showed non-specific gastritis   - HIDA negative  - refused MRCP during inpatient  - Pain improved and was tolerating diet without issues  - Cont xifaxin, magox,neutraphos  - LFTs improving during admission  - OP f/u with GI recomemended     UTI  - completed course of rocephin  - UCx with ecoli     HENRRY  - resolved with IV fluids     The rest of the patient's chronic conditions were managed appropriately during their admission. They were medically stable at the time of discharge.         Test results:  Nm Hepatobiliary Duct Scan  IMPRESSION: 1. No findings of cystic duct obstruction. 2. Hepatomegaly, with heterogeneous radiotracer uptake, in keeping with intrinsic liver disease.      ROS:  Review of Systems   Constitutional: Negative. Respiratory: Negative. Cardiovascular: Negative. Gastrointestinal: Negative. Neurological: Negative.     Psychiatric/Behavioral:        Slightly depressed due to her chronic illness       Date admitted: January 8, 2020    Date discharged: January 21, 2020    Date of face to face: February 14, 2020    Medication reconciliation performed: Yes    Medications added/changed/discontinued: Start rifaximin 2 times a day and wear oxygen at 2 L/min when engaging in any activity, stop Aleve, Zofran and BuSpar    Review discharge instructions: Yes    Need for follow up on in hospital testing: No    Need for follow up with specialist: Yes  Name of specialist: Gastroenterology in 2 to 3 weeks from discharge    Does patient have help at home: Yes  Name: Family    Barriers to obtaining medications: No    Patient/family educated on cause of hospitalization: Yes    Patient/family able to repeat back cause of hospitalization, disease process, medication changes, and when to seek help: Yes    Patient Past Records were reviewed:  yes    Vitals: 02/14/20 0911   BP: 110/75   Pulse: 89   Resp: 14   Temp: 98 °F (36.7 °C)   TempSrc: Oral   SpO2: 98%   Weight: 135 lb (61.2 kg)   Height: 5' 6\" (1.676 m)      Body mass index is 21.79 kg/m². Physical assessment:  Physical Exam  Vitals signs and nursing note reviewed. HENT:      Head: Normocephalic and atraumatic. Cardiovascular:      Rate and Rhythm: Normal rate and regular rhythm. Heart sounds: Normal heart sounds. Pulmonary:      Effort: Pulmonary effort is normal.      Breath sounds: Normal breath sounds. Abdominal:      General: Bowel sounds are normal.      Palpations: Abdomen is soft. There is no shifting dullness or fluid wave. Tenderness: There is no abdominal tenderness. Skin:     General: Skin is warm and dry. Neurological:      Mental Status: She is alert and oriented to person, place, and time. Gait: Gait is intact. Patient Active Problem List   Diagnosis Code    Hyperlipidemia E78.5    Vitamin D insufficiency E55.9    ETOH abuse F10.10    COPD (chronic obstructive pulmonary disease) ([facility] [address] J44.9    Bipolar mood disorder (HCC) F31.9    Panic disorder F41.0    Intractable nausea and vomiting F41.6    Alcoholic cirrhosis of liver without ascites (HCC) K70.30    Tobacco abuse Z72.0    Macrocytic anemia D53.9    Leukopenia D72.819    Thrombocytopenia (HCC) D69.6    HENRRY (acute kidney injury) ([address] N17.9    Pancytopenia (HCC) D61.818    Alcohol-induced pancreatitis K85.20       Past Medical History:   Diagnosis Date    Bipolar affective ([address]     Chronic obstructive pulmonary disease (HCC)     Cirrhosis ([address]     ETOH abuse     Former smoker     1 PPD x40 years    History of ascites     History of sinusitis     Hyperlipidemia 11/20/2009    Patient denies    Left breast mass 05/13/2011    U/S Left Breast: No definite mass identified.  Recommended recheck in 6 months    Manic depression ([address]     Panic disorder     Vitamin D insufficiency 11/20/2009    23 ng/mL    Wrist fracture, right 01/06/2010    Non-Displaced Distal Radius/Ulnar Styloid Fx          Social History     Socioeconomic History    Marital status:      Spouse name: Not on file    Number of children: Not on file    Years of education: Not on file    Highest education level: Not on file   Occupational History    Not on file   Social Needs    Financial resource strain: Not on file    Food insecurity:     Worry: Not on file     Inability: Not on file    Transportation needs:     Medical: Not on file     Non-medical: Not on file   Tobacco Use    Smoking status: Former Smoker     Packs/day: 1.00     Years: 40.00     Pack years: 40.00    Smokeless tobacco: Former User     Quit date: 06/2016   Substance and Sexual Activity    Alcohol use:  Yes     Alcohol/week: 21.0 standard drinks     Types: 21 Shots of liquor per week     Comment: Quit 6/2016; however, takes 3 shots of vodka nightly to help sleep    Drug use: Yes     Types: Marijuana     Comment: Marijuana once/3 months    Sexual activity: Not on file   Lifestyle    Physical activity:     Days per week: Not on file     Minutes per session: Not on file    Stress: Not on file   Relationships    Social connections:     Talks on phone: Not on file     Gets together: Not on file     Attends Voodoo service: Not on file     Active member of club or organization: Not on file     Attends meetings of clubs or organizations: Not on file     Relationship status: Not on file    Intimate partner violence:     Fear of current or ex partner: Not on file     Emotionally abused: Not on file     Physically abused: Not on file     Forced sexual activity: Not on file   Other Topics Concern     Service Not Asked    Blood Transfusions Not Asked    Caffeine Concern Not Asked    Occupational Exposure Not Asked   Ronald Peeling Hazards Not Asked    Sleep Concern Not Asked    Stress Concern Not Asked    Weight Concern Not Asked    Special Diet Not Asked    Back Care Not Asked    Exercise Not Asked    Bike Helmet Not Asked   [address] [facility] Not Asked    Self-Exams Not Asked   Social History Narrative    Not on file     Family History   Problem Relation Age of Onset    No Known Problems Daughter     No Known Problems Adoptive Father     No Known Problems Adoptive Mother        Current Outpatient Medications   Medication Sig Dispense Refill    venlafaxine-SR (EFFEXOR-XR) 75 mg capsule       rifAXIMin (XIFAXAN) 550 mg tablet Take 1 Tab by mouth two (2) times a day. 60 Tab 0    budesonide-formoteroL (SYMBICORT) 160-4.5 mcg/actuation HFAA Take 2 Puffs by inhalation two (2) times a day. 1 Inhaler 2    ipratropium (ATROVENT HFA) 17 mcg/actuation inhaler Take 1 Puff by inhalation every four (4) hours as needed for Wheezing. 1 Inhaler 5    albuterol (PROVENTIL HFA) 90 mcg/actuation inhaler Take 1-2 Puffs by inhalation.  omeprazole (PRILOSEC) 40 mg capsule Take 40 mg by mouth daily.  risperiDONE (RISPERDAL) 2 mg tablet Take 2 mg by mouth daily. Indications: Bipolar Disorder in Remission      magnesium oxide (MAG-OX) 400 mg tablet Take 1 Tab by mouth daily. 30 Tab 0    thiamine HCL (B-1) 100 mg tablet Take 1 Tab by mouth daily. 30 Tab 0       Social History     Tobacco Use   Smoking Status Former Smoker    Packs/day: 1.00    Years: 40.00    Pack years: 40.00   Smokeless Tobacco Former User    Quit date: 06/2016       Allergies   Allergen Reactions    Animal Dander Itching    Egg Nausea and Vomiting     Pt stated she does not have true allergy to eggs. I have discussed the diagnosis with the patient and the intended plan as seen in the above orders. The patient has received an After-Visit Summary and questions were answered concerning future plans.      Medication Side Effects and Warnings were discussed with patient: yes      Return to clinic if sxs persist, to ER if sxs worsen    Patient verbalized understanding of above instructions. AVS printed and given to ptAbdullahi Boggs, BARAKP-BC  [facility]   [address] [facility] [address]  [id number]

## 2020-02-17 ENCOUNTER — TELEPHONE (OUTPATIENT)
Dept: FAMILY MEDICINE CLINIC | Age: 55
End: 2020-02-17

## 2020-02-17 NOTE — TELEPHONE ENCOUNTER
Patient called stating that her insurance won't cover her xifaxan medication so she wants to know if there is an alternative medication.  Patient requested a call back

## 2020-03-03 ENCOUNTER — DOCUMENTATION ONLY (OUTPATIENT)
Dept: PRIMARY CARE CLINIC | Age: 55
End: 2020-03-03

## 2020-03-03 NOTE — PROGRESS NOTES
[address] Dept[address] Xifaxan 550mg Twice daily. Per Authorization- Sending for Pharmacy clinical review. Turnaround time 24hrs- Sent urgent due to pt needing medication.  Thank you

## 2020-03-10 ENCOUNTER — TELEPHONE (OUTPATIENT)
Dept: FAMILY MEDICINE CLINIC | Age: 55
End: 2020-03-10

## 2020-03-13 ENCOUNTER — TELEPHONE (OUTPATIENT)
Dept: FAMILY MEDICINE CLINIC | Age: 55
End: 2020-03-13

## 2020-03-13 NOTE — TELEPHONE ENCOUNTER
Called pt and informed her that referral has been sent to a Dr. Anderson Vo in [place]- she was ok with that.  Thanks

## 2020-03-13 NOTE — TELEPHONE ENCOUNTER
Pt is requesting a referral for gastroenterology.   She spoke to her insurance and she wants to go to  [facility] Clifford Foster   P: [phone]

## 2020-05-14 PROBLEM — Z72.0 NICOTINE ABUSE: Status: ACTIVE | Noted: 2020-01-01

## 2020-05-14 PROBLEM — F51.01 PRIMARY INSOMNIA: Status: ACTIVE | Noted: 2020-01-01

## 2020-05-14 NOTE — PATIENT INSTRUCTIONS
To schedule your mammgram call [phone] Learning About Vaping What is it? Vaping is using a battery-powered device to inhale liquid nicotine or other substances. The devices can look like everyday items such as pens, cigarettes, or USB flash drives. Instead of tobacco, they use a blend of liquid nicotine, flavors, and other chemicals. This is called vaping liquid. It comes in different nicotine strengths. THC (a chemical in marijuana) products can also be used. Vapes are also called: · E-cigarettes. · Vape pens. · Juuls. How do vapes work? Vapes make an aerosol cloud by heating vaping liquid or THC. You breathe in through the mouthpiece. This turns on the battery, which workman the heating element. This turns vaping liquid or THC into tiny particles suspended in gas, called an aerosol. What are the safety concerns? More research is needed before experts can tell if vaping is safe. The long-term health effects aren't known. Here's what experts are learning about vaping. It's not harmless water vapor. The \"vapor\" made by vaping isn't a vapor at all. It's an aerosol cloud made of chemicals suspended in a gas. This vape aerosol contains chemicals known to be harmful. It likely has nicotine. Nicotine is addictive. It's harmful to developing brains, such as in unborn babies, children, and young adults up to age 22. Liquid nicotine can be lethal if swallowed. Keep it out of children's reach. It may cause a deadly lung disease. There have been outbreaks of lung disease and death related to vaping. Some sources call it vaping-related lung injury. Many of these may be from vaping products with THC. But the exact cause isn't known. How well does it work to help people stop smoking? More research is needed to know how well vaping works to help people stop smoking. Some people try to quit smoking by reducing the amount of nicotine they vape. They do this slowly over time.  If you're thinking of using vaping to help you stop smoking, talk to your doctor first. Here are some other things to think about. It's not approved as a quit-smoking aid. The U.S. Food and Drug Administration and the Centers for Disease Control and Prevention don't recommend vaping to help quit smoking. Nicotine replacement patches or gums are preferred. Many people end up using both. This is called dual use. Some people choose to vape when they can't smoke. But they still smoke cigarettes. Where can you learn more? Go to http://janae-rodrick.info/ Enter C351 in the search box to learn more about \"Learning About Vaping. \" Current as of: July 4, 2019Content Version: 12.4 © 8609-9060 [employer]. Care instructions adapted under license by [employer] (which disclaims liability or warranty for this information). If you have questions about a medical condition or this instruction, always ask your healthcare professional. [address] any warranty or liability for your use of this information.

## 2020-05-14 NOTE — PROGRESS NOTES
1. Have you been to the ER, urgent care clinic since your last visit? Hospitalized since your last visit? No 
 
2. Have you seen or consulted any other health care providers outside of the [address] [place] [address] since your last visit? Include any pap smears or colon screening. Yes, had a Virtual Visit with her Psychiatrist a week ago. Chief Complaint Patient presents with  Follow Up Chronic Condition

## 2020-05-14 NOTE — PROGRESS NOTES
[address] 
             [phone] Abril Davis is a 63 y.o. female who was seen by synchronous (real-time) audio-video technology on 5/14/2020. Consent: Abril Davis, who was seen by synchronous (real-time) audio-video technology, and/or her healthcare decision maker, is aware that this patient-initiated, Telehealth encounter on 5/14/2020 is a billable service, with coverage as determined by her insurance carrier. She is aware that she may receive a bill and has provided verbal consent to proceed: Yes. Assessment & Plan:  
Diagnoses and all orders for this visit: 1. Alcoholic cirrhosis of liver without ascites ([facility] [address] Has been to gastroenterology, I have not received a copy of that note yet She was started on lactulose, having 1-2 bm's a day Educated her if she starts to have 5 or more bm's a day to contact the gastroenterologist as her lactulose may need to be adjusted Educated her on the purpose of taking lactulose whit her liver disease to prevent the build up of ammonia 2. Chronic obstructive pulmonary disease, unspecified COPD type ([facility] [address] Managed by pulmonology Using 02 as needed and when sleeping Via video she appears comfortable and in no respiratory distress Minimal usage of her albuterol 3. Nicotine abuse Although she has stopped smoking cigarettes she is now vaping using a nicotine amount of 50mg Counseled on the dangers of vaping and encouraged her to quit 4. Primary insomnia Managed by psychiatry, currently working on adjusting her medications She states her depression is controlled on current medications. Follow-up and Dispositions · Return in about 3 months (around 8/14/2020) for Annual physical, w/gyn, depression, copd,, 30 minutes, office visit. [address] Subjective:  
Abril Davis is a 63 y.o. female who was seen for Follow Up Chronic Condition Cirrhosis Went to gastroenterology and was started on lactulose Dr. Italia Woodson, appointment was on 4/29/20 in person Taking the lactulose as directed Having 1-2 bowel movements a day COPD Using O2 when active, does laps around her apartment and sleeps with it Does not use in the kitchen Using symbicort daily, using albuterol about 1 x a week Denies chest pain/pressure or intolerable shortness of breath with activity. Denies cough or sputum production. Filed for disability Insomnia 
psychiatrist is working on sleep medicine, currently with insomnia Currently using meditation and back ground noise at night in addition to medications She has tried trazodone, hydroxyzine, and Benadryl She likes her psychiatrist 
Currently on Effexor and feels like she is getting the desired results Denies SI/HI Social support: has fiance, has a good friend who lives in [place] Nicotine abuse She has stopped smoking cigarettes but has switched to vaping Currently the nicotine dosage she is vaping is 50 mg 
Counseled on the negative effects of nicotine on the body and the relationship to her current disease processes Stressed to her vaping is not safer than cigarette smoking, it can cause lung disease, and there is still much unknown about the effects of vaping. Counseled to stop vaping. Prior to Admission medications Medication Sig Start Date End Date Taking? Authorizing Provider  
lactulose (CHRONULAC) 10 gram/15 mL solution Take 2 tsp by mouth two (2) times a day. Yes Provider, Historical  
venlafaxine-SR (EFFEXOR-XR) 75 mg capsule Take 150 mg by mouth daily. 2/4/20  Yes Provider, Historical  
budesonide-formoteroL (SYMBICORT) 160-4.5 mcg/actuation HFAA Take 2 Puffs by inhalation two (2) times a day. 2/14/20  Yes Noel Jack NP  
ipratropium (ATROVENT HFA) 17 mcg/actuation inhaler Take 1 Puff by inhalation every four (4) hours as needed for Wheezing.  2/14/20  Yes Noel Jack NP  
 albuterol (PROVENTIL HFA) 90 mcg/actuation inhaler Take 1-2 Puffs by inhalation. 10/21/18  Yes Provider, Historical  
omeprazole (PRILOSEC) 40 mg capsule Take 20 mg by mouth daily. Yes Provider, Historical  
risperiDONE (RISPERDAL) 2 mg tablet Take 2 mg by mouth daily. Indications: Bipolar Disorder in Remission   Yes Luz Alvarado MD  
magnesium oxide (MAG-OX) 400 mg tablet Take 1 Tab by mouth daily. 5/10/19  Yes Reymundo Moreno MD  
thiamine HCL (B-1) 100 mg tablet Take 1 Tab by mouth daily. 5/10/19  Yes Reymundo Moreno MD  
traZODone (DESYREL) 50 mg tablet Take 50 mg by mouth nightly. Provider, Historical  
rifAXIMin (XIFAXAN) 550 mg tablet Take 1 Tab by mouth two (2) times a day. 2/18/20   Anell Poles, NP Allergies Allergen Reactions  Animal Dander Itching  Egg Nausea and Vomiting Pt stated she does not have true allergy to eggs. Patient Active Problem List  
Diagnosis Code  Hyperlipidemia E78.5  Vitamin D insufficiency E55.9  
 ETOH abuse F10.10  COPD (chronic obstructive pulmonary disease) ([facility]) J44.9  Bipolar mood disorder ([address] F31.9  Panic disorder F41.0  Intractable nausea and vomiting R11.2  Alcoholic cirrhosis of liver without ascites ([facility]) K70.30  Tobacco abuse Z72.0  Macrocytic anemia D53.9  Leukopenia D72.819  Thrombocytopenia ([address] D69.6  HENRRY (acute kidney injury) ([address] N17.9  Pancytopenia ([address] E27.933  Alcohol-induced pancreatitis K85.20  
 Nicotine abuse Z72.0  
 Primary insomnia F51.01 Patient Active Problem List  
 Diagnosis Date Noted  Nicotine abuse 05/14/2020  Primary insomnia 05/14/2020  Alcohol-induced pancreatitis 01/14/2020  Macrocytic anemia 01/09/2020  Leukopenia 01/09/2020  Thrombocytopenia ([address] 01/09/2020  HENRRY (acute kidney injury) ([address] 01/09/2020  Pancytopenia ([address] 01/09/2020  Intractable nausea and vomiting 05/10/2019  Alcoholic cirrhosis of liver without ascites ([facility] [address] 10/21/2018  Tobacco abuse 10/21/2018  Hyperlipidemia  ETOH abuse  COPD (chronic obstructive pulmonary disease) ([facility])  Bipolar mood disorder ([facility] [address]  Panic disorder  Vitamin D insufficiency 11/20/2009 Current Outpatient Medications Medication Sig Dispense Refill  lactulose (CHRONULAC) 10 gram/15 mL solution Take 2 tsp by mouth two (2) times a day.  venlafaxine-SR (EFFEXOR-XR) 75 mg capsule Take 150 mg by mouth daily.  budesonide-formoteroL (SYMBICORT) 160-4.5 mcg/actuation HFAA Take 2 Puffs by inhalation two (2) times a day. 1 Inhaler 2  
 ipratropium (ATROVENT HFA) 17 mcg/actuation inhaler Take 1 Puff by inhalation every four (4) hours as needed for Wheezing. 1 Inhaler 5  
 albuterol (PROVENTIL HFA) 90 mcg/actuation inhaler Take 1-2 Puffs by inhalation.  omeprazole (PRILOSEC) 40 mg capsule Take 20 mg by mouth daily.  risperiDONE (RISPERDAL) 2 mg tablet Take 2 mg by mouth daily. Indications: Bipolar Disorder in Remission  magnesium oxide (MAG-OX) 400 mg tablet Take 1 Tab by mouth daily. 30 Tab 0  
 thiamine HCL (B-1) 100 mg tablet Take 1 Tab by mouth daily. 30 Tab 0  
 traZODone (DESYREL) 50 mg tablet Take 50 mg by mouth nightly.  rifAXIMin (XIFAXAN) 550 mg tablet Take 1 Tab by mouth two (2) times a day. 60 Tab 0 Allergies Allergen Reactions  Animal Dander Itching  Egg Nausea and Vomiting Pt stated she does not have true allergy to eggs. Past Medical History:  
Diagnosis Date  Bipolar affective ([facility] [address]  Chronic obstructive pulmonary disease ([facility] [address]  Cirrhosis ([facility] [address]  ETOH abuse  Former smoker 1 PPD x40 years  History of ascites  History of sinusitis  Hyperlipidemia 11/20/2009 Patient denies  Left breast mass 05/13/2011 U/S Left Breast: No definite mass identified. Recommended recheck in 6 months  Manic depression ([address]  Panic disorder  Vitamin D insufficiency 11/20/2009  
 23 ng/mL  Wrist fracture, right 01/06/2010 Non-Displaced Distal Radius/Ulnar Styloid Fx Past Surgical History:  
Procedure Laterality Date  HX WISDOM TEETH EXTRACTION Family History Problem Relation Age of Onset  No Known Problems Daughter  No Known Problems Adoptive Father  No Known Problems Adoptive Mother Social History Tobacco Use  Smoking status: Former Smoker Packs/day: 1.00 Years: 40.00 Pack years: 40.00  Smokeless tobacco: Former User Quit date: 06/2016 Substance Use Topics  Alcohol use: Yes Alcohol/week: 21.0 standard drinks Types: 21 Shots of liquor per week Comment: Quit 6/2016; however, takes 3 shots of vodka nightly to help sleep ROS As stated in HPI, otherwise all others negative. Objective: There were no vitals taken for this visit. General: alert, cooperative, no distress Mental  status: normal mood, behavior, speech, dress, motor activity, and thought processes, able to follow commands HENT: NCAT Neck: no visualized mass Resp: no respiratory distress, appears comfortable in no respiratory distress, currently without O2 on Neuro: no gross deficits Skin: no discoloration or lesions of concern on visible areas Psychiatric: normal affect, consistent with stated mood, no evidence of hallucinations Additional exam findings: We discussed the expected course, resolution and complications of the diagnosis(es) in detail. Medication risks, benefits, costs, interactions, and alternatives were discussed as indicated. I advised her to contact the office if her condition worsens, changes or fails to improve as anticipated. She expressed understanding with the diagnosis(es) and plan. Korey Olsen is a 63 y.o. female who was evaluated by a video visit encounter for concerns as above.  Patient identification was verified prior to start of the visit. A caregiver was present when appropriate. Due to this being a TeleHealth encounter (During CIBII-55 public health emergency), evaluation of the following organ systems was limited: Vitals/Constitutional/EENT/Resp/CV/GI//MS/Neuro/Skin/Heme-Lymph-Imm. Pursuant to the emergency declaration under the [facility] [place] waiver authority and the [employer] Act, this Virtual  Visit was conducted, with patient's (and/or legal guardian's) consent, to reduce the patient's risk of exposure to COVID-19 and provide necessary medical care. Services were provided through a video synchronous discussion virtually to substitute for in-person clinic visit. Patient and provider were located at their individual homes. An After Visit Summary was printed and given to the patient. All diagnosis have been discussed with the patient and all of the patient's questions have been answered. Follow-up and Dispositions · Return in about 3 months (around 8/14/2020) for Annual physical, w/gyn, depression, copd,, 30 minutes, office visit. Domo Grubbs, BARAKP- [facility] [address] [facility] [address]

## 2020-06-03 NOTE — TELEPHONE ENCOUNTER
Felicia Foster with Dr. Killian De León called and said that pt is scheduled for a colonoscopy on June 12. They need to get clearance from [facility] to proceed with colonoscopy.    She will be faxing paperwork for Aretha to review  Please let me know if pt needs to come in for in office appt

## 2020-06-08 NOTE — TELEPHONE ENCOUNTER
Spoke with mrs chaney, she does not have a pulmonologist  Referral placed  Called dr. Ameena Carrillo office and let them know she needs clearance for colonoscopy

## 2020-06-16 NOTE — TELEPHONE ENCOUNTER
Paulo Ceja [phone] from [facility] [place] called, she would like a return call.  She is trying to see if pre-op paper work was received for patient

## 2020-06-17 NOTE — TELEPHONE ENCOUNTER
Nadege Light from [facility] is requesting a call back at [phone]:  She is requesting clearance for Friday.

## 2020-06-17 NOTE — TELEPHONE ENCOUNTER
Alfredo Godinez's call. Explained to her Dr. Sesar Vasquez note makes no mention of clearance for the colonoscopy. Remi Lincoln explained Mrs. Ori Min will be receiving conscious sedation for the procedure, the procedure will be done in the hospital and Mrs. Ori Min is having a routine screening colonoscopy. Informed her I will let her know when I hear back from Dr. Sarah Villarreal. I have called and left a message at his office.

## 2020-06-18 NOTE — TELEPHONE ENCOUNTER
Dr. Isaias Lucero is calling to advise the patient is clear per the note.     Call the office back at [phone]

## 2020-08-14 NOTE — PROGRESS NOTES
Chief Complaint Patient presents with  COPD  Depression  Other Due for pap [facility] 1. Have you been to the ER, urgent care clinic since your last visit? Hospitalized since your last visit? No 
 
2. Have you seen or consulted any other health care providers outside of the [address] [place] [address] since your last visit? Include any pap smears or colon screening. Pulmonary

## 2020-08-14 NOTE — PROGRESS NOTES
[address] 
             [phone] Ardyth Moritz is a 63 y.o. female who was seen by synchronous (real-time) audio-video technology on 8/14/2020. Consent: Ardyth Moritz, who was seen by synchronous (real-time) audio-video technology, and/or her healthcare decision maker, is aware that this patient-initiated, Telehealth encounter on 8/14/2020 is a billable service, with coverage as determined by her insurance carrier. She is aware that she may receive a bill and has provided verbal consent to proceed: Yes. Assessment & Plan:  
Diagnoses and all orders for this visit: 
 
1. Chronic obstructive pulmonary disease, unspecified COPD type ([facility] Denies any respiratory issues Managed by pulmonary 2. Mixed hyperlipidemia Managed by GI Last Lipid:   
Lab Results Component Value Date/Time Cholesterol, total 238 (H) 04/02/2020 10:24 AM  
 HDL Cholesterol 50 04/02/2020 10:24 AM  
 LDL, calculated  04/02/2020 10:24 AM  
  LDL AND VLDL CHOLESTEROL NOT CALCULATED WHEN TRIGLYCERIDES >400 MG/DL OR HDL CHOLESTEROL <20 MG/DL Triglyceride 441 (H) 04/02/2020 10:24 AM  
 CHOL/HDL Ratio 4.8 04/02/2020 10:24 AM  
 
3. Bipolar disorder, in partial remission, most recent episode depressed ([facility] Managed by psychiatry Recent change in her medications from resperidal to geodon due to leukopenia Asked her to f/u on this with Dr. Nickie Dobson at her upcoming visit in 2 weeks If her WBC continue to drop will do further workup if needed 4. Alcoholic cirrhosis of liver without ascites ([facility] Endorses no ETOH since January Having difficulty with her SO as he continues to drink ETOH daily Overall cirrhosis is managed by GI 
 
5. Other neutropenia ([facility] Will continue to monitor, recent med changes by psych 6. Screening mammogram, encounter for -     [facility] MAMMO BI SCREENING INCL CAD; Future Health maintenance: mammo ordered Follow-up and Dispositions · Return in about 4 months (around 12/14/2020) for HLD, copd, depression, low wbc, 30 min, office only. [address] Subjective:  
Raul Maldonado is a 63 y.o. female who was seen for COPD; Depression; and Other (Due for pap - [place] colon[place] [facility] allow me to update date) COPD ROS: taking medications as instructed, no medication side effects noted, no significant ongoing wheezing or shortness of breath, using bronchodilator MDI less than twice a week, uses oxygen at night, no chest pain. New concerns: none. Assessment:  COPD well controlled. Plan: current treatment plan is effective, no change in therapy Depression Review: 
Patient is seen for followup of depression. Treatment includes Effexor and just seeing psychiatrist for meds. Ongoing symptoms include none, just frustrated with SO as he continues to drink ETOH. She denies depressed mood, anhedonia, feelings of worthlessness/guilt, hopelessness, recurrent thoughts of death and suicidal thoughts without plan. She experiences the following side effects from the treatment: none. Her psychiatrist changed respiradal to geodon as it could cause dectreased WBC. Had labs drawn 4/2020. Changed to geodon one month ago F/u 9/3/2020 Prior to Admission medications Medication Sig Start Date End Date Taking? Authorizing Provider  
ziprasidone (GEODON) 20 mg capsule BID 8/6/20  Yes Provider, Historical  
cholecalciferol, vitamin d3, 10 mcg (400 unit) cap Vitamin D3 10 mcg (400 unit) capsule   Yes Provider, Historical  
zolpidem (AMBIEN) 5 mg tablet zolpidem 5 mg tablet   Yes Provider, Historical  
venlafaxine-SR (EFFEXOR-XR) 150 mg capsule venlafaxine  mg capsule,extended release 24 hr Take 1 capsule every day by oral route.    Yes Provider, Historical  
montelukast (SINGULAIR) 10 mg tablet montelukast 10 mg tablet   Yes Provider, Historical  
fluticasone/umeclidin/vilanter (TRELEGY ELLIPTA IN) Take 1 Puff by inhalation daily. Yes Provider, Historical  
Oxygen as needed. 2L/NC   Yes Provider, Historical  
lactulose (CHRONULAC) 10 gram/15 mL solution Take 2 tsp by mouth two (2) times a day. Yes Provider, Historical  
budesonide-formoteroL (SYMBICORT) 160-4.5 mcg/actuation HFAA Take 2 Puffs by inhalation two (2) times a day. 2/14/20  Yes Lina Huitron NP  
ipratropium (ATROVENT HFA) 17 mcg/actuation inhaler Take 1 Puff by inhalation every four (4) hours as needed for Wheezing. 2/14/20  Yes Lina Huitron NP  
albuterol (PROVENTIL HFA) 90 mcg/actuation inhaler Take 1-2 Puffs by inhalation. 10/21/18  Yes Provider, Historical  
omeprazole (PRILOSEC) 40 mg capsule Take 20 mg by mouth daily. Yes Provider, Historical  
magnesium oxide (MAG-OX) 400 mg tablet Take 1 Tab by mouth daily. 5/10/19  Yes Saravanan Hall MD  
thiamine HCL (B-1) 100 mg tablet Take 1 Tab by mouth daily. 5/10/19  Yes Saravanan Hall MD  
risperiDONE (RISPERDAL) 2 mg tablet Take 2 mg by mouth daily. Indications: Bipolar Disorder in Remission  8/14/20  Zach Aguayo MD  
 
Allergies Allergen Reactions  Animal Dander Itching  Egg Nausea and Vomiting Pt stated she does not have true allergy to eggs. Patient Active Problem List  
Diagnosis Code  Hyperlipidemia E78.5  Vitamin D insufficiency E55.9  
 ETOH abuse F10.10  COPD (chronic obstructive pulmonary disease) (HCC) J44.9  Bipolar mood disorder ([facility] F31.9  Panic disorder F41.0  Intractable nausea and vomiting R11.2  Alcoholic cirrhosis of liver without ascites (HCC) K70.30  Tobacco abuse Z72.0  Macrocytic anemia D53.9  Leukopenia D72.819  Thrombocytopenia ([address] D69.6  HENRRY (acute kidney injury) ([facility] N17.9  Pancytopenia ([facility] G60.097  Alcohol-induced pancreatitis K85.20  
 Nicotine abuse Z72.0  
 Primary insomnia F51.01 Patient Active Problem List  
 Diagnosis Date Noted  Nicotine abuse 05/14/2020  Primary insomnia 05/14/2020  Alcohol-induced pancreatitis 01/14/2020  Macrocytic anemia 01/09/2020  Leukopenia 01/09/2020  Thrombocytopenia ([facility] 01/09/2020  HENRRY (acute kidney injury) ([facility] 01/09/2020  Pancytopenia ([facility] 01/09/2020  Intractable nausea and vomiting 05/10/2019  Alcoholic cirrhosis of liver without ascites ([facility] 10/21/2018  Tobacco abuse 10/21/2018  Hyperlipidemia  ETOH abuse  COPD (chronic obstructive pulmonary disease) (HCC)  Bipolar mood disorder ([facility]  Panic disorder  Vitamin D insufficiency 11/20/2009 Current Outpatient Medications Medication Sig Dispense Refill  ziprasidone (GEODON) 20 mg capsule BID  cholecalciferol, vitamin d3, 10 mcg (400 unit) cap Vitamin D3 10 mcg (400 unit) capsule  zolpidem (AMBIEN) 5 mg tablet zolpidem 5 mg tablet  venlafaxine-SR (EFFEXOR-XR) 150 mg capsule venlafaxine  mg capsule,extended release 24 hr Take 1 capsule every day by oral route.  montelukast (SINGULAIR) 10 mg tablet montelukast 10 mg tablet  fluticasone/umeclidin/vilanter (TRELEGY ELLIPTA IN) Take 1 Puff by inhalation daily.  Oxygen as needed. 2L/NC    
 lactulose (CHRONULAC) 10 gram/15 mL solution Take 2 tsp by mouth two (2) times a day.  budesonide-formoteroL (SYMBICORT) 160-4.5 mcg/actuation HFAA Take 2 Puffs by inhalation two (2) times a day. 1 Inhaler 2  
 ipratropium (ATROVENT HFA) 17 mcg/actuation inhaler Take 1 Puff by inhalation every four (4) hours as needed for Wheezing. 1 Inhaler 5  
 albuterol (PROVENTIL HFA) 90 mcg/actuation inhaler Take 1-2 Puffs by inhalation.  omeprazole (PRILOSEC) 40 mg capsule Take 20 mg by mouth daily.  magnesium oxide (MAG-OX) 400 mg tablet Take 1 Tab by mouth daily. 30 Tab 0  
 thiamine HCL (B-1) 100 mg tablet Take 1 Tab by mouth daily. 30 Tab 0 Allergies Allergen Reactions  Animal Dander Itching  Egg Nausea and Vomiting Pt stated she does not have true allergy to eggs. Past Medical History:  
Diagnosis Date  Bipolar affective ([address]  Chronic obstructive pulmonary disease ([facility]  Cirrhosis ([facility]  Encounter for screening colonoscopy  ETOH abuse  Fall at home 2020  
 hitting head & right eye (bruised); did not seek medical attention  Former smoker 1 PPD x40 years  History of ascites  History of sinusitis  Hyperlipidemia 2009 Patient denies  Left breast mass 2011 U/S Left Breast: No definite mass identified. Recommended recheck in 6 months  Manic depression ([address]  On home O2   
 as needed  Panic disorder  Vitamin D insufficiency 2009  
 23 ng/mL  Wrist fracture, right 2010 Non-Displaced Distal Radius/Ulnar Styloid Fx Past Surgical History:  
Procedure Laterality Date  COLONOSCOPY N/A 2020 SCREENING COLONOSCOPY with bx and endoclip x 1 performed by Ruthy Lopez MD at [address] HX ENDOSCOPY    
  [facility] Family History Problem Relation Age of Onset  No Known Problems Daughter  No Known Problems Adoptive Father  No Known Problems Adoptive Mother Social History Tobacco Use  Smoking status: Former Smoker Packs/day: 1.00 Years: 40.00 Pack years: 40.00 Last attempt to quit: 2020 Years since quittin.5  Smokeless tobacco: Former User Quit date: 2016 Substance Use Topics  Alcohol use: Not Currently Comment: Quit 2020 ROS As stated in HPI, otherwise all others negative. Objective: There were no vitals taken for this visit. General: alert, cooperative, no distress Mental  status: normal mood, behavior, speech, dress, motor activity, and thought processes, able to follow commands HENT: NCAT Neck: no visualized mass Resp: no respiratory distress Neuro: no gross deficits Skin: no discoloration or lesions of concern on visible areas Psychiatric: normal affect, consistent with stated mood, no evidence of hallucinations Additional exam findings: We discussed the expected course, resolution and complications of the diagnosis(es) in detail. Medication risks, benefits, costs, interactions, and alternatives were discussed as indicated. I advised her to contact the office if her condition worsens, changes or fails to improve as anticipated. She expressed understanding with the diagnosis(es) and plan. Bella Vieyra is a 63 y.o. female who was evaluated by a video visit encounter for concerns as above. Patient identification was verified prior to start of the visit. A caregiver was present when appropriate. Due to this being a TeleHealth encounter (During JDYNY-41 public health emergency), evaluation of the following organ systems was limited: Vitals/Constitutional/EENT/Resp/CV/GI//MS/Neuro/Skin/Heme-Lymph-Imm. Pursuant to the emergency declaration under the [facility] waiver authority and the [employer] Act, this Virtual  Visit was conducted, with patient's (and/or legal guardian's) consent, to reduce the patient's risk of exposure to COVID-19 and provide necessary medical care. Services were provided through a video synchronous discussion virtually to substitute for in-person clinic visit. Patient and provider were located at their individual homes. An After Visit Summary was printed and given to the patient. All diagnosis have been discussed with the patient and all of the patient's questions have been answered. Follow-up and Dispositions · Return in about 4 months (around 12/14/2020) for HLD, copd, depression, low wbc, 30 min, office only. Alicia Estrada, BARAKP-BC [facility] [address] [facility] M[address]

## 2020-08-14 NOTE — PROGRESS NOTES
Chief Complaint Patient presents with  COPD  Depression  Other   Due for pap - Put colonoscopy in

## 2021-01-01 ENCOUNTER — APPOINTMENT (OUTPATIENT)
Dept: CT IMAGING | Age: 56
End: 2021-01-01
Attending: EMERGENCY MEDICINE
Payer: MEDICAID

## 2021-01-01 ENCOUNTER — APPOINTMENT (OUTPATIENT)
Dept: GENERAL RADIOLOGY | Age: 56
End: 2021-01-01
Attending: INTERNAL MEDICINE
Payer: MEDICAID

## 2021-01-01 ENCOUNTER — HOSPITAL ENCOUNTER (INPATIENT)
Age: 56
LOS: 7 days | End: 2021-03-17
Attending: INTERNAL MEDICINE | Admitting: INTERNAL MEDICINE
Payer: MEDICAID

## 2021-01-01 ENCOUNTER — APPOINTMENT (OUTPATIENT)
Dept: GENERAL RADIOLOGY | Age: 56
End: 2021-01-01
Attending: PHYSICIAN ASSISTANT
Payer: MEDICAID

## 2021-01-01 ENCOUNTER — APPOINTMENT (OUTPATIENT)
Dept: ULTRASOUND IMAGING | Age: 56
End: 2021-01-01
Attending: HOSPITALIST
Payer: MEDICAID

## 2021-01-01 ENCOUNTER — APPOINTMENT (OUTPATIENT)
Dept: INTERVENTIONAL RADIOLOGY/VASCULAR | Age: 56
End: 2021-01-01
Attending: INTERNAL MEDICINE
Payer: MEDICAID

## 2021-01-01 ENCOUNTER — HOSPITAL ENCOUNTER (INPATIENT)
Dept: ULTRASOUND IMAGING | Age: 56
Discharge: HOME | End: 2021-03-15
Attending: INTERNAL MEDICINE
Payer: MEDICAID

## 2021-01-01 ENCOUNTER — HOSPITAL ENCOUNTER (EMERGENCY)
Age: 56
Discharge: ANOTHER HOSPITAL | End: 2021-03-10
Attending: EMERGENCY MEDICINE
Payer: MEDICAID

## 2021-01-01 ENCOUNTER — APPOINTMENT (OUTPATIENT)
Dept: GENERAL RADIOLOGY | Age: 56
End: 2021-01-01
Attending: FAMILY MEDICINE
Payer: MEDICAID

## 2021-01-01 ENCOUNTER — APPOINTMENT (OUTPATIENT)
Dept: NON INVASIVE DIAGNOSTICS | Age: 56
End: 2021-01-01
Attending: INTERNAL MEDICINE
Payer: MEDICAID

## 2021-01-01 ENCOUNTER — APPOINTMENT (OUTPATIENT)
Dept: GENERAL RADIOLOGY | Age: 56
End: 2021-01-01
Attending: EMERGENCY MEDICINE
Payer: MEDICAID

## 2021-01-01 VITALS
BODY MASS INDEX: 28.98 KG/M2 | DIASTOLIC BLOOD PRESSURE: 70 MMHG | RESPIRATION RATE: 16 BRPM | SYSTOLIC BLOOD PRESSURE: 130 MMHG | OXYGEN SATURATION: 86 % | HEART RATE: 108 BPM | HEIGHT: 66 IN | TEMPERATURE: 98.2 F | WEIGHT: 180.34 LBS

## 2021-01-01 VITALS
TEMPERATURE: 97.8 F | BODY MASS INDEX: 26.03 KG/M2 | WEIGHT: 162 LBS | DIASTOLIC BLOOD PRESSURE: 71 MMHG | OXYGEN SATURATION: 99 % | HEART RATE: 128 BPM | HEIGHT: 66 IN | RESPIRATION RATE: 19 BRPM | SYSTOLIC BLOOD PRESSURE: 104 MMHG

## 2021-01-01 DIAGNOSIS — D69.6 THROMBOCYTOPENIA (HCC): ICD-10-CM

## 2021-01-01 DIAGNOSIS — R09.02 HYPOXIA: ICD-10-CM

## 2021-01-01 DIAGNOSIS — G93.41 METABOLIC ENCEPHALOPATHY: Primary | ICD-10-CM

## 2021-01-01 DIAGNOSIS — K70.11 ALCOHOLIC HEPATITIS WITH ASCITES: ICD-10-CM

## 2021-01-01 DIAGNOSIS — K70.31 ALCOHOLIC CIRRHOSIS OF LIVER WITH ASCITES (HCC): ICD-10-CM

## 2021-01-01 DIAGNOSIS — S82.142A TIBIAL PLATEAU FRACTURE, LEFT, CLOSED, INITIAL ENCOUNTER: ICD-10-CM

## 2021-01-01 DIAGNOSIS — N30.00 ACUTE CYSTITIS WITHOUT HEMATURIA: ICD-10-CM

## 2021-01-01 DIAGNOSIS — F10.921 ACUTE ALCOHOL ABUSE, WITH DELIRIUM (HCC): ICD-10-CM

## 2021-01-01 DIAGNOSIS — D68.9 COAGULOPATHY (HCC): ICD-10-CM

## 2021-01-01 DIAGNOSIS — E87.1 HYPONATREMIA: ICD-10-CM

## 2021-01-01 DIAGNOSIS — F10.930 ALCOHOL WITHDRAWAL SYNDROME WITHOUT COMPLICATION (HCC): ICD-10-CM

## 2021-01-01 DIAGNOSIS — D64.9 ANEMIA, UNSPECIFIED TYPE: ICD-10-CM

## 2021-01-01 LAB
ABO + RH BLD: NORMAL
ABO + RH BLD: NORMAL
ALBUMIN FLD-MCNC: <0.6 G/DL
ALBUMIN SERPL-MCNC: 2.2 G/DL (ref 3.4–5)
ALBUMIN SERPL-MCNC: 2.2 G/DL (ref 3.4–5)
ALBUMIN SERPL-MCNC: 2.7 G/DL (ref 3.4–5)
ALBUMIN SERPL-MCNC: 2.8 G/DL (ref 3.4–5)
ALBUMIN SERPL-MCNC: 3.2 G/DL (ref 3.4–5)
ALBUMIN SERPL-MCNC: 3.4 G/DL (ref 3.4–5)
ALBUMIN SERPL-MCNC: 3.5 G/DL (ref 3.4–5)
ALBUMIN/GLOB SERPL: 0.6 {RATIO} (ref 0.8–1.7)
ALBUMIN/GLOB SERPL: 0.7 {RATIO} (ref 0.8–1.7)
ALBUMIN/GLOB SERPL: 0.8 {RATIO} (ref 0.8–1.7)
ALBUMIN/GLOB SERPL: 1.1 {RATIO} (ref 0.8–1.7)
ALBUMIN/GLOB SERPL: 1.5 {RATIO} (ref 0.8–1.7)
ALP SERPL-CCNC: 109 U/L (ref 45–117)
ALP SERPL-CCNC: 111 U/L (ref 45–117)
ALP SERPL-CCNC: 124 U/L (ref 45–117)
ALP SERPL-CCNC: 135 U/L (ref 45–117)
ALP SERPL-CCNC: 177 U/L (ref 45–117)
ALP SERPL-CCNC: 215 U/L (ref 45–117)
ALP SERPL-CCNC: 87 U/L (ref 45–117)
ALT SERPL-CCNC: 101 U/L (ref 13–56)
ALT SERPL-CCNC: 104 U/L (ref 13–56)
ALT SERPL-CCNC: 74 U/L (ref 13–56)
ALT SERPL-CCNC: 76 U/L (ref 13–56)
ALT SERPL-CCNC: 82 U/L (ref 13–56)
ALT SERPL-CCNC: 86 U/L (ref 13–56)
ALT SERPL-CCNC: 89 U/L (ref 13–56)
AMMONIA PLAS-SCNC: 27 UMOL/L (ref 11–32)
AMMONIA PLAS-SCNC: 32 UMOL/L (ref 11–32)
AMMONIA PLAS-SCNC: 35 UMOL/L (ref 11–32)
AMMONIA PLAS-SCNC: 43 UMOL/L (ref 11–32)
AMMONIA PLAS-SCNC: 50 UMOL/L (ref 11–32)
AMMONIA PLAS-SCNC: 59 UMOL/L (ref 11–32)
AMMONIA PLAS-SCNC: 61 UMOL/L (ref 11–32)
AMMONIA PLAS-SCNC: 64 UMOL/L (ref 11–32)
AMMONIA PLAS-SCNC: 79 UMOL/L (ref 11–32)
ANION GAP SERPL CALC-SCNC: 12 MMOL/L (ref 3–18)
ANION GAP SERPL CALC-SCNC: 13 MMOL/L (ref 3–18)
ANION GAP SERPL CALC-SCNC: 5 MMOL/L (ref 3–18)
ANION GAP SERPL CALC-SCNC: 6 MMOL/L (ref 3–18)
ANION GAP SERPL CALC-SCNC: 8 MMOL/L (ref 3–18)
ANION GAP SERPL CALC-SCNC: 9 MMOL/L (ref 3–18)
APPEARANCE FLD: CLEAR
APPEARANCE UR: ABNORMAL
ARTERIAL PATENCY WRIST A: YES
AST SERPL-CCNC: 123 U/L (ref 10–38)
AST SERPL-CCNC: 141 U/L (ref 10–38)
AST SERPL-CCNC: 159 U/L (ref 10–38)
AST SERPL-CCNC: 161 U/L (ref 10–38)
AST SERPL-CCNC: 172 U/L (ref 10–38)
AST SERPL-CCNC: 191 U/L (ref 10–38)
AST SERPL-CCNC: 191 U/L (ref 10–38)
ATRIAL RATE: 125 BPM
AV VELOCITY RATIO: 0.8
AV VTI RATIO: 0.7
BACTERIA SPEC CULT: NORMAL
BACTERIA URNS QL MICRO: ABNORMAL /HPF
BASE DEFICIT BLD-SCNC: 3 MMOL/L
BASOPHILS # BLD: 0 K/UL (ref 0–0.1)
BASOPHILS # BLD: 0.1 K/UL (ref 0–0.1)
BASOPHILS NFR BLD: 0 % (ref 0–2)
BASOPHILS NFR BLD: 0 % (ref 0–3)
BASOPHILS NFR BLD: 0 % (ref 0–3)
BASOPHILS NFR BLD: 1 % (ref 0–2)
BDY SITE: NORMAL
BILIRUB DIRECT SERPL-MCNC: 5.2 MG/DL (ref 0–0.2)
BILIRUB DIRECT SERPL-MCNC: 5.3 MG/DL (ref 0–0.2)
BILIRUB DIRECT SERPL-MCNC: 5.9 MG/DL (ref 0–0.2)
BILIRUB SERPL-MCNC: 4 MG/DL (ref 0.2–1)
BILIRUB SERPL-MCNC: 5.1 MG/DL (ref 0.2–1)
BILIRUB SERPL-MCNC: 5.1 MG/DL (ref 0.2–1)
BILIRUB SERPL-MCNC: 6.1 MG/DL (ref 0.2–1)
BILIRUB SERPL-MCNC: 8.2 MG/DL (ref 0.2–1)
BILIRUB SERPL-MCNC: 8.3 MG/DL (ref 0.2–1)
BILIRUB SERPL-MCNC: 9.6 MG/DL (ref 0.2–1)
BILIRUB UR QL: ABNORMAL
BLD PROD TYP BPU: NORMAL
BLOOD GROUP ANTIBODIES SERPL: NORMAL
BLOOD GROUP ANTIBODIES SERPL: NORMAL
BNP SERPL-MCNC: 1946 PG/ML (ref 0–900)
BPU ID: NORMAL
BUN SERPL-MCNC: 10 MG/DL (ref 7–18)
BUN SERPL-MCNC: 11 MG/DL (ref 7–18)
BUN SERPL-MCNC: 12 MG/DL (ref 7–18)
BUN SERPL-MCNC: 13 MG/DL (ref 7–18)
BUN SERPL-MCNC: 13 MG/DL (ref 7–18)
BUN SERPL-MCNC: 14 MG/DL (ref 7–18)
BUN SERPL-MCNC: 15 MG/DL (ref 7–18)
BUN SERPL-MCNC: 16 MG/DL (ref 7–18)
BUN SERPL-MCNC: 19 MG/DL (ref 7–18)
BUN SERPL-MCNC: 7 MG/DL (ref 7–18)
BUN/CREAT SERPL: 13 (ref 12–20)
BUN/CREAT SERPL: 16 (ref 12–20)
BUN/CREAT SERPL: 17 (ref 12–20)
BUN/CREAT SERPL: 21 (ref 12–20)
BUN/CREAT SERPL: 22 (ref 12–20)
BUN/CREAT SERPL: 23 (ref 12–20)
BUN/CREAT SERPL: 26 (ref 12–20)
BUN/CREAT SERPL: 32 (ref 12–20)
BUN/CREAT SERPL: 34 (ref 12–20)
BUN/CREAT SERPL: 50 (ref 12–20)
CA-I SERPL-SCNC: 1.04 MMOL/L (ref 1.12–1.32)
CALCIUM SERPL-MCNC: 7 MG/DL (ref 8.5–10.1)
CALCIUM SERPL-MCNC: 7 MG/DL (ref 8.5–10.1)
CALCIUM SERPL-MCNC: 7.1 MG/DL (ref 8.5–10.1)
CALCIUM SERPL-MCNC: 7.3 MG/DL (ref 8.5–10.1)
CALCIUM SERPL-MCNC: 7.4 MG/DL (ref 8.5–10.1)
CALCIUM SERPL-MCNC: 7.4 MG/DL (ref 8.5–10.1)
CALCIUM SERPL-MCNC: 7.6 MG/DL (ref 8.5–10.1)
CALCIUM SERPL-MCNC: 7.8 MG/DL (ref 8.5–10.1)
CALCIUM SERPL-MCNC: 7.8 MG/DL (ref 8.5–10.1)
CALCIUM SERPL-MCNC: 8.5 MG/DL (ref 8.5–10.1)
CALCULATED P AXIS, ECG09: 77 DEGREES
CALCULATED R AXIS, ECG10: 62 DEGREES
CALCULATED T AXIS, ECG11: 60 DEGREES
CALLED TO:,BCALL1: NORMAL
CALLED TO:,BCALL1: NORMAL
CALLED TO:,BCALL2: NORMAL
CHLORIDE SERPL-SCNC: 100 MMOL/L (ref 100–111)
CHLORIDE SERPL-SCNC: 105 MMOL/L (ref 100–111)
CHLORIDE SERPL-SCNC: 108 MMOL/L (ref 100–111)
CHLORIDE SERPL-SCNC: 110 MMOL/L (ref 100–111)
CHLORIDE SERPL-SCNC: 112 MMOL/L (ref 100–111)
CHLORIDE SERPL-SCNC: 114 MMOL/L (ref 100–111)
CHLORIDE SERPL-SCNC: 119 MMOL/L (ref 100–111)
CHLORIDE SERPL-SCNC: 91 MMOL/L (ref 100–111)
CHLORIDE SERPL-SCNC: 95 MMOL/L (ref 100–111)
CHLORIDE SERPL-SCNC: 99 MMOL/L (ref 100–111)
CK MB CFR SERPL CALC: 0.7 % (ref 0–4)
CK MB CFR SERPL CALC: 0.8 % (ref 0–4)
CK MB CFR SERPL CALC: 1 % (ref 0–4)
CK MB SERPL-MCNC: 4.4 NG/ML (ref 5–25)
CK MB SERPL-MCNC: 5.4 NG/ML (ref 5–25)
CK MB SERPL-MCNC: 6.7 NG/ML (ref 5–25)
CK SERPL-CCNC: 546 U/L (ref 26–192)
CK SERPL-CCNC: 605 U/L (ref 26–192)
CK SERPL-CCNC: 846 U/L (ref 26–192)
CO2 SERPL-SCNC: 19 MMOL/L (ref 21–32)
CO2 SERPL-SCNC: 22 MMOL/L (ref 21–32)
CO2 SERPL-SCNC: 22 MMOL/L (ref 21–32)
CO2 SERPL-SCNC: 23 MMOL/L (ref 21–32)
CO2 SERPL-SCNC: 23 MMOL/L (ref 21–32)
CO2 SERPL-SCNC: 24 MMOL/L (ref 21–32)
CO2 SERPL-SCNC: 28 MMOL/L (ref 21–32)
CO2 SERPL-SCNC: 28 MMOL/L (ref 21–32)
COLOR FLD: YELLOW
COLOR UR: ABNORMAL
COVID-19 RAPID TEST, COVR: NOT DETECTED
COVID-19 RAPID TEST, COVR: NOT DETECTED
CREAT SERPL-MCNC: 0.35 MG/DL (ref 0.6–1.3)
CREAT SERPL-MCNC: 0.38 MG/DL (ref 0.6–1.3)
CREAT SERPL-MCNC: 0.43 MG/DL (ref 0.6–1.3)
CREAT SERPL-MCNC: 0.47 MG/DL (ref 0.6–1.3)
CREAT SERPL-MCNC: 0.49 MG/DL (ref 0.6–1.3)
CREAT SERPL-MCNC: 0.53 MG/DL (ref 0.6–1.3)
CREAT SERPL-MCNC: 0.62 MG/DL (ref 0.6–1.3)
CREAT SERPL-MCNC: 0.67 MG/DL (ref 0.6–1.3)
CREAT SERPL-MCNC: 0.78 MG/DL (ref 0.6–1.3)
CREAT SERPL-MCNC: 0.81 MG/DL (ref 0.6–1.3)
CROSSMATCH RESULT,%XM: NORMAL
DIAGNOSIS, 93000: NORMAL
DIFFERENTIAL METHOD BLD: ABNORMAL
ECHO AO ROOT DIAM: 3.22 CM
ECHO AV AREA PEAK VELOCITY: 3.1 CM2
ECHO AV AREA VTI: 2.9 CM2
ECHO AV AREA/BSA PEAK VELOCITY: 1.6 CM2/M2
ECHO AV AREA/BSA VTI: 1.5 CM2/M2
ECHO AV MEAN GRADIENT: 8.4 MMHG
ECHO AV MEAN VELOCITY: 1.34 M/S
ECHO AV PEAK GRADIENT: 13.3 MMHG
ECHO AV PEAK VELOCITY: 182.09 CM/S
ECHO AV VTI: 36.22 CM
ECHO LA MAJOR AXIS: 2.9 CM
ECHO LA MINOR AXIS: 1.54 CM
ECHO LA TO AORTIC ROOT RATIO: 0.9
ECHO LA VOL 2C: 28.55 ML (ref 22–52)
ECHO LA VOL 4C: 36.58 ML (ref 22–52)
ECHO LA VOL BP: 34.39 ML (ref 22–52)
ECHO LA VOL/BSA BIPLANE: 18.24 ML/M2 (ref 16–28)
ECHO LA VOLUME INDEX A2C: 15.14 ML/M2 (ref 16–28)
ECHO LA VOLUME INDEX A4C: 19.4 ML/M2 (ref 16–28)
ECHO LV E' LATERAL VELOCITY: 12 CM/S
ECHO LV E' SEPTAL VELOCITY: 10 CM/S
ECHO LV EDV A2C: 102.7 ML
ECHO LV EDV A4C: 111.1 ML
ECHO LV EDV BP: 107 ML (ref 56–104)
ECHO LV EDV INDEX A4C: 58.9 ML/M2
ECHO LV EDV INDEX BP: 56.8 ML/M2
ECHO LV EDV NDEX A2C: 54.5 ML/M2
ECHO LV EDV TEICHHOLZ: 0.83 ML
ECHO LV EJECTION FRACTION A2C: 65 %
ECHO LV EJECTION FRACTION A4C: 59 %
ECHO LV EJECTION FRACTION BIPLANE: 61 % (ref 55–100)
ECHO LV ESV A2C: 36.1 ML
ECHO LV ESV A4C: 45.5 ML
ECHO LV ESV BP: 41.7 ML (ref 19–49)
ECHO LV ESV INDEX A2C: 19.1 ML/M2
ECHO LV ESV INDEX A4C: 24.1 ML/M2
ECHO LV ESV INDEX BP: 22.1 ML/M2
ECHO LV ESV TEICHHOLZ: 0.33 ML
ECHO LV INTERNAL DIMENSION DIASTOLIC: 5.35 CM (ref 3.9–5.3)
ECHO LV INTERNAL DIMENSION SYSTOLIC: 3.61 CM
ECHO LV IVSD: 0.53 CM (ref 0.6–0.9)
ECHO LV MASS 2D: 120.1 G (ref 67–162)
ECHO LV MASS INDEX 2D: 63.7 G/M2 (ref 43–95)
ECHO LV POSTERIOR WALL DIASTOLIC: 0.79 CM (ref 0.6–0.9)
ECHO LVOT DIAM: 2.23 CM
ECHO LVOT PEAK GRADIENT: 8.5 MMHG
ECHO LVOT PEAK VELOCITY: 146.16 CM/S
ECHO LVOT SV: 104.3 ML
ECHO LVOT VTI: 26.77 CM
ECHO MV A VELOCITY: 100.94 CM/S
ECHO MV AREA PHT: 11 CM2
ECHO MV E DECELERATION TIME (DT): 69.3 MS
ECHO MV E VELOCITY: 120.35 CM/S
ECHO MV E/A RATIO: 1.19
ECHO MV E/E' LATERAL: 10.03
ECHO MV E/E' RATIO (AVERAGED): 11.03
ECHO MV E/E' SEPTAL: 12.04
ECHO MV PRESSURE HALF TIME (PHT): 20.1 MS
ECHO RA AREA 4C: 16.07 CM2
ECHO RV INTERNAL DIMENSION: 3.16 CM
ECHO RV TAPSE: 3.4 CM (ref 1.5–2)
EOSINOPHIL # BLD: 0 K/UL (ref 0–0.4)
EOSINOPHIL NFR BLD: 0 % (ref 0–5)
EOSINOPHIL NFR FLD MANUAL: 0 %
EPITH CASTS URNS QL MICRO: ABNORMAL /LPF (ref 0–5)
ERYTHROCYTE [DISTWIDTH] IN BLOOD BY AUTOMATED COUNT: 16 % (ref 11.6–14.5)
ERYTHROCYTE [DISTWIDTH] IN BLOOD BY AUTOMATED COUNT: 16.2 % (ref 11.6–14.5)
ERYTHROCYTE [DISTWIDTH] IN BLOOD BY AUTOMATED COUNT: 16.2 % (ref 11.6–14.5)
ERYTHROCYTE [DISTWIDTH] IN BLOOD BY AUTOMATED COUNT: 16.3 % (ref 11.6–14.5)
ERYTHROCYTE [DISTWIDTH] IN BLOOD BY AUTOMATED COUNT: 17.1 % (ref 11.6–14.5)
ERYTHROCYTE [DISTWIDTH] IN BLOOD BY AUTOMATED COUNT: 17.9 % (ref 11.6–14.5)
ERYTHROCYTE [DISTWIDTH] IN BLOOD BY AUTOMATED COUNT: 18.6 % (ref 11.6–14.5)
ERYTHROCYTE [DISTWIDTH] IN BLOOD BY AUTOMATED COUNT: 19.2 % (ref 11.6–14.5)
ERYTHROCYTE [DISTWIDTH] IN BLOOD BY AUTOMATED COUNT: 20 % (ref 11.6–14.5)
ETHANOL SERPL-MCNC: 79 MG/DL (ref 0–3)
GAS FLOW.O2 O2 DELIVERY SYS: NORMAL L/MIN
GAS FLOW.O2 SETTING OXYMISER: 12 L/M
GLOBULIN SER CALC-MCNC: 2.2 G/DL (ref 2–4)
GLOBULIN SER CALC-MCNC: 2.2 G/DL (ref 2–4)
GLOBULIN SER CALC-MCNC: 2.3 G/DL (ref 2–4)
GLOBULIN SER CALC-MCNC: 2.5 G/DL (ref 2–4)
GLOBULIN SER CALC-MCNC: 2.8 G/DL (ref 2–4)
GLOBULIN SER CALC-MCNC: 3.5 G/DL (ref 2–4)
GLOBULIN SER CALC-MCNC: 3.9 G/DL (ref 2–4)
GLUCOSE BLD STRIP.AUTO-MCNC: 100 MG/DL (ref 70–110)
GLUCOSE BLD STRIP.AUTO-MCNC: 100 MG/DL (ref 70–110)
GLUCOSE BLD STRIP.AUTO-MCNC: 104 MG/DL (ref 70–110)
GLUCOSE BLD STRIP.AUTO-MCNC: 113 MG/DL (ref 70–110)
GLUCOSE BLD STRIP.AUTO-MCNC: 127 MG/DL (ref 70–110)
GLUCOSE BLD STRIP.AUTO-MCNC: 128 MG/DL (ref 70–110)
GLUCOSE BLD STRIP.AUTO-MCNC: 134 MG/DL (ref 70–110)
GLUCOSE BLD STRIP.AUTO-MCNC: 154 MG/DL (ref 70–110)
GLUCOSE BLD STRIP.AUTO-MCNC: 160 MG/DL (ref 70–110)
GLUCOSE BLD STRIP.AUTO-MCNC: 87 MG/DL (ref 70–110)
GLUCOSE BLD STRIP.AUTO-MCNC: 88 MG/DL (ref 70–110)
GLUCOSE BLD STRIP.AUTO-MCNC: 88 MG/DL (ref 70–110)
GLUCOSE BLD STRIP.AUTO-MCNC: 90 MG/DL (ref 70–110)
GLUCOSE BLD STRIP.AUTO-MCNC: 93 MG/DL (ref 70–110)
GLUCOSE BLD STRIP.AUTO-MCNC: 94 MG/DL (ref 70–110)
GLUCOSE BLD STRIP.AUTO-MCNC: 96 MG/DL (ref 70–110)
GLUCOSE BLD STRIP.AUTO-MCNC: 97 MG/DL (ref 70–110)
GLUCOSE SERPL-MCNC: 102 MG/DL (ref 74–99)
GLUCOSE SERPL-MCNC: 105 MG/DL (ref 74–99)
GLUCOSE SERPL-MCNC: 115 MG/DL (ref 74–99)
GLUCOSE SERPL-MCNC: 121 MG/DL (ref 74–99)
GLUCOSE SERPL-MCNC: 74 MG/DL (ref 74–99)
GLUCOSE SERPL-MCNC: 77 MG/DL (ref 74–99)
GLUCOSE SERPL-MCNC: 87 MG/DL (ref 74–99)
GLUCOSE SERPL-MCNC: 90 MG/DL (ref 74–99)
GLUCOSE SERPL-MCNC: 95 MG/DL (ref 74–99)
GLUCOSE SERPL-MCNC: 98 MG/DL (ref 74–99)
GLUCOSE UR STRIP.AUTO-MCNC: NEGATIVE MG/DL
GRAN CASTS URNS QL MICRO: ABNORMAL /LPF
HCO3 BLD-SCNC: 22.3 MMOL/L (ref 22–26)
HCT VFR BLD AUTO: 21.3 % (ref 35–45)
HCT VFR BLD AUTO: 21.7 % (ref 35–45)
HCT VFR BLD AUTO: 23 % (ref 35–45)
HCT VFR BLD AUTO: 23.7 % (ref 35–45)
HCT VFR BLD AUTO: 24.1 % (ref 35–45)
HCT VFR BLD AUTO: 24.6 % (ref 35–45)
HCT VFR BLD AUTO: 24.7 % (ref 35–45)
HCT VFR BLD AUTO: 24.8 % (ref 35–45)
HCT VFR BLD AUTO: 25 % (ref 35–45)
HCT VFR BLD AUTO: 25.2 % (ref 35–45)
HCT VFR BLD AUTO: 25.5 % (ref 35–45)
HCT VFR BLD AUTO: 25.6 % (ref 35–45)
HCT VFR BLD AUTO: 26.2 % (ref 35–45)
HCT VFR BLD AUTO: 27.6 % (ref 35–45)
HCT VFR BLD AUTO: 34.1 % (ref 35–45)
HGB BLD-MCNC: 11.6 G/DL (ref 12–16)
HGB BLD-MCNC: 6.9 G/DL (ref 12–16)
HGB BLD-MCNC: 7.1 G/DL (ref 12–16)
HGB BLD-MCNC: 7.6 G/DL (ref 12–16)
HGB BLD-MCNC: 8 G/DL (ref 12–16)
HGB BLD-MCNC: 8.1 G/DL (ref 12–16)
HGB BLD-MCNC: 8.3 G/DL (ref 12–16)
HGB BLD-MCNC: 8.4 G/DL (ref 12–16)
HGB BLD-MCNC: 8.5 G/DL (ref 12–16)
HGB BLD-MCNC: 9.2 G/DL (ref 12–16)
HGB UR QL STRIP: NEGATIVE
HISTORY CHECKED?,CKHIST: NORMAL
HYALINE CASTS URNS QL MICRO: ABNORMAL /LPF (ref 0–2)
INR PPP: 1.5 (ref 0.8–1.2)
INR PPP: 1.8 (ref 0.8–1.2)
INR PPP: 1.9 (ref 0.8–1.2)
INR PPP: 2 (ref 0.8–1.2)
INR PPP: 2 (ref 0.8–1.2)
KETONES UR QL STRIP.AUTO: ABNORMAL MG/DL
LEUKOCYTE ESTERASE UR QL STRIP.AUTO: ABNORMAL
LIPASE SERPL-CCNC: 145 U/L (ref 73–393)
LIPASE SERPL-CCNC: 147 U/L (ref 73–393)
LIPASE SERPL-CCNC: 172 U/L (ref 73–393)
LIPASE SERPL-CCNC: 495 U/L (ref 73–393)
LVFS 2D: 32.57 %
LVOT MG: 6.4 MMHG
LVOT MV: 1.21 CM/S
LVSV (MOD BI): 34.07 ML
LVSV (MOD SINGLE 4C): 34.21 ML
LVSV (MOD SINGLE): 34.75 ML
LVSV (TEICH): 43.62 ML
LYMPHOCYTES # BLD: 0.3 K/UL (ref 0.9–3.6)
LYMPHOCYTES # BLD: 0.4 K/UL (ref 0.8–3.5)
LYMPHOCYTES # BLD: 0.5 K/UL (ref 0.9–3.6)
LYMPHOCYTES # BLD: 0.6 K/UL (ref 0.9–3.6)
LYMPHOCYTES # BLD: 0.7 K/UL (ref 0.9–3.6)
LYMPHOCYTES # BLD: 0.9 K/UL (ref 0.9–3.6)
LYMPHOCYTES # BLD: 1.1 K/UL (ref 0.9–3.6)
LYMPHOCYTES # BLD: 1.2 K/UL (ref 0.8–3.5)
LYMPHOCYTES # BLD: 2.1 K/UL (ref 0.9–3.6)
LYMPHOCYTES NFR BLD: 11 % (ref 20–51)
LYMPHOCYTES NFR BLD: 12 % (ref 21–52)
LYMPHOCYTES NFR BLD: 14 % (ref 21–52)
LYMPHOCYTES NFR BLD: 15 % (ref 21–52)
LYMPHOCYTES NFR BLD: 15 % (ref 21–52)
LYMPHOCYTES NFR BLD: 17 % (ref 21–52)
LYMPHOCYTES NFR BLD: 4 % (ref 20–51)
LYMPHOCYTES NFR BLD: 7 % (ref 21–52)
LYMPHOCYTES NFR BLD: 7 % (ref 21–52)
LYMPHOCYTES NFR FLD: 2 %
MACROPHAGES NFR FLD: 69 %
MAGNESIUM SERPL-MCNC: 1.8 MG/DL (ref 1.6–2.6)
MAGNESIUM SERPL-MCNC: 1.9 MG/DL (ref 1.6–2.6)
MAGNESIUM SERPL-MCNC: 2 MG/DL (ref 1.6–2.6)
MAGNESIUM SERPL-MCNC: 2.2 MG/DL (ref 1.6–2.6)
MAGNESIUM SERPL-MCNC: 2.2 MG/DL (ref 1.6–2.6)
MAGNESIUM SERPL-MCNC: 2.3 MG/DL (ref 1.6–2.6)
MCH RBC QN AUTO: 32.9 PG (ref 24–34)
MCH RBC QN AUTO: 33.6 PG (ref 24–34)
MCH RBC QN AUTO: 33.7 PG (ref 24–34)
MCH RBC QN AUTO: 33.8 PG (ref 24–34)
MCH RBC QN AUTO: 33.9 PG (ref 24–34)
MCH RBC QN AUTO: 34.3 PG (ref 24–34)
MCH RBC QN AUTO: 34.5 PG (ref 24–34)
MCHC RBC AUTO-ENTMCNC: 32.4 G/DL (ref 31–37)
MCHC RBC AUTO-ENTMCNC: 32.4 G/DL (ref 31–37)
MCHC RBC AUTO-ENTMCNC: 32.7 G/DL (ref 31–37)
MCHC RBC AUTO-ENTMCNC: 32.9 G/DL (ref 31–37)
MCHC RBC AUTO-ENTMCNC: 33.3 G/DL (ref 31–37)
MCHC RBC AUTO-ENTMCNC: 33.6 G/DL (ref 31–37)
MCHC RBC AUTO-ENTMCNC: 33.6 G/DL (ref 31–37)
MCHC RBC AUTO-ENTMCNC: 33.7 G/DL (ref 31–37)
MCHC RBC AUTO-ENTMCNC: 34 G/DL (ref 31–37)
MCV RBC AUTO: 100.4 FL (ref 74–97)
MCV RBC AUTO: 100.5 FL (ref 74–97)
MCV RBC AUTO: 100.7 FL (ref 74–97)
MCV RBC AUTO: 100.8 FL (ref 74–97)
MCV RBC AUTO: 102.1 FL (ref 74–97)
MCV RBC AUTO: 102.8 FL (ref 74–97)
MCV RBC AUTO: 104 FL (ref 74–97)
MCV RBC AUTO: 106.5 FL (ref 74–97)
MCV RBC AUTO: 99.4 FL (ref 74–97)
MONOCYTES # BLD: 0.7 K/UL (ref 0.05–1.2)
MONOCYTES # BLD: 1.1 K/UL (ref 0.05–1.2)
MONOCYTES # BLD: 1.3 K/UL (ref 0.05–1.2)
MONOCYTES # BLD: 1.3 K/UL (ref 0.05–1.2)
MONOCYTES # BLD: 1.4 K/UL (ref 0.05–1.2)
MONOCYTES # BLD: 1.4 K/UL (ref 0.05–1.2)
MONOCYTES # BLD: 1.5 K/UL (ref 0–1)
MONOCYTES # BLD: 1.6 K/UL (ref 0.05–1.2)
MONOCYTES # BLD: 1.8 K/UL (ref 0–1)
MONOCYTES NFR BLD: 12 % (ref 3–10)
MONOCYTES NFR BLD: 13 % (ref 2–9)
MONOCYTES NFR BLD: 17 % (ref 2–9)
MONOCYTES NFR BLD: 17 % (ref 3–10)
MONOCYTES NFR BLD: 18 % (ref 3–10)
MONOCYTES NFR BLD: 21 % (ref 3–10)
MONOCYTES NFR BLD: 22 % (ref 3–10)
MONOCYTES NFR BLD: 23 % (ref 3–10)
MONOCYTES NFR BLD: 26 % (ref 3–10)
MONOCYTES NFR FLD: 0 %
MV DEC SLOPE: 17.38
NEUTROPHILS NFR FLD: 29 %
NEUTS BAND # FLD: 0 %
NEUTS SEG # BLD: 3.1 K/UL (ref 1.8–8)
NEUTS SEG # BLD: 3.2 K/UL (ref 1.8–8)
NEUTS SEG # BLD: 3.3 K/UL (ref 1.8–8)
NEUTS SEG # BLD: 3.7 K/UL (ref 1.8–8)
NEUTS SEG # BLD: 4.5 K/UL (ref 1.8–8)
NEUTS SEG # BLD: 5.3 K/UL (ref 1.8–8)
NEUTS SEG # BLD: 8.3 K/UL (ref 1.8–8)
NEUTS SEG # BLD: 8.5 K/UL (ref 1.8–8)
NEUTS SEG # BLD: 8.9 K/UL (ref 1.8–8)
NEUTS SEG NFR BLD: 61 % (ref 40–73)
NEUTS SEG NFR BLD: 61 % (ref 40–73)
NEUTS SEG NFR BLD: 63 % (ref 40–73)
NEUTS SEG NFR BLD: 64 % (ref 40–73)
NEUTS SEG NFR BLD: 71 % (ref 40–73)
NEUTS SEG NFR BLD: 75 % (ref 40–73)
NEUTS SEG NFR BLD: 76 % (ref 40–73)
NEUTS SEG NFR BLD: 76 % (ref 42–75)
NEUTS SEG NFR BLD: 79 % (ref 42–75)
NITRITE UR QL STRIP.AUTO: POSITIVE
NUC CELL # FLD: 100 /CU MM
O2/TOTAL GAS SETTING VFR VENT: 100 %
P-R INTERVAL, ECG05: 156 MS
PCO2 BLD: 38.2 MMHG (ref 35–45)
PH BLD: 7.38 [PH] (ref 7.35–7.45)
PH UR STRIP: 5 [PH] (ref 5–8)
PHOSPHATE SERPL-MCNC: 1.2 MG/DL (ref 2.5–4.9)
PHOSPHATE SERPL-MCNC: 1.3 MG/DL (ref 2.5–4.9)
PHOSPHATE SERPL-MCNC: 1.5 MG/DL (ref 2.5–4.9)
PHOSPHATE SERPL-MCNC: 1.7 MG/DL (ref 2.5–4.9)
PHOSPHATE SERPL-MCNC: 2.1 MG/DL (ref 2.5–4.9)
PHOSPHATE SERPL-MCNC: 2.1 MG/DL (ref 2.5–4.9)
PLATELET # BLD AUTO: 104 K/UL (ref 135–420)
PLATELET # BLD AUTO: 64 K/UL (ref 135–420)
PLATELET # BLD AUTO: 71 K/UL (ref 135–420)
PLATELET # BLD AUTO: 74 K/UL (ref 135–420)
PLATELET # BLD AUTO: 78 K/UL (ref 135–420)
PLATELET # BLD AUTO: 83 K/UL (ref 135–420)
PLATELET # BLD AUTO: 92 K/UL (ref 135–420)
PLATELET # BLD AUTO: 98 K/UL (ref 135–420)
PLATELET # BLD AUTO: 99 K/UL (ref 135–420)
PLATELET COMMENTS,PCOM: ABNORMAL
PMV BLD AUTO: 10.2 FL (ref 9.2–11.8)
PMV BLD AUTO: 10.6 FL (ref 9.2–11.8)
PMV BLD AUTO: 10.7 FL (ref 9.2–11.8)
PMV BLD AUTO: 10.8 FL (ref 9.2–11.8)
PMV BLD AUTO: 11.4 FL (ref 9.2–11.8)
PMV BLD AUTO: 11.5 FL (ref 9.2–11.8)
PMV BLD AUTO: 12 FL (ref 9.2–11.8)
PMV BLD AUTO: 12.8 FL (ref 9.2–11.8)
PMV BLD AUTO: 9.9 FL (ref 9.2–11.8)
PO2 BLD: 86 MMHG (ref 80–100)
POTASSIUM SERPL-SCNC: 2.9 MMOL/L (ref 3.5–5.5)
POTASSIUM SERPL-SCNC: 3 MMOL/L (ref 3.5–5.5)
POTASSIUM SERPL-SCNC: 3.2 MMOL/L (ref 3.5–5.5)
POTASSIUM SERPL-SCNC: 3.4 MMOL/L (ref 3.5–5.5)
POTASSIUM SERPL-SCNC: 3.5 MMOL/L (ref 3.5–5.5)
POTASSIUM SERPL-SCNC: 3.7 MMOL/L (ref 3.5–5.5)
POTASSIUM SERPL-SCNC: 3.9 MMOL/L (ref 3.5–5.5)
POTASSIUM SERPL-SCNC: 4.4 MMOL/L (ref 3.5–5.5)
POTASSIUM SERPL-SCNC: 4.8 MMOL/L (ref 3.5–5.5)
POTASSIUM SERPL-SCNC: 4.9 MMOL/L (ref 3.5–5.5)
POTASSIUM SERPL-SCNC: 4.9 MMOL/L (ref 3.5–5.5)
POTASSIUM SERPL-SCNC: 5.4 MMOL/L (ref 3.5–5.5)
PROT FLD-MCNC: <2 G/DL
PROT SERPL-MCNC: 5 G/DL (ref 6.4–8.2)
PROT SERPL-MCNC: 5.3 G/DL (ref 6.4–8.2)
PROT SERPL-MCNC: 5.4 G/DL (ref 6.4–8.2)
PROT SERPL-MCNC: 5.6 G/DL (ref 6.4–8.2)
PROT SERPL-MCNC: 5.7 G/DL (ref 6.4–8.2)
PROT SERPL-MCNC: 5.8 G/DL (ref 6.4–8.2)
PROT SERPL-MCNC: 6.6 G/DL (ref 6.4–8.2)
PROT UR STRIP-MCNC: ABNORMAL MG/DL
PROTHROMBIN TIME: 17.8 SEC (ref 11.5–15.2)
PROTHROMBIN TIME: 20.1 SEC (ref 11.5–15.2)
PROTHROMBIN TIME: 20.3 SEC (ref 11.5–15.2)
PROTHROMBIN TIME: 20.7 SEC (ref 11.5–15.2)
PROTHROMBIN TIME: 20.8 SEC (ref 11.5–15.2)
PROTHROMBIN TIME: 21.2 SEC (ref 11.5–15.2)
PROTHROMBIN TIME: 22 SEC (ref 11.5–15.2)
PROTHROMBIN TIME: 22.5 SEC (ref 11.5–15.2)
Q-T INTERVAL, ECG07: 304 MS
QRS DURATION, ECG06: 78 MS
QTC CALCULATION (BEZET), ECG08: 438 MS
RBC # BLD AUTO: 2 M/UL (ref 4.2–5.3)
RBC # BLD AUTO: 2.16 M/UL (ref 4.2–5.3)
RBC # BLD AUTO: 2.36 M/UL (ref 4.2–5.3)
RBC # BLD AUTO: 2.45 M/UL (ref 4.2–5.3)
RBC # BLD AUTO: 2.45 M/UL (ref 4.2–5.3)
RBC # BLD AUTO: 2.48 M/UL (ref 4.2–5.3)
RBC # BLD AUTO: 2.52 M/UL (ref 4.2–5.3)
RBC # BLD AUTO: 2.74 M/UL (ref 4.2–5.3)
RBC # BLD AUTO: 3.43 M/UL (ref 4.2–5.3)
RBC # FLD: 693 /CU MM
RBC #/AREA URNS HPF: ABNORMAL /HPF (ref 0–5)
RBC MORPH BLD: ABNORMAL
SAO2 % BLD: 96 % (ref 92–97)
SARS-COV-2, COV2: NORMAL
SARS-COV-2, COV2NT: NOT DETECTED
SERVICE CMNT-IMP: NORMAL
SODIUM SERPL-SCNC: 126 MMOL/L (ref 136–145)
SODIUM SERPL-SCNC: 126 MMOL/L (ref 136–145)
SODIUM SERPL-SCNC: 129 MMOL/L (ref 136–145)
SODIUM SERPL-SCNC: 131 MMOL/L (ref 136–145)
SODIUM SERPL-SCNC: 133 MMOL/L (ref 136–145)
SODIUM SERPL-SCNC: 138 MMOL/L (ref 136–145)
SODIUM SERPL-SCNC: 142 MMOL/L (ref 136–145)
SODIUM SERPL-SCNC: 145 MMOL/L (ref 136–145)
SODIUM SERPL-SCNC: 147 MMOL/L (ref 136–145)
SODIUM SERPL-SCNC: 152 MMOL/L (ref 136–145)
SOURCE, COVRS: NORMAL
SOURCE, COVRS: NORMAL
SP GR UR REFRACTOMETRY: 1.02 (ref 1–1.03)
SPECIMEN EXP DATE BLD: NORMAL
SPECIMEN EXP DATE BLD: NORMAL
SPECIMEN SOURCE FLD: ABNORMAL
SPECIMEN SOURCE FLD: NORMAL
SPECIMEN SOURCE FLD: NORMAL
SPECIMEN TYPE: NORMAL
STATUS OF UNIT,%ST: NORMAL
TROPONIN I SERPL-MCNC: <0.02 NG/ML (ref 0–0.04)
UNIT DIVISION, %UDIV: 0
UROBILINOGEN UR QL STRIP.AUTO: 1 EU/DL (ref 0.2–1)
VANCOMYCIN TROUGH SERPL-MCNC: 4.5 UG/ML (ref 10–20)
VENTRICULAR RATE, ECG03: 125 BPM
WBC # BLD AUTO: 10.5 K/UL (ref 4.6–13.2)
WBC # BLD AUTO: 11.2 K/UL (ref 4.6–13.2)
WBC # BLD AUTO: 12.5 K/UL (ref 4.6–13.2)
WBC # BLD AUTO: 4.1 K/UL (ref 4.6–13.2)
WBC # BLD AUTO: 5.2 K/UL (ref 4.6–13.2)
WBC # BLD AUTO: 5.2 K/UL (ref 4.6–13.2)
WBC # BLD AUTO: 6 K/UL (ref 4.6–13.2)
WBC # BLD AUTO: 7.1 K/UL (ref 4.6–13.2)
WBC # BLD AUTO: 7.1 K/UL (ref 4.6–13.2)
WBC MORPH BLD: ABNORMAL
WBC MORPH BLD: ABNORMAL
WBC URNS QL MICRO: ABNORMAL /HPF (ref 0–4)

## 2021-01-01 PROCEDURE — 74011000258 HC RX REV CODE- 258: Performed by: INTERNAL MEDICINE

## 2021-01-01 PROCEDURE — 96361 HYDRATE IV INFUSION ADD-ON: CPT

## 2021-01-01 PROCEDURE — 99285 EMERGENCY DEPT VISIT HI MDM: CPT

## 2021-01-01 PROCEDURE — 96376 TX/PRO/DX INJ SAME DRUG ADON: CPT

## 2021-01-01 PROCEDURE — 84100 ASSAY OF PHOSPHORUS: CPT

## 2021-01-01 PROCEDURE — 74177 CT ABD & PELVIS W/CONTRAST: CPT

## 2021-01-01 PROCEDURE — 80076 HEPATIC FUNCTION PANEL: CPT

## 2021-01-01 PROCEDURE — 87205 SMEAR GRAM STAIN: CPT

## 2021-01-01 PROCEDURE — 94640 AIRWAY INHALATION TREATMENT: CPT

## 2021-01-01 PROCEDURE — 96365 THER/PROPH/DIAG IV INF INIT: CPT

## 2021-01-01 PROCEDURE — 83690 ASSAY OF LIPASE: CPT

## 2021-01-01 PROCEDURE — 70450 CT HEAD/BRAIN W/O DYE: CPT

## 2021-01-01 PROCEDURE — 71045 X-RAY EXAM CHEST 1 VIEW: CPT

## 2021-01-01 PROCEDURE — 36430 TRANSFUSION BLD/BLD COMPNT: CPT

## 2021-01-01 PROCEDURE — 74011250637 HC RX REV CODE- 250/637: Performed by: EMERGENCY MEDICINE

## 2021-01-01 PROCEDURE — 77030041504 US GUIDE PARACENTESIS

## 2021-01-01 PROCEDURE — 74011250636 HC RX REV CODE- 250/636: Performed by: FAMILY MEDICINE

## 2021-01-01 PROCEDURE — 74011250636 HC RX REV CODE- 250/636: Performed by: INTERNAL MEDICINE

## 2021-01-01 PROCEDURE — 74011000250 HC RX REV CODE- 250: Performed by: FAMILY MEDICINE

## 2021-01-01 PROCEDURE — 85025 COMPLETE CBC W/AUTO DIFF WBC: CPT

## 2021-01-01 PROCEDURE — 74011250636 HC RX REV CODE- 250/636: Performed by: RADIOLOGY

## 2021-01-01 PROCEDURE — 65610000006 HC RM INTENSIVE CARE

## 2021-01-01 PROCEDURE — 82962 GLUCOSE BLOOD TEST: CPT

## 2021-01-01 PROCEDURE — 83735 ASSAY OF MAGNESIUM: CPT

## 2021-01-01 PROCEDURE — 02HV33Z INSERTION OF INFUSION DEVICE INTO SUPERIOR VENA CAVA, PERCUTANEOUS APPROACH: ICD-10-PCS | Performed by: PHYSICIAN ASSISTANT

## 2021-01-01 PROCEDURE — C9113 INJ PANTOPRAZOLE SODIUM, VIA: HCPCS | Performed by: INTERNAL MEDICINE

## 2021-01-01 PROCEDURE — 96375 TX/PRO/DX INJ NEW DRUG ADDON: CPT

## 2021-01-01 PROCEDURE — 74011250636 HC RX REV CODE- 250/636: Performed by: HOSPITALIST

## 2021-01-01 PROCEDURE — 74011000250 HC RX REV CODE- 250: Performed by: INTERNAL MEDICINE

## 2021-01-01 PROCEDURE — 87635 SARS-COV-2 COVID-19 AMP PRB: CPT

## 2021-01-01 PROCEDURE — 74011000250 HC RX REV CODE- 250: Performed by: RADIOLOGY

## 2021-01-01 PROCEDURE — 36415 COLL VENOUS BLD VENIPUNCTURE: CPT

## 2021-01-01 PROCEDURE — 77010033678 HC OXYGEN DAILY

## 2021-01-01 PROCEDURE — 85018 HEMOGLOBIN: CPT

## 2021-01-01 PROCEDURE — P9047 ALBUMIN (HUMAN), 25%, 50ML: HCPCS | Performed by: INTERNAL MEDICINE

## 2021-01-01 PROCEDURE — 82140 ASSAY OF AMMONIA: CPT

## 2021-01-01 PROCEDURE — 85610 PROTHROMBIN TIME: CPT

## 2021-01-01 PROCEDURE — 30233K1 TRANSFUSION OF NONAUTOLOGOUS FROZEN PLASMA INTO PERIPHERAL VEIN, PERCUTANEOUS APPROACH: ICD-10-PCS | Performed by: INTERNAL MEDICINE

## 2021-01-01 PROCEDURE — 88112 CYTOPATH CELL ENHANCE TECH: CPT

## 2021-01-01 PROCEDURE — 87086 URINE CULTURE/COLONY COUNT: CPT

## 2021-01-01 PROCEDURE — 84132 ASSAY OF SERUM POTASSIUM: CPT

## 2021-01-01 PROCEDURE — 93306 TTE W/DOPPLER COMPLETE: CPT

## 2021-01-01 PROCEDURE — P9016 RBC LEUKOCYTES REDUCED: HCPCS

## 2021-01-01 PROCEDURE — 76450000000

## 2021-01-01 PROCEDURE — 74011000250 HC RX REV CODE- 250: Performed by: NURSE PRACTITIONER

## 2021-01-01 PROCEDURE — 81001 URINALYSIS AUTO W/SCOPE: CPT

## 2021-01-01 PROCEDURE — 30233N1 TRANSFUSION OF NONAUTOLOGOUS RED BLOOD CELLS INTO PERIPHERAL VEIN, PERCUTANEOUS APPROACH: ICD-10-PCS | Performed by: INTERNAL MEDICINE

## 2021-01-01 PROCEDURE — 74011000636 HC RX REV CODE- 636: Performed by: EMERGENCY MEDICINE

## 2021-01-01 PROCEDURE — 74011250636 HC RX REV CODE- 250/636: Performed by: EMERGENCY MEDICINE

## 2021-01-01 PROCEDURE — 99223 1ST HOSP IP/OBS HIGH 75: CPT | Performed by: INTERNAL MEDICINE

## 2021-01-01 PROCEDURE — 0W9G3ZZ DRAINAGE OF PERITONEAL CAVITY, PERCUTANEOUS APPROACH: ICD-10-PCS | Performed by: RADIOLOGY

## 2021-01-01 PROCEDURE — 82553 CREATINE MB FRACTION: CPT

## 2021-01-01 PROCEDURE — 99356 PR PROLONGED SVC I/P OR OBS SETTING 1ST HOUR: CPT | Performed by: NURSE PRACTITIONER

## 2021-01-01 PROCEDURE — 74011250636 HC RX REV CODE- 250/636: Performed by: NURSE PRACTITIONER

## 2021-01-01 PROCEDURE — 76705 ECHO EXAM OF ABDOMEN: CPT

## 2021-01-01 PROCEDURE — 73590 X-RAY EXAM OF LOWER LEG: CPT

## 2021-01-01 PROCEDURE — 99233 SBSQ HOSP IP/OBS HIGH 50: CPT | Performed by: NURSE PRACTITIONER

## 2021-01-01 PROCEDURE — 86901 BLOOD TYPING SEROLOGIC RH(D): CPT

## 2021-01-01 PROCEDURE — 84484 ASSAY OF TROPONIN QUANT: CPT

## 2021-01-01 PROCEDURE — 80053 COMPREHEN METABOLIC PANEL: CPT

## 2021-01-01 PROCEDURE — 86923 COMPATIBILITY TEST ELECTRIC: CPT

## 2021-01-01 PROCEDURE — 74011000250 HC RX REV CODE- 250: Performed by: HOSPITALIST

## 2021-01-01 PROCEDURE — 82330 ASSAY OF CALCIUM: CPT

## 2021-01-01 PROCEDURE — 86850 RBC ANTIBODY SCREEN: CPT

## 2021-01-01 PROCEDURE — 77030005513 HC CATH URETH FOL11 MDII -B

## 2021-01-01 PROCEDURE — 82803 BLOOD GASES ANY COMBINATION: CPT

## 2021-01-01 PROCEDURE — 89051 BODY FLUID CELL COUNT: CPT

## 2021-01-01 PROCEDURE — 82042 OTHER SOURCE ALBUMIN QUAN EA: CPT

## 2021-01-01 PROCEDURE — 36600 WITHDRAWAL OF ARTERIAL BLOOD: CPT

## 2021-01-01 PROCEDURE — 87015 SPECIMEN INFECT AGNT CONCNTJ: CPT

## 2021-01-01 PROCEDURE — 85014 HEMATOCRIT: CPT

## 2021-01-01 PROCEDURE — 80202 ASSAY OF VANCOMYCIN: CPT

## 2021-01-01 PROCEDURE — 74011000258 HC RX REV CODE- 258: Performed by: EMERGENCY MEDICINE

## 2021-01-01 PROCEDURE — 83880 ASSAY OF NATRIURETIC PEPTIDE: CPT

## 2021-01-01 PROCEDURE — C1751 CATH, INF, PER/CENT/MIDLINE: HCPCS

## 2021-01-01 PROCEDURE — 80048 BASIC METABOLIC PNL TOTAL CA: CPT

## 2021-01-01 PROCEDURE — 74011636637 HC RX REV CODE- 636/637: Performed by: INTERNAL MEDICINE

## 2021-01-01 PROCEDURE — 87040 BLOOD CULTURE FOR BACTERIA: CPT

## 2021-01-01 PROCEDURE — 82077 ASSAY SPEC XCP UR&BREATH IA: CPT

## 2021-01-01 PROCEDURE — 73552 X-RAY EXAM OF FEMUR 2/>: CPT

## 2021-01-01 PROCEDURE — P9059 PLASMA, FRZ BETWEEN 8-24HOUR: HCPCS

## 2021-01-01 PROCEDURE — 93005 ELECTROCARDIOGRAM TRACING: CPT

## 2021-01-01 PROCEDURE — U0003 INFECTIOUS AGENT DETECTION BY NUCLEIC ACID (DNA OR RNA); SEVERE ACUTE RESPIRATORY SYNDROME CORONAVIRUS 2 (SARS-COV-2) (CORONAVIRUS DISEASE [COVID-19]), AMPLIFIED PROBE TECHNIQUE, MAKING USE OF HIGH THROUGHPUT TECHNOLOGIES AS DESCRIBED BY CMS-2020-01-R: HCPCS

## 2021-01-01 PROCEDURE — 88305 TISSUE EXAM BY PATHOLOGIST: CPT

## 2021-01-01 PROCEDURE — 84157 ASSAY OF PROTEIN OTHER: CPT

## 2021-01-01 PROCEDURE — 99222 1ST HOSP IP/OBS MODERATE 55: CPT | Performed by: NURSE PRACTITIONER

## 2021-01-01 PROCEDURE — 77030013140 HC MSK NEB VYRM -A

## 2021-01-01 PROCEDURE — 74011250637 HC RX REV CODE- 250/637: Performed by: HOSPITALIST

## 2021-01-01 RX ORDER — VENLAFAXINE HYDROCHLORIDE 150 MG/1
150 CAPSULE, EXTENDED RELEASE ORAL
Status: DISCONTINUED | OUTPATIENT
Start: 2021-01-01 | End: 2021-01-01 | Stop reason: HOSPADM

## 2021-01-01 RX ORDER — LORAZEPAM 2 MG/ML
2 INJECTION INTRAMUSCULAR
Status: DISCONTINUED | OUTPATIENT
Start: 2021-01-01 | End: 2021-01-01

## 2021-01-01 RX ORDER — FUROSEMIDE 10 MG/ML
20 INJECTION INTRAMUSCULAR; INTRAVENOUS DAILY
Status: DISCONTINUED | OUTPATIENT
Start: 2021-01-01 | End: 2021-01-01 | Stop reason: HOSPADM

## 2021-01-01 RX ORDER — ZIPRASIDONE HYDROCHLORIDE 20 MG/1
20 CAPSULE ORAL 2 TIMES DAILY WITH MEALS
Status: DISCONTINUED | OUTPATIENT
Start: 2021-01-01 | End: 2021-01-01 | Stop reason: HOSPADM

## 2021-01-01 RX ORDER — LORAZEPAM 2 MG/ML
2 INJECTION INTRAMUSCULAR
Status: DISCONTINUED | OUTPATIENT
Start: 2021-01-01 | End: 2021-01-01 | Stop reason: HOSPADM

## 2021-01-01 RX ORDER — POTASSIUM CHLORIDE 7.45 MG/ML
10 INJECTION INTRAVENOUS ONCE
Status: COMPLETED | OUTPATIENT
Start: 2021-01-01 | End: 2021-01-01

## 2021-01-01 RX ORDER — FLUTICASONE PROPIONATE 50 MCG
2 SPRAY, SUSPENSION (ML) NASAL DAILY
Status: DISCONTINUED | OUTPATIENT
Start: 2021-01-01 | End: 2021-01-01

## 2021-01-01 RX ORDER — POTASSIUM CHLORIDE 7.45 MG/ML
10 INJECTION INTRAVENOUS
Status: COMPLETED | OUTPATIENT
Start: 2021-01-01 | End: 2021-01-01

## 2021-01-01 RX ORDER — POTASSIUM CHLORIDE 7.45 MG/ML
10 INJECTION INTRAVENOUS
Status: DISPENSED | OUTPATIENT
Start: 2021-01-01 | End: 2021-01-01

## 2021-01-01 RX ORDER — SODIUM CHLORIDE 9 MG/ML
150 INJECTION, SOLUTION INTRAVENOUS CONTINUOUS
Status: DISCONTINUED | OUTPATIENT
Start: 2021-01-01 | End: 2021-01-01

## 2021-01-01 RX ORDER — DEXTROSE MONOHYDRATE AND SODIUM CHLORIDE 5; .9 G/100ML; G/100ML
75 INJECTION, SOLUTION INTRAVENOUS CONTINUOUS
Status: DISCONTINUED | OUTPATIENT
Start: 2021-01-01 | End: 2021-01-01

## 2021-01-01 RX ORDER — SODIUM CHLORIDE 0.9 % (FLUSH) 0.9 %
5-40 SYRINGE (ML) INJECTION AS NEEDED
Status: DISCONTINUED | OUTPATIENT
Start: 2021-01-01 | End: 2021-01-01 | Stop reason: HOSPADM

## 2021-01-01 RX ORDER — LORAZEPAM 1 MG/1
1 TABLET ORAL
Status: DISCONTINUED | OUTPATIENT
Start: 2021-01-01 | End: 2021-01-01 | Stop reason: HOSPADM

## 2021-01-01 RX ORDER — LORAZEPAM 2 MG/ML
3 INJECTION INTRAMUSCULAR
Status: DISCONTINUED | OUTPATIENT
Start: 2021-01-01 | End: 2021-01-01 | Stop reason: HOSPADM

## 2021-01-01 RX ORDER — MORPHINE SULFATE 5 MG/ML
INJECTION, SOLUTION INTRAVENOUS
Status: DISCONTINUED | OUTPATIENT
Start: 2021-01-01 | End: 2021-01-01 | Stop reason: HOSPADM

## 2021-01-01 RX ORDER — ALBUTEROL SULFATE 0.83 MG/ML
2.5 SOLUTION RESPIRATORY (INHALATION)
Status: DISCONTINUED | OUTPATIENT
Start: 2021-01-01 | End: 2021-01-01 | Stop reason: CLARIF

## 2021-01-01 RX ORDER — CALCIUM GLUCONATE 20 MG/ML
2 INJECTION, SOLUTION INTRAVENOUS ONCE
Status: DISCONTINUED | OUTPATIENT
Start: 2021-01-01 | End: 2021-01-01 | Stop reason: SDUPTHER

## 2021-01-01 RX ORDER — LORAZEPAM 2 MG/ML
1 INJECTION INTRAMUSCULAR
Status: DISCONTINUED | OUTPATIENT
Start: 2021-01-01 | End: 2021-01-01 | Stop reason: HOSPADM

## 2021-01-01 RX ORDER — INSULIN LISPRO 100 [IU]/ML
INJECTION, SOLUTION INTRAVENOUS; SUBCUTANEOUS 2 TIMES DAILY
Status: DISCONTINUED | OUTPATIENT
Start: 2021-01-01 | End: 2021-01-01

## 2021-01-01 RX ORDER — CALCIUM GLUCONATE 20 MG/ML
1 INJECTION, SOLUTION INTRAVENOUS ONCE
Status: COMPLETED | OUTPATIENT
Start: 2021-01-01 | End: 2021-01-01

## 2021-01-01 RX ORDER — MAGNESIUM SULFATE 1 G/100ML
1 INJECTION INTRAVENOUS ONCE
Status: COMPLETED | OUTPATIENT
Start: 2021-01-01 | End: 2021-01-01

## 2021-01-01 RX ORDER — VENLAFAXINE HYDROCHLORIDE 150 MG/1
150 CAPSULE, EXTENDED RELEASE ORAL
Status: DISCONTINUED | OUTPATIENT
Start: 2021-01-01 | End: 2021-01-01

## 2021-01-01 RX ORDER — MAGNESIUM SULFATE HEPTAHYDRATE 40 MG/ML
2 INJECTION, SOLUTION INTRAVENOUS ONCE
Status: COMPLETED | OUTPATIENT
Start: 2021-01-01 | End: 2021-01-01

## 2021-01-01 RX ORDER — BUDESONIDE 0.5 MG/2ML
500 INHALANT ORAL
Status: DISCONTINUED | OUTPATIENT
Start: 2021-01-01 | End: 2021-01-01

## 2021-01-01 RX ORDER — SODIUM CHLORIDE 0.9 % (FLUSH) 0.9 %
5-40 SYRINGE (ML) INJECTION EVERY 8 HOURS
Status: DISCONTINUED | OUTPATIENT
Start: 2021-01-01 | End: 2021-01-01

## 2021-01-01 RX ORDER — LANOLIN ALCOHOL/MO/W.PET/CERES
400 CREAM (GRAM) TOPICAL 2 TIMES DAILY
Status: DISCONTINUED | OUTPATIENT
Start: 2021-01-01 | End: 2021-01-01 | Stop reason: HOSPADM

## 2021-01-01 RX ORDER — VANCOMYCIN/0.9 % SOD CHLORIDE 1.5G/250ML
1500 PLASTIC BAG, INJECTION (ML) INTRAVENOUS ONCE
Status: COMPLETED | OUTPATIENT
Start: 2021-01-01 | End: 2021-01-01

## 2021-01-01 RX ORDER — MAGNESIUM SULFATE 100 %
4 CRYSTALS MISCELLANEOUS AS NEEDED
Status: DISCONTINUED | OUTPATIENT
Start: 2021-01-01 | End: 2021-01-01

## 2021-01-01 RX ORDER — THERA TABS 400 MCG
1 TAB ORAL DAILY
Status: DISCONTINUED | OUTPATIENT
Start: 2021-01-01 | End: 2021-01-01 | Stop reason: HOSPADM

## 2021-01-01 RX ORDER — SODIUM CHLORIDE 0.9 % (FLUSH) 0.9 %
5-40 SYRINGE (ML) INJECTION AS NEEDED
Status: DISCONTINUED | OUTPATIENT
Start: 2021-01-01 | End: 2021-01-01

## 2021-01-01 RX ORDER — PHYTONADIONE 10 MG/ML
10 INJECTION, EMULSION INTRAMUSCULAR; INTRAVENOUS; SUBCUTANEOUS DAILY
Status: COMPLETED | OUTPATIENT
Start: 2021-01-01 | End: 2021-01-01

## 2021-01-01 RX ORDER — SODIUM CHLORIDE 9 MG/ML
250 INJECTION, SOLUTION INTRAVENOUS AS NEEDED
Status: DISCONTINUED | OUTPATIENT
Start: 2021-01-01 | End: 2021-01-01

## 2021-01-01 RX ORDER — ONDANSETRON 2 MG/ML
4 INJECTION INTRAMUSCULAR; INTRAVENOUS
Status: DISCONTINUED | OUTPATIENT
Start: 2021-01-01 | End: 2021-01-01 | Stop reason: HOSPADM

## 2021-01-01 RX ORDER — ALBUTEROL SULFATE 90 UG/1
2 AEROSOL, METERED RESPIRATORY (INHALATION)
Status: DISCONTINUED | OUTPATIENT
Start: 2021-01-01 | End: 2021-01-01 | Stop reason: CLARIF

## 2021-01-01 RX ORDER — LORAZEPAM 1 MG/1
2 TABLET ORAL
Status: DISCONTINUED | OUTPATIENT
Start: 2021-01-01 | End: 2021-01-01 | Stop reason: HOSPADM

## 2021-01-01 RX ORDER — FACIAL-BODY WIPES
10 EACH TOPICAL DAILY PRN
Status: DISCONTINUED | OUTPATIENT
Start: 2021-01-01 | End: 2021-01-01 | Stop reason: HOSPADM

## 2021-01-01 RX ORDER — ACETAMINOPHEN 650 MG/1
650 SUPPOSITORY RECTAL
Status: DISCONTINUED | OUTPATIENT
Start: 2021-01-01 | End: 2021-01-01 | Stop reason: HOSPADM

## 2021-01-01 RX ORDER — INSULIN LISPRO 100 [IU]/ML
INJECTION, SOLUTION INTRAVENOUS; SUBCUTANEOUS EVERY 6 HOURS
Status: DISCONTINUED | OUTPATIENT
Start: 2021-01-01 | End: 2021-01-01

## 2021-01-01 RX ORDER — DEXTROSE MONOHYDRATE 100 MG/ML
125-250 INJECTION, SOLUTION INTRAVENOUS AS NEEDED
Status: DISCONTINUED | OUTPATIENT
Start: 2021-01-01 | End: 2021-01-01

## 2021-01-01 RX ORDER — ALBUMIN HUMAN 250 G/1000ML
12.5 SOLUTION INTRAVENOUS EVERY 6 HOURS
Status: DISCONTINUED | OUTPATIENT
Start: 2021-01-01 | End: 2021-01-01

## 2021-01-01 RX ORDER — ACETAMINOPHEN 650 MG/1
650 SUPPOSITORY RECTAL
Status: DISCONTINUED | OUTPATIENT
Start: 2021-01-01 | End: 2021-01-01

## 2021-01-01 RX ORDER — CYANOCOBALAMIN (VITAMIN B-12) 500 MCG
400 TABLET ORAL DAILY
Status: DISCONTINUED | OUTPATIENT
Start: 2021-01-01 | End: 2021-01-01 | Stop reason: HOSPADM

## 2021-01-01 RX ORDER — LIDOCAINE HYDROCHLORIDE 10 MG/ML
1-20 INJECTION INFILTRATION; PERINEURAL
Status: COMPLETED | OUTPATIENT
Start: 2021-01-01 | End: 2021-01-01

## 2021-01-01 RX ORDER — ASPIRIN 325 MG/1
100 TABLET, FILM COATED ORAL DAILY
Status: DISCONTINUED | OUTPATIENT
Start: 2021-01-01 | End: 2021-01-01 | Stop reason: HOSPADM

## 2021-01-01 RX ORDER — NOREPINEPHRINE BIT/0.9 % NACL 8 MG/250ML
.5-16 INFUSION BOTTLE (ML) INTRAVENOUS
Status: DISCONTINUED | OUTPATIENT
Start: 2021-01-01 | End: 2021-01-01

## 2021-01-01 RX ORDER — HEPARIN SODIUM (PORCINE) LOCK FLUSH IV SOLN 100 UNIT/ML 100 UNIT/ML
500 SOLUTION INTRAVENOUS
Status: DISCONTINUED | OUTPATIENT
Start: 2021-01-01 | End: 2021-01-01

## 2021-01-01 RX ORDER — VANCOMYCIN HYDROCHLORIDE
1250 EVERY 12 HOURS
Status: DISCONTINUED | OUTPATIENT
Start: 2021-01-01 | End: 2021-01-01 | Stop reason: DRUGHIGH

## 2021-01-01 RX ORDER — FUROSEMIDE 10 MG/ML
20 INJECTION INTRAMUSCULAR; INTRAVENOUS 2 TIMES DAILY
Status: COMPLETED | OUTPATIENT
Start: 2021-01-01 | End: 2021-01-01

## 2021-01-01 RX ORDER — HEPARIN SODIUM 200 [USP'U]/100ML
500 INJECTION, SOLUTION INTRAVENOUS
Status: COMPLETED | OUTPATIENT
Start: 2021-01-01 | End: 2021-01-01

## 2021-01-01 RX ORDER — PROMETHAZINE HYDROCHLORIDE 25 MG/1
12.5 TABLET ORAL
Status: DISCONTINUED | OUTPATIENT
Start: 2021-01-01 | End: 2021-01-01

## 2021-01-01 RX ORDER — SODIUM CHLORIDE 0.9 % (FLUSH) 0.9 %
5-40 SYRINGE (ML) INJECTION EVERY 8 HOURS
Status: DISCONTINUED | OUTPATIENT
Start: 2021-01-01 | End: 2021-01-01 | Stop reason: HOSPADM

## 2021-01-01 RX ORDER — LORAZEPAM 1 MG/1
1 TABLET ORAL
Status: DISCONTINUED | OUTPATIENT
Start: 2021-01-01 | End: 2021-01-01

## 2021-01-01 RX ORDER — ACETAMINOPHEN 325 MG/1
650 TABLET ORAL
Status: DISCONTINUED | OUTPATIENT
Start: 2021-01-01 | End: 2021-01-01

## 2021-01-01 RX ORDER — IPRATROPIUM BROMIDE AND ALBUTEROL SULFATE 2.5; .5 MG/3ML; MG/3ML
3 SOLUTION RESPIRATORY (INHALATION)
Status: DISCONTINUED | OUTPATIENT
Start: 2021-01-01 | End: 2021-01-01

## 2021-01-01 RX ORDER — LIDOCAINE HYDROCHLORIDE 10 MG/ML
0-10 INJECTION, SOLUTION EPIDURAL; INFILTRATION; INTRACAUDAL; PERINEURAL
Status: COMPLETED | OUTPATIENT
Start: 2021-01-01 | End: 2021-01-01

## 2021-01-01 RX ORDER — IPRATROPIUM BROMIDE AND ALBUTEROL SULFATE 2.5; .5 MG/3ML; MG/3ML
3 SOLUTION RESPIRATORY (INHALATION)
Status: DISCONTINUED | OUTPATIENT
Start: 2021-01-01 | End: 2021-01-01 | Stop reason: HOSPADM

## 2021-01-01 RX ORDER — FUROSEMIDE 10 MG/ML
20 INJECTION INTRAMUSCULAR; INTRAVENOUS ONCE
Status: COMPLETED | OUTPATIENT
Start: 2021-01-01 | End: 2021-01-01

## 2021-01-01 RX ORDER — LORAZEPAM 2 MG/ML
1 INJECTION INTRAMUSCULAR
Status: DISCONTINUED | OUTPATIENT
Start: 2021-01-01 | End: 2021-01-01

## 2021-01-01 RX ORDER — MORPHINE SULFATE 2 MG/ML
2 INJECTION, SOLUTION INTRAMUSCULAR; INTRAVENOUS ONCE
Status: COMPLETED | OUTPATIENT
Start: 2021-01-01 | End: 2021-01-01

## 2021-01-01 RX ORDER — SODIUM CHLORIDE 9 MG/ML
75 INJECTION, SOLUTION INTRAVENOUS CONTINUOUS
Status: DISCONTINUED | OUTPATIENT
Start: 2021-01-01 | End: 2021-01-01

## 2021-01-01 RX ORDER — CALCIUM GLUCONATE 20 MG/ML
2 INJECTION, SOLUTION INTRAVENOUS ONCE
Status: COMPLETED | OUTPATIENT
Start: 2021-01-01 | End: 2021-01-01

## 2021-01-01 RX ORDER — POLYETHYLENE GLYCOL 3350 17 G/17G
17 POWDER, FOR SOLUTION ORAL DAILY PRN
Status: DISCONTINUED | OUTPATIENT
Start: 2021-01-01 | End: 2021-01-01

## 2021-01-01 RX ORDER — ACETAMINOPHEN 325 MG/1
650 TABLET ORAL
Status: DISCONTINUED | OUTPATIENT
Start: 2021-01-01 | End: 2021-01-01 | Stop reason: HOSPADM

## 2021-01-01 RX ORDER — VANCOMYCIN HYDROCHLORIDE
1250 EVERY 8 HOURS
Status: DISCONTINUED | OUTPATIENT
Start: 2021-01-01 | End: 2021-01-01

## 2021-01-01 RX ORDER — LORAZEPAM 2 MG/ML
3 INJECTION INTRAMUSCULAR
Status: DISCONTINUED | OUTPATIENT
Start: 2021-01-01 | End: 2021-01-01

## 2021-01-01 RX ORDER — LORAZEPAM 1 MG/1
2 TABLET ORAL
Status: DISCONTINUED | OUTPATIENT
Start: 2021-01-01 | End: 2021-01-01

## 2021-01-01 RX ORDER — FOLIC ACID 1 MG/1
1 TABLET ORAL DAILY
Status: DISCONTINUED | OUTPATIENT
Start: 2021-01-01 | End: 2021-01-01 | Stop reason: HOSPADM

## 2021-01-01 RX ORDER — VANCOMYCIN 1.75 GRAM/500 ML IN 0.9 % SODIUM CHLORIDE INTRAVENOUS
1750 ONCE
Status: COMPLETED | OUTPATIENT
Start: 2021-01-01 | End: 2021-01-01

## 2021-01-01 RX ORDER — LORAZEPAM 2 MG/ML
1 INJECTION INTRAMUSCULAR
Status: COMPLETED | OUTPATIENT
Start: 2021-01-01 | End: 2021-01-01

## 2021-01-01 RX ORDER — ONDANSETRON 2 MG/ML
4 INJECTION INTRAMUSCULAR; INTRAVENOUS
Status: COMPLETED | OUTPATIENT
Start: 2021-01-01 | End: 2021-01-01

## 2021-01-01 RX ORDER — GLYCOPYRROLATE 0.2 MG/ML
0.2 INJECTION INTRAMUSCULAR; INTRAVENOUS
Status: DISCONTINUED | OUTPATIENT
Start: 2021-01-01 | End: 2021-01-01 | Stop reason: HOSPADM

## 2021-01-01 RX ORDER — PANTOPRAZOLE SODIUM 40 MG/10ML
40 INJECTION, POWDER, LYOPHILIZED, FOR SOLUTION INTRAVENOUS EVERY 12 HOURS
Status: DISCONTINUED | OUTPATIENT
Start: 2021-01-01 | End: 2021-01-01

## 2021-01-01 RX ORDER — DIAZEPAM 10 MG/2ML
10 INJECTION INTRAMUSCULAR
Status: COMPLETED | OUTPATIENT
Start: 2021-01-01 | End: 2021-01-01

## 2021-01-01 RX ADMIN — LORAZEPAM 2 MG: 2 INJECTION INTRAMUSCULAR at 01:14

## 2021-01-01 RX ADMIN — Medication 100 MG: at 08:43

## 2021-01-01 RX ADMIN — SODIUM CHLORIDE 40 MG: 9 INJECTION INTRAMUSCULAR; INTRAVENOUS; SUBCUTANEOUS at 22:28

## 2021-01-01 RX ADMIN — DEXMEDETOMIDINE 0.6 MCG/KG/HR: 100 INJECTION, SOLUTION, CONCENTRATE INTRAVENOUS at 11:00

## 2021-01-01 RX ADMIN — PIPERACILLIN AND TAZOBACTAM 3.38 G: 3; .375 INJECTION, POWDER, LYOPHILIZED, FOR SOLUTION INTRAVENOUS at 20:18

## 2021-01-01 RX ADMIN — SODIUM CHLORIDE 10 ML: 9 INJECTION, SOLUTION INTRAMUSCULAR; INTRAVENOUS; SUBCUTANEOUS at 21:25

## 2021-01-01 RX ADMIN — LORAZEPAM 2 MG: 2 INJECTION INTRAMUSCULAR at 12:35

## 2021-01-01 RX ADMIN — SODIUM CHLORIDE 30 ML: 9 INJECTION, SOLUTION INTRAMUSCULAR; INTRAVENOUS; SUBCUTANEOUS at 14:00

## 2021-01-01 RX ADMIN — DEXMEDETOMIDINE 0.7 MCG/KG/HR: 100 INJECTION, SOLUTION, CONCENTRATE INTRAVENOUS at 21:14

## 2021-01-01 RX ADMIN — LORAZEPAM 2 MG: 2 INJECTION INTRAMUSCULAR at 21:14

## 2021-01-01 RX ADMIN — IPRATROPIUM BROMIDE AND ALBUTEROL SULFATE 3 ML: .5; 3 SOLUTION RESPIRATORY (INHALATION) at 16:06

## 2021-01-01 RX ADMIN — VANCOMYCIN HYDROCHLORIDE 1250 MG: 10 INJECTION, POWDER, LYOPHILIZED, FOR SOLUTION INTRAVENOUS at 03:15

## 2021-01-01 RX ADMIN — POTASSIUM CHLORIDE 10 MEQ: 10 INJECTION, SOLUTION INTRAVENOUS at 21:00

## 2021-01-01 RX ADMIN — METHYLPREDNISOLONE SODIUM SUCCINATE 40 MG: 40 INJECTION, POWDER, FOR SOLUTION INTRAMUSCULAR; INTRAVENOUS at 23:27

## 2021-01-01 RX ADMIN — DEXMEDETOMIDINE 1.5 MCG/KG/HR: 100 INJECTION, SOLUTION, CONCENTRATE INTRAVENOUS at 22:23

## 2021-01-01 RX ADMIN — PIPERACILLIN AND TAZOBACTAM 3.38 G: 3; .375 INJECTION, POWDER, LYOPHILIZED, FOR SOLUTION INTRAVENOUS at 05:09

## 2021-01-01 RX ADMIN — IPRATROPIUM BROMIDE AND ALBUTEROL SULFATE 3 ML: .5; 3 SOLUTION RESPIRATORY (INHALATION) at 15:36

## 2021-01-01 RX ADMIN — PIPERACILLIN AND TAZOBACTAM 3.38 G: 3; .375 INJECTION, POWDER, LYOPHILIZED, FOR SOLUTION INTRAVENOUS at 13:00

## 2021-01-01 RX ADMIN — FUROSEMIDE 20 MG: 10 INJECTION, SOLUTION INTRAMUSCULAR; INTRAVENOUS at 21:09

## 2021-01-01 RX ADMIN — PIPERACILLIN AND TAZOBACTAM 3.38 G: 3; .375 INJECTION, POWDER, LYOPHILIZED, FOR SOLUTION INTRAVENOUS at 04:52

## 2021-01-01 RX ADMIN — SODIUM CHLORIDE 10 ML: 9 INJECTION, SOLUTION INTRAMUSCULAR; INTRAVENOUS; SUBCUTANEOUS at 06:00

## 2021-01-01 RX ADMIN — POTASSIUM CHLORIDE 10 MEQ: 10 INJECTION, SOLUTION INTRAVENOUS at 17:00

## 2021-01-01 RX ADMIN — CALCIUM GLUCONATE 1000 MG: 20 INJECTION, SOLUTION INTRAVENOUS at 09:34

## 2021-01-01 RX ADMIN — SODIUM CHLORIDE 10 ML: 9 INJECTION, SOLUTION INTRAMUSCULAR; INTRAVENOUS; SUBCUTANEOUS at 05:10

## 2021-01-01 RX ADMIN — PIPERACILLIN AND TAZOBACTAM 3.38 G: 3; .375 INJECTION, POWDER, LYOPHILIZED, FOR SOLUTION INTRAVENOUS at 12:00

## 2021-01-01 RX ADMIN — INSULIN LISPRO 2 UNITS: 100 INJECTION, SOLUTION INTRAVENOUS; SUBCUTANEOUS at 00:00

## 2021-01-01 RX ADMIN — GLYCOPYRROLATE 0.2 MG: 0.2 INJECTION, SOLUTION INTRAMUSCULAR; INTRAVENOUS at 20:14

## 2021-01-01 RX ADMIN — MORPHINE SULFATE 2 MG: 2 INJECTION, SOLUTION INTRAMUSCULAR; INTRAVENOUS at 12:59

## 2021-01-01 RX ADMIN — LORAZEPAM 2 MG: 2 INJECTION INTRAMUSCULAR at 10:58

## 2021-01-01 RX ADMIN — ALBUMIN (HUMAN) 12.5 G: 0.25 INJECTION, SOLUTION INTRAVENOUS at 02:33

## 2021-01-01 RX ADMIN — FOLIC ACID 1 MG: 1 TABLET ORAL at 08:43

## 2021-01-01 RX ADMIN — PHENYLEPHRINE HYDROCHLORIDE 60 MCG/MIN: 10 INJECTION INTRAVENOUS at 05:35

## 2021-01-01 RX ADMIN — PIPERACILLIN AND TAZOBACTAM 3.38 G: 3; .375 INJECTION, POWDER, LYOPHILIZED, FOR SOLUTION INTRAVENOUS at 06:00

## 2021-01-01 RX ADMIN — SODIUM PHOSPHATE, MONOBASIC, MONOHYDRATE 12 MMOL: 276; 142 INJECTION, SOLUTION INTRAVENOUS at 07:01

## 2021-01-01 RX ADMIN — IOPAMIDOL 94 ML: 612 INJECTION, SOLUTION INTRAVENOUS at 08:22

## 2021-01-01 RX ADMIN — ALBUMIN (HUMAN) 12.5 G: 0.25 INJECTION, SOLUTION INTRAVENOUS at 15:48

## 2021-01-01 RX ADMIN — METHYLPREDNISOLONE SODIUM SUCCINATE 40 MG: 40 INJECTION, POWDER, FOR SOLUTION INTRAMUSCULAR; INTRAVENOUS at 18:00

## 2021-01-01 RX ADMIN — ALBUMIN (HUMAN) 12.5 G: 0.25 INJECTION, SOLUTION INTRAVENOUS at 14:33

## 2021-01-01 RX ADMIN — PIPERACILLIN AND TAZOBACTAM 3.38 G: 3; .375 INJECTION, POWDER, LYOPHILIZED, FOR SOLUTION INTRAVENOUS at 04:23

## 2021-01-01 RX ADMIN — LORAZEPAM 1 MG: 2 INJECTION INTRAMUSCULAR; INTRAVENOUS at 21:27

## 2021-01-01 RX ADMIN — ALBUMIN (HUMAN) 12.5 G: 0.25 INJECTION, SOLUTION INTRAVENOUS at 03:22

## 2021-01-01 RX ADMIN — SODIUM CHLORIDE 150 ML/HR: 900 INJECTION, SOLUTION INTRAVENOUS at 10:28

## 2021-01-01 RX ADMIN — CALCIUM GLUCONATE 2 G: 20 INJECTION, SOLUTION INTRAVENOUS at 16:26

## 2021-01-01 RX ADMIN — PHYTONADIONE 10 MG: 10 INJECTION, EMULSION INTRAMUSCULAR; INTRAVENOUS; SUBCUTANEOUS at 09:26

## 2021-01-01 RX ADMIN — IPRATROPIUM BROMIDE AND ALBUTEROL SULFATE 3 ML: .5; 3 SOLUTION RESPIRATORY (INHALATION) at 11:41

## 2021-01-01 RX ADMIN — POTASSIUM CHLORIDE 10 MEQ: 10 INJECTION, SOLUTION INTRAVENOUS at 20:00

## 2021-01-01 RX ADMIN — BUDESONIDE 500 MCG: 0.5 INHALANT RESPIRATORY (INHALATION) at 08:14

## 2021-01-01 RX ADMIN — VANCOMYCIN HYDROCHLORIDE 1250 MG: 10 INJECTION, POWDER, LYOPHILIZED, FOR SOLUTION INTRAVENOUS at 17:49

## 2021-01-01 RX ADMIN — PHENYLEPHRINE HYDROCHLORIDE 70 MCG/MIN: 10 INJECTION INTRAVENOUS at 14:32

## 2021-01-01 RX ADMIN — LORAZEPAM 2 MG: 2 INJECTION INTRAMUSCULAR at 11:34

## 2021-01-01 RX ADMIN — LORAZEPAM 2 MG: 2 INJECTION INTRAMUSCULAR at 03:10

## 2021-01-01 RX ADMIN — SODIUM CHLORIDE 10 ML: 9 INJECTION, SOLUTION INTRAMUSCULAR; INTRAVENOUS; SUBCUTANEOUS at 21:05

## 2021-01-01 RX ADMIN — ALBUMIN (HUMAN) 12.5 G: 0.25 INJECTION, SOLUTION INTRAVENOUS at 16:17

## 2021-01-01 RX ADMIN — LORAZEPAM 2 MG: 2 INJECTION INTRAMUSCULAR at 04:13

## 2021-01-01 RX ADMIN — BUDESONIDE 500 MCG: 0.5 INHALANT RESPIRATORY (INHALATION) at 20:02

## 2021-01-01 RX ADMIN — FOLIC ACID: 5 INJECTION, SOLUTION INTRAMUSCULAR; INTRAVENOUS; SUBCUTANEOUS at 16:45

## 2021-01-01 RX ADMIN — PHENYLEPHRINE HYDROCHLORIDE 30 MCG/MIN: 10 INJECTION INTRAVENOUS at 00:08

## 2021-01-01 RX ADMIN — POTASSIUM CHLORIDE 10 MEQ: 10 INJECTION, SOLUTION INTRAVENOUS at 08:13

## 2021-01-01 RX ADMIN — IPRATROPIUM BROMIDE AND ALBUTEROL SULFATE 3 ML: .5; 3 SOLUTION RESPIRATORY (INHALATION) at 20:17

## 2021-01-01 RX ADMIN — LORAZEPAM 2 MG: 2 INJECTION INTRAMUSCULAR at 13:59

## 2021-01-01 RX ADMIN — FUROSEMIDE 20 MG: 10 INJECTION, SOLUTION INTRAMUSCULAR; INTRAVENOUS at 09:00

## 2021-01-01 RX ADMIN — LORAZEPAM 2 MG: 2 INJECTION INTRAMUSCULAR at 05:20

## 2021-01-01 RX ADMIN — LORAZEPAM 2 MG: 2 INJECTION INTRAMUSCULAR at 03:25

## 2021-01-01 RX ADMIN — LORAZEPAM 2 MG: 2 INJECTION INTRAMUSCULAR at 15:40

## 2021-01-01 RX ADMIN — PHENYLEPHRINE HYDROCHLORIDE 65 MCG/MIN: 10 INJECTION INTRAVENOUS at 09:17

## 2021-01-01 RX ADMIN — POTASSIUM CHLORIDE 10 MEQ: 10 INJECTION, SOLUTION INTRAVENOUS at 08:18

## 2021-01-01 RX ADMIN — LORAZEPAM 2 MG: 2 INJECTION INTRAMUSCULAR at 13:06

## 2021-01-01 RX ADMIN — LORAZEPAM 1 MG: 1 TABLET ORAL at 17:57

## 2021-01-01 RX ADMIN — ALBUMIN (HUMAN) 12.5 G: 0.25 INJECTION, SOLUTION INTRAVENOUS at 04:23

## 2021-01-01 RX ADMIN — LACTULOSE 30 ML: 20 SOLUTION ORAL at 08:47

## 2021-01-01 RX ADMIN — IPRATROPIUM BROMIDE AND ALBUTEROL SULFATE 3 ML: .5; 3 SOLUTION RESPIRATORY (INHALATION) at 15:58

## 2021-01-01 RX ADMIN — ALBUMIN (HUMAN) 12.5 G: 0.25 INJECTION, SOLUTION INTRAVENOUS at 09:07

## 2021-01-01 RX ADMIN — ALBUMIN (HUMAN) 12.5 G: 0.25 INJECTION, SOLUTION INTRAVENOUS at 04:52

## 2021-01-01 RX ADMIN — POTASSIUM CHLORIDE 10 MEQ: 10 INJECTION, SOLUTION INTRAVENOUS at 11:00

## 2021-01-01 RX ADMIN — SODIUM CHLORIDE 10 ML: 9 INJECTION, SOLUTION INTRAMUSCULAR; INTRAVENOUS; SUBCUTANEOUS at 16:01

## 2021-01-01 RX ADMIN — ONDANSETRON 4 MG: 2 INJECTION INTRAMUSCULAR; INTRAVENOUS at 07:51

## 2021-01-01 RX ADMIN — SODIUM CHLORIDE 40 MG: 9 INJECTION INTRAMUSCULAR; INTRAVENOUS; SUBCUTANEOUS at 09:30

## 2021-01-01 RX ADMIN — SODIUM CHLORIDE 125 ML/HR: 900 INJECTION, SOLUTION INTRAVENOUS at 09:34

## 2021-01-01 RX ADMIN — ALBUMIN (HUMAN) 12.5 G: 0.25 INJECTION, SOLUTION INTRAVENOUS at 08:28

## 2021-01-01 RX ADMIN — IPRATROPIUM BROMIDE AND ALBUTEROL SULFATE 3 ML: .5; 3 SOLUTION RESPIRATORY (INHALATION) at 08:14

## 2021-01-01 RX ADMIN — Medication 10 ML: at 22:00

## 2021-01-01 RX ADMIN — DEXMEDETOMIDINE 1.5 MCG/KG/HR: 100 INJECTION, SOLUTION, CONCENTRATE INTRAVENOUS at 00:00

## 2021-01-01 RX ADMIN — LORAZEPAM 2 MG: 2 INJECTION INTRAMUSCULAR at 07:40

## 2021-01-01 RX ADMIN — MORPHINE SULFATE: 10 INJECTION, SOLUTION INTRAMUSCULAR; INTRAVENOUS at 17:41

## 2021-01-01 RX ADMIN — LORAZEPAM 2 MG: 2 INJECTION INTRAMUSCULAR at 23:53

## 2021-01-01 RX ADMIN — LORAZEPAM 2 MG: 2 INJECTION INTRAMUSCULAR at 09:36

## 2021-01-01 RX ADMIN — LORAZEPAM 2 MG: 2 INJECTION INTRAMUSCULAR at 13:31

## 2021-01-01 RX ADMIN — PIPERACILLIN AND TAZOBACTAM 3.38 G: 3; .375 INJECTION, POWDER, LYOPHILIZED, FOR SOLUTION INTRAVENOUS at 22:00

## 2021-01-01 RX ADMIN — LIDOCAINE HYDROCHLORIDE ANHYDROUS 10 ML: 10 INJECTION, SOLUTION INFILTRATION at 12:31

## 2021-01-01 RX ADMIN — PHENYLEPHRINE HYDROCHLORIDE 60 MCG/MIN: 10 INJECTION INTRAVENOUS at 20:10

## 2021-01-01 RX ADMIN — DEXMEDETOMIDINE 0.9 MCG/KG/HR: 100 INJECTION, SOLUTION, CONCENTRATE INTRAVENOUS at 15:30

## 2021-01-01 RX ADMIN — LORAZEPAM 2 MG: 2 INJECTION INTRAMUSCULAR at 14:52

## 2021-01-01 RX ADMIN — IPRATROPIUM BROMIDE AND ALBUTEROL SULFATE 3 ML: .5; 3 SOLUTION RESPIRATORY (INHALATION) at 20:26

## 2021-01-01 RX ADMIN — BUDESONIDE 500 MCG: 0.5 INHALANT RESPIRATORY (INHALATION) at 20:34

## 2021-01-01 RX ADMIN — FLUTICASONE PROPIONATE 2 SPRAY: 50 SPRAY, METERED NASAL at 10:38

## 2021-01-01 RX ADMIN — LORAZEPAM 3 MG: 2 INJECTION INTRAMUSCULAR; INTRAVENOUS at 00:00

## 2021-01-01 RX ADMIN — VANCOMYCIN HYDROCHLORIDE 1250 MG: 10 INJECTION, POWDER, LYOPHILIZED, FOR SOLUTION INTRAVENOUS at 08:28

## 2021-01-01 RX ADMIN — WATER 20 MG: 1 INJECTION INTRAMUSCULAR; INTRAVENOUS; SUBCUTANEOUS at 09:02

## 2021-01-01 RX ADMIN — LORAZEPAM 2 MG: 2 INJECTION INTRAMUSCULAR at 10:45

## 2021-01-01 RX ADMIN — IPRATROPIUM BROMIDE AND ALBUTEROL SULFATE 3 ML: .5; 3 SOLUTION RESPIRATORY (INHALATION) at 11:37

## 2021-01-01 RX ADMIN — Medication 10 ML: at 20:48

## 2021-01-01 RX ADMIN — LORAZEPAM 2 MG: 1 TABLET ORAL at 08:40

## 2021-01-01 RX ADMIN — PHENYLEPHRINE HYDROCHLORIDE 70 MCG/MIN: 10 INJECTION INTRAVENOUS at 07:42

## 2021-01-01 RX ADMIN — LORAZEPAM 2 MG: 2 INJECTION INTRAMUSCULAR at 03:56

## 2021-01-01 RX ADMIN — LORAZEPAM 2 MG: 2 INJECTION INTRAMUSCULAR at 12:53

## 2021-01-01 RX ADMIN — MORPHINE SULFATE: 10 INJECTION, SOLUTION INTRAMUSCULAR; INTRAVENOUS at 14:27

## 2021-01-01 RX ADMIN — IPRATROPIUM BROMIDE AND ALBUTEROL SULFATE 3 ML: .5; 3 SOLUTION RESPIRATORY (INHALATION) at 07:28

## 2021-01-01 RX ADMIN — WATER 20 MG: 1 INJECTION INTRAMUSCULAR; INTRAVENOUS; SUBCUTANEOUS at 09:31

## 2021-01-01 RX ADMIN — METHYLPREDNISOLONE SODIUM SUCCINATE 40 MG: 40 INJECTION, POWDER, FOR SOLUTION INTRAMUSCULAR; INTRAVENOUS at 12:10

## 2021-01-01 RX ADMIN — LORAZEPAM 2 MG: 2 INJECTION INTRAMUSCULAR at 05:09

## 2021-01-01 RX ADMIN — PIPERACILLIN AND TAZOBACTAM 3.38 G: 3; .375 INJECTION, POWDER, LYOPHILIZED, FOR SOLUTION INTRAVENOUS at 20:38

## 2021-01-01 RX ADMIN — LORAZEPAM 2 MG: 2 INJECTION INTRAMUSCULAR at 16:21

## 2021-01-01 RX ADMIN — POTASSIUM PHOSPHATE, MONOBASIC AND POTASSIUM PHOSPHATE, DIBASIC: 224; 236 INJECTION, SOLUTION, CONCENTRATE INTRAVENOUS at 12:59

## 2021-01-01 RX ADMIN — FLUTICASONE PROPIONATE 2 SPRAY: 50 SPRAY, METERED NASAL at 09:03

## 2021-01-01 RX ADMIN — SODIUM PHOSPHATE, MONOBASIC, MONOHYDRATE 20 MMOL: 276; 142 INJECTION, SOLUTION INTRAVENOUS at 10:38

## 2021-01-01 RX ADMIN — WATER 20 MG: 1 INJECTION INTRAMUSCULAR; INTRAVENOUS; SUBCUTANEOUS at 21:19

## 2021-01-01 RX ADMIN — IPRATROPIUM BROMIDE AND ALBUTEROL SULFATE 3 ML: .5; 3 SOLUTION RESPIRATORY (INHALATION) at 07:41

## 2021-01-01 RX ADMIN — POTASSIUM CHLORIDE 10 MEQ: 10 INJECTION, SOLUTION INTRAVENOUS at 09:07

## 2021-01-01 RX ADMIN — GLYCOPYRROLATE 0.2 MG: 0.2 INJECTION, SOLUTION INTRAMUSCULAR; INTRAVENOUS at 12:54

## 2021-01-01 RX ADMIN — MAGNESIUM SULFATE HEPTAHYDRATE 2 G: 40 INJECTION, SOLUTION INTRAVENOUS at 12:00

## 2021-01-01 RX ADMIN — DEXMEDETOMIDINE 1.5 MCG/KG/HR: 100 INJECTION, SOLUTION, CONCENTRATE INTRAVENOUS at 18:31

## 2021-01-01 RX ADMIN — DEXMEDETOMIDINE 0.6 MCG/KG/HR: 100 INJECTION, SOLUTION, CONCENTRATE INTRAVENOUS at 20:35

## 2021-01-01 RX ADMIN — LORAZEPAM 2 MG: 2 INJECTION INTRAMUSCULAR at 02:00

## 2021-01-01 RX ADMIN — ALBUMIN (HUMAN) 12.5 G: 0.25 INJECTION, SOLUTION INTRAVENOUS at 08:17

## 2021-01-01 RX ADMIN — FOLIC ACID: 5 INJECTION, SOLUTION INTRAMUSCULAR; INTRAVENOUS; SUBCUTANEOUS at 10:49

## 2021-01-01 RX ADMIN — FLUTICASONE PROPIONATE 2 SPRAY: 50 SPRAY, METERED NASAL at 09:26

## 2021-01-01 RX ADMIN — SODIUM CHLORIDE 20 ML: 9 INJECTION, SOLUTION INTRAMUSCULAR; INTRAVENOUS; SUBCUTANEOUS at 22:00

## 2021-01-01 RX ADMIN — POTASSIUM CHLORIDE 10 MEQ: 10 INJECTION, SOLUTION INTRAVENOUS at 11:50

## 2021-01-01 RX ADMIN — VANCOMYCIN HYDROCHLORIDE 1500 MG: 10 INJECTION, POWDER, LYOPHILIZED, FOR SOLUTION INTRAVENOUS at 09:06

## 2021-01-01 RX ADMIN — WATER 20 MG: 1 INJECTION INTRAMUSCULAR; INTRAVENOUS; SUBCUTANEOUS at 22:48

## 2021-01-01 RX ADMIN — LORAZEPAM 2 MG: 2 INJECTION INTRAMUSCULAR at 09:48

## 2021-01-01 RX ADMIN — ZIPRASIDONE HCL 20 MG: 20 CAPSULE ORAL at 03:00

## 2021-01-01 RX ADMIN — WATER 20 MG: 1 INJECTION INTRAMUSCULAR; INTRAVENOUS; SUBCUTANEOUS at 10:34

## 2021-01-01 RX ADMIN — LORAZEPAM 2 MG: 2 INJECTION INTRAMUSCULAR at 14:43

## 2021-01-01 RX ADMIN — HEPARIN SODIUM IN SODIUM CHLORIDE 1000 UNITS: 200 INJECTION INTRAVENOUS at 14:48

## 2021-01-01 RX ADMIN — POTASSIUM PHOSPHATE, MONOBASIC AND POTASSIUM PHOSPHATE, DIBASIC: 224; 236 INJECTION, SOLUTION, CONCENTRATE INTRAVENOUS at 16:52

## 2021-01-01 RX ADMIN — POTASSIUM CHLORIDE 10 MEQ: 10 INJECTION, SOLUTION INTRAVENOUS at 09:36

## 2021-01-01 RX ADMIN — FUROSEMIDE 20 MG: 10 INJECTION, SOLUTION INTRAMUSCULAR; INTRAVENOUS at 11:01

## 2021-01-01 RX ADMIN — FUROSEMIDE 20 MG: 10 INJECTION, SOLUTION INTRAMUSCULAR; INTRAVENOUS at 08:17

## 2021-01-01 RX ADMIN — ALBUMIN (HUMAN) 12.5 G: 0.25 INJECTION, SOLUTION INTRAVENOUS at 09:02

## 2021-01-01 RX ADMIN — PIPERACILLIN AND TAZOBACTAM 3.38 G: 3; .375 INJECTION, POWDER, LYOPHILIZED, FOR SOLUTION INTRAVENOUS at 12:09

## 2021-01-01 RX ADMIN — SODIUM CHLORIDE 1000 ML: 9 INJECTION, SOLUTION INTRAVENOUS at 07:48

## 2021-01-01 RX ADMIN — DEXMEDETOMIDINE 0.8 MCG/KG/HR: 100 INJECTION, SOLUTION, CONCENTRATE INTRAVENOUS at 14:48

## 2021-01-01 RX ADMIN — IPRATROPIUM BROMIDE AND ALBUTEROL SULFATE 3 ML: .5; 3 SOLUTION RESPIRATORY (INHALATION) at 20:02

## 2021-01-01 RX ADMIN — SODIUM CHLORIDE 40 MG: 9 INJECTION INTRAMUSCULAR; INTRAVENOUS; SUBCUTANEOUS at 20:17

## 2021-01-01 RX ADMIN — POTASSIUM CHLORIDE 10 MEQ: 10 INJECTION, SOLUTION INTRAVENOUS at 09:34

## 2021-01-01 RX ADMIN — MORPHINE SULFATE 2 MG: 2 INJECTION, SOLUTION INTRAMUSCULAR; INTRAVENOUS at 07:51

## 2021-01-01 RX ADMIN — FOLIC ACID: 5 INJECTION, SOLUTION INTRAMUSCULAR; INTRAVENOUS; SUBCUTANEOUS at 09:44

## 2021-01-01 RX ADMIN — VENLAFAXINE HYDROCHLORIDE 150 MG: 150 CAPSULE, EXTENDED RELEASE ORAL at 08:00

## 2021-01-01 RX ADMIN — DEXMEDETOMIDINE 1.5 MCG/KG/HR: 100 INJECTION, SOLUTION, CONCENTRATE INTRAVENOUS at 01:08

## 2021-01-01 RX ADMIN — SODIUM CHLORIDE 40 MG: 9 INJECTION INTRAMUSCULAR; INTRAVENOUS; SUBCUTANEOUS at 21:00

## 2021-01-01 RX ADMIN — LORAZEPAM 2 MG: 2 INJECTION INTRAMUSCULAR at 23:27

## 2021-01-01 RX ADMIN — IPRATROPIUM BROMIDE AND ALBUTEROL SULFATE 3 ML: .5; 3 SOLUTION RESPIRATORY (INHALATION) at 19:29

## 2021-01-01 RX ADMIN — ALBUMIN (HUMAN) 12.5 G: 0.25 INJECTION, SOLUTION INTRAVENOUS at 21:09

## 2021-01-01 RX ADMIN — MAGNESIUM SULFATE HEPTAHYDRATE 2 G: 40 INJECTION, SOLUTION INTRAVENOUS at 11:58

## 2021-01-01 RX ADMIN — SODIUM CHLORIDE 40 MG: 9 INJECTION INTRAMUSCULAR; INTRAVENOUS; SUBCUTANEOUS at 09:26

## 2021-01-01 RX ADMIN — SODIUM CHLORIDE 40 MG: 9 INJECTION INTRAMUSCULAR; INTRAVENOUS; SUBCUTANEOUS at 08:14

## 2021-01-01 RX ADMIN — IPRATROPIUM BROMIDE AND ALBUTEROL SULFATE 3 ML: .5; 3 SOLUTION RESPIRATORY (INHALATION) at 07:23

## 2021-01-01 RX ADMIN — BUDESONIDE 500 MCG: 0.5 INHALANT RESPIRATORY (INHALATION) at 20:17

## 2021-01-01 RX ADMIN — LORAZEPAM 2 MG: 2 INJECTION INTRAMUSCULAR at 18:29

## 2021-01-01 RX ADMIN — SODIUM CHLORIDE 75 ML/HR: 900 INJECTION, SOLUTION INTRAVENOUS at 04:52

## 2021-01-01 RX ADMIN — SODIUM CHLORIDE 500 ML: 900 INJECTION, SOLUTION INTRAVENOUS at 21:56

## 2021-01-01 RX ADMIN — ZIPRASIDONE HCL 20 MG: 20 CAPSULE ORAL at 08:42

## 2021-01-01 RX ADMIN — CALCIUM GLUCONATE 1000 MG: 20 INJECTION, SOLUTION INTRAVENOUS at 12:00

## 2021-01-01 RX ADMIN — PHENYLEPHRINE HYDROCHLORIDE 75 MCG/MIN: 10 INJECTION INTRAVENOUS at 22:48

## 2021-01-01 RX ADMIN — SODIUM CHLORIDE 1000 ML: 9 INJECTION, SOLUTION INTRAVENOUS at 17:28

## 2021-01-01 RX ADMIN — WATER 20 MG: 1 INJECTION INTRAMUSCULAR; INTRAVENOUS; SUBCUTANEOUS at 08:22

## 2021-01-01 RX ADMIN — ALBUMIN (HUMAN) 12.5 G: 0.25 INJECTION, SOLUTION INTRAVENOUS at 21:14

## 2021-01-01 RX ADMIN — LORAZEPAM 2 MG: 2 INJECTION INTRAMUSCULAR at 12:04

## 2021-01-01 RX ADMIN — PHENYLEPHRINE HYDROCHLORIDE 60 MCG/MIN: 10 INJECTION INTRAVENOUS at 11:21

## 2021-01-01 RX ADMIN — ALBUMIN (HUMAN) 12.5 G: 0.25 INJECTION, SOLUTION INTRAVENOUS at 22:27

## 2021-01-01 RX ADMIN — LORAZEPAM 2 MG: 2 INJECTION INTRAMUSCULAR at 13:58

## 2021-01-01 RX ADMIN — BUDESONIDE 500 MCG: 0.5 INHALANT RESPIRATORY (INHALATION) at 19:29

## 2021-01-01 RX ADMIN — ALBUMIN (HUMAN) 12.5 G: 0.25 INJECTION, SOLUTION INTRAVENOUS at 09:04

## 2021-01-01 RX ADMIN — BUDESONIDE 500 MCG: 0.5 INHALANT RESPIRATORY (INHALATION) at 07:41

## 2021-01-01 RX ADMIN — BUDESONIDE 500 MCG: 0.5 INHALANT RESPIRATORY (INHALATION) at 07:23

## 2021-01-01 RX ADMIN — HEPARIN SODIUM (PORCINE) LOCK FLUSH IV SOLN 100 UNIT/ML 500 UNITS: 100 SOLUTION at 14:48

## 2021-01-01 RX ADMIN — DEXMEDETOMIDINE 1.5 MCG/KG/HR: 100 INJECTION, SOLUTION, CONCENTRATE INTRAVENOUS at 21:30

## 2021-01-01 RX ADMIN — DEXMEDETOMIDINE 1.5 MCG/KG/HR: 100 INJECTION, SOLUTION, CONCENTRATE INTRAVENOUS at 11:50

## 2021-01-01 RX ADMIN — PIPERACILLIN AND TAZOBACTAM 3.38 G: 3; .375 INJECTION, POWDER, LYOPHILIZED, FOR SOLUTION INTRAVENOUS at 04:00

## 2021-01-01 RX ADMIN — FLUTICASONE PROPIONATE 2 SPRAY: 50 SPRAY, METERED NASAL at 08:37

## 2021-01-01 RX ADMIN — POTASSIUM CHLORIDE 10 MEQ: 10 INJECTION, SOLUTION INTRAVENOUS at 12:00

## 2021-01-01 RX ADMIN — DIAZEPAM 10 MG: 5 INJECTION, SOLUTION INTRAMUSCULAR; INTRAVENOUS at 15:27

## 2021-01-01 RX ADMIN — LORAZEPAM 2 MG: 1 TABLET ORAL at 10:22

## 2021-01-01 RX ADMIN — SODIUM CHLORIDE 150 ML/HR: 900 INJECTION, SOLUTION INTRAVENOUS at 00:20

## 2021-01-01 RX ADMIN — LORAZEPAM 2 MG: 1 TABLET ORAL at 11:24

## 2021-01-01 RX ADMIN — POTASSIUM CHLORIDE 10 MEQ: 10 INJECTION, SOLUTION INTRAVENOUS at 11:06

## 2021-01-01 RX ADMIN — LORAZEPAM 1 MG: 2 INJECTION, SOLUTION INTRAMUSCULAR; INTRAVENOUS at 12:59

## 2021-01-01 RX ADMIN — IPRATROPIUM BROMIDE AND ALBUTEROL SULFATE 3 ML: .5; 3 SOLUTION RESPIRATORY (INHALATION) at 15:12

## 2021-01-01 RX ADMIN — Medication 2 MCG/MIN: at 23:18

## 2021-01-01 RX ADMIN — POTASSIUM CHLORIDE 10 MEQ: 10 INJECTION, SOLUTION INTRAVENOUS at 06:52

## 2021-01-01 RX ADMIN — LORAZEPAM 2 MG: 2 INJECTION INTRAMUSCULAR at 00:35

## 2021-01-01 RX ADMIN — FUROSEMIDE 20 MG: 10 INJECTION, SOLUTION INTRAMUSCULAR; INTRAVENOUS at 21:15

## 2021-01-01 RX ADMIN — SODIUM CHLORIDE 40 MG: 9 INJECTION INTRAMUSCULAR; INTRAVENOUS; SUBCUTANEOUS at 21:08

## 2021-01-01 RX ADMIN — LACTULOSE 30 ML: 20 SOLUTION ORAL at 08:17

## 2021-01-01 RX ADMIN — METHYLPREDNISOLONE SODIUM SUCCINATE 60 MG: 40 INJECTION, POWDER, FOR SOLUTION INTRAMUSCULAR; INTRAVENOUS at 11:05

## 2021-01-01 RX ADMIN — LORAZEPAM 2 MG: 2 INJECTION INTRAMUSCULAR at 16:17

## 2021-01-01 RX ADMIN — SODIUM CHLORIDE 10 ML: 9 INJECTION, SOLUTION INTRAMUSCULAR; INTRAVENOUS; SUBCUTANEOUS at 23:27

## 2021-01-01 RX ADMIN — LORAZEPAM 2 MG: 2 INJECTION INTRAMUSCULAR at 07:20

## 2021-01-01 RX ADMIN — SODIUM CHLORIDE 40 MG: 9 INJECTION INTRAMUSCULAR; INTRAVENOUS; SUBCUTANEOUS at 20:38

## 2021-01-01 RX ADMIN — ALBUMIN (HUMAN) 12.5 G: 0.25 INJECTION, SOLUTION INTRAVENOUS at 20:48

## 2021-01-01 RX ADMIN — SODIUM CHLORIDE 1000 ML: 900 INJECTION, SOLUTION INTRAVENOUS at 13:00

## 2021-01-01 RX ADMIN — LORAZEPAM 2 MG: 2 INJECTION INTRAMUSCULAR at 09:16

## 2021-01-01 RX ADMIN — ALBUMIN (HUMAN) 12.5 G: 0.25 INJECTION, SOLUTION INTRAVENOUS at 20:37

## 2021-01-01 RX ADMIN — MAGNESIUM SULFATE HEPTAHYDRATE 1 G: 1 INJECTION, SOLUTION INTRAVENOUS at 09:34

## 2021-01-01 RX ADMIN — DEXMEDETOMIDINE 1.1 MCG/KG/HR: 100 INJECTION, SOLUTION, CONCENTRATE INTRAVENOUS at 09:44

## 2021-01-01 RX ADMIN — LORAZEPAM 1 MG: 2 INJECTION INTRAMUSCULAR; INTRAVENOUS at 09:04

## 2021-01-01 RX ADMIN — SODIUM CHLORIDE 40 MG: 9 INJECTION INTRAMUSCULAR; INTRAVENOUS; SUBCUTANEOUS at 08:28

## 2021-01-01 RX ADMIN — ALBUMIN (HUMAN) 12.5 G: 0.25 INJECTION, SOLUTION INTRAVENOUS at 09:26

## 2021-01-01 RX ADMIN — ALBUMIN (HUMAN) 12.5 G: 0.25 INJECTION, SOLUTION INTRAVENOUS at 03:10

## 2021-01-01 RX ADMIN — ALBUMIN (HUMAN) 12.5 G: 0.25 INJECTION, SOLUTION INTRAVENOUS at 14:48

## 2021-01-01 RX ADMIN — LORAZEPAM 2 MG: 2 INJECTION INTRAMUSCULAR at 20:14

## 2021-01-01 RX ADMIN — VANCOMYCIN HYDROCHLORIDE 1750 MG: 10 INJECTION, POWDER, LYOPHILIZED, FOR SOLUTION INTRAVENOUS at 15:30

## 2021-01-01 RX ADMIN — SODIUM CHLORIDE 10 ML: 9 INJECTION, SOLUTION INTRAMUSCULAR; INTRAVENOUS; SUBCUTANEOUS at 05:04

## 2021-01-01 RX ADMIN — DEXMEDETOMIDINE 1 MCG/KG/HR: 100 INJECTION, SOLUTION, CONCENTRATE INTRAVENOUS at 22:17

## 2021-01-01 RX ADMIN — VANCOMYCIN HYDROCHLORIDE 1250 MG: 10 INJECTION, POWDER, LYOPHILIZED, FOR SOLUTION INTRAVENOUS at 20:00

## 2021-01-01 RX ADMIN — DEXMEDETOMIDINE 1.1 MCG/KG/HR: 100 INJECTION, SOLUTION, CONCENTRATE INTRAVENOUS at 04:51

## 2021-01-01 RX ADMIN — LORAZEPAM 3 MG: 2 INJECTION INTRAMUSCULAR; INTRAVENOUS at 16:41

## 2021-01-01 RX ADMIN — PIPERACILLIN AND TAZOBACTAM 3.38 G: 3; .375 INJECTION, POWDER, LYOPHILIZED, FOR SOLUTION INTRAVENOUS at 20:50

## 2021-01-01 RX ADMIN — DEXMEDETOMIDINE 1.5 MCG/KG/HR: 100 INJECTION, SOLUTION, CONCENTRATE INTRAVENOUS at 05:54

## 2021-01-01 RX ADMIN — PHYTONADIONE 10 MG: 10 INJECTION, EMULSION INTRAMUSCULAR; INTRAVENOUS; SUBCUTANEOUS at 09:03

## 2021-01-01 RX ADMIN — SODIUM CHLORIDE 10 ML: 9 INJECTION, SOLUTION INTRAMUSCULAR; INTRAVENOUS; SUBCUTANEOUS at 21:10

## 2021-01-01 RX ADMIN — POTASSIUM CHLORIDE 10 MEQ: 10 INJECTION, SOLUTION INTRAVENOUS at 08:29

## 2021-01-01 RX ADMIN — METHYLPREDNISOLONE SODIUM SUCCINATE 60 MG: 40 INJECTION, POWDER, FOR SOLUTION INTRAMUSCULAR; INTRAVENOUS at 17:32

## 2021-01-01 RX ADMIN — FOLIC ACID: 5 INJECTION, SOLUTION INTRAMUSCULAR; INTRAVENOUS; SUBCUTANEOUS at 11:20

## 2021-01-01 RX ADMIN — THIAMINE HYDROCHLORIDE: 100 INJECTION, SOLUTION INTRAMUSCULAR; INTRAVENOUS at 19:09

## 2021-01-01 RX ADMIN — METHYLPREDNISOLONE SODIUM SUCCINATE 60 MG: 40 INJECTION, POWDER, FOR SOLUTION INTRAMUSCULAR; INTRAVENOUS at 00:00

## 2021-01-01 RX ADMIN — DEXMEDETOMIDINE 1.5 MCG/KG/HR: 100 INJECTION, SOLUTION, CONCENTRATE INTRAVENOUS at 18:51

## 2021-01-01 RX ADMIN — SODIUM CHLORIDE 40 MG: 9 INJECTION INTRAMUSCULAR; INTRAVENOUS; SUBCUTANEOUS at 09:04

## 2021-01-01 RX ADMIN — PHYTONADIONE 10 MG: 10 INJECTION, EMULSION INTRAMUSCULAR; INTRAVENOUS; SUBCUTANEOUS at 09:00

## 2021-01-01 RX ADMIN — DEXMEDETOMIDINE 1.1 MCG/KG/HR: 100 INJECTION, SOLUTION, CONCENTRATE INTRAVENOUS at 14:33

## 2021-01-01 RX ADMIN — IPRATROPIUM BROMIDE AND ALBUTEROL SULFATE 3 ML: .5; 3 SOLUTION RESPIRATORY (INHALATION) at 20:35

## 2021-01-01 RX ADMIN — FUROSEMIDE 20 MG: 10 INJECTION, SOLUTION INTRAMUSCULAR; INTRAVENOUS at 10:35

## 2021-01-01 RX ADMIN — VANCOMYCIN HYDROCHLORIDE 1250 MG: 10 INJECTION, POWDER, LYOPHILIZED, FOR SOLUTION INTRAVENOUS at 09:41

## 2021-01-01 RX ADMIN — ALBUMIN (HUMAN) 12.5 G: 0.25 INJECTION, SOLUTION INTRAVENOUS at 04:00

## 2021-01-01 RX ADMIN — POTASSIUM CHLORIDE 10 MEQ: 10 INJECTION, SOLUTION INTRAVENOUS at 18:51

## 2021-01-01 RX ADMIN — LACTULOSE 30 ML: 20 SOLUTION ORAL at 17:54

## 2021-01-01 RX ADMIN — WATER 20 MG: 1 INJECTION INTRAMUSCULAR; INTRAVENOUS; SUBCUTANEOUS at 09:00

## 2021-01-01 RX ADMIN — LORAZEPAM 2 MG: 2 INJECTION INTRAMUSCULAR at 16:28

## 2021-01-01 RX ADMIN — POTASSIUM CHLORIDE 10 MEQ: 10 INJECTION, SOLUTION INTRAVENOUS at 22:00

## 2021-01-01 RX ADMIN — SODIUM CHLORIDE 10 ML: 9 INJECTION, SOLUTION INTRAMUSCULAR; INTRAVENOUS; SUBCUTANEOUS at 05:02

## 2021-01-01 RX ADMIN — CEFTRIAXONE 1 G: 1 INJECTION, POWDER, FOR SOLUTION INTRAMUSCULAR; INTRAVENOUS at 11:44

## 2021-01-01 RX ADMIN — CHOLECALCIFEROL (VITAMIN D3) 10 MCG (400 UNIT) TABLET 400 UNITS: at 08:45

## 2021-01-01 RX ADMIN — IPRATROPIUM BROMIDE AND ALBUTEROL SULFATE 3 ML: .5; 3 SOLUTION RESPIRATORY (INHALATION) at 11:08

## 2021-01-01 RX ADMIN — LORAZEPAM 2 MG: 2 INJECTION INTRAMUSCULAR at 19:40

## 2021-01-01 RX ADMIN — IPRATROPIUM BROMIDE AND ALBUTEROL SULFATE 3 ML: .5; 3 SOLUTION RESPIRATORY (INHALATION) at 15:07

## 2021-01-01 RX ADMIN — Medication 8 MCG/MIN: at 20:34

## 2021-01-01 RX ADMIN — Medication 10 ML: at 05:30

## 2021-01-01 RX ADMIN — WATER 20 MG: 1 INJECTION INTRAMUSCULAR; INTRAVENOUS; SUBCUTANEOUS at 20:17

## 2021-01-01 RX ADMIN — THERA TABS 1 TABLET: TAB at 08:44

## 2021-01-01 RX ADMIN — IPRATROPIUM BROMIDE AND ALBUTEROL SULFATE 3 ML: .5; 3 SOLUTION RESPIRATORY (INHALATION) at 11:05

## 2021-01-01 RX ADMIN — SODIUM CHLORIDE 40 MG: 9 INJECTION INTRAMUSCULAR; INTRAVENOUS; SUBCUTANEOUS at 09:08

## 2021-01-01 RX ADMIN — LIDOCAINE HYDROCHLORIDE 3 ML: 10 INJECTION, SOLUTION INFILTRATION; PERINEURAL at 14:48

## 2021-01-01 RX ADMIN — LORAZEPAM 2 MG: 2 INJECTION INTRAMUSCULAR at 17:18

## 2021-01-01 RX ADMIN — WATER 20 MG: 1 INJECTION INTRAMUSCULAR; INTRAVENOUS; SUBCUTANEOUS at 21:05

## 2021-01-01 RX ADMIN — PIPERACILLIN AND TAZOBACTAM 3.38 G: 3; .375 INJECTION, POWDER, LYOPHILIZED, FOR SOLUTION INTRAVENOUS at 21:14

## 2021-01-01 RX ADMIN — IPRATROPIUM BROMIDE AND ALBUTEROL SULFATE 3 ML: .5; 3 SOLUTION RESPIRATORY (INHALATION) at 07:11

## 2021-01-01 RX ADMIN — POTASSIUM CHLORIDE 10 MEQ: 10 INJECTION, SOLUTION INTRAVENOUS at 16:00

## 2021-01-01 RX ADMIN — LORAZEPAM 3 MG: 2 INJECTION INTRAMUSCULAR; INTRAVENOUS at 18:04

## 2021-01-01 RX ADMIN — VANCOMYCIN HYDROCHLORIDE 1250 MG: 10 INJECTION, POWDER, LYOPHILIZED, FOR SOLUTION INTRAVENOUS at 04:52

## 2021-01-01 RX ADMIN — ALBUMIN (HUMAN) 12.5 G: 0.25 INJECTION, SOLUTION INTRAVENOUS at 16:33

## 2021-01-01 RX ADMIN — SODIUM CHLORIDE 40 MG: 9 INJECTION INTRAMUSCULAR; INTRAVENOUS; SUBCUTANEOUS at 21:14

## 2021-01-01 RX ADMIN — VANCOMYCIN HYDROCHLORIDE 1250 MG: 10 INJECTION, POWDER, LYOPHILIZED, FOR SOLUTION INTRAVENOUS at 17:59

## 2021-01-01 RX ADMIN — BUDESONIDE 500 MCG: 0.5 INHALANT RESPIRATORY (INHALATION) at 20:26

## 2021-01-01 RX ADMIN — PIPERACILLIN AND TAZOBACTAM 3.38 G: 3; .375 INJECTION, POWDER, LYOPHILIZED, FOR SOLUTION INTRAVENOUS at 04:13

## 2021-01-01 RX ADMIN — DEXMEDETOMIDINE 1.5 MCG/KG/HR: 100 INJECTION, SOLUTION, CONCENTRATE INTRAVENOUS at 05:21

## 2021-01-01 RX ADMIN — PIPERACILLIN AND TAZOBACTAM 3.38 G: 3; .375 INJECTION, POWDER, LYOPHILIZED, FOR SOLUTION INTRAVENOUS at 21:09

## 2021-01-01 RX ADMIN — ALBUMIN (HUMAN) 12.5 G: 0.25 INJECTION, SOLUTION INTRAVENOUS at 20:16

## 2021-01-01 RX ADMIN — DEXMEDETOMIDINE 0.7 MCG/KG/HR: 100 INJECTION, SOLUTION, CONCENTRATE INTRAVENOUS at 04:44

## 2021-01-01 RX ADMIN — BUDESONIDE 500 MCG: 0.5 INHALANT RESPIRATORY (INHALATION) at 07:27

## 2021-01-01 RX ADMIN — WATER 20 MG: 1 INJECTION INTRAMUSCULAR; INTRAVENOUS; SUBCUTANEOUS at 21:22

## 2021-01-01 RX ADMIN — ARFORMOTEROL TARTRATE: 15 SOLUTION RESPIRATORY (INHALATION) at 07:11

## 2021-01-01 RX ADMIN — DEXMEDETOMIDINE 1.5 MCG/KG/HR: 100 INJECTION, SOLUTION, CONCENTRATE INTRAVENOUS at 08:17

## 2021-01-01 RX ADMIN — DEXMEDETOMIDINE 0.9 MCG/KG/HR: 100 INJECTION, SOLUTION, CONCENTRATE INTRAVENOUS at 12:01

## 2021-01-01 RX ADMIN — LORAZEPAM 2 MG: 2 INJECTION INTRAMUSCULAR at 12:10

## 2021-01-01 RX ADMIN — FUROSEMIDE 20 MG: 10 INJECTION, SOLUTION INTRAMUSCULAR; INTRAVENOUS at 11:34

## 2021-01-01 RX ADMIN — METHYLPREDNISOLONE SODIUM SUCCINATE 40 MG: 40 INJECTION, POWDER, FOR SOLUTION INTRAMUSCULAR; INTRAVENOUS at 05:04

## 2021-01-01 RX ADMIN — LORAZEPAM 2 MG: 2 INJECTION INTRAMUSCULAR at 08:01

## 2021-01-01 RX ADMIN — MAGNESIUM OXIDE 400 MG (241.3 MG MAGNESIUM) TABLET 400 MG: TABLET at 08:44

## 2021-01-01 RX ADMIN — DEXMEDETOMIDINE 0.5 MCG/KG/HR: 100 INJECTION, SOLUTION, CONCENTRATE INTRAVENOUS at 23:35

## 2021-01-01 RX ADMIN — DEXMEDETOMIDINE 1.5 MCG/KG/HR: 100 INJECTION, SOLUTION, CONCENTRATE INTRAVENOUS at 09:06

## 2021-01-01 RX ADMIN — POTASSIUM CHLORIDE 10 MEQ: 10 INJECTION, SOLUTION INTRAVENOUS at 20:18

## 2021-03-09 NOTE — ED NOTES
Purposeful rounding completed: 
 
Side rails up x 2:  YES Bed in low position and wheels locked: YES Call bell within reach: NO 
Comfort addressed: YES Toileting needs addressed: YES Plan of care reviewed/updated with patient and or family members: YES 
IV site assessed: YES Pain assessed and addressed: YES, On CIWA Protocol and medicated

## 2021-03-09 NOTE — ED PROVIDER NOTES
[address] [facility] EMERGENCY DEPARTMENT HISTORY AND PHYSICAL EXAM  
 
 
Date: 3/9/2021 Patient Name: Madhu Mcdowell YOB: 1965 Medical Record Number: [id number] HISTORY OF PRESENTING ILLNESS:  
 
Madhu Mcdowell is a 61 y.o. female presenting with the noted PMH to the ED c/o fall. Patient states she fell 1 hour prior to arrival.  She states she was walking into the bathroom and then her vertigo hit her. She states she fell landing on her left knee. She told EMS she hit her head on the door. She denies loss of consciousness. Denies a headache. Denies any neck or back pain. She states she has a history of COPD and is unchanged. When asked about chest pain or shortness of breath. She denies abdominal pain. She states she noticed the bruise on her abdomen couple days ago after previous fall. Denies any urine or bowel changes. Denies taking any blood thinners. She states she does see a liver specialist.  She reports having liver disease from hepatitis C. She states she is normally weak in her right leg because of previous break of her right ankle. Today was her left knee that hurts. She states it is a sharp aching pain. Increased when she tries to move it. No decreasing factors. No pain medicines prior to arrival. 
 
Rest of complete systems reviewed and negative Primary Care Provider: Anoop Shin NP Specialist: 
 
Past Medical History:  
Past Medical History:  
Diagnosis Date  Bipolar affective ([address]  Chronic obstructive pulmonary disease ([address]  Cirrhosis ([facility] [address]  Encounter for screening colonoscopy  ETOH abuse  Fall at home 06/03/2020  
 hitting head & right eye (bruised); did not seek medical attention  Former smoker 1 PPD x40 years  History of ascites  History of sinusitis  Hyperlipidemia 11/20/2009 Patient denies  Left breast mass 05/13/2011 U/S Left Breast: No definite mass identified. Recommended recheck in 6 months  Manic depression ([address]  On home O2   
 as needed  Panic disorder  Vitamin D insufficiency 2009  
 23 ng/mL  Wrist fracture, right 2010 Non-Displaced Distal Radius/Ulnar Styloid Fx Past Surgical History:  
Past Surgical History:  
Procedure Laterality Date  COLONOSCOPY N/A 2020 SCREENING COLONOSCOPY with bx and endoclip x 1 performed by Antoni Barrow MD at [address] [facility] HX ENDOSCOPY    
  [facility] Social History:  
Social History Tobacco Use  Smoking status: Former Smoker Packs/day: 1.00 Years: 40.00 Pack years: 40.00 Quit date: 2020 Years since quittin.1  Smokeless tobacco: Former User Quit date: 2016 Substance Use Topics  Alcohol use: Not Currently Comment: Quit 2020  Drug use: Yes Types: Marijuana Comment: 5 hits daily Allergies: Allergies Allergen Reactions  Animal Dander Itching  Egg Nausea and Vomiting Pt stated she does not have true allergy to eggs. REVIEW OF SYSTEMS: 
Review of Systems PHYSICAL EXAM: 
Vitals:  
 21 1140 21 1145 21 1200 21 1215 BP: 119/75 112/88 117/67 121/79 Pulse: (!) 127 (!) 134 (!) 126 (!) 128 Resp: 19 19 19 18 Temp:      
SpO2: 95% 97% 96% 93% Weight:      
Height:      
 
 
Physical Exam 
 Vital signs reviewed. Alert in mild distress. Tremulous. Cachectic and chronically ill. HEENT: normocephalic atraumatic. Eyes are PERRLA EOMI. Conjunctiva normal.   
External ears and nose normal.   
Neck: normal external exam. No midline neck or back TTP. Lungs are clear to ascultation bilaterally. normal effort Heart is tachycardic rate and rhythm with no murmurs. Abdomen soft and nontender. Distended. No rebound rigidity or guarding. Old dark purple bruise seen on right side.  
Extremities:  2+ pulses and BCR in all 4 extremities. Left knee effusion. No edema BLE. Flexion Extension intact distally. Non tender to palpation of legs. No point tenderness. Neuro: Normal gait. 5 out of 5 strength in all 4 extremities. No facial droop. Stigmata of chronic liver disease. Skin examination: intact. no rashes. No petechia or purpura. Old bruising of abdomen. Medications  
lactulose (CHRONULAC) 10 gram/15 mL solution 30 mL (30 mL Oral Given 3/9/21 0817) LORazepam (ATIVAN) injection 1 mg (has no administration in time range) morphine injection 2 mg (has no administration in time range)  
sodium chloride 0.9 % bolus infusion 1,000 mL (has no administration in time range)  
sodium chloride 0.9 % bolus infusion 1,000 mL (1,000 mL IntraVENous New Bag 3/9/21 8371) morphine injection 2 mg (2 mg IntraVENous Given 3/9/21 9011) ondansetron (ZOFRAN) injection 4 mg (4 mg IntraVENous Given 3/9/21 0791) iopamidoL (ISOVUE 300) 61 % contrast injection 100 mL (94 mL IntraVENous Given 3/9/21 0822) cefTRIAXone (ROCEPHIN) 1 g in 0.9% sodium chloride (MBP/ADV) 50 mL MBP (1 g IntraVENous New Bag 3/9/21 1144) RESULTS: 
 
Labs - Labs Reviewed CBC WITH AUTOMATED DIFF - Abnormal; Notable for the following components:  
    Result Value RBC 3.43 (*) HGB 11.6 (*) HCT 34.1 (*) MCV 99.4 (*)   
 RDW 16.0 (*) PLATELET 387 (*) MPV 12.8 (*) All other components within normal limits METABOLIC PANEL, COMPREHENSIVE - Abnormal; Notable for the following components:  
 Sodium 126 (*) Chloride 91 (*) Glucose 102 (*) Bilirubin, total 4.0 (*) ALT (SGPT) 101 (*) AST (SGOT) 191 (*) Alk. phosphatase 215 (*) Albumin 2.7 (*) A-G Ratio 0.7 (*) All other components within normal limits PROTHROMBIN TIME + INR - Abnormal; Notable for the following components:  
 Prothrombin time 17.8 (*) INR 1.5 (*) All other components within normal limits LIPASE - Abnormal; Notable for the following components:  
 Lipase 495 (*) All other components within normal limits URINALYSIS W/ RFLX MICROSCOPIC - Abnormal; Notable for the following components:  
 Protein TRACE (*) Ketone TRACE (*) Bilirubin SMALL (*) Nitrites Positive (*) Leukocyte Esterase SMALL (*) All other components within normal limits AMMONIA - Abnormal; Notable for the following components:  
 Ammonia 64 (*) All other components within normal limits ETHYL ALCOHOL - Abnormal; Notable for the following components:  
 ALCOHOL(ETHYL),SERUM 79 (*) All other components within normal limits URINE MICROSCOPIC ONLY - Abnormal; Notable for the following components:  
 Bacteria 2+ (*) All other components within normal limits CULTURE, URINE  
TROPONIN I  
METABOLIC PANEL, BASIC  
TYPE & SCREEN Radiologic Studies - Xr Femur Lt 2 V Result Date: 3/9/2021 EXAM: XR FEMUR LT 2 V CLINICAL INDICATION/HISTORY: left leg pain s/p fall   > Additional: None. COMPARISON: None. TECHNIQUE: 2 separate views of the left femur on 6 total exposures _______________ FINDINGS: Osseous alignment is as expected on the provided projections. Femur appears intact. There is a comminuted, depressed medial tibial plateau fracture present. Small volume lipohemarthrosis at the knee joint. _______________ 1. No acute osseous abnormality involving the left femur. 2. Comminuted, depressed medial tibial plateau fracture with metaphyseal extension and small volume lipohemarthrosis. Ct Head Wo Cont Result Date: 3/9/2021 EXAM: CT head INDICATION: Fall with head injury COMPARISON: None. TECHNIQUE: Axial CT imaging of the head was performed without intravenous contrast. Coronal and sagittal reconstructions were performed. One or more dose reduction techniques were used on this CT: automated exposure control, adjustment of the mAs and/or kVp according to patient size, and iterative reconstruction techniques.   The specific techniques used on this CT exam have been documented in the patient's electronic medical record. Digital Imaging and Communications in Medicine (DICOM) format image data are available to nonaffiliated external healthcare facilities or entities on a secure, media free, reciprocally searchable basis with patient authorization for at least a 12-month period after this study. _______________ FINDINGS: BRAIN AND POSTERIOR FOSSA: The sulci, folia, ventricles and basal cisterns are within normal limits for the patient's age. There is no intracranial hemorrhage, mass effect, or midline shift. There are no areas of abnormal parenchymal attenuation. EXTRA-AXIAL SPACES AND MENINGES: There are no abnormal extra-axial fluid collections. CALVARIUM: Intact. SINUSES: Minimal mucosal thickening of the ethmoid sinuses. Other visualized sinuses are clear. OTHER: Mastoid air cells are clear. _______________ No evidence of acute intracranial injury, hemorrhage or fracture. Normal noncontrast CT brain. Ct Abd Pelv W Cont Result Date: 3/9/2021 EXAM: CT of the abdomen and pelvis INDICATION: Fall with abdominal pain. Right-sided abdominal bruising. Cirrhosis. CT abdomen and pelvis 1/8/2020 COMPARISON: CT abdomen and pelvis 1/8/2020 TECHNIQUE: Axial CT imaging of the abdomen and pelvis was performed with intravenous contrast. Multiplanar reformats were generated. One or more dose reduction techniques were used on this CT: automated exposure control, adjustment of the mAs and/or kVp according to patient size, and iterative reconstruction techniques. The specific techniques used on this CT exam have been documented in the patient's electronic medical record.   Digital Imaging and Communications in Medicine (DICOM) format image data are available to nonaffiliated external healthcare facilities or entities on a secure, media free, reciprocally searchable basis with patient authorization for at least a 12-month period after this study. _______________ FINDINGS: LOWER CHEST: Unremarkable. LIVER, BILIARY: Cirrhotic morphology of the liver. No focal mass. No biliary dilation. Multiple gallstones are present. Gallbladder is distended. No gallbladder wall thickening. CBD appears normal. PANCREAS: Normal. SPLEEN: Normal. ADRENALS: Normal. KIDNEYS: Small hypodensity anterior lower pole right kidney likely represents a cyst. Kidneys are otherwise unremarkable. LYMPH NODES: No enlarged lymph nodes. GASTROINTESTINAL TRACT: No bowel dilation or wall thickening. Scattered colonic diverticula. No evidence of bowel obstruction. PELVIC ORGANS: Unremarkable. VASCULATURE: Patent and very enlarged umbilical vein extending caudally into the abdominal wall. This extends through an enlarged right rectus muscle which is consistent with acute rectus muscle hematoma. On image 108 there is a tiny area of high attenuation which may be external to the umbilical vein suggesting small area of venous bruising. Portal vein is patent. BONES: No acute or aggressive osseous abnormalities identified. Stable superior endplate compression deformity L4. OTHER: Enlarged right rectus muscle with mixed areas of high and low attenuation consistent with rectus muscle hematoma. This measures 21 cm craniocaudally and 6 cm AP. Small to moderate ascites. Subcutaneous edematous change consistent with anasarca. _______________ 1. Acute right rectus muscle hematoma with questionable small area of venous imaging from recanalized umbilical vein running through this muscle. No large areas of active extravasation. 2. Cirrhotic liver with ascites. 3. Cholelithiasis without evidence of acute cholecystitis. Xr Chest [facility] Result Date: 3/9/2021 EXAM: XR CHEST PORT CLINICAL INDICATION/HISTORY: tachycardia -Additional: None COMPARISON: 1/12/2020 TECHNIQUE: Frontal view of the chest _______________ FINDINGS: HEART AND MEDIASTINUM: Normal cardiac size and mediastinal contours.  LUNGS AND PLEURAL SPACES: Lungs are improved in the degree of expansion without evidence of focal pneumonic opacity. No pneumothorax or pleural effusion. BONY THORAX AND SOFT TISSUES: No acute osseous abnormality _______________ Approved pulmonary expansion without superimposed acute radiographic abnormality. EKG interpretation:  
Done at 744 read by myself as sinus tachycardia at 125 bpm.  No ST elevation. Normal axis. MEDICAL DECISION MAKING Patient with low sodium here. IV fluids given. Patient with UTI but no sepsis. Heart rate elevated here. Patient with elevated alcohol level. Patient with known history of alcohol dependence. History of hepatitis C and chronic elevated transaminitis. Patient with end-stage liver disease. Suspect reason for elevated INR. Patient with closed tibial plateau fracture. Will place in the immobilizer. Patient doing better with medications here. Will transfer patient for further care. 0 736. Patient reassessed. Out of the room in radiology. 0 819. Patient reassessed. Getting lactulose. Updated partial results. 1101. Paging hospitalist.  Dr. Charles Field. States accepts patient if ortho is okay to consult here. 
1101. Paging orthopedics. 1121. Patient reassessed. Updated partial results. Patient states she has been having urinary symptoms for last 4 days. Feels like a UTI. She states she has been having burning. IV antibiotics infusing. Patient does not meet sepsis criteria. Patient tachycardic but no other SIRS criteria. 1159. Spoke with Matt CROSS for orthopedics. He states he will speak with the attending and call back. 1220. Matt Gonzalez called back. He spoke to Dr. Raine Blas. Patient will need ICU level care which we do not currently have. Would not be able to perform surgery here. Recommending patient be transferred. 1221. Contacting transfer center. 1222. Patient reassessed. Getting more tremulous.   Will give Ativan and additional pain medicine. Patient with known chronic alcohol dependency. May be going through withdrawals. Will repeat another BMP to see where her sodium is. 
1224. Alexandra view holding 14 patients in the ED for admission. Will look for other hospitals. Diagnosis Clinical Impression: 1. Metabolic encephalopathy 2. Hyponatremia 3. Tibial plateau fracture, left, closed, initial encounter 4. Acute cystitis without hematuria   
  
 
 
 
transfered in stable and improved condition. This chart was completed using Dragon, a dictation transcription service. Errors may have resulted from using this device. Critical Care Time: The services I provided to this patient were to treat and/or prevent clinically significant deterioration that could result in the failure of one or more body systems and/or organ systems. Services included the following: 
-reviewing nursing notes and old charts 
-vital sign assessments 
-direct patient care 
-medication orders and management 
-interpreting and reviewing diagnostic studies/labs 
-re-evaluations 
-documentation time Aggregate critical care time was 36 minutes, which includes only time during which I was engaged in work directly related to the patient's care as described above, whether I was at bedside or elsewhere in the Emergency Department. It did not include time spent performing other reported procedures or the services of residents, students, nurses, or advance practice providers.

## 2021-03-09 NOTE — ED NOTES
Purposeful rounding completed: 
 
Side rails up x 1:  YES Bed in low position and wheels locked: YES Call bell within reach: NO, Patient located at nurses station Comfort addressed: YES Toileting needs addressed: YES Plan of care reviewed/updated with patient and or family members: YES 
IV site assessed: YES Pain assessed and addressed: YES

## 2021-03-09 NOTE — ED TRIAGE NOTES
Pt to ED via EMS with complaints of left knee pain that occurred following fall this morning. Pt also reports intermittent abdominal pain and weakness. Pt with history of liver cirrhosis, right sided abdominal bruising and ascites noted.

## 2021-03-09 NOTE — ED NOTES
Patient placed on 2L BNC. O2 saturation 89% while sleeping. Patient states she uses oxygen at night.

## 2021-03-10 PROBLEM — F10.939 ALCOHOL WITHDRAWAL (HCC): Status: ACTIVE | Noted: 2021-01-01

## 2021-03-10 NOTE — ED NOTES
The documentation for this period is being entered following the guidelines as defined in the [facility] policy by ROMULO Eric.

## 2021-03-10 NOTE — ED NOTES
[facility] present to bring pt to [facility] ICU bed #106. All belongings sent with patient.

## 2021-03-10 NOTE — ED NOTES
Vitals: 
Patient Vitals for the past 12 hrs: 
 Pulse Resp BP SpO2  
03/10/21 0625 (!) 129 27  97 % 03/10/21 0545 (!) 133 25 117/75 (!) 78 %  
03/10/21 0530 (!) 133 29  (!) 82 % 03/10/21 0515 (!) 131 22 (!) 135/109 99 % 03/10/21 0500 (!) 128 23 125/72 96 % 03/10/21 0445 (!) 133 22 116/78 97 % 03/10/21 0430 (!) 131 23 (!) 125/110 98 % 03/10/21 0415 (!) 131 26 (!) 108/92 94 % 03/10/21 0400 (!) 131 26 125/80 96 % 03/10/21 0345 (!) 130 24 127/73 97 % 03/10/21 0330 (!) 138 27 135/75   
03/10/21 0315 (!) 141 29 138/88 (!) 82 % 03/10/21 0300 (!) 134 28 126/76 95 % 03/10/21 0245 (!) 122 23 116/73 96 % 03/10/21 0230 (!) 134 26 (!) 127/112 97 % 03/10/21 0215 (!) 122 21 119/71 94 % 03/10/21 0200 (!) 127 17 117/75 91 % 03/10/21 0145 (!) 121 20 109/66 94 % 03/10/21 0130 (!) 122 20 99/72 94 % 03/10/21 0115 (!) 121 21 106/62 93 % 03/10/21 0100 (!) 123 23 102/71 92 % 03/10/21 0045 (!) 124 22 112/67 92 % 03/10/21 0030 (!) 123 23 111/69 92 % 03/10/21 0015 (!) 124 22 110/68 92 % 03/10/21 0000 (!) 124 22 112/66 92 % 03/09/21 2345 (!) 124 24 111/71 93 % 03/09/21 2330 (!) 127 23 118/70 93 % 03/09/21 2315 (!) 126 25 112/63 92 % 03/09/21 2300 (!) 133 23 95/70 94 % 03/09/21 2245 (!) 127 22 116/72 94 % 03/09/21 2230 (!) 135 24 109/79 92 % 03/09/21 2215 (!) 124 22 118/61 92 % 03/09/21 2200 (!) 125 22 106/67 92 % 03/09/21 2145 (!) 130 25 118/77 93 % 03/09/21 1845 (!) 125 21 119/73  Medications ordered:  
Medications  
lactulose (CHRONULAC) 10 gram/15 mL solution 30 mL (30 mL Oral Given 3/9/21 1754) sodium chloride (NS) flush 5-40 mL (10 mL IntraVENous Given 3/9/21 2200)  
sodium chloride (NS) flush 5-40 mL (has no administration in time range) LORazepam (ATIVAN) tablet 1 mg (1 mg Oral Given 3/9/21 1757) Or  
LORazepam (ATIVAN) injection 1 mg ( IntraVENous See Alternative 3/9/21 1757) LORazepam (ATIVAN) tablet 2 mg ( Oral See Alternative 3/10/21 0520)   Or LORazepam (ATIVAN) injection 2 mg (2 mg IntraVENous Given 3/10/21 0520) LORazepam (ATIVAN) injection 3 mg (has no administration in time range)  
ziprasidone (GEODON) capsule 20 mg (20 mg Oral Given 3/10/21 0300) venlafaxine-SR (EFFEXOR-XR) capsule 150 mg (has no administration in time range)  
magnesium oxide (MAG-OX) tablet 400 mg (has no administration in time range) thiamine mononitrate (B-1) tablet 100 mg (has no administration in time range) therapeutic multivitamin (THERAGRAN) tablet 1 Tab (has no administration in time range)  
cholecalciferol (VITAMIN D3) (400 Units /10 mcg) tablet 400 Units (has no administration in time range)  
folic acid (FOLVITE) tablet 1 mg (has no administration in time range) 0.9% sodium chloride infusion (150 mL/hr IntraVENous New Bag 3/10/21 0020)  
sodium chloride 0.9 % bolus infusion 1,000 mL (0 mL IntraVENous IV Completed 3/9/21 0900) morphine injection 2 mg (2 mg IntraVENous Given 3/9/21 0751) ondansetron (ZOFRAN) injection 4 mg (4 mg IntraVENous Given 3/9/21 0751) iopamidoL (ISOVUE 300) 61 % contrast injection 100 mL (94 mL IntraVENous Given 3/9/21 0822) cefTRIAXone (ROCEPHIN) 1 g in 0.9% sodium chloride (MBP/ADV) 50 mL MBP (0 g IntraVENous IV Completed 3/9/21 1230) LORazepam (ATIVAN) injection 1 mg (1 mg IntraVENous Given 3/9/21 1259) morphine injection 2 mg (2 mg IntraVENous Given 3/9/21 1259)  
sodium chloride 0.9 % bolus infusion 1,000 mL (1,000 mL IntraVENous New Bag 3/9/21 1300) diazePAM (VALIUM) injection 10 mg (10 mg IntraVENous Given 3/9/21 1527)  
sodium chloride 0.9 % bolus infusion 1,000 mL (1,000 mL IntraVENous New Bag 3/9/21 1728) Lab findings: 
Recent Results (from the past 12 hour(s)) METABOLIC PANEL, BASIC Collection Time: 03/09/21  8:34 PM  
Result Value Ref Range Sodium 131 (L) 136 - 145 mmol/L Potassium 4.9 3.5 - 5.5 mmol/L Chloride 100 100 - 111 mmol/L  
 CO2 23 21 - 32 mmol/L  Anion gap 8 3.0 - 18 mmol/L Glucose 95 74 - 99 mg/dL BUN 14 7.0 - 18 MG/DL Creatinine 0.67 0.6 - 1.3 MG/DL  
 BUN/Creatinine ratio 21 (H) 12 - 20 GFR est AA >60 >60 ml/min/1.73m2 GFR est non-AA >60 >60 ml/min/1.73m2 Calcium 7.0 (L) 8.5 - 10.1 MG/DL  
CBC WITH AUTOMATED DIFF Collection Time: 03/10/21  6:05 AM  
Result Value Ref Range WBC 12.5 4.6 - 13.2 K/uL  
 RBC 2.74 (L) 4.20 - 5.30 M/uL HGB 9.2 (L) 12.0 - 16.0 g/dL HCT 27.6 (L) 35.0 - 45.0 % .7 (H) 74.0 - 97.0 FL  
 MCH 33.6 24.0 - 34.0 PG  
 MCHC 33.3 31.0 - 37.0 g/dL  
 RDW 16.2 (H) 11.6 - 14.5 % PLATELET 99 (L) 326 - 420 K/uL MPV 12.0 (H) 9.2 - 11.8 FL  
 NEUTROPHILS 71 40 - 73 % LYMPHOCYTES 17 (L) 21 - 52 % MONOCYTES 12 (H) 3 - 10 % EOSINOPHILS 0 0 - 5 % BASOPHILS 0 0 - 2 %  
 ABS. NEUTROPHILS 8.9 (H) 1.8 - 8.0 K/UL  
 ABS. LYMPHOCYTES 2.1 0.9 - 3.6 K/UL  
 ABS. MONOCYTES 1.4 (H) 0.05 - 1.2 K/UL  
 ABS. EOSINOPHILS 0.0 0.0 - 0.4 K/UL  
 ABS. BASOPHILS 0.0 0.0 - 0.1 K/UL  
 DF AUTOMATED AMMONIA Collection Time: 03/10/21  6:05 AM  
Result Value Ref Range Ammonia 61 (H) 11 - 32 UMOL/L  
 
 
EKG interpretation by ED Physician: X-Ray, CT or other radiology findings or impressions: 
XR TIB/FIB LT Final Result Comminuted, depressed, displaced medial tibial plateau fracture. CT ABD PELV W CONT Final Result 1. Acute right rectus muscle hematoma with questionable small area of venous  
imaging from recanalized umbilical vein running through this muscle. No large  
areas of active extravasation. 2. Cirrhotic liver with ascites. 3. Cholelithiasis without evidence of acute cholecystitis. CT HEAD WO CONT Final Result No evidence of acute intracranial injury, hemorrhage or fracture. Normal  
noncontrast CT brain. XR FEMUR LT 2 V Final Result 1. No acute osseous abnormality involving the left femur.   
2. Comminuted, depressed medial tibial plateau fracture with metaphyseal extension and small volume lipohemarthrosis. XR CHEST PORT Final Result Approved pulmonary expansion without superimposed acute radiographic  
abnormality. Progress notes, Consult notes or additional Procedure notes:  
Patient turned over to me by Dr. Dania Roman as she was pending transfer for orthopedic consultation regarding her injury along with metabolic encephalopathy. Throughout the night patient has had clear alcohol withdrawal symptoms to include delirium. She is required repeated doses of Ativan. Patient is really slotted to go to a telemetry bed but clearly will need ICU capability which is not available at this facility. Sodium is improved. Ammonia is not significantly improved I have updated the transfer center regarding need for ICU bed Will turn over to dr Kelly Moss to f/u on transfer Reevaluation of patient:  
guarded Disposition: 
Diagnosis: 1. Metabolic encephalopathy 2. Hyponatremia 3. Tibial plateau fracture, left, closed, initial encounter 4. Acute cystitis without hematuria 5. Acute alcohol abuse, with delirium ([facility] [address] Disposition: pending transfer Follow-up Information None Patient's Medications Start Taking No medications on file Continue Taking ALBUTEROL (PROVENTIL HFA) 90 MCG/ACTUATION INHALER    Take 1-2 Puffs by inhalation. BUDESONIDE-FORMOTEROL (SYMBICORT) 160-4.5 MCG/ACTUATION HFAA    Take 2 Puffs by inhalation two (2) times a day. CHOLECALCIFEROL, VITAMIN D3, 10 MCG (400 UNIT) CAP    Vitamin D3 10 mcg (400 unit) capsule FLUTICASONE/UMECLIDIN/VILANTER (TRELEGY ELLIPTA IN)    Take 1 Puff by inhalation daily. IPRATROPIUM (ATROVENT HFA) 17 MCG/ACTUATION INHALER    Take 1 Puff by inhalation every four (4) hours as needed for Wheezing. LACTULOSE (CHRONULAC) 10 GRAM/15 ML SOLUTION    Take 2 tsp by mouth two (2) times a day. MAGNESIUM OXIDE (MAG-OX) 400 MG TABLET    Take 1 Tab by mouth daily. MONTELUKAST (SINGULAIR) 10 MG TABLET    montelukast 10 mg tablet OMEPRAZOLE (PRILOSEC) 40 MG CAPSULE    Take 20 mg by mouth daily. OXYGEN    as needed. 2L/NC  
 THIAMINE HCL (B-1) 100 MG TABLET    Take 1 Tab by mouth daily. VENLAFAXINE-SR (EFFEXOR-XR) 150 MG CAPSULE    venlafaxine  mg capsule,extended release 24 hr Take 1 capsule every day by oral route. ZIPRASIDONE (GEODON) 20 MG CAPSULE    BID  
 ZOLPIDEM (AMBIEN) 5 MG TABLET    zolpidem 5 mg tablet These Medications have changed No medications on file Stop Taking No medications on file

## 2021-03-10 NOTE — PROGRESS NOTES
1740  Pt arrived via EMS transport. Assisted onto ICU bed, CHG done, pt has large bruising right flank, abdomen, leg and ankle. Denies pain on right side. Left leg in brace. Jaundice eyes, garble speech. Tremors in both upper extremities. Pt is a/o x1, following commands, tele monitored, 2L NC. Pt attempting to hit staff, restraints started at this time. 1800  Dr. Nikos Fontanez at bedside assessing pt. Orders received. See MAR. 
 
1900  Bedside, Verbal and Written shift change report given to MELI Rea (oncoming nurse) by Arie Rob (offgoing nurse). Report included the following information SBAR, Kardex, Intake/Output and Recent Results.

## 2021-03-10 NOTE — H&P
History & Physical 
 
Patient: Kylah Donald MRN: [id number]  CSN: [id number] YOB: 1965  Age: 61 y.o. Sex: female DOA: 3/10/2021 Chief Complaint: No chief complaint on file. HPI:  
 
Kylah Donald is a 61 y.o.  female who has history of COPD on intermittent oxygen, bipolar, cirrhosis, alcohol abuse ascites multiple fractures Presents to [place] [address] emergency room after falling and sustaining a tibial fracture while awaiting transfer for Ortho evaluation patient became worsening confused with tachycardia and developed this CIWA of over 22 despite being given multiple doses of Ativan patient was attempted to be transferred to ICU and [facility] however no beds so we have accepted in transfer According to her fiancé she no longer drinks hard liquor but drinks mostly wine and beer daily she has had increasing abdominal girth and increasing falls and instability with prior fractures he is here medical power of  she is estranged from her landlord is not my  she will talk to him spent a lot of, although 100 you okay at work about but out time try to talk to people we will have you not ever not talk to your family when he was younger who will alleviate he was driving daughter Past Medical History:  
Diagnosis Date  Bipolar affective ([address]  Chronic obstructive pulmonary disease ([address]  Cirrhosis ([address]  Encounter for screening colonoscopy  ETOH abuse  Fall at home 06/03/2020  
 hitting head & right eye (bruised); did not seek medical attention  Former smoker 1 PPD x40 years  History of ascites  History of sinusitis  Hyperlipidemia 11/20/2009 Patient denies  Left breast mass 05/13/2011 U/S Left Breast: No definite mass identified. Recommended recheck in 6 months  Manic depression ([address]  On home O2   
 as needed  Panic disorder  Vitamin D insufficiency 11/20/2009  
 23 ng/mL  Wrist fracture, right 2010 Non-Displaced Distal Radius/Ulnar Styloid Fx Past Surgical History:  
Procedure Laterality Date  COLONOSCOPY N/A 2020 SCREENING COLONOSCOPY with bx and endoclip x 1 performed by Chad Montez MD at [facility] HX ENDOSCOPY    
  [facility] Family History Problem Relation Age of Onset  No Known Problems Daughter  No Known Problems Adoptive Father  No Known Problems Adoptive Mother Social History Socioeconomic History  Marital status:  Spouse name: Not on file  Number of children: Not on file  Years of education: Not on file  Highest education level: Not on file Tobacco Use  Smoking status: Former Smoker Packs/day: 1.00 Years: 40.00 Pack years: 40.00 Quit date: 2020 Years since quittin.1  Smokeless tobacco: Former User Quit date: 2016 Substance and Sexual Activity  Alcohol use: Not Currently Comment: Quit 2020  Drug use: Yes Types: Marijuana Comment: 5 hits daily Other Topics Concern Prior to Admission medications Medication Sig Start Date End Date Taking? Authorizing Provider  
ziprasidone (GEODON) 20 mg capsule BID 20   Provider, Historical  
cholecalciferol, vitamin d3, 10 mcg (400 unit) cap Vitamin D3 10 mcg (400 unit) capsule    Provider, Historical  
zolpidem (AMBIEN) 5 mg tablet zolpidem 5 mg tablet    Provider, Historical  
venlafaxine-SR (EFFEXOR-XR) 150 mg capsule venlafaxine  mg capsule,extended release 24 hr Take 1 capsule every day by oral route. Provider, Historical  
montelukast (SINGULAIR) 10 mg tablet montelukast 10 mg tablet    Provider, Historical  
fluticasone/umeclidin/vilanter (TRELEGY ELLIPTA IN) Take 1 Puff by inhalation daily. Provider, Historical  
Oxygen as needed. 2L/NC    Provider, Historical  
lactulose (CHRONULAC) 10 gram/15 mL solution Take 2 tsp by mouth two (2) times a day. Provider, Historical  
budesonide-formoteroL (SYMBICORT) 160-4.5 mcg/actuation HFAA Take 2 Puffs by inhalation two (2) times a day. 20   Keith Wilson NP  
ipratropium (ATROVENT HFA) 17 mcg/actuation inhaler Take 1 Puff by inhalation every four (4) hours as needed for Wheezing. 20   Keith Wilson NP  
albuterol (PROVENTIL HFA) 90 mcg/actuation inhaler Take 1-2 Puffs by inhalation. 10/21/18   Provider, Historical  
omeprazole (PRILOSEC) 40 mg capsule Take 20 mg by mouth daily. Provider, Historical  
magnesium oxide (MAG-OX) 400 mg tablet Take 1 Tab by mouth daily. 5/10/19   Gurjit Savage MD  
thiamine HCL (B-1) 100 mg tablet Take 1 Tab by mouth daily. 5/10/19   Gurjit Savage MD  
 
 
Allergies Allergen Reactions  Animal Dander Itching  Egg Nausea and Vomiting Pt stated she does not have true allergy to eggs. Review of Systems Limited due to confusion Physical Exam:  
 
Physical Exam: 
Visit Vitals /70 Pulse (!) 120 Resp 27 Ht 5' 6\" (1.676 m) Wt 79 kg (174 lb 2.6 oz) SpO2 94% BMI 28.11 kg/m² Temp (24hrs), Av.8 °F (36.6 °C), Min:97.8 °F (36.6 °C), Max:97.8 °F (36.6 °C) No intake/output data recorded. No intake/output data recorded. General:  Alert, cooperative, no distress, appears stated age. Not oriented to place month or year she does remember falling and having her fiancé help with medical decisions her speech is slurred and she is confused she also sees her fiancé who is absent and not in our room tachycardia Head: Normocephalic, without obvious abnormality, atraumatic. Eyes:  Conjunctivae/corneas clear. PERRL, EOMs intact. Nose: Nares normal. No drainage or sinus tenderness. Neck: Supple, symmetrical, trachea midline, no adenopathy, thyroid: no enlargement, no carotid bruit and no JVD. Lungs:   Clear to auscultation bilaterally.   
Heart:  Regular rate and rhythm, S1, S2 normal.  Tachycardia large hematoma of the abdomen with ascites tense Abdomen: Soft, non-tender. Bowel sounds normal.  Large hematoma of the abdomen tense ascites multiple purpura and bruising Extremities: Extremities normal, atraumatic, no cyanosis or edema. Pulses: 2+ and symmetric all extremities. Skin:  abdominal purpura echymosis bruises Neurologic: AAOx month not year oriented to name and situation, No focal motor or sensory deficit. Tremor Labs Reviewed:  
Recent Results (from the past 12 hour(s)) COVID-19 RAPID TEST Collection Time: 03/10/21  9:30 AM  
Result Value Ref Range Specimen source Please find results under separate order COVID-19 rapid test Not detected NOTD AMMONIA Collection Time: 03/10/21  4:25 PM  
Result Value Ref Range Ammonia 32 11 - 32 UMOL/L  
PROTHROMBIN TIME + INR Collection Time: 03/10/21  6:35 PM  
Result Value Ref Range Prothrombin time 20.3 (H) 11.5 - 15.2 sec INR 1.8 (H) 0.8 - 1.2    
CBC WITH AUTOMATED DIFF Collection Time: 03/10/21  6:35 PM  
Result Value Ref Range WBC 10.5 4.6 - 13.2 K/uL  
 RBC 2.45 (L) 4.20 - 5.30 M/uL HGB 8.3 (L) 12.0 - 16.0 g/dL HCT 24.6 (L) 35.0 - 45.0 % .4 (H) 74.0 - 97.0 FL  
 MCH 33.9 24.0 - 34.0 PG  
 MCHC 33.7 31.0 - 37.0 g/dL  
 RDW 16.2 (H) 11.6 - 14.5 % PLATELET 98 (L) 166 - 420 K/uL MPV 11.4 9.2 - 11.8 FL  
 NEUTROPHILS 79 (H) 42 - 75 % LYMPHOCYTES 4 (L) 20 - 51 % MONOCYTES 17 (H) 2 - 9 % EOSINOPHILS 0 0 - 5 % BASOPHILS 0 0 - 3 %  
 ABS. NEUTROPHILS 8.3 (H) 1.8 - 8.0 K/UL  
 ABS. LYMPHOCYTES 0.4 (L) 0.8 - 3.5 K/UL  
 ABS. MONOCYTES 1.8 (H) 0 - 1.0 K/UL  
 ABS. EOSINOPHILS 0.0 0.0 - 0.4 K/UL  
 ABS. BASOPHILS 0.0 0.0 - 0.1 K/UL PLATELET COMMENTS LARGE PLATELETS    
 RBC COMMENTS ANISOCYTOSIS 1+ 
    
 RBC COMMENTS POLYCHROMASIA 1+ 
    
 RBC COMMENTS MACROCYTOSIS 1+ 
    
 RBC COMMENTS TARGET CELLS 
OCCASIONAL 
    
 DF MANUAL MAGNESIUM  Collection Time: 03/10/21  6:35 PM  
Result Value Ref Range Magnesium 1.9 1.6 - 2.6 mg/dL CARDIAC PANEL,(CK, CKMB & TROPONIN) Collection Time: 03/10/21  6:35 PM  
Result Value Ref Range CK - MB 6.7 (H) <3.6 ng/ml CK-MB Index 0.8 0.0 - 4.0 %  (H) 26 - 192 U/L Troponin-I, QT <0.02 0.0 - 0.045 NG/ML  
 
 
 
 and EKG Procedures/imaging: see electronic medical records for all procedures/Xrays and details which were not copied into this note but were reviewed prior to creation of Plan Assessment/Plan Active Problems: 1. Alcohol withdrawal ([facility] (3/10/2021) 2. Abdominal wall hematoma 3. COPD on home oxygen 4. Bipolar 5. Questionable history of hep C ? 6. Tibial fracture Cardiovascular: T 
achycardia likely from alcohol withdrawal we will check echo cardiac enzymes she will need cardiac clearance GI: Cirrhosis Started on IV albumin CIWA Banana bag patient has history of chronic pancreatitis we will also check lipase now with large abdominal hematoma Hematology: 
 Coagulopathy thrombocytopenia abdominal wall hematoma No Lovenox SCDs monitor CBC hematoma of the abdomen check H&H every 8 transfuse if hemoglobin is under 7 so far she has had a two-point drop of hemoglobin Vitamin K daily x3 Will obtain hepatology consult to cover everything ID: 
 Probable UTI cover with Zosyn also cover for intra-abdominal infection check urine cultures and blood cultures Neuro: 
Hepatic encephalopathy monitor ammonia levels treat with lactulose On Precedex for sedation Patient with history of bipolar we will replace/restart her Geodon and Effexor Musculoskeletal: 
Ortho consulted will see in a.m. currently on a knee immobilizer Not surgical candidate at this time pending cardiology clearance and stabilization of alcohol withdrawal 
 
Respiratory: COPD on home oxygen Pharmacy to substitute trilogy equivalent Patient has an estranged daughter her emergency contact is actually her fiancé he was updated on her current situation he will call the ICU daily DVT/GI Prophylaxis: H2B/PPI and  
 
 
 
Mendy De León MD 
3/10/2021 6:05 PM

## 2021-03-10 NOTE — ED NOTES
Patient endorsed to me as a signout from Dr. Cheek at 0700 pending transfer to a facility with an ICU bed for alcohol withdrawal and persistent metabolic encephalopathy. Patient remains tachycardic. Upon my examination, she is sleeping but easily arousable. She is oriented x2 on awakening. Will continue to monitor. 
 
8:22 AM 
Case discussed with the intensivist at [facility], Dr. Oneill. He stated he would be happy to consult on the patient once she is transferred to [facility]. 
 
10:46 AM 
Patient has been accepted to [facility] under the care of hospitalist Dr. Shante Nickerson.  
 
11:23 AM 
Case discussed with orthopedic surgeon Dr. Peck at [facility]. 
 
Patient signed out to Dr. Elizondo at 1400 pending transfer to [facility]. 
 
Lori Cho DO

## 2021-03-10 NOTE — ED NOTES
Transfer center called, No ICU at [facility], will look at [facility]

## 2021-03-11 PROBLEM — K85.20 ALCOHOL-INDUCED PANCREATITIS: Status: RESOLVED | Noted: 2020-01-14 | Resolved: 2021-01-01

## 2021-03-11 PROBLEM — F51.01 PRIMARY INSOMNIA: Status: RESOLVED | Noted: 2020-01-01 | Resolved: 2021-01-01

## 2021-03-11 PROBLEM — S82.102A: Status: ACTIVE | Noted: 2021-01-01

## 2021-03-11 PROBLEM — D61.818 PANCYTOPENIA (HCC): Status: RESOLVED | Noted: 2020-01-09 | Resolved: 2021-01-01

## 2021-03-11 PROBLEM — K70.11 ALCOHOLIC HEPATITIS WITH ASCITES: Status: ACTIVE | Noted: 2021-01-01

## 2021-03-11 PROBLEM — D68.9 COAGULOPATHY (HCC): Status: ACTIVE | Noted: 2021-01-01

## 2021-03-11 PROBLEM — D72.819 LEUKOPENIA: Status: RESOLVED | Noted: 2020-01-09 | Resolved: 2021-01-01

## 2021-03-11 PROBLEM — Z72.0 NICOTINE ABUSE: Status: RESOLVED | Noted: 2020-01-01 | Resolved: 2021-01-01

## 2021-03-11 PROBLEM — S82.209A TIBIAL FRACTURE: Status: ACTIVE | Noted: 2021-01-01

## 2021-03-11 PROBLEM — K70.31 ALCOHOLIC CIRRHOSIS OF LIVER WITH ASCITES (HCC): Status: ACTIVE | Noted: 2021-01-01

## 2021-03-11 PROBLEM — K70.30 ALCOHOLIC CIRRHOSIS OF LIVER WITHOUT ASCITES (HCC): Status: RESOLVED | Noted: 2018-10-21 | Resolved: 2021-01-01

## 2021-03-11 PROBLEM — S30.1XXA ABDOMINAL WALL HEMATOMA: Status: ACTIVE | Noted: 2021-01-01

## 2021-03-11 PROBLEM — R11.2 INTRACTABLE NAUSEA AND VOMITING: Status: RESOLVED | Noted: 2019-05-10 | Resolved: 2021-01-01

## 2021-03-11 PROBLEM — D64.9 ANEMIA: Status: ACTIVE | Noted: 2021-01-01

## 2021-03-11 PROBLEM — D53.9 MACROCYTIC ANEMIA: Status: RESOLVED | Noted: 2020-01-09 | Resolved: 2021-01-01

## 2021-03-11 PROBLEM — N17.9 AKI (ACUTE KIDNEY INJURY) (HCC): Status: RESOLVED | Noted: 2020-01-09 | Resolved: 2021-01-01

## 2021-03-11 NOTE — PROGRESS NOTES
Zosyn (Piperacillin/Tazobactam) Extended Infusion Idania Hanna, a 61 y.o. yo female, has been converted to an extended infusion of Zosyn while in the intensive care unit. A loading dose of 3.375 was given over 30 minutes Maintenance dose: 3.375g IV q8h over 240min Extended infusions will begin 4 hours after the initial dose if CrCl  >/= 20 ml/min or 8 hours after the initial dose if CrCl < 20 ml/min. Extended infusions will run over 4 hours (240 minutes). Recent Labs  
  03/10/21 
0605 03/09/21 2034 03/09/21 
1253 CREA 0.62 0.67 0.81 Ht Readings from Last 1 Encounters:  
03/10/21 167.6 cm (66\") Wt Readings from Last 1 Encounters:  
03/10/21 79 kg (174 lb 2.6 oz) CrCl : Serum creatinine: 0.62 mg/dL 03/10/21 6453 Estimated creatinine clearance: 94.1 mL/min Renal adjustment of extended infusion of Zosyn 3.375 or 4.5 gm every 8 hours for CrCl >/= 20 ml/min 3.375 or 4.5 gm every 12 hours for CrCl < 20 ml/min, intermittent HD or PD Nicholas Talbert, Pharm. D. 
[phone]

## 2021-03-11 NOTE — CONSULTS
[facility] Pulmonary, Critical Care, and Sleep Medicine Name: Yamileth Aaron MRN: [id number] : 1965 Hospital: [facility] [place] Date: 3/11/2021  Room: [address] [place] [address] Note Consult requesting physician: Dr. Margaret Davis Reason for Consult: Alcohol withdrawal, fracture tibia, cirrhosis Subjective/History of Present Illness:  
 
Patient is a 61 y.o. female with PMHx significant for alcohol abuse and cirrhosis. The patient came to ED at [facility] on 3/9 morning with fall and left leg pains. Patient reportedly had vertigo and fell in the bathroom. She did not lose consciousness. She had bruising in her abdomen from a previous fall few days ago per ER report. CT head was nil acute. CT abdomen/pelvis showed evidence of right rectus muscle hematoma and evidence of cirrhosis with ascites. X-ray showed left proximal tibia fracture. Subsequently, patient went into alcohol withdrawal with tachycardia and agitation needing Ativan per CIWA protocol. She was on 2 L oxygen in the ER. Rapid Covid test was negative. Due to lack of beds, patient stayed in the ER and subsequently transferred yesterday evening [3/10] to THE [facility] ICU. The patient is in the ICU. She is confused. She has been placed on Levophed for hypotension overnight. No reports of chest pain or palpitations or vomiting or hematemesis or rectal bleeding. Patient has significant bruising right-sided abdomen. Social historychronic alcoholic; drinking mostly wine and beer.  
 
 
Review of symptomslimited due to patient condition Allergies Allergen Reactions  Animal Dander Itching  Egg Nausea and Vomiting Pt stated she does not have true allergy to eggs. Past Medical History:  
Diagnosis Date  Bipolar affective ([address]  Chronic obstructive pulmonary disease ([facility]  Cirrhosis ([facility]  Encounter for screening colonoscopy  ETOH abuse  Fall at home 2020  
 hitting head & right eye (bruised); did not seek medical attention  Former smoker 1 PPD x40 years  History of ascites  History of sinusitis  Hyperlipidemia 2009 Patient denies  Left breast mass 2011 U/S Left Breast: No definite mass identified. Recommended recheck in 6 months  Manic depression ([address]  On home O2   
 as needed  Panic disorder  Vitamin D insufficiency 2009  
 23 ng/mL  Wrist fracture, right 2010 Non-Displaced Distal Radius/Ulnar Styloid Fx Past Surgical History:  
Procedure Laterality Date  COLONOSCOPY N/A 2020 SCREENING COLONOSCOPY with bx and endoclip x 1 performed by Kennedi Donald MD at [facility] HX ENDOSCOPY    
  [facility] Social History Tobacco Use  Smoking status: Former Smoker Packs/day: 1.00 Years: 40.00 Pack years: 40.00 Quit date: 2020 Years since quittin.1  Smokeless tobacco: Former User Quit date: 2016 Substance Use Topics  Alcohol use: Not Currently Comment: Quit 2020 Family History Problem Relation Age of Onset  No Known Problems Daughter  No Known Problems Adoptive Father  No Known Problems Adoptive Mother Prior to Admission medications Medication Sig Start Date End Date Taking? Authorizing Provider  
ziprasidone (GEODON) 20 mg capsule BID 20   Provider, Historical  
cholecalciferol, vitamin d3, 10 mcg (400 unit) cap Vitamin D3 10 mcg (400 unit) capsule    Provider, Historical  
zolpidem (AMBIEN) 5 mg tablet zolpidem 5 mg tablet    Provider, Historical  
venlafaxine-SR (EFFEXOR-XR) 150 mg capsule venlafaxine  mg capsule,extended release 24 hr Take 1 capsule every day by oral route.     Provider, Historical  
montelukast (SINGULAIR) 10 mg tablet montelukast 10 mg tablet    Provider, Historical fluticasone/umeclidin/vilanter (TRELEGY ELLIPTA IN) Take 1 Puff by inhalation daily. Provider, Historical  
Oxygen as needed. 2L/NC    Provider, Historical  
lactulose (CHRONULAC) 10 gram/15 mL solution Take 2 tsp by mouth two (2) times a day. Provider, Historical  
budesonide-formoteroL (SYMBICORT) 160-4.5 mcg/actuation HFAA Take 2 Puffs by inhalation two (2) times a day. 2/14/20   Montana Hendrickson NP  
ipratropium (ATROVENT HFA) 17 mcg/actuation inhaler Take 1 Puff by inhalation every four (4) hours as needed for Wheezing. 2/14/20   Montana Hendrickson NP  
albuterol (PROVENTIL HFA) 90 mcg/actuation inhaler Take 1-2 Puffs by inhalation. 10/21/18   Provider, Historical  
omeprazole (PRILOSEC) 40 mg capsule Take 20 mg by mouth daily. Provider, Historical  
magnesium oxide (MAG-OX) 400 mg tablet Take 1 Tab by mouth daily. 5/10/19   Francesca Jones MD  
thiamine HCL (B-1) 100 mg tablet Take 1 Tab by mouth daily. 5/10/19   Francesca Jones MD  
 
Current Facility-Administered Medications Medication Dose Route Frequency  piperacillin-tazobactam (ZOSYN) 3.375 g in 0.9% sodium chloride (MBP/ADV) 100 mL MBP  3.375 g IntraVENous Q8H  
 dexmedeTOMidine (PRECEDEX) 400 mcg in 0.9% sodium chloride 100 mL infusion  0.1-1.5 mcg/kg/hr IntraVENous TITRATE  sodium chloride (NS) flush 5-40 mL  5-40 mL IntraVENous Q8H  
 albumin human 25% (BUMINATE) solution 12.5 g  12.5 g IntraVENous Q6H  
 0.9% sodium chloride 1,000 mL with mvi, adult no. 4 with vit K 10 mL, thiamine 214 mg, folic acid 1 mg infusion   IntraVENous Q24H  
 lactulose (CHRONULAC) 10 gram/15 mL solution 30 mL  20 g Oral TID  pantoprazole (PROTONIX) 40 mg in 0.9% sodium chloride 10 mL injection  40 mg IntraVENous Q12H  phytonadione (vitamin K1) (AQUA-MEPHYTON) injection 10 mg  10 mg SubCUTAneous DAILY  arformoterol 15 mcg/budesonide 0.5 mg neb solution   Nebulization Q12H  
 venlafaxine-SR (EFFEXOR-XR) capsule 150 mg  150 mg Oral DAILY WITH BREAKFAST  ziprasidone (GEODON) capsule 20 mg  20 mg Oral BID WITH MEALS  
 NOREPINephrine (LEVOPHED) 8 mg in 0.9% NS 250ml infusion  0.5-16 mcg/min IntraVENous TITRATE Objective:  
Vital Signs:   
Visit Vitals BP (!) 89/65 Pulse 93 Temp 97.8 °F (36.6 °C) Resp 24 Ht 5' 6\" (1.676 m) Wt 78.9 kg (174 lb) SpO2 97% BMI 28.08 kg/m² O2 Device: Nasal cannula O2 Flow Rate (L/min): 2 l/min Temp (24hrs), Av.8 °F (36.6 °C), Min:97.6 °F (36.4 °C), Max:97.9 °F (36.6 °C) Intake/Output:  
Last shift:      No intake/output data recorded. Last 3 shifts:  1901 -  0700 In: 300 [I.V.:300] Out: 325 [Urine:325] Intake/Output Summary (Last 24 hours) at 3/11/2021 0848 Last data filed at 3/11/2021 6465 Gross per 24 hour Intake 300 ml Output 325 ml Net -25 ml Last 3 Recorded Weights in this Encounter 03/10/21 1747 21 3490 Weight: 79 kg (174 lb 2.6 oz) 78.9 kg (174 lb) Physical Exam:  
Patient awake, confused; comfortable; not in distress; on nasal cannula oxygen at 2 lits nc; acyanotic; appears frail and older than stated age HEENT: pupils not dilated, reactive, severe bilateral scleral jaundice, dry oral mucosa, no nasal drainage; neck supple Neck: No adenopathy or thyroid swelling CVS: S1S2 no murmurs; JVD not elevated; telemetrysinus tachycardia RS: Mod air entry bilaterally, decreased BS at bases, no wheezes or crackles, not tachypneic or in distress Abd: soft, abdominal distention with ascites, right significant abdominal wall bruising, difficult to palpate liver; no obvious splenomegaly; no guarding or rigidity, bowel sounds heard Neuro: non focal, awake, confused, moving all extremities with the exception of fractured left leg Extrm: no leg edema; mild bilateral upper extremity hand edema; no clubbing Skin: no rash; bruising in the right abdominal wall and right foot Lymphatic: no cervical or supraclavicular adenopathy Psych: Confused mood Vasc: DPs palpable in the lower extremities Data:  
   
Recent Results (from the past 24 hour(s)) COVID-19 RAPID TEST Collection Time: 03/10/21  9:30 AM  
Result Value Ref Range Specimen source Please find results under separate order COVID-19 rapid test Not detected NOTD AMMONIA Collection Time: 03/10/21  4:25 PM  
Result Value Ref Range Ammonia 32 11 - 32 UMOL/L  
PROTHROMBIN TIME + INR Collection Time: 03/10/21  6:35 PM  
Result Value Ref Range Prothrombin time 20.3 (H) 11.5 - 15.2 sec INR 1.8 (H) 0.8 - 1.2    
CBC WITH AUTOMATED DIFF Collection Time: 03/10/21  6:35 PM  
Result Value Ref Range WBC 10.5 4.6 - 13.2 K/uL  
 RBC 2.45 (L) 4.20 - 5.30 M/uL HGB 8.3 (L) 12.0 - 16.0 g/dL HCT 24.6 (L) 35.0 - 45.0 % .4 (H) 74.0 - 97.0 FL  
 MCH 33.9 24.0 - 34.0 PG  
 MCHC 33.7 31.0 - 37.0 g/dL  
 RDW 16.2 (H) 11.6 - 14.5 % PLATELET 98 (L) 506 - 420 K/uL MPV 11.4 9.2 - 11.8 FL  
 NEUTROPHILS 79 (H) 42 - 75 % LYMPHOCYTES 4 (L) 20 - 51 % MONOCYTES 17 (H) 2 - 9 % EOSINOPHILS 0 0 - 5 % BASOPHILS 0 0 - 3 %  
 ABS. NEUTROPHILS 8.3 (H) 1.8 - 8.0 K/UL  
 ABS. LYMPHOCYTES 0.4 (L) 0.8 - 3.5 K/UL  
 ABS. MONOCYTES 1.8 (H) 0 - 1.0 K/UL  
 ABS. EOSINOPHILS 0.0 0.0 - 0.4 K/UL  
 ABS. BASOPHILS 0.0 0.0 - 0.1 K/UL PLATELET COMMENTS LARGE PLATELETS    
 RBC COMMENTS ANISOCYTOSIS 1+ 
    
 RBC COMMENTS POLYCHROMASIA 1+ 
    
 RBC COMMENTS MACROCYTOSIS 1+ 
    
 RBC COMMENTS TARGET CELLS 
OCCASIONAL 
    
 DF MANUAL MAGNESIUM Collection Time: 03/10/21  6:35 PM  
Result Value Ref Range Magnesium 1.9 1.6 - 2.6 mg/dL CARDIAC PANEL,(CK, CKMB & TROPONIN) Collection Time: 03/10/21  6:35 PM  
Result Value Ref Range CK - MB 6.7 (H) <3.6 ng/ml CK-MB Index 0.8 0.0 - 4.0 %  (H) 26 - 192 U/L Troponin-I, QT <0.02 0.0 - 0.045 NG/ML  
LIPASE Collection Time: 03/10/21  6:35 PM  
Result Value Ref Range  Lipase 172 73 - 393 U/L  
CULTURE, BLOOD Collection Time: 03/10/21  8:35 PM  
 Specimen: Blood Result Value Ref Range Special Requests: NO SPECIAL REQUESTS Culture result: NO GROWTH AFTER 9 HOURS    
CULTURE, BLOOD Collection Time: 03/10/21  8:45 PM  
 Specimen: Blood Result Value Ref Range Special Requests: NO SPECIAL REQUESTS Culture result: NO GROWTH AFTER 9 HOURS    
CARDIAC PANEL,(CK, CKMB & TROPONIN) Collection Time: 03/11/21 12:37 AM  
Result Value Ref Range CK - MB 4.4 (H) <3.6 ng/ml CK-MB Index 0.7 0.0 - 4.0 %  (H) 26 - 192 U/L Troponin-I, QT <0.02 0.0 - 0.045 NG/ML  
AMMONIA Collection Time: 03/11/21  6:26 AM  
Result Value Ref Range Ammonia 35 (H) 11 - 32 UMOL/L  
PROTHROMBIN TIME + INR Collection Time: 03/11/21  6:26 AM  
Result Value Ref Range Prothrombin time 21.2 (H) 11.5 - 15.2 sec INR 1.9 (H) 0.8 - 1.2    
CBC WITH AUTOMATED DIFF Collection Time: 03/11/21  6:26 AM  
Result Value Ref Range WBC 7.1 4.6 - 13.2 K/uL  
 RBC 2.16 (L) 4.20 - 5.30 M/uL HGB 7.1 (L) 12.0 - 16.0 g/dL HCT 21.7 (L) 35.0 - 45.0 % .5 (H) 74.0 - 97.0 FL  
 MCH 32.9 24.0 - 34.0 PG  
 MCHC 32.7 31.0 - 37.0 g/dL  
 RDW 16.3 (H) 11.6 - 14.5 % PLATELET 78 (L) 878 - 420 K/uL MPV 11.5 9.2 - 11.8 FL  
 NEUTROPHILS 63 40 - 73 % LYMPHOCYTES 15 (L) 21 - 52 % MONOCYTES 22 (H) 3 - 10 % EOSINOPHILS 0 0 - 5 % BASOPHILS 0 0 - 2 %  
 ABS. NEUTROPHILS 4.5 1.8 - 8.0 K/UL  
 ABS. LYMPHOCYTES 1.1 0.9 - 3.6 K/UL  
 ABS. MONOCYTES 1.6 (H) 0.05 - 1.2 K/UL  
 ABS. EOSINOPHILS 0.0 0.0 - 0.4 K/UL  
 ABS. BASOPHILS 0.0 0.0 - 0.1 K/UL  
 DF AUTOMATED MAGNESIUM Collection Time: 03/11/21  6:26 AM  
Result Value Ref Range Magnesium 1.9 1.6 - 2.6 mg/dL METABOLIC PANEL, COMPREHENSIVE Collection Time: 03/11/21  6:26 AM  
Result Value Ref Range Sodium 133 (L) 136 - 145 mmol/L Potassium 4.4 3.5 - 5.5 mmol/L  Chloride 105 100 - 111 mmol/L  
 CO2 23 21 - 32 mmol/L Anion gap 5 3.0 - 18 mmol/L Glucose 87 74 - 99 mg/dL BUN 19 (H) 7.0 - 18 MG/DL Creatinine 0.38 (L) 0.6 - 1.3 MG/DL  
 BUN/Creatinine ratio 50 (H) 12 - 20 GFR est AA >60 >60 ml/min/1.73m2 GFR est non-AA >60 >60 ml/min/1.73m2 Calcium 7.1 (L) 8.5 - 10.1 MG/DL Bilirubin, total 5.1 (H) 0.2 - 1.0 MG/DL  
 ALT (SGPT) 89 (H) 13 - 56 U/L  
 AST (SGOT) 172 (H) 10 - 38 U/L Alk. phosphatase 135 (H) 45 - 117 U/L Protein, total 5.0 (L) 6.4 - 8.2 g/dL Albumin 2.2 (L) 3.4 - 5.0 g/dL Globulin 2.8 2.0 - 4.0 g/dL A-G Ratio 0.8 0.8 - 1.7 LIPASE Collection Time: 03/11/21  6:26 AM  
Result Value Ref Range Lipase 147 73 - 393 U/L  
CARDIAC PANEL,(CK, CKMB & TROPONIN) Collection Time: 03/11/21  6:26 AM  
Result Value Ref Range CK - MB 5.4 (H) <3.6 ng/ml CK-MB Index 1.0 0.0 - 4.0 %  (H) 26 - 192 U/L Troponin-I, QT <0.02 0.0 - 0.045 NG/ML Chemistry Recent Labs  
  03/11/21 
0626 03/10/21 [id number] 03/10/21 
0605 03/09/21 
2034 03/09/21 
0700 03/09/21 
0700 GLU 87  --  98 95   < > 102* *  --  129* 131*   < > 126*  
K 4.4  --  4.8 4.9   < > 4.9   --  99* 100   < > 91* CO2 23  --  24 23   < > 22 BUN 19*  --  16 14   < > 13 CREA 0.38*  --  0.62 0.67   < > 0.78  
CA 7.1*  --  7.4* 7.0*   < > 8.5 MG 1.9 1.9  --   --   --   --   
AGAP 5  --  6 8   < > 13 BUCR 50*  --  26* 21*   < > 17  
*  --  177*  --   --  215* TP 5.0*  --  5.7*  --   --  6.6 ALB 2.2*  --  2.2*  --   --  2.7*  
GLOB 2.8  --  3.5  --   --  3.9 AGRAT 0.8  --  0.6*  --   --  0.7*  
 < > = values in this interval not displayed. Lactic Acid Lactic acid Date Value Ref Range Status 05/10/2019 2.3 (HH) 0.4 - 2.0 MMOL/L Final  
  Comment:  
  CALLED TO AND CORRECTLY REPEATED BY: 
ZELALEM AGUAYO 3000 RN ON [id number] AT 3231 TO LL 
  
 
No results for input(s): LAC in the last 72 hours.   
 
Liver Enzymes Protein, total  
Date Value Ref Range Status 03/11/2021 5.0 (L) 6.4 - 8.2 g/dL Final  
 
Albumin Date Value Ref Range Status 03/11/2021 2.2 (L) 3.4 - 5.0 g/dL Final  
 
Globulin Date Value Ref Range Status 03/11/2021 2.8 2.0 - 4.0 g/dL Final  
 
A-G Ratio Date Value Ref Range Status 03/11/2021 0.8 0.8 - 1.7   Final  
 
Alk. phosphatase Date Value Ref Range Status 03/11/2021 135 (H) 45 - 117 U/L Final  
 
Recent Labs  
  03/11/21 
0626 03/10/21 
0605 03/09/21 
0700 TP 5.0* 5.7* 6.6 ALB 2.2* 2.2* 2.7*  
GLOB 2.8 3.5 3.9 AGRAT 0.8 0.6* 0.7* * 177* 215* CBC w/Diff Recent Labs  
  03/11/21 
0626 03/10/21 [id number] 03/10/21 
7239 WBC 7.1 10.5 12.5 RBC 2.16* 2.45* 2.74* HGB 7.1* 8.3* 9.2* HCT 21.7* 24.6* 27.6* PLT 78* 98* 99* GRANS 63 79* 71 LYMPH 15* 4* 17* EOS 0 0 0 Cardiac Enzymes Lab Results Component Value Date/Time  (H) 03/11/2021 06:26 AM  
  (H) 03/11/2021 12:37 AM  
  (H) 03/10/2021 06:35 PM  
 CKMB 5.4 (H) 03/11/2021 06:26 AM  
 CKMB 4.4 (H) 03/11/2021 12:37 AM  
 CKMB 6.7 (H) 03/10/2021 06:35 PM  
 CKND1 1.0 03/11/2021 06:26 AM  
 CKND1 0.7 03/11/2021 12:37 AM  
 CKND1 0.8 03/10/2021 06:35 PM  
 TROIQ <0.02 03/11/2021 06:26 AM  
 TROIQ <0.02 03/11/2021 12:37 AM  
 TROIQ <0.02 03/10/2021 06:35 PM  
  
 
BNP No results found for: BNP, BNPP, XBNPT Coagulation Recent Labs  
  03/11/21 
0626 03/10/21 1835 03/09/21 
0700 PTP 21.2* 20.3* 17.8* INR 1.9* 1.8* 1.5* Thyroid  Lab Results Component Value Date/Time TSH 1.45 09/10/2020 10:21 AM  
   
No results found for: T4  
 
Urinalysis Lab Results Component Value Date/Time  Color DARK YELLOW 03/09/2021 08:15 AM  
 Appearance CLOUDY 03/09/2021 08:15 AM  
 Specific gravity 1.024 03/09/2021 08:15 AM  
 pH (UA) 5.0 03/09/2021 08:15 AM  
 Protein TRACE (A) 03/09/2021 08:15 AM  
 Glucose Negative 03/09/2021 08:15 AM  
 Ketone TRACE (A) 03/09/2021 08:15 AM  
 Bilirubin SMALL (A) 03/09/2021 08:15 AM  
 Urobilinogen 1.0 03/09/2021 08:15 AM  
 Nitrites Positive (A) 03/09/2021 08:15 AM  
 Leukocyte Esterase SMALL (A) 03/09/2021 08:15 AM  
 Epithelial cells FEW 03/09/2021 08:15 AM  
 Bacteria 2+ (A) 03/09/2021 08:15 AM  
 WBC 4 to 6 03/09/2021 08:15 AM  
 RBC 0 to 2 03/09/2021 08:15 AM  
  
 
 
 Ref. Range 3/10/2021 18:35 3/11/2021 00:37 3/11/2021 06:26 CK Latest Ref Range: 26 - 192 U/L 846 (H) 605 (H) 546 (H) CK-MB Index Latest Ref Range: 0.0 - 4.0 % 0.8 0.7 1.0 CK - MB Latest Ref Range: <3.6 ng/ml 6.7 (H) 4.4 (H) 5.4 (H) Troponin-I, Qt. Latest Ref Range: 0.0 - 0.045 NG/ML <0.02 <0.02 <0.02 EKG 3/9/2021 07:44 Diagnosis   Final  
Sinus tachycardia Abnormal ECG When compared with ECG of 08-JAN-2020 21:35,  
Rate faster Nonspecific T wave abnormality no longer evident in Anterior leads Culture data during this hospitalization. All Micro Results Procedure Component Value Units Date/Time CULTURE, BLOOD [[id number]] Collected: 03/10/21 2035 Order Status: Completed Specimen: Blood Updated: 03/11/21 0720 Special Requests: NO SPECIAL REQUESTS Culture result: NO GROWTH AFTER 9 HOURS     
 CULTURE, BLOOD [[id number]] Collected: 03/10/21 2045 Order Status: Completed Specimen: Blood Updated: 03/11/21 0720 Special Requests: NO SPECIAL REQUESTS Culture result: NO GROWTH AFTER 9 HOURS     
  
  
 
 
PFT June 2020 RESULTS: 
Flow volume loop has upward concavity suggestive of airway obstruction. Total expiratory time was 11.39 seconds.  Forced vital capacity was 3.15 liters, which is 96% of predicted. FEV1 was 1.78 liters, which is 70% of predicted. FEV1/FVC ratio was 57%. No significant response to bronchodilator on spirometry.  
Total lung was 1.87 liters, which is 99% of predicted. Residual volume is 1.72 liters, which is 92% of predicted.  
Diffusion capacity was 11, which is 45% of predicted.  IMPRESSION: 
1. Spirometry showed moderate airway obstruction.   There was no bronchodilator response. 2.  Lung volumes did not show restriction or hyperinflation. 3.  There is moderate reduction in diffusion capacity.   
 
 
ECHO   pending Images report reviewed by me: 
CT 3/9 (Most Recent) (CT reviewed by me) Results from [facility] Encounter encounter on 03/09/21 CT ABD PELV W CONT Narrative EXAM: CT of the abdomen and pelvis INDICATION: Fall with abdominal pain. Right-sided abdominal bruising. Cirrhosis. CT abdomen and pelvis 1/8/2020 COMPARISON: CT abdomen and pelvis 1/8/2020 TECHNIQUE: Axial CT imaging of the abdomen and pelvis was performed with 
intravenous contrast. Multiplanar reformats were generated. One or more dose reduction techniques were used on this CT: automated exposure 
control, adjustment of the mAs and/or kVp according to patient size, and 
iterative reconstruction techniques. The specific techniques used on this CT 
exam have been documented in the patient's electronic medical record. Digital 
Imaging and Communications in Medicine (DICOM) format image data are available 
to nonaffiliated external healthcare facilities or entities on a secure, media 
free, reciprocally searchable basis with patient authorization for at least a 
12-month period after this study. _______________ FINDINGS: 
 
LOWER CHEST: Unremarkable. LIVER, BILIARY: Cirrhotic morphology of the liver. No focal mass. No biliary 
dilation. Multiple gallstones are present. Gallbladder is distended. No 
gallbladder wall thickening. CBD appears normal. 
 
PANCREAS: Normal. 
 
SPLEEN: Normal. 
 
ADRENALS: Normal. 
 
KIDNEYS: Small hypodensity anterior lower pole right kidney likely represents a 
cyst. Kidneys are otherwise unremarkable. LYMPH NODES: No enlarged lymph nodes. GASTROINTESTINAL TRACT: No bowel dilation or wall thickening. Scattered colonic 
diverticula. No evidence of bowel obstruction. PELVIC ORGANS: Unremarkable.  
 
VASCULATURE: Patent and very enlarged umbilical vein extending caudally into the 
abdominal wall. This extends through an enlarged right rectus muscle which is 
consistent with acute rectus muscle hematoma. On image 108 there is a tiny area 
of high attenuation which may be external to the umbilical vein suggesting small 
area of venous bruising. Portal vein is patent. BONES: No acute or aggressive osseous abnormalities identified. Stable superior 
endplate compression deformity L4. 
 
OTHER: Enlarged right rectus muscle with mixed areas of high and low attenuation 
consistent with rectus muscle hematoma. This measures 21 cm craniocaudally and 6 
cm AP. Small to moderate ascites. Subcutaneous edematous change consistent with 
anasarca. 
 
_______________ Impression 1. Acute right rectus muscle hematoma with questionable small area of venous 
imaging from recanalized umbilical vein running through this muscle. No large 
areas of active extravasation. 2. Cirrhotic liver with ascites. 3. Cholelithiasis without evidence of acute cholecystitis. CT head 3/9/2021 FINDINGS: 
BRAIN AND POSTERIOR FOSSA: The sulci, folia, ventricles and basal cisterns are 
within normal limits for the patient's age. There is no intracranial hemorrhage, 
mass effect, or midline shift. There are no areas of abnormal parenchymal 
attenuation. EXTRA-AXIAL SPACES AND MENINGES: There are no abnormal extra-axial fluid 
collections. CALVARIUM: Intact. SINUSES: Minimal mucosal thickening of the ethmoid sinuses. Other visualized 
sinuses are clear. OTHER: Mastoid air cells are clear. IMPRESSION No evidence of acute intracranial injury, hemorrhage or fracture. Normal 
noncontrast CT brain. CXR reviewed by me: 
XR 3/9 (Most Recent). CXR  reviewed by me and compared with previous CXR Results from [facility] Encounter encounter on 03/09/21 XR TIB/FIB LT Narrative EXAM: Left tibia-fibula INDICATION: Fall with left leg pain COMPARISON: Left femur series done earlier today 
 
_______________ FINDINGS: 
 
AP and lateral views were performed. There is redemonstration of a comminuted 
medial tibial plateau fracture which is depressed. Lateral film shows 
significant posterior displacement of the large fragment. Distal tibia and the 
fibula are intact. Possible small hemarthrosis. _______________ Impression Comminuted, depressed, displaced medial tibial plateau fracture. X-ray femur left 3/9 IMPRESSION 1. No acute osseous abnormality involving the left femur. 2. Comminuted, depressed medial tibial plateau fracture with metaphyseal 
extension and small volume lipohemarthrosis. Chest x-ray 3/9 CLINICAL INDICATION/HISTORY: tachycardia 
-Additional: None COMPARISON: 1/12/2020 TECHNIQUE: Frontal view of the chest 
FINDINGS: 
HEART AND MEDIASTINUM: Normal cardiac size and mediastinal contours. LUNGS AND PLEURAL SPACES: Lungs are improved in the degree of expansion without 
evidence of focal pneumonic opacity. No pneumothorax or pleural effusion. BONY THORAX AND SOFT TISSUES: No acute osseous abnormality IMPRESSION Approved pulmonary expansion without superimposed acute radiographic 
abnormality. IMPRESSION:  
· Anemia · Cirrhosis · Abdominal wall hematoma · Alcohol withdrawal 
· Alcoholic hepatitis · Coagulopathy · Thrombocytopenia · Fracture left tibia · Alcohol abuse · COPD · Bipolar disorder · Patient Active Problem List  
Diagnosis Code  ETOH abuse F10.10  COPD (chronic obstructive pulmonary disease) (HCC) J44.9  Bipolar mood disorder ([facility] F31.9  Tobacco abuse Z72.0  Thrombocytopenia ([facility] D69.6  Alcohol withdrawal (HCC) F10.239  Anemia D64.9  Alcoholic cirrhosis of liver with ascites (HCC) K70.31  
 Abdominal wall hematoma S30. Shanell Resides  Alcoholic hepatitis with ascites K70.11  
 Coagulopathy (HCC) D68.9  Fracture of proximal end of left tibia S82.102A · Code status: Full code RECOMMENDATIONS:  
Respiratory: Stable respirations; on nasal cannula oxygen. Chest x-ray reviewedno focal pneumonia or bullous emphysema disease noted. Prior PFT showed FEV1 of 70% and DLCO 45%consistent with moderate/stage II COPD. Continue bronchodilatorsavoid LABA due to risk of tachycardia and arrhythmias; budesonide nebulizer twice a day; DuoNeb 4 times daily. Keep SPO2 >=92%. HOB 30 degree elevation all the time. Aggressive pulmonary toileting. Aspiration precautions. Incentive spirometry when tolerated. CVS: Stable hemodynamics; continue IV fluids; negative troponins; echocardiogram doneresults pending. PressorLevophed; continue albumin every 6 hours; IV fluidsnormal saline 125 ml per hour; will transfuse 1 unit of PRBC as well due to anemia and hypotension. ID: Patient on empiric antibiotic with Zosyn. Follow cultures. Deescalate antibiotic when appropriate. Hematology/Oncology: Patient has anemia likely due to chronic nutritional deficiency with alcoholism, and acute abdominal wall hematoma from recent fall; transfuse 1 unit PRBC; keep hemoglobin greater than 7 g/dL; patient baseline coagulopathy from cirrhosistransfuse 2 units of PRBC; patient baseline mild thrombocytopeniacontinue to monitor. Renal: Stable renal function; monitor urine output. GI/: Patient has Reyna catheter. Remains n.p.o. due to confusion from alcohol withdrawal. 
Lactulose as tolerated to keep ammonia down. Govind Alberts has been consulted. Endocrine: Monitor BS. Neurology: CT head nil acute; CIWA protocol with Ativan for alcohol withdrawal; on Precedex drip to avoid progression of alcohol withdrawal into DTs. Toxicology: Serum alcohol elevated on admission to [place] [address] ER. Skin/Wound: Monitor right abdominal wall bruising Electrolytes: Replace electrolytes per ICU electrolyte replacement protocol. Replace magnesium and calcium; check phosphorus; potassium stable.  
IVF: Normal saline 125 mL/h; nutritional fluids with thiamine and folic acid 191 mL/h daily; albumin infusions. Nutrition: Keep n.p.o. for now except sips of water and oral meds. Prophylaxis: DVT Prophylaxis: SCD. GI Prophylaxis: Protonix. Restraints: none Lines/Tubes: PIVs; poor IV accesswould consult IR for PICC line. Reyna: 3/10 (Medically necessary for strict input/output monitoring in critically ill patient, will remove it when not needed. Reyna bundle followed). Preop pulmonary risk assessment Patient has multiple comorbidities with moderate to high risk of ventilator dependence from pulmonary standpoint; patient at risk of getting worse with DTs; defer to timing of surgery to orthopedic surgeon; patient cleared from pulmonary standpoint. Advance Directive/Palliative Care: consulted; poor prognosis overall with high risk for cardiorespiratory failure, intubation, ventilator support, mortality. Will defer respective systems problem management to primary and other respective consultant and follow patient in ICU with primary and other medical team. 
Further recommendations will be based on the patient's response to recommended treatment and results of the investigation ordered. Quality Care: PPI, DVT prophylaxis, HOB elevated, Infection control all reviewed and addressed. Care of plan d/w hospitalist team, RN, RT. High complexity decision making was performed during the evaluation of this patient at high risk for decompensation with multiple organ involvement. Total critical care time spent rendering care exclusive of procedures/family discussion/coordination of care: 54 minutes. Name Relation Home Work Mobile Cory Kent Boyfriend [phone] [place] Daughter Patient emergency contact is her boyfriend; patient's daughter currently does not have contact details to reach through.  
 
  
 
 
·Please note: Voice-recognition software may have been used to generate this report, which may have resulted in some phonetic-based errors in grammar and contents. Even though attempts were made to correct all the mistakes, some may have been missed, and remained in the body of the document. Keyshawn Simmons MD 
3/11/2021

## 2021-03-11 NOTE — CONSULTS
Orthopaedic Consult Patient: Cherise Craig MRN: [id number]  SSN: [id number] YOB: 1965  Age: 61 y.o. Sex: female Subjective:  
Patient is a 61 y.o.  female who presents with history of fall. She presented to [place] [address] ER and was diagnosed with a tibial plateau fracture. She was also in acute alcohol withdrawal. She was transferred to [facility] due to need of an ICU bed not available elsewhere. Patient Active Problem List  
 Diagnosis Date Noted  Alcohol withdrawal ([address] 03/10/2021  
 Nicotine abuse 05/14/2020  Primary insomnia 05/14/2020  Alcohol-induced pancreatitis 01/14/2020  Macrocytic anemia 01/09/2020  Leukopenia 01/09/2020  Thrombocytopenia ([address] 01/09/2020  HENRRY (acute kidney injury) ([address] 01/09/2020  Pancytopenia ([address] 01/09/2020  Intractable nausea and vomiting 05/10/2019  Alcoholic cirrhosis of liver without ascites ([address] 10/21/2018  Tobacco abuse 10/21/2018  Hyperlipidemia  ETOH abuse  COPD (chronic obstructive pulmonary disease) (HCC)  Bipolar mood disorder ([address]  Panic disorder  Vitamin D insufficiency 11/20/2009 Past Medical History:  
Diagnosis Date  Bipolar affective ([address]  Chronic obstructive pulmonary disease ([address]  Cirrhosis ([address]  Encounter for screening colonoscopy  ETOH abuse  Fall at home 06/03/2020  
 hitting head & right eye (bruised); did not seek medical attention  Former smoker 1 PPD x40 years  History of ascites  History of sinusitis  Hyperlipidemia 11/20/2009 Patient denies  Left breast mass 05/13/2011 U/S Left Breast: No definite mass identified. Recommended recheck in 6 months  Manic depression ([address]  On home O2   
 as needed  Panic disorder  Vitamin D insufficiency 11/20/2009  
 23 ng/mL  Wrist fracture, right 01/06/2010 Non-Displaced Distal Radius/Ulnar Styloid Fx Past Surgical History:  
Procedure Laterality Date  COLONOSCOPY N/A 6/26/2020 SCREENING COLONOSCOPY with bx and endoclip x 1 performed by Mauricio Wyatt MD at [facility] HX ENDOSCOPY    
  [facility] Prior to Admission medications Medication Sig Start Date End Date Taking? Authorizing Provider  
ziprasidone (GEODON) 20 mg capsule BID 8/6/20   Provider, Historical  
cholecalciferol, vitamin d3, 10 mcg (400 unit) cap Vitamin D3 10 mcg (400 unit) capsule    Provider, Historical  
zolpidem (AMBIEN) 5 mg tablet zolpidem 5 mg tablet    Provider, Historical  
venlafaxine-SR (EFFEXOR-XR) 150 mg capsule venlafaxine  mg capsule,extended release 24 hr Take 1 capsule every day by oral route. Provider, Historical  
montelukast (SINGULAIR) 10 mg tablet montelukast 10 mg tablet    Provider, Historical  
fluticasone/umeclidin/vilanter (TRELEGY ELLIPTA IN) Take 1 Puff by inhalation daily. Provider, Historical  
Oxygen as needed. 2L/NC    Provider, Historical  
lactulose (CHRONULAC) 10 gram/15 mL solution Take 2 tsp by mouth two (2) times a day. Provider, Historical  
budesonide-formoteroL (SYMBICORT) 160-4.5 mcg/actuation HFAA Take 2 Puffs by inhalation two (2) times a day. 2/14/20   Magnus Fothergill, NP  
ipratropium (ATROVENT HFA) 17 mcg/actuation inhaler Take 1 Puff by inhalation every four (4) hours as needed for Wheezing. 2/14/20   Magnus Fothergill, NP  
albuterol (PROVENTIL HFA) 90 mcg/actuation inhaler Take 1-2 Puffs by inhalation. 10/21/18   Provider, Historical  
omeprazole (PRILOSEC) 40 mg capsule Take 20 mg by mouth daily. Provider, Historical  
magnesium oxide (MAG-OX) 400 mg tablet Take 1 Tab by mouth daily. 5/10/19   Janes Arcos MD  
thiamine HCL (B-1) 100 mg tablet Take 1 Tab by mouth daily. 5/10/19   Janes Arcos MD  
 
Current Facility-Administered Medications Medication Dose Route Frequency  piperacillin-tazobactam (ZOSYN) 3.375 g in 0.9% sodium chloride (MBP/ADV) 100 mL MBP  3.375 g IntraVENous Q8H  
 dexmedeTOMidine (PRECEDEX) 400 mcg in 0.9% sodium chloride 100 mL infusion  0.1-1.5 mcg/kg/hr IntraVENous TITRATE  sodium chloride (NS) flush 5-40 mL  5-40 mL IntraVENous Q8H  
 sodium chloride (NS) flush 5-40 mL  5-40 mL IntraVENous PRN  
 acetaminophen (TYLENOL) tablet 650 mg  650 mg Oral Q6H PRN Or  
 acetaminophen (TYLENOL) suppository 650 mg  650 mg Rectal Q6H PRN  polyethylene glycol (MIRALAX) packet 17 g  17 g Oral DAILY PRN  promethazine (PHENERGAN) tablet 12.5 mg  12.5 mg Oral Q6H PRN Or  
 ondansetron (ZOFRAN) injection 4 mg  4 mg IntraVENous Q6H PRN  
 albumin human 25% (BUMINATE) solution 12.5 g  12.5 g IntraVENous Q6H  
 0.9% sodium chloride 1,000 mL with mvi, adult no. 4 with vit K 10 mL, thiamine 013 mg, folic acid 1 mg infusion   IntraVENous Q24H  
 sodium chloride (NS) flush 5-40 mL  5-40 mL IntraVENous PRN  
 LORazepam (ATIVAN) tablet 1 mg  1 mg Oral Q1H PRN Or  
 LORazepam (ATIVAN) injection 1 mg  1 mg IntraVENous Q1H PRN  
 LORazepam (ATIVAN) tablet 2 mg  2 mg Oral Q1H PRN Or  
 LORazepam (ATIVAN) injection 2 mg  2 mg IntraVENous Q1H PRN  
 LORazepam (ATIVAN) injection 3 mg  3 mg IntraVENous Q15MIN PRN  
 lactulose (CHRONULAC) 10 gram/15 mL solution 30 mL  20 g Oral TID  pantoprazole (PROTONIX) 40 mg in 0.9% sodium chloride 10 mL injection  40 mg IntraVENous Q12H  phytonadione (vitamin K1) (AQUA-MEPHYTON) injection 10 mg  10 mg SubCUTAneous DAILY  arformoterol 15 mcg/budesonide 0.5 mg neb solution   Nebulization Q12H  
 venlafaxine-SR (EFFEXOR-XR) capsule 150 mg  150 mg Oral DAILY WITH BREAKFAST  ziprasidone (GEODON) capsule 20 mg  20 mg Oral BID WITH MEALS  
 NOREPINephrine (LEVOPHED) 8 mg in 0.9% NS 250ml infusion  0.5-16 mcg/min IntraVENous TITRATE  albuterol-ipratropium (DUO-NEB) 2.5 MG-0.5 MG/3 ML  3 mL Nebulization Q4H PRN Allergies Allergen Reactions  Animal Dander Itching  Egg Nausea and Vomiting Pt stated she does not have true allergy to eggs. Social History Tobacco Use  Smoking status: Former Smoker Packs/day: 1.00 Years: 40.00 Pack years: 40.00 Quit date: 2020 Years since quittin.1  Smokeless tobacco: Former User Quit date: 2016 Substance Use Topics  Alcohol use: Not Currently Comment: Quit 2020 Family History Problem Relation Age of Onset  No Known Problems Daughter  No Known Problems Adoptive Father  No Known Problems Adoptive Mother Review of Systems A comprehensive review of systems was negative except for that written in the HPI. Objective:  
 
Patient Vitals for the past 8 hrs: 
 BP Temp Pulse Resp SpO2  
21 0630 (!) 102/59  93 24 97 % 21 0615   100 27 97 % 21 0600 (!) 89/65  (!) 103 24 92 % 21 0545 (!) 103/58  91 24 97 % 21 0530 94/65  99 20 94 % 21 0515 (!) 97/51  98 27 97 % 21 0500 111/68  (!) 109 26 97 % 21 0445 120/67  (!) 102 23 95 % 21 0430 121/71  96 21 91 % 21 0415 (!) 100/59 97.7 °F (36.5 °C) 90 18 96 % 21 0400 (!) 94/57  90 19 96 % 21 0345 (!) 98/57  91 18 96 % 21 0330 100/60  89 18 96 % 21 0315 (!) 99/57  90 20 96 % 21 0300 94/60  91 18 96 % 21 0245 (!) 93/58  91 19 96 % 21 0230 95/60  91 18 96 % 21 0215 (!) 92/57  92 18 95 % 21 0200 96/61  93 16 95 % 21 0145 92/61  93 18 95 % 21 0130 (!) 94/59  92 14 94 % 21 0115 99/61  91 18 95 % 21 0100 (!) 86/58  93 16 95 % 21 0045 (!) 91/58  93 19 95 % 21 0030 (!) 86/57  94 22 95 % 21 0015 (!) 82/53  93 21 94 % 21 0000 (!) 82/52 97.6 °F (36.4 °C) 93 22 95 % 03/10/21 2345 (!) 80/51  96 24 95 % 03/10/21 2330 (!) 72/49  99 20 94 % 03/10/21 2300 (!) 76/45  (!) 101 22 94 % Temp (24hrs), Av.8 °F (36.6 °C), Min:97.6 °F (36.4 °C), Max:97.9 °F (36.6 °C) Gen: resting in bed HEENT: no obvious deformity Neck: Supple, without masses, thyroid non-tender Resp: No accessory muscle use, clear breath sounds without wheezes rales or rhonchi  
Card: No murmurs, normal S1, S2 without thrills, bruits or peripheral edema Abd: Soft, non-tender, non-distended, normoactive bowel sounds are present, no palpable organomegaly and no detectable hernias Lymph: No cervical or inguinal adenopathy Musc: knee immobilizer in place Skin: No skin breakdown noted. Skin warm, pink, dry Neuro: not assessed Psych: resting Labs:  
Recent Results (from the past 24 hour(s)) COVID-19 RAPID TEST Collection Time: 03/10/21  9:30 AM  
Result Value Ref Range Specimen source Please find results under separate order COVID-19 rapid test Not detected NOTD AMMONIA Collection Time: 03/10/21  4:25 PM  
Result Value Ref Range Ammonia 32 11 - 32 UMOL/L  
PROTHROMBIN TIME + INR Collection Time: 03/10/21  6:35 PM  
Result Value Ref Range Prothrombin time 20.3 (H) 11.5 - 15.2 sec INR 1.8 (H) 0.8 - 1.2    
CBC WITH AUTOMATED DIFF Collection Time: 03/10/21  6:35 PM  
Result Value Ref Range WBC 10.5 4.6 - 13.2 K/uL  
 RBC 2.45 (L) 4.20 - 5.30 M/uL HGB 8.3 (L) 12.0 - 16.0 g/dL HCT 24.6 (L) 35.0 - 45.0 % .4 (H) 74.0 - 97.0 FL  
 MCH 33.9 24.0 - 34.0 PG  
 MCHC 33.7 31.0 - 37.0 g/dL  
 RDW 16.2 (H) 11.6 - 14.5 % PLATELET 98 (L) 646 - 420 K/uL MPV 11.4 9.2 - 11.8 FL  
 NEUTROPHILS 79 (H) 42 - 75 % LYMPHOCYTES 4 (L) 20 - 51 % MONOCYTES 17 (H) 2 - 9 % EOSINOPHILS 0 0 - 5 % BASOPHILS 0 0 - 3 %  
 ABS. NEUTROPHILS 8.3 (H) 1.8 - 8.0 K/UL  
 ABS. LYMPHOCYTES 0.4 (L) 0.8 - 3.5 K/UL  
 ABS. MONOCYTES 1.8 (H) 0 - 1.0 K/UL  
 ABS. EOSINOPHILS 0.0 0.0 - 0.4 K/UL  
 ABS. BASOPHILS 0.0 0.0 - 0.1 K/UL PLATELET COMMENTS LARGE PLATELETS    
 RBC COMMENTS ANISOCYTOSIS 1+  RBC COMMENTS POLYCHROMASIA 1+ 
    
 RBC COMMENTS MACROCYTOSIS 1+ 
    
 RBC COMMENTS TARGET CELLS 
OCCASIONAL 
    
 DF MANUAL MAGNESIUM Collection Time: 03/10/21  6:35 PM  
Result Value Ref Range Magnesium 1.9 1.6 - 2.6 mg/dL CARDIAC PANEL,(CK, CKMB & TROPONIN) Collection Time: 03/10/21  6:35 PM  
Result Value Ref Range CK - MB 6.7 (H) <3.6 ng/ml CK-MB Index 0.8 0.0 - 4.0 %  (H) 26 - 192 U/L Troponin-I, QT <0.02 0.0 - 0.045 NG/ML  
LIPASE Collection Time: 03/10/21  6:35 PM  
Result Value Ref Range Lipase 172 73 - 393 U/L  
CARDIAC PANEL,(CK, CKMB & TROPONIN) Collection Time: 03/11/21 12:37 AM  
Result Value Ref Range CK - MB 4.4 (H) <3.6 ng/ml CK-MB Index 0.7 0.0 - 4.0 %  (H) 26 - 192 U/L Troponin-I, QT <0.02 0.0 - 0.045 NG/ML  
CBC WITH AUTOMATED DIFF Collection Time: 03/11/21  6:26 AM  
Result Value Ref Range WBC 7.1 4.6 - 13.2 K/uL  
 RBC 2.16 (L) 4.20 - 5.30 M/uL HGB 7.1 (L) 12.0 - 16.0 g/dL HCT 21.7 (L) 35.0 - 45.0 % .5 (H) 74.0 - 97.0 FL  
 MCH 32.9 24.0 - 34.0 PG  
 MCHC 32.7 31.0 - 37.0 g/dL  
 RDW 16.3 (H) 11.6 - 14.5 % PLATELET 78 (L) 923 - 420 K/uL MPV 11.5 9.2 - 11.8 FL  
 NEUTROPHILS 63 40 - 73 % LYMPHOCYTES 15 (L) 21 - 52 % MONOCYTES 22 (H) 3 - 10 % EOSINOPHILS 0 0 - 5 % BASOPHILS 0 0 - 2 %  
 ABS. NEUTROPHILS 4.5 1.8 - 8.0 K/UL  
 ABS. LYMPHOCYTES 1.1 0.9 - 3.6 K/UL  
 ABS. MONOCYTES 1.6 (H) 0.05 - 1.2 K/UL  
 ABS. EOSINOPHILS 0.0 0.0 - 0.4 K/UL  
 ABS. BASOPHILS 0.0 0.0 - 0.1 K/UL  
 DF AUTOMATED Assessment:  
 
Patient Active Problem List  
 Diagnosis Date Noted  Alcohol withdrawal ([facility] 03/10/2021  
 Nicotine abuse 05/14/2020  Primary insomnia 05/14/2020  Alcohol-induced pancreatitis 01/14/2020  Macrocytic anemia 01/09/2020  Leukopenia 01/09/2020  Thrombocytopenia [facility] 01/09/2020  HENRRY (acute kidney injury) ([address] 01/09/2020  Pancytopenia ([facility] 01/09/2020  Intractable nausea and vomiting 05/10/2019  Alcoholic cirrhosis of liver without ascites ([facility] 10/21/2018  Tobacco abuse 10/21/2018  Hyperlipidemia  ETOH abuse  COPD (chronic obstructive pulmonary disease) (HCC)  Bipolar mood disorder ([facility]  Panic disorder  Vitamin D insufficiency 11/20/2009 Plan:  
 
Medial tibial plateau fracture with displacement. Would benefit from surgical management once medically cleared. Until then continue knee immobilizer. Strictly non weight bearing.  
 
 
Milena Cazares MD

## 2021-03-11 NOTE — PROGRESS NOTES
2045- Report and care received, assessment completed per flow sheet. Sedated, responds briefly to repeated verbal. Confused, speech garbled, attempted to reorient, soft bilateral wrist restraints in place to protect integrity of lines/tubes. Scattered bruises, significant bruising noted to entire abdomen and right flank area. Incontinent of a large amount of urine.  updated, orders received for a chairez catheter. 2100- Chairez inserted without difficulty. 2135-  updated regarding hypotension. MD to place orders. 0000- Reassessment without change. 0400- Reassessment without change.

## 2021-03-11 NOTE — ACP (ADVANCE CARE PLANNING)
Patient has a boyfriend listed in her emergency contacts. Patient is confused but was able to tell me while RN Jacquelyn Reyes was in the room that she has a daughter Lacy Conti. She said \"she doesn't like me very much\" but acknowledged she is her legal next of kin. Patient is , has one child. She couldn't get into her phone for a number for her daughter but said that her boyfriend would know the number. As patient gets more clear recommend an Advance Medical Directive is completed. 0 - patient's daughter was on the phone saying that she is estranged from her mom and didn't want to make medical decisions. With ANA Reyes patient told us that she would want her boyfriend \"Young\" to make decisions for her. We will work to complete an [address] Directive tomorrow [phone] [facility] [place], M.Div. Board Certified [employer] [phone] - Office

## 2021-03-11 NOTE — PROGRESS NOTES
Hospitalist Progress Note Patient: Marin Pierson MRN: [id number]  CSN: [id number] YOB: 1965  Age: 61 y.o. Sex: female DOA: 3/10/2021 LOS:  LOS: 1 day Chief Complaint: 
 
Fracture, etoh w/d Assessment/Plan  
 
60 yo female, alcoholic, fell and fractured tibia, sent from [facility] where no ICU beds for active etoh withdrawal 
She is hypotensive, anemic, sedate with precedex this am, on pressor support Critically ill 1. Alcohol withdrawal  
2. Abdominal wall hematoma 3. COPD on home oxygen 4. Bipolar disorder, etoh abuse 5. Questionable history of hep C ? 6. Tibial fracture 7. UTI, cx neg 8. Coagulopathy 9. Severe anemia, blood loss 10. Hyponatremia 11.hypophosphatemia Critical Care Note Assessment and Plan by system: 
 
CVS:pressor support for hypotension, hemorrhagic shock Resp:NC 02, watch for hypoxia, nebs routine GI:PPI Kermita Jessica Neuro:precedex for etoh w/d, geodon :chairez for I/O Endo:watch BG levels Heme:every 8 H H&H, transfuse PRBC, plasma ID:IV abx, follow cx results, zosyn F/E/N:IVF maintenance plus banana bag DVT proph:support, SCD 
 
 
32 minutes of critical care time spent in the direct evaluation and treatment of this high risk patient. The reason for providing this level of medical care for this critically ill patient was due a critical illness that impaired one or more vital organ systems such that there was a high probability of imminent or life threatening deterioration in the patients condition. This care involved high complexity decision making to assess, manipulate, and support vital system functions, to treat this degreee vital organ system failure and to prevent further life threatening deterioration of the patients condition. ' 
  
 
 surgery when medically stabilized 
 
  
  
Disposition :TBD Patient Active Problem List  
Diagnosis Code  Hyperlipidemia E78.5  Vitamin D insufficiency E55.9  
 ETOH abuse F10.10  COPD (chronic obstructive pulmonary disease) ([facility]) J44.9  Bipolar mood disorder ([facility] F31.9  Panic disorder F41.0  Intractable nausea and vomiting R11.2  Alcoholic cirrhosis of liver without ascites (HCC) K70.30  Tobacco abuse Z72.0  Macrocytic anemia D53.9  Leukopenia D72.819  Thrombocytopenia ([address] D69.6  HENRRY (acute kidney injury) ([facility] N17.9  Pancytopenia ([facility] R07.911  Alcohol-induced pancreatitis K85.20  
 Nicotine abuse Z72.0  
 Primary insomnia F51.01  
 Alcohol withdrawal ([facility]) F10.239 Subjective: 
 
Sedate with precedex To receive blood and plasma this am 
In leg immobilizer Review of systems: 
 
She is unable to participate Vital signs/Intake and Output: 
Visit Vitals BP (!) 89/65 Pulse 93 Temp 97.8 °F (36.6 °C) Resp 24 Ht 5' 6\" (1.676 m) Wt 78.9 kg (174 lb) SpO2 97% BMI 28.08 kg/m² Current Shift:  No intake/output data recorded. Last three shifts:  03/09 1901 - 03/11 0700 In: 300 [I.V.:300] Out: 325 [Urine:325] Exam: 
 
General: ill pale appearing Wf, sleeping , arousable but very drowsy Head/Neck: NCAT, supple, No masses, No lymphadenopathy CVS:Regular rate and rhythm, no M/R/G, S1/S2 heard, no thrill Lungs:Clear to auscultation bilaterally, no wheezes, rhonchi, or rales Abdomen: Soft, Nontender, mod distention, abd wall hematoma Extremities: no edema LE Skin:large diffuse adb wall bruising Neuro:no asterixis seen LE in splint Labs: Results:  
   
Chemistry Recent Labs  
  03/11/21 
0626 03/10/21 
0605 03/09/21 
2034 03/09/21 
0700 03/09/21 
0700 GLU 87 98 95   < > 102* * 129* 131*   < > 126*  
K 4.4 4.8 4.9   < > 4.9  99* 100   < > 91* CO2 23 24 23   < > 22 BUN 19* 16 14   < > 13 CREA 0.38* 0.62 0.67   < > 0.78  
CA 7.1* 7.4* 7.0*   < > 8.5 AGAP 5 6 8   < > 13 BUCR 50* 26* 21*   < > 17  
* 177*  --   --  215* TP 5.0* 5.7*  --   --  6.6 ALB 2.2* 2.2*  --   --  2.7* GLOB 2.8 3.5  --   --  3.9 AGRAT 0.8 0.6*  --   --  0.7*  
 < > = values in this interval not displayed. CBC w/Diff Recent Labs  
  03/11/21 
0626 03/10/21 [id number] 03/10/21 
3725 WBC 7.1 10.5 12.5 RBC 2.16* 2.45* 2.74* HGB 7.1* 8.3* 9.2* HCT 21.7* 24.6* 27.6* PLT 78* 98* 99* GRANS 63 79* 71 LYMPH 15* 4* 17* EOS 0 0 0 Cardiac Enzymes Recent Labs  
  03/11/21 0626 03/11/21 
0037 * 605* CKND1 1.0 0.7 Coagulation Recent Labs  
  03/11/21 
0626 03/10/21 [id number] PTP 21.2* 20.3* INR 1.9* 1.8* Lipid Panel Lab Results Component Value Date/Time Cholesterol, total 205 (H) 09/10/2020 10:22 AM  
 HDL Cholesterol 87 (H) 09/10/2020 10:22 AM  
 LDL, calculated 109.2 (H) 09/10/2020 10:22 AM  
 VLDL, calculated 8.8 09/10/2020 10:22 AM  
 Triglyceride 44 09/10/2020 10:22 AM  
 CHOL/HDL Ratio 2.4 09/10/2020 10:22 AM  
  
BNP No results for input(s): BNPP in the last 72 hours. Liver Enzymes Recent Labs  
  03/11/21 
4490 TP 5.0* ALB 2.2* * Thyroid Studies Lab Results Component Value Date/Time TSH 1.45 09/10/2020 10:21 AM  
    
Procedures/imaging: see electronic medical records for all procedures/Xrays and details which were not copied into this note but were reviewed prior to creation of Plan Jaun Baldwin MD

## 2021-03-12 NOTE — PROGRESS NOTES
Bedside shift change report received from RAI Kaufman RN. Report included the following information SBAR, Kardex, Intake/Output, MAR, Recent Results, Med Rec Status and Cardiac Rhythm NSR/Sinus Tach and plan of care. Pt remained confused intermittently, restless, pulling at lines. Restraints in place. Medications administered per MAR. 1 unit of PRBC, 1 unit Plasma administered. Assessments complete, see flowsheets. Bedside shift change report RAI Boucher RN. Report included the following information SBAR, Kardex, Intake/Output, MAR, Recent Results, Med Rec Status and Cardiac Rhythm NSR/Sinus Tach and plan of care.

## 2021-03-12 NOTE — CONSULTS
Palliative Medicine Consult Patient Name: Cindy Quigley YOB: 1965 Date of Initial Consult: March 12, 2021 Reason for Consult: Goals of care discussions Requesting Provider: Dr. Kathy Gordon Primary Care Physician: Montana Hendrickson NP 
  
 SUMMARY:  
Cindy Quigley is a 61 y.o. with a past history of COPD on intermittent oxygen, bipolar, cirrhosis, alcohol abuse ascites multiple fractures, who was admitted on 3/10/2021 from home with a diagnosis of alcohol withdrawal, abdominal wall hematoma, tibial fracture, and tachycardia. Current medical issues leading to Palliative Medicine involvement include: Support and goals of care discussions. PALLIATIVE DIAGNOSES:  
1. Goals of care discussions 2. Alcohol abuse 3. Cirrhosis 4. COPD 5. Debility PLAN:  
1. Goals of care discussions: Palliative medicine team including David Berry RN and I met with patient at patient's bedside. Patient was awake alert and oriented x4. Her mouth was very dry which made speech difficult, but she was able to talk better once oral care was performed. Introduced our role of palliative medicine and support offered as patient shares that she had a fall. Patient has no AMD on file, she is not , she has 1 daughter Almita Munroe who per chart review admits she is estranged and does not want involvement in patient's care, and lives with a significant other \"fiancé\" Raynald Mew. Patient consistent with desire for Juani Recinos to make medical decisions in the event she is unable; agreed to AMD completion today naming a Raynald Mew (significant other) as medical power of . She declined naming a secondary. Discussed with Juani Recinos at bedside who agrees with this role. He shares that they have been together for 3 years, patient shares they both met at a bar. Per both patient and Juani Recinos, they are both going to try alcohol cessation together. Encouragement given.   Discussed the benefits and burdens of CPR in the setting of alcohol abuse, cirrhosis, COPD with intermittent oxygen use, and other comorbidities. Patient would like more time to think about this and to talk with her significant other. Patient understands that at this time she remains a full code with full interventions. We will plan to follow-up Monday if remains hospitalized for further clarification and goals of care. 2. Alcohol abuse: Patient reportedly drinks 2 cans of beer and 2 glasses of wine daily. Per Lesley Castellanos she has given up \"hard liquor\". Encouragement provided. 3. Cirrhosis: Not clear whether she has a hepatologist.  Sclera jaundice noted. Coagulopathy status post 2 units packed red blood cells. 4. COPD: On oxygen intermittently at home, on oxygen via nasal cannula here. Baseline shortness of breath with activity. On bronchodilators. 5. Debility: Lives with significant other. Generalized weakness with falls. Not clear how many falls she has sustained recently. Now here with a tibial fracture which needs surgical repair, complicated by current alcohol withdrawal.  Ortho is following. Patient is nonweightbearing. 6. Initial consult note routed to primary continuity provider 7. Communicated plan of care with: Palliative IDT, patient, family, MD, RN 
 
 
 GOALS OF CARE / TREATMENT PREFERENCES:  
[====Goals of Care====] GOALS OF CARE: Full code with full interventions Patient/Health Care Proxy Stated Goals: Prolong life TREATMENT PREFERENCES:  
Code Status: Full Code Advance Care Planning: 
Advance Care Planning 6/26/2020 Patient's Healthcare Decision Maker is: Legal Next of Kin Confirm Advance Directive None Patient Would Like to Complete Advance Directive No  
 
 
Medical Interventions: Full interventions The palliative care team has discussed with patient / health care proxy about goals of care / treatment preferences for patient. 
[====Goals of Care====]  HISTORY:  
 
History obtained from: Patient, chart, significant other CHIEF COMPLAINT: Dry mouth HPI/SUBJECTIVE: The patient is:  
[x] Verbal and participatory [] Non-participatory due to:  
Oriented x 4, dry mouth makes it difficult for her to articulate her words but improved with mouth care. Clinical Pain Assessment (nonverbal scale for severity on nonverbal patients):  
Clinical Pain Assessment Severity: 3 Adult Nonverbal Pain Scale Face: No particular expression or smile Activity (Movement): Lying quietly, normal position Guarding: Lying quietly, no positioning of hands over areas of body Physiology (Vital Signs): Stable vital signs Respiratory: Baseline RR/SpO2 compliant with ventilator Total Score: 0 
 
 
 FUNCTIONAL ASSESSMENT:  
 
Palliative Performance Scale (PPS): PPS: 40 PSYCHOSOCIAL/SPIRITUAL SCREENING:  
 
Advance Care Planning: 
Advance Care Planning 6/26/2020 Patient's Healthcare Decision Maker is: Legal Next of Kin Confirm Advance Directive None Patient Would Like to Complete Advance Directive No  
 
  
Any spiritual / Gnosticist concerns: 
[] Yes /  [x] No 
 
Caregiver Burnout: 
[] Yes /  [] No /  [x] No Caregiver Present Anticipatory grief assessment:  
[] Normal  / [x] Maladaptive REVIEW OF SYSTEMS:  
 
Positive and pertinent negative findings in ROS are noted above in HPI. The following systems were [x] reviewed / [] unable to be reviewed as noted in HPI Other findings are noted below. Systems: constitutional, ears/nose/mouth/throat, respiratory, gastrointestinal, genitourinary, musculoskeletal, integumentary, neurologic, psychiatric, endocrine. Positive findings noted below. Modified ESAS Completed by: provider Fatigue: 5 Pain: 3 Anxiety: 0 Nausea: 0 Dyspnea: 0 Constipation: No  
     
 
 
 PHYSICAL EXAM:  
 
From RN flowsheet: 
Wt Readings from Last 3 Encounters:  
03/11/21 83.2 kg (183 lb 6.8 oz) 03/09/21 73.5 kg (162 lb)  
06/26/20 73.8 kg (162 lb 11.2 oz) Blood pressure 125/60, pulse (!) 108, temperature 97.9 °F (36.6 °C), resp. rate 26, height 5' 6\" (1.676 m), weight 83.2 kg (183 lb 6.8 oz), SpO2 92 %. Pain Scale 1: Adult Nonverbal Pain Scale Pain Intensity 1: 0 Constitutional: Awake, alert, appears uncomfortable, appears chronically ill Eyes: pupils equal, anicteric ENMT: dry mucous membranes Cardiovascular: regular rhythm, distal pulses intact Respiratory: breathing not labored, symmetric Gastrointestinal: Hard distended, significant bruising, mild tenderness Musculoskeletal: no deformity, no tenderness to palpation Skin: warm, dry Neurologic: following commands, moving all extremities, Oriented x 4 Psychiatric: full affect, no hallucinations HISTORY:  
 
Principal Problem: 
  Alcohol withdrawal ([address] (3/10/2021) Active Problems: 
  Anemia (3/11/2021) Alcoholic cirrhosis of liver with ascites ([address] (3/11/2021) Abdominal wall hematoma (3/11/2021) Alcoholic hepatitis with ascites (3/11/2021) Coagulopathy ([address] (3/11/2021) Tibial fracture (3/11/2021) Past Medical History:  
Diagnosis Date  Bipolar affective ([address]  Chronic obstructive pulmonary disease ([address]  Cirrhosis ([address]  Encounter for screening colonoscopy  ETOH abuse  Fall at home 06/03/2020  
 hitting head & right eye (bruised); did not seek medical attention  Former smoker 1 PPD x40 years  History of ascites  History of sinusitis  Hyperlipidemia 11/20/2009 Patient denies  Left breast mass 05/13/2011 U/S Left Breast: No definite mass identified. Recommended recheck in 6 months  Manic depression ([address]  On home O2   
 as needed  Panic disorder  Vitamin D insufficiency 11/20/2009  
 23 ng/mL  Wrist fracture, right 01/06/2010 Non-Displaced Distal Radius/Ulnar Styloid Fx Past Surgical History:  
Procedure Laterality Date  COLONOSCOPY N/A 2020 SCREENING COLONOSCOPY with bx and endoclip x 1 performed by Gilda Urena MD at [address] [facility] HX ENDOSCOPY    
  [place] [address] Family History Problem Relation Age of Onset  No Known Problems Daughter  No Known Problems Adoptive Father  No Known Problems Adoptive Mother History reviewed, no pertinent family history. Social History Tobacco Use  Smoking status: Former Smoker Packs/day: 1.00 Years: 40.00 Pack years: 40.00 Quit date: 2020 Years since quittin.1  Smokeless tobacco: Former User Quit date: 2016 Substance Use Topics  Alcohol use: Not Currently Comment: Quit 2020 Allergies Allergen Reactions  Animal Dander Itching  Egg Nausea and Vomiting Pt stated she does not have true allergy to eggs. Current Facility-Administered Medications Medication Dose Route Frequency  0.9% sodium chloride 1,000 mL with mvi, adult no. 4 with vit K 10 mL, thiamine 073 mg, folic acid 1 mg infusion   IntraVENous DAILY  fluticasone propionate (FLONASE) 50 mcg/actuation nasal spray 2 Spray  2 Spray Both Nostrils DAILY  furosemide (LASIX) injection 20 mg  20 mg IntraVENous BID  vancomycin (VANCOCIN) 1750 mg in  ml infusion  1,750 mg IntraVENous ONCE  
 heparin (porcine) 100 unit/mL injection 500 Units  500 Units InterCATHeter Q8H PRN  
 [START ON 3/13/2021] vancomycin (VANCOCIN) 1250 mg in  ml infusion  1,250 mg IntraVENous Q12H  PHARMACY INFORMATION NOTE  1 Each Other Rx Dosing/Monitoring  piperacillin-tazobactam (ZOSYN) 3.375 g in 0.9% sodium chloride (MBP/ADV) 100 mL MBP  3.375 g IntraVENous Q8H  
 0.9% sodium chloride infusion  75 mL/hr IntraVENous CONTINUOUS  
 0.9% sodium chloride infusion 250 mL  250 mL IntraVENous PRN  
 0.9% sodium chloride infusion 250 mL  250 mL IntraVENous PRN  
 budesonide (PULMICORT) 500 mcg/2 ml nebulizer suspension  500 mcg Nebulization BID RT  
 albuterol-ipratropium (DUO-NEB) 2.5 MG-0.5 MG/3 ML  3 mL Nebulization QID RT  
 ziprasidone (GEODON) 20 mg in sterile water (preservative free) 1 mL injection  20 mg IntraMUSCular BID  PHENYLephrine (ISAMAR-SYNEPHRINE) 30 mg in 0.9% sodium chloride 250 mL infusion   mcg/min IntraVENous TITRATE  dexmedeTOMidine (PRECEDEX) 400 mcg in 0.9% sodium chloride 100 mL infusion  0.1-1.5 mcg/kg/hr IntraVENous TITRATE  sodium chloride (NS) flush 5-40 mL  5-40 mL IntraVENous Q8H  
 sodium chloride (NS) flush 5-40 mL  5-40 mL IntraVENous PRN  
 acetaminophen (TYLENOL) tablet 650 mg  650 mg Oral Q6H PRN Or  
 acetaminophen (TYLENOL) suppository 650 mg  650 mg Rectal Q6H PRN  polyethylene glycol (MIRALAX) packet 17 g  17 g Oral DAILY PRN  promethazine (PHENERGAN) tablet 12.5 mg  12.5 mg Oral Q6H PRN Or  
 ondansetron (ZOFRAN) injection 4 mg  4 mg IntraVENous Q6H PRN  
 albumin human 25% (BUMINATE) solution 12.5 g  12.5 g IntraVENous Q6H  
 sodium chloride (NS) flush 5-40 mL  5-40 mL IntraVENous PRN  
 LORazepam (ATIVAN) tablet 1 mg  1 mg Oral Q1H PRN Or  
 LORazepam (ATIVAN) injection 1 mg  1 mg IntraVENous Q1H PRN  
 LORazepam (ATIVAN) tablet 2 mg  2 mg Oral Q1H PRN Or  
 LORazepam (ATIVAN) injection 2 mg  2 mg IntraVENous Q1H PRN  
 LORazepam (ATIVAN) injection 3 mg  3 mg IntraVENous Q15MIN PRN  
 lactulose (CHRONULAC) 10 gram/15 mL solution 30 mL  20 g Oral TID  pantoprazole (PROTONIX) 40 mg in 0.9% sodium chloride 10 mL injection  40 mg IntraVENous Q12H  phytonadione (vitamin K1) (AQUA-MEPHYTON) injection 10 mg  10 mg SubCUTAneous DAILY  venlafaxine-SR (EFFEXOR-XR) capsule 150 mg  150 mg Oral DAILY WITH BREAKFAST  [Held by provider] ziprasidone (GEODON) capsule 20 mg  20 mg Oral BID WITH MEALS  
 albuterol-ipratropium (DUO-NEB) 2.5 MG-0.5 MG/3 ML  3 mL Nebulization Q4H PRN  
 
 
 
 LAB AND IMAGING FINDINGS:  
 
Lab Results Component Value Date/Time WBC 6.0 03/12/2021 06:02 AM  
 HGB 8.3 (L) 03/12/2021 06:02 AM  
 PLATELET 71 (L) 21/68/5122 06:02 AM  
 
Lab Results Component Value Date/Time Sodium 138 03/12/2021 06:02 AM  
 Potassium 3.9 03/12/2021 06:02 AM  
 Chloride 108 03/12/2021 06:02 AM  
 CO2 22 03/12/2021 06:02 AM  
 BUN 15 03/12/2021 06:02 AM  
 Creatinine 0.47 (L) 03/12/2021 06:02 AM  
 Calcium 7.4 (L) 03/12/2021 06:02 AM  
 Magnesium 2.2 03/12/2021 06:02 AM  
 Phosphorus 1.7 (L) 03/12/2021 06:02 AM  
  
Lab Results Component Value Date/Time Alk. phosphatase 124 (H) 03/12/2021 06:02 AM  
 Protein, total 5.3 (L) 03/12/2021 06:02 AM  
 Albumin 2.8 (L) 03/12/2021 06:02 AM  
 Globulin 2.5 03/12/2021 06:02 AM  
 
Lab Results Component Value Date/Time INR 1.8 (H) 03/12/2021 06:02 AM  
 Prothrombin time 20.1 (H) 03/12/2021 06:02 AM  
  
No results found for: IRON, FE, TIBC, IBCT, PSAT, FERR No results found for: PH, PCO2, PO2 No components found for: [place] Lab Results Component Value Date/Time  (H) 03/11/2021 06:26 AM  
 CK - MB 5.4 (H) 03/11/2021 06:26 AM  
  
 
 
   
 
Total time: 50 minutes Counseling / coordination time, spent as noted above: 45 minutes  
> 50% counseling / coordination?:  Yes with patient and family Prolonged service was provided for  []30 min   []75 min in face to face time in the presence of the patient, spent as noted above. Time Start:  
Time End:  
Note: this can only be billed with 66022 (initial) or 42966 (follow up). If multiple start / stop times, list each separately.

## 2021-03-12 NOTE — PROGRESS NOTES
Reason for Admission:   Transferred from [facility] due to no ICU beds available at their facility, alcohol withdrawal requiring precedex . Norberto Coulter RUR Score:   17 PCP: First and Last name:   Richard Melo NP Name of Practice:  
 Are you a current patient: Yes/No:  
 Approximate date of last visit:  
 Can you participate in a virtual visit if needed: Do you (patient/family) have any concerns for transition/discharge? Substance abuse treatment Plan for utilizing home health:   TBD Current Advanced Directive/Advance Care Plan:  Full Code Healthcare Decision Maker: Devika Palafox Click here to complete [address] including selection of the Healthcare Decision Maker Relationship (ie \"Primary\") Transition of Care Plan:   Chart reviewed pt transferred from [address], pt fell at home resulting in tibia fracture pt actively was going thru alcohol withdrawal, ortho surgery on hold until more stable, rapid covid negative 3/10/2, pt remains confused at this time, hx includes cirrhosis,bipolar affective,COPD,cm will cont to review case and remain available, weekend cm will be available for immediate needs thru [facility] . Care Management Interventions PCP Verified by CM: Yes 
Palliative Care Criteria Met (RRAT>21 & CHF Dx)?: No 
Current Support Network: Other(boyfriend listed on chart) Confirm Follow Up Transport: Friends

## 2021-03-12 NOTE — PROGRESS NOTES
Bedside shift change report received from [facility]. Report included the following information SBAR, Kardex, Intake/Output, MAR, Recent Results, Med Rec Status and Cardiac Rhythm Sinus Tach and plan of care. Pt confused, pulling clothes off, disoriented. Pt remained confused throughout majority of shift, periodically yelling out of room, pulling lines, removing gown, pulling at restraints. Disoriented to surrounding and sometimes able to redirect. Pt had PICC line placed. Medications adminstered per MAR.

## 2021-03-12 NOTE — PROGRESS NOTES
Hospitalist Progress Note Patient: Brian York MRN: [id number]  CSN: [id number] YOB: 1965  Age: 61 y.o. Sex: female DOA: 3/10/2021 LOS:  LOS: 2 days Chief Complaint: 
 
etoh withdrawal 
 
 
Assessment/Plan  
 
  
62 yo female, alcoholic, fell and fractured tibia, sent from [facility] where no ICU beds for active etoh withdrawal 
She is on precedex and pressor support More alert this am 
  
1.  Alcohol withdrawal -CIWA, precedex, banana bag daily 2.  Abdominal wall hematoma, H&H stabilized 3.  COPD on home oxygen, 02 and nebs rotuine, add flonase for nasal congestion 4.  Bipolar disorder, etoh abuse-geodon 5.  history of hep C 6.  Tibial fracture-for surgery once medical issues more stabilized 7. UTI, cx neg 8. Coagulopathy-follow INR, under 2 now 9. Severe anemia, blood loss 10. Hyponatremia, continue IVF, albumin, lasix for ascites and edema 11.hypophosphatemia-NaPhos ordered US to assess ascites, may need paracentesis Remain in ICU for now as remains very ill with guarded prognosis Discussed with intensivist and nursing Empiric IV abx Palliative care sorting out family contacts Disposition : 
Patient Active Problem List  
Diagnosis Code  ETOH abuse F10.10  COPD (chronic obstructive pulmonary disease) (HCC) J44.9  Bipolar mood disorder ([facility] F31.9  Tobacco abuse Z72.0  Thrombocytopenia ([facility] D69.6  Alcohol withdrawal (HCC) F10.239  Anemia D64.9  Alcoholic cirrhosis of liver with ascites (HCC) K70.31  
 Abdominal wall hematoma S30. Eli Grain  Alcoholic hepatitis with ascites K70.11  
 Coagulopathy (HCC) D68.9  Tibial fracture S82.209A Subjective: She states she cant breathe thru her nose Knows she is in hospital, thought [facility] [address] though No new issues overnight Pain stable in leg Denies abd pain Review of systems: 
 
Constitutional: denies fevers, chills Respiratory: denies cough Cardiovascular: denies chest pain, palpitations Gastrointestinal: denies nausea, vomiting, diarrhea Vital signs/Intake and Output: 
Visit Vitals BP (!) 120/58 Pulse 89 Temp 98 °F (36.7 °C) Resp 26 Ht 5' 6\" (1.676 m) Wt 83.2 kg (183 lb 6.8 oz) SpO2 95% BMI 29.61 kg/m² Current Shift:  No intake/output data recorded. Last three shifts:  03/10 1901 - 03/12 0700 In: 2485.7 [I.V.:1795.7] Out: 3828 [WBTHD:[id number]] Exam: 
 
General: ill pale appearing WF, talking, ox2 Head/Neck: NCAT, supple, No masses, No lymphadenopathy CVS:Regular rate and rhythm, no M/R/G, S1/S2 heard, no thrill Lungs:Clear to auscultation bilaterally, no wheezes, rhonchi, or rales Abdomen: Soft, distended, appears to have fluid wave, bruising/hematoma looks same Extremities: LLE in splint, bruising RLE and foot Skin:npale, bruising Neuro:grossly normal , follows commands Psych:appropriate Labs: Results:  
   
Chemistry Recent Labs  
  03/11/21 
9844 03/10/21 
8658 03/09/21 2034 GLU 87 98 95 * 129* 131* K 4.4 4.8 4.9  99* 100 CO2 23 24 23 BUN 19* 16 14 CREA 0.38* 0.62 0.67 CA 7.1* 7.4* 7.0* AGAP 5 6 8 BUCR 50* 26* 21* * 177*  --   
TP 5.0* 5.7*  --   
ALB 2.2* 2.2*  --   
GLOB 2.8 3.5  --   
AGRAT 0.8 0.6*  --   
  
CBC w/Diff Recent Labs  
  03/12/21 
0602 03/11/21 2028 03/11/21 
1400 03/11/21 
0626 03/10/21 [address] WBC 6.0  --   --  7.1 10.5 RBC 2.45*  --   --  2.16* 2.45* HGB 8.3* 8.1* 7.6* 7.1* 8.3* HCT 24.7* 24.8* 23.0* 21.7* 24.6* PLT 71*  --   --  78* 98* GRANS 61  --   --  63 79* LYMPH 15*  --   --  15* 4*  
EOS 0  --   --  0 0 Cardiac Enzymes Recent Labs  
  03/11/21 
0626 03/11/21 
0037 * 605* CKND1 1.0 0.7 Coagulation Recent Labs  
  03/12/21 
0602 03/11/21 
2593 PTP 20.1* 21.2* INR 1.8* 1.9* Lipid Panel Lab Results Component Value Date/Time  Cholesterol, total 205 (H) 09/10/2020 10:22 AM  
 HDL Cholesterol 87 (H) 09/10/2020 10:22 AM  
 LDL, calculated 109.2 (H) 09/10/2020 10:22 AM  
 VLDL, calculated 8.8 09/10/2020 10:22 AM  
 Triglyceride 44 09/10/2020 10:22 AM  
 CHOL/HDL Ratio 2.4 09/10/2020 10:22 AM  
  
BNP No results for input(s): BNPP in the last 72 hours. Liver Enzymes Recent Labs  
  03/11/21 
1041 TP 5.0* ALB 2.2* * Thyroid Studies Lab Results Component Value Date/Time TSH 1.45 09/10/2020 10:21 AM  
    
Procedures/imaging: see electronic medical records for all procedures/Xrays and details which were not copied into this note but were reviewed prior to creation of Plan Mariela Pereira MD

## 2021-03-12 NOTE — PROGRESS NOTES
Pharmacy Dosing Services: Vancomycin Consult for Vancomycin Dosing by Pharmacy by Dr. Nidia Mcclain Consult provided for this 61y.o. year old female , for indication of aspiration pneumonia. Day of Therapy 1 Ht Readings from Last 1 Encounters:  
03/11/21 167.6 cm (66\") Wt Readings from Last 1 Encounters:  
03/11/21 83.2 kg (183 lb 6.8 oz) Serum Creatinine Lab Results Component Value Date/Time Creatinine 0.47 (L) 03/12/2021 06:02 AM  
  
Creatinine Clearance Estimated Creatinine Clearance: 96.4 mL/min (A) (by C-G formula based on SCr of 0.47 mg/dL (L)). BUN Lab Results Component Value Date/Time BUN 15 03/12/2021 06:02 AM  
  
WBC Lab Results Component Value Date/Time WBC 6.0 03/12/2021 06:02 AM  
  
H/H Lab Results Component Value Date/Time HGB 8.3 (L) 03/12/2021 06:02 AM  
  
Platelets Lab Results Component Value Date/Time PLATELET 71 (L) 95/44/2805 06:02 AM  
  
Temp 97.9 °F (36.6 °C) Start Vancomycin therapy, with loading dose of 1750 mg at 1300 3/12/21. Follow with maintenance dose of 1250 mg at 0400 3/13/21, every 12 hours. Dose calculated to approximate a therapeutic trough of 15-20 mcg/mL. Pharmacy to follow daily and will make changes to dose and/or frequency based on clinical status.  
 
Pharmacist TRACY Eric

## 2021-03-12 NOTE — PROCEDURES
Interventional Radiology Brief Procedure Note Performed By:  Sukumar Griffin PA-C Attending Radiologist: Brandi Huynh MD 
 
Pre-operative Diagnosis:  Poor peripheral veins, need for long term IV access Post-operative Diagnosis: Same as pre-op diagnosis Procedure(s) Performed:  Fluoroscopic guided PICC line placement Anesthesia:  Local   
 
Findings:  Successful right arm dual lumen 5F PICC line placement. Please see dictated report for details. Complications: None immediate Estimated Blood Loss:  Minimal 
 
Specimens: None Condition: Stable Disposition:  Return to nursing unit. PICC line is ready for immediate use. Sukumar Griffin PA-C [facility] 3/12/2021

## 2021-03-12 NOTE — PROGRESS NOTES
Palliative Medicine Patient completed an advance medical directive today. Copy placed on chart for scanning into EMR. Primary Decision Maker (Health Care Agent): Sandi Schultz Relationship to patient: friend Phone number: [phone] [x] Named in a scanned document  
[] Legal Next of Kin 
[] Guardian ACP documents you currently have include: 
[x] Advance Directive or Living Will 
[] Durable Do Not Resuscitate 
[] Physician Orders for Scope of Treatment (POST) [] Medical Power of  
[] Other CODE STATUS: FULL CODE with aggressive resuscitative measures Date of Initial Consult: 3/12/2021 Reason for Consult: Care decisions, identification of surrogate decision maker Requesting Provider:  Dr Ricardo Vivar Primary Care Physician: Jeff Olson NP 
 
INITIAL VISIT   (seen with Lexis Wong NP) PMH: COPD; cirrhosis; ETOH abuse; bipolar d/o; hepatitis C Hospital recap: She was brought to the [facility] ED on 3/9/2021 after sustaining a fall at home. Became more agitated and unstable necessitating transfer to [facility] ICU for care. On 3/10/2021 she was transferred to [facility]. She has required CIWA protocol medication and has had periods of disorientation. Has required precedex for sedation and levophed and phenylephrine for BP support. Restraints have been required for patient safety. Pertinent Diagnostics Ref. Range 1/9/2020 12:02 1/10/2020 02:00 1/11/2020 05:20 1/12/2020 04:15 1/13/2020 06:00 4/2/2020 10:25 9/10/2020 10:21 3/9/2021 07:00 3/10/2021 06:05 3/10/2021 18:35 3/11/2021 06:26 3/11/2021 14:00 3/11/2021 20:28 3/12/2021 06:02 WBC Latest Ref Range: 4.6 - 13.2 K/uL 3.3 (L) 3.8 (L) 3.3 (L) 4.3 (L) 4.5 (L) 3.2 (L) 2.5 (L) 11.2 12.5 10.5 7.1   6.0  
RBC Latest Ref Range: 4.20 - 5.30 M/uL 3.01 (L) 3.08 (L) 3.02 (L) 2.90 (L) 2.79 (L) 4.26 4.14 (L) 3.43 (L) 2.74 (L) 2.45 (L) 2.16 (L)   2.45 (L) HGB Latest Ref Range: 12.0 - 16.0 g/dL 10.2 (L) 10.4 (L) 10.5 (L) 9.9 (L) 9.6 (L) 13.1 12.4 11.6 (L) 9.2 (L) 8.3 (L) 7.1 (L) 7.6 (L) 8.1 (L) 8.3 (L) HCT Latest Ref Range: 35.0 - 45.0 % 31.1 (L) 31.7 (L) 31.7 (L) 29.9 (L) 29.1 (L) 40.2 38.6 34.1 (L) 27.6 (L) 24.6 (L) 21.7 (L) 23.0 (L) 24.8 (L) 24.7 (L) MCV Latest Ref Range: 74.0 - 97.0 .3 (H) 102.9 (H) 105.0 (H) 103.1 (H) 104.3 (H) 94.4 93.2 99.4 (H) 100.7 (H) 100.4 (H) 100.5 (H)   100.8 (H) MCH Latest Ref Range: 24.0 - 34.0 PG 33.9 33.8 34.8 (H) 34.1 (H) 34.4 (H) 30.8 30.0 33.8 33.6 33.9 32.9   33.9 MCHC Latest Ref Range: 31.0 - 37.0 g/dL 32.8 32.8 33.1 33.1 33.0 32.6 32.1 34.0 33.3 33.7 32.7   33.6 RDW Latest Ref Range: 11.6 - 14.5 % 15.0 (H) 15.1 (H) 15.9 (H) 15.9 (H) 16.4 (H) 13.3 15.7 (H) 16.0 (H) 16.2 (H) 16.2 (H) 16.3 (H)   17.1 (H) PLATELET Latest Ref Range: 135 - 420 K/uL 45 (L) 49 (L) 60 (L) 55 (L) 65 (L) 113 (L) 122 (L) 104 (L) 99 (L) 98 (L) 78 (L)   71 (L) Ref. Range 3/11/2021 06:26 3/12/2021 06:02 Creatinine Latest Ref Range: 0.6 - 1.3 MG/DL 0.38 (L) 0.47 (L) BUN/Creatinine ratio Latest Ref Range: 12 - 20   50 (H) 32 (H) Ref. Range 3/10/2021 18:35 3/11/2021 06:26 3/12/2021 06:02 INR Latest Ref Range: 0.8 - 1.2   1.8 (H) 1.9 (H) 1.8 (H) Prothrombin time Latest Ref Range: 11.5 - 15.2 sec 20.3 (H) 21.2 (H) 20.1 (H) Ref. Range 4/2/2020 10:25 4/2/2020 10:26 9/10/2020 10:21 9/10/2020 10:22 9/10/2020 10:25 3/9/2021 07:00 3/9/2021 12:53 3/9/2021 20:34 3/10/2021 06:05 3/10/2021 16:25 3/10/2021 18:35 3/11/2021 00:37 3/11/2021 06:26 3/12/2021 06:02 Bilirubin, total Latest Ref Range: 0.2 - 1.0 MG/DL 0.8  0.7   4.0 (H)   5.1 (H)    5.1 (H) 6.1 (H) Protein, total Latest Ref Range: 6.4 - 8.2 g/dL 7.4  7.4   6.6   5.7 (L)    5.0 (L) 5.3 (L) Albumin Latest Ref Range: 3.4 - 5.0 g/dL 3.7  3.5   2.7 (L)   2.2 (L)    2.2 (L) 2.8 (L) Globulin Latest Ref Range: 2.0 - 4.0 g/dL 3.7  3.9   3.9   3.5    2.8 2.5 A-G Ratio Latest Ref Range: 0.8 - 1.7   1.0  0.9   0.7 (L)   0.6 (L)    0.8 1.1 ALT Latest Ref Range: 13 - 56 U/L 43  116 (H)   101 (H)   104 (H)    89 (H) 82 (H) AST Latest Ref Range: 10 - 38 U/L 73 (H)  332 (H)   191 (H)   191 (H)    172 (H) 161 (H) Alk. phosphatase Latest Ref Range: 45 - 117 U/L 80  130 (H)   215 (H)   177 (H)    135 (H) 124 (H) Lipase Latest Ref Range: 73 - 393 U/L      495 (H)     172  147 145 Ref. Range 3/10/2021 06:05 3/10/2021 16:25 3/10/2021 18:35 3/11/2021 00:37 3/11/2021 06:26 3/12/2021 06:02 Ammonia Latest Ref Range: 11 - 32 UMOL/L 61 (H) 32   35 (H) 27  
3/9/2021: Left tib/fib xray: Comminuted, depressed, displaced medial tibial plateau fracture. 3/9/2021: CT A/P with contrast:  1. Acute right rectus muscle hematoma with questionable small area of venous imaging from recanalized umbilical vein running through this muscle. No large areas of active extravasation. 2. Cirrhotic liver with ascites. 3. Cholelithiasis without evidence of acute cholecystitis. Pertinent medication: precedex; ativan (for CIWA protocol) phenylephrine; effexor; geodon Seen in room ICU6 today. Awake and alert. Able to state that she is at [facility] and that she came because she fell. Or on at 4 lpm per NC. Soft restraints on wrists. Abdomen protuberant. Speech difficult to understand as she is edentulous and slurs some words. Able to follow commands. Hands tremulous while signing document. Able to tell us that her daughter is named Adele Olsen and that \"we love each other very much but don't agree on everything\". (Pastoral note states that during conversation with Adele Olsen she declined to be a surrogate decision maker for her mom 2/2 estrangement) States her fiance is named Mague Days and they have been together for three years. When asked who she would want to speak for her if she is not able to speak for herself, she replied without hesitation \"Young\". Mr Bill Nicholas was reached by telephone in the room and asked if he would agree to accept this responsibility and he agreed.  Ms Canelo Lal also verified with Mr Marco Llanes that he would agree to be her surrogate decision maker. Mr Marco Llanes stated that Ms Marco Gardner drinks wine and beer daily and had for an extended time. He is hoping for her to be discharged and then they can work on alcohol cessation together. Disposition plan: Unsure at present. Still needs orthopedic intervention for tibial plateau fracture but is not stable enough for surgery. Requires pressors still. CIWA protocol remains active. Palliative Medicine remains available for any questions or concerns Israel Coelho RN, MSN 
P: [phone]

## 2021-03-12 NOTE — PROGRESS NOTES
1900: Bedside and Verbal shift change report given to Erica Saavedra RN (oncoming nurse) by Isi Nix RN (offgoing nurse). Report included the following information SBAR, Kardex, Intake/Output, MAR, Recent Results, Med Rec Status, Cardiac Rhythm NSR/Sinus Tach and Alarm Parameters . 2000: Shift assessment completed. Pt A&O to self, disoriented to time, place, and situation, intermittently. Pt restrained and hallucinating at this time, stating \"Get Young, he's having a heart attack, he's not waking up. Get him. He's right there on the floor dying. \" Reyna catheter and PIVx3 lines in place. O2 NC at 3lpm 
Phlebotomist at bedside collecting blood specimen. 2024: Paged Dr. Ivan Allen concerning Geodon 20mg  PO. Order modified for Geodon 20mg IVP. 2230: Increased O2 NC to 5lpm with humidifier. Pt SPO2 is 95% at this time. 2332: Contacted Dr. Ivan Allen concerning HR and agitation. Levophed order changed to Smooth-Synephrine, awaiting pharmacist. Will consider Lactulose enema based on lab results. Strict NPO at this time. Ativan administered per CIWA. 0000: Reassessment completed. 80: Contacted Dr. Ivan Allen concerning SPO2 of 88% on 5lpm NC. Advised to increase to 5lpm. Face mask placed on pt due to mouth breathing, SPO2 - 94% on 6lpm NC. CXR ordered. 0330: Radiology tech at bedside performing CXR. 0350: Received orders to decrease maintenance fluids from 125mL/h to 75mL/h. O2 decreased to 4lpm NC 
 
0400: Reassessment completed. 0600: Phlebotomist at bedside collecting blood specimen. 0715: Bedside and Verbal shift change report given to Isi Nix RN (oncoming nurse) by Erica Saavedra RN (offgoing nurse). Report included the following information SBAR, Kardex, Intake/Output, MAR, Recent Results, Med Rec Status, Cardiac Rhythm NSR and Alarm Parameters .

## 2021-03-12 NOTE — PROGRESS NOTES
[facility]ists Pulmonary, Critical Care, and Sleep Medicine Name: Deana Santacruz MRN: [id number] : 1965 Hospital: [facility] [place] Date: 3/12/2021  Room: [address] [place] [address] Note Consult requesting physician: Dr. Norma Jain Reason for Consult: Alcohol withdrawal, fracture tibia, cirrhosis Subjective/History of Present Illness:  
 
Patient is a 61 y.o. female with PMHx significant for alcohol abuse and cirrhosis. The patient came to ED at [facility] on 3/9 morning with fall and left leg pains. Patient reportedly had vertigo and fell in the bathroom. She did not lose consciousness. She had bruising in her abdomen from a previous fall few days ago per ER report. CT head was nil acute. CT abdomen/pelvis showed evidence of right rectus muscle hematoma and evidence of cirrhosis with ascites. X-ray showed left proximal tibia fracture. Subsequently, patient went into alcohol withdrawal with tachycardia and agitation needing Ativan per CIWA protocol. She was on 2 L oxygen in the ER. Rapid Covid test was negative. Due to lack of beds, patient stayed in the ER and subsequently transferred yesterday evening [3/10] to [facility] ICU. 
 
3/12/2021 Patient remains in ICU, agitated and confused periodically. Also, patient hypoxemic needing oxygen supplemental therapy; currently on facemask. Patient awake, seems to be more alert this morning. Breathing seems to be stable; no significant cough. No chest pain or hematemesis or rectal bleeding. Patient denies abdominal pains. Patient has significant bruising right-sided abdomen. Afebrile; blood pressure stable; urine output725 MLS today. Telemetrysinus rhythm. Pressorphenylephrine 60 mcg/min. Social historychronic alcoholic; drinking mostly wine and beer.  
 
 
Review of symptomslimited due to patient condition Allergies Allergen Reactions  Animal Dander Itching  Egg Nausea and Vomiting Pt stated she does not have true allergy to eggs. Past Medical History:  
Diagnosis Date  Bipolar affective ([facility]  Chronic obstructive pulmonary disease ([facility]  Cirrhosis ([facility]  Encounter for screening colonoscopy  ETOH abuse  Fall at home 2020  
 hitting head & right eye (bruised); did not seek medical attention  Former smoker 1 PPD x40 years  History of ascites  History of sinusitis  Hyperlipidemia 2009 Patient denies  Left breast mass 2011 U/S Left Breast: No definite mass identified. Recommended recheck in 6 months  Manic depression ([facility]  On home O2   
 as needed  Panic disorder  Vitamin D insufficiency 2009  
 23 ng/mL  Wrist fracture, right 2010 Non-Displaced Distal Radius/Ulnar Styloid Fx Past Surgical History:  
Procedure Laterality Date  COLONOSCOPY N/A 2020 SCREENING COLONOSCOPY with bx and endoclip x 1 performed by Magy Norton MD at [address] [facility] HX ENDOSCOPY    
  [facility] Social History Tobacco Use  Smoking status: Former Smoker Packs/day: 1.00 Years: 40.00 Pack years: 40.00 Quit date: 2020 Years since quittin.1  Smokeless tobacco: Former User Quit date: 2016 Substance Use Topics  Alcohol use: Not Currently Comment: Quit 2020 Family History Problem Relation Age of Onset  No Known Problems Daughter  No Known Problems Adoptive Father  No Known Problems Adoptive Mother Prior to Admission medications Medication Sig Start Date End Date Taking?  Authorizing Provider  
ziprasidone (GEODON) 20 mg capsule BID 20   Provider, Historical  
cholecalciferol, vitamin d3, 10 mcg (400 unit) cap Vitamin D3 10 mcg (400 unit) capsule    Provider, Historical  
zolpidem (AMBIEN) 5 mg tablet zolpidem 5 mg tablet    Provider, Historical  
venlafaxine-SR (EFFEXOR-XR) 150 mg capsule venlafaxine  mg capsule,extended release 24 hr Take 1 capsule every day by oral route. Provider, Historical  
montelukast (SINGULAIR) 10 mg tablet montelukast 10 mg tablet    Provider, Historical  
fluticasone/umeclidin/vilanter (TRELEGY ELLIPTA IN) Take 1 Puff by inhalation daily. Provider, Historical  
Oxygen as needed. 2L/NC    Provider, Historical  
lactulose (CHRONULAC) 10 gram/15 mL solution Take 2 tsp by mouth two (2) times a day. Provider, Historical  
budesonide-formoteroL (SYMBICORT) 160-4.5 mcg/actuation HFAA Take 2 Puffs by inhalation two (2) times a day. 2/14/20   oMlly Bowers NP  
ipratropium (ATROVENT HFA) 17 mcg/actuation inhaler Take 1 Puff by inhalation every four (4) hours as needed for Wheezing. 2/14/20   Molly Bowers NP  
albuterol (PROVENTIL HFA) 90 mcg/actuation inhaler Take 1-2 Puffs by inhalation. 10/21/18   Provider, Historical  
omeprazole (PRILOSEC) 40 mg capsule Take 20 mg by mouth daily. Provider, Historical  
magnesium oxide (MAG-OX) 400 mg tablet Take 1 Tab by mouth daily. 5/10/19   Lennie Kaufman MD  
thiamine HCL (B-1) 100 mg tablet Take 1 Tab by mouth daily. 5/10/19   Lennie Kaufman MD  
 
Current Facility-Administered Medications Medication Dose Route Frequency  0.9% sodium chloride 1,000 mL with mvi, adult no. 4 with vit K 10 mL, thiamine 240 mg, folic acid 1 mg infusion   IntraVENous DAILY  sodium phosphate 20 mmol in 0.9% sodium chloride 250 mL infusion  20 mmol IntraVENous ONCE  
 fluticasone propionate (FLONASE) 50 mcg/actuation nasal spray 2 Spray  2 Spray Both Nostrils DAILY  furosemide (LASIX) injection 20 mg  20 mg IntraVENous BID  piperacillin-tazobactam (ZOSYN) 3.375 g in 0.9% sodium chloride (MBP/ADV) 100 mL MBP  3.375 g IntraVENous Q8H  
 0.9% sodium chloride infusion  75 mL/hr IntraVENous CONTINUOUS  
 budesonide (PULMICORT) 500 mcg/2 ml nebulizer suspension  500 mcg Nebulization BID RT  
 albuterol-ipratropium (DUO-NEB) 2.5 MG-0.5 MG/3 ML  3 mL Nebulization QID RT  
 ziprasidone (GEODON) 20 mg in sterile water (preservative free) 1 mL injection  20 mg IntraMUSCular BID  PHENYLephrine (ISAMAR-SYNEPHRINE) 30 mg in 0.9% sodium chloride 250 mL infusion   mcg/min IntraVENous TITRATE  dexmedeTOMidine (PRECEDEX) 400 mcg in 0.9% sodium chloride 100 mL infusion  0.1-1.5 mcg/kg/hr IntraVENous TITRATE  sodium chloride (NS) flush 5-40 mL  5-40 mL IntraVENous Q8H  
 albumin human 25% (BUMINATE) solution 12.5 g  12.5 g IntraVENous Q6H  
 lactulose (CHRONULAC) 10 gram/15 mL solution 30 mL  20 g Oral TID  pantoprazole (PROTONIX) 40 mg in 0.9% sodium chloride 10 mL injection  40 mg IntraVENous Q12H  phytonadione (vitamin K1) (AQUA-MEPHYTON) injection 10 mg  10 mg SubCUTAneous DAILY  venlafaxine-SR (EFFEXOR-XR) capsule 150 mg  150 mg Oral DAILY WITH BREAKFAST  [Held by provider] ziprasidone (GEODON) capsule 20 mg  20 mg Oral BID WITH MEALS Objective:  
Vital Signs:   
Visit Vitals /60 Pulse (!) 108 Temp 98.2 °F (36.8 °C) Resp 26 Ht 5' 6\" (1.676 m) Wt 83.2 kg (183 lb 6.8 oz) SpO2 92% BMI 29.61 kg/m² O2 Device: Oxygen mask O2 Flow Rate (L/min): 4 l/min Temp (24hrs), Av.2 °F (36.8 °C), Min:97.8 °F (36.6 °C), Max:98.8 °F (37.1 °C) Intake/Output:  
Last shift:       07 - 1900 In: 2670.9 [I.V.:2670.9] Out: - Last 3 shifts: 03/10 1901 -  0700 In: 2485.7 [I.V.:1795.7] Out: 3615 [[facility]:[id number]] Intake/Output Summary (Last 24 hours) at 3/12/2021 1121 Last data filed at 3/12/2021 0800 Gross per 24 hour Intake 4489.35 ml Output 725 ml Net 3764.35 ml Last 3 Recorded Weights in this Encounter 21 0741 21 0921 21 Weight: 78.9 kg (174 lb) 78.9 kg (174 lb) 83.2 kg (183 lb 6.8 oz) Physical Exam:  
Patient awake, appears comfortable; appears chronically ill; on oxygen via facemask; acyanotic; appears frail and older than stated age HEENT: pupils not dilated, reactive, severe bilateral scleral jaundice, dry oral mucosa, no nasal drainage; neck supple Neck: No adenopathy or thyroid swelling CVS: Telemetrysinus rhythm; normal heart sounds with no murmurs; JVD not elevated RS: Symmetrical breath sounds; moderate air entry bilaterally; no wheezing; bibasal crackles, not tachypneic or in distress Abd: Soft, distended with ascites; no tenderness, no guarding or rigidity; significant right-sided abdominal wall bruising, difficult to palpate liver; no obvious splenomegaly; bowel sounds present Neuro: Awake, more alert, moving all extremities with the exception of fractured left leg Extrm: no leg edema; mild bilateral upper extremity hand edema; no clubbing Skin: no rash; bruising in the right abdominal wall and right foot Lymphatic: no cervical or supraclavicular adenopathy Psych: Mood calm now Vasc: DPs palpable in the lower extremities Data:  
   
Recent Results (from the past 24 hour(s)) TYPE & SCREEN Collection Time: 03/11/21 12:00 PM  
Result Value Ref Range Crossmatch Expiration 03/14/2021,2359 ABO/Rh(D) A POSITIVE Antibody screen NEG   
 CALLED TO:    
  1 RC UNIT READY TO JOSE RN ICU ON 3/11/21 AT [address]  
 Unit number [id number] Blood component type RC LR Unit division 00 Status of unit ISSUED Crossmatch result Compatible HGB & HCT Collection Time: 03/11/21  2:00 PM  
Result Value Ref Range HGB 7.6 (L) 12.0 - 16.0 g/dL HCT 23.0 (L) 35.0 - 45.0 % HGB & HCT Collection Time: 03/11/21  8:28 PM  
Result Value Ref Range HGB 8.1 (L) 12.0 - 16.0 g/dL HCT 24.8 (L) 35.0 - 45.0 % GLUCOSE, POC Collection Time: 03/11/21 11:32 PM  
Result Value Ref Range Glucose (POC) 94 70 - 110 mg/dL PROTHROMBIN TIME + INR Collection Time: 03/12/21  6:02 AM  
Result Value Ref Range Prothrombin time 20.1 (H) 11.5 - 15.2 sec INR 1.8 (H) 0.8 - 1.2 MAGNESIUM Collection Time: 03/12/21  6:02 AM  
Result Value Ref Range Magnesium 2.2 1.6 - 2.6 mg/dL AMMONIA Collection Time: 03/12/21  6:02 AM  
Result Value Ref Range Ammonia 27 11 - 32 UMOL/L  
LIPASE Collection Time: 03/12/21  6:02 AM  
Result Value Ref Range Lipase 145 73 - 393 U/L  
CBC WITH AUTOMATED DIFF Collection Time: 03/12/21  6:02 AM  
Result Value Ref Range WBC 6.0 4.6 - 13.2 K/uL  
 RBC 2.45 (L) 4.20 - 5.30 M/uL HGB 8.3 (L) 12.0 - 16.0 g/dL HCT 24.7 (L) 35.0 - 45.0 % .8 (H) 74.0 - 97.0 FL  
 MCH 33.9 24.0 - 34.0 PG  
 MCHC 33.6 31.0 - 37.0 g/dL  
 RDW 17.1 (H) 11.6 - 14.5 % PLATELET 71 (L) 013 - 420 K/uL MPV 9.9 9.2 - 11.8 FL  
 NEUTROPHILS 61 40 - 73 % LYMPHOCYTES 15 (L) 21 - 52 % MONOCYTES 23 (H) 3 - 10 % EOSINOPHILS 0 0 - 5 % BASOPHILS 1 0 - 2 %  
 ABS. NEUTROPHILS 3.7 1.8 - 8.0 K/UL  
 ABS. LYMPHOCYTES 0.9 0.9 - 3.6 K/UL  
 ABS. MONOCYTES 1.4 (H) 0.05 - 1.2 K/UL  
 ABS. EOSINOPHILS 0.0 0.0 - 0.4 K/UL  
 ABS. BASOPHILS 0.0 0.0 - 0.1 K/UL  
 DF AUTOMATED PHOSPHORUS Collection Time: 03/12/21  6:02 AM  
Result Value Ref Range Phosphorus 1.7 (L) 2.5 - 4.9 MG/DL  
METABOLIC PANEL, COMPREHENSIVE Collection Time: 03/12/21  6:02 AM  
Result Value Ref Range Sodium 138 136 - 145 mmol/L Potassium 3.9 3.5 - 5.5 mmol/L Chloride 108 100 - 111 mmol/L  
 CO2 22 21 - 32 mmol/L Anion gap 8 3.0 - 18 mmol/L Glucose 77 74 - 99 mg/dL BUN 15 7.0 - 18 MG/DL Creatinine 0.47 (L) 0.6 - 1.3 MG/DL  
 BUN/Creatinine ratio 32 (H) 12 - 20 GFR est AA >60 >60 ml/min/1.73m2 GFR est non-AA >60 >60 ml/min/1.73m2 Calcium 7.4 (L) 8.5 - 10.1 MG/DL Bilirubin, total 6.1 (H) 0.2 - 1.0 MG/DL  
 ALT (SGPT) 82 (H) 13 - 56 U/L  
 AST (SGOT) 161 (H) 10 - 38 U/L Alk. phosphatase 124 (H) 45 - 117 U/L Protein, total 5.3 (L) 6.4 - 8.2 g/dL  Albumin 2.8 (L) 3.4 - 5.0 g/dL  
 Globulin 2.5 2.0 - 4.0 g/dL A-G Ratio 1.1 0.8 - 1.7 GLUCOSE, POC Collection Time: 03/12/21  6:37 AM  
Result Value Ref Range Glucose (POC) 93 70 - 110 mg/dL GLUCOSE, POC Collection Time: 03/12/21 11:07 AM  
Result Value Ref Range Glucose (POC) 87 70 - 110 mg/dL Chemistry Recent Labs  
  03/12/21 
0602 03/11/21 
0626 03/10/21 [id number] 03/10/21 
9598 GLU 77 87  --  98  
 133*  --  129*  
K 3.9 4.4  --  4.8  
 105  --  99* CO2 22 23  --  24 BUN 15 19*  --  16  
CREA 0.47* 0.38*  --  0.62  
CA 7.4* 7.1*  --  7.4* MG 2.2 1.9 1.9  --   
PHOS 1.7* 2.1*  --   --   
AGAP 8 5  --  6  
BUCR 32* 50*  --  26* * 135*  --  177* TP 5.3* 5.0*  --  5.7* ALB 2.8* 2.2*  --  2.2*  
GLOB 2.5 2.8  --  3.5 AGRAT 1.1 0.8  --  0.6* Lactic Acid Lactic acid Date Value Ref Range Status 05/10/2019 2.3 (HH) 0.4 - 2.0 MMOL/L Final  
  Comment:  
  CALLED TO AND CORRECTLY REPEATED BY: 
ZELALEM AGUAYO 3000 RN ON [id number] AT 0958 TO LL 
  
 
No results for input(s): LAC in the last 72 hours. Liver Enzymes Protein, total  
Date Value Ref Range Status 03/12/2021 5.3 (L) 6.4 - 8.2 g/dL Final  
 
Albumin Date Value Ref Range Status 03/12/2021 2.8 (L) 3.4 - 5.0 g/dL Final  
 
Globulin Date Value Ref Range Status 03/12/2021 2.5 2.0 - 4.0 g/dL Final  
 
A-G Ratio Date Value Ref Range Status 03/12/2021 1.1 0.8 - 1.7   Final  
 
Alk. phosphatase Date Value Ref Range Status 03/12/2021 124 (H) 45 - 117 U/L Final  
 
Recent Labs  
  03/12/21 
0602 03/11/21 
2402 03/10/21 
1648 TP 5.3* 5.0* 5.7* ALB 2.8* 2.2* 2.2*  
GLOB 2.5 2.8 3.5 AGRAT 1.1 0.8 0.6* * 135* 177* CBC w/Diff Recent Labs  
  03/12/21 
0602 03/11/21 2028 03/11/21 
1400 03/11/21 
0626 03/10/21 [id number] WBC 6.0  --   --  7.1 10.5 RBC 2.45*  --   --  2.16* 2.45* HGB 8.3* 8.1* 7.6* 7.1* 8.3* HCT 24.7* 24.8* 23.0* 21.7* 24.6* PLT 71*  --   --  78* 98* GRANS 61  --   --  63 79*  
LYMPH 15*  --   --  15* 4*  
EOS 0  --   --  0 0 Cardiac Enzymes No results found for: CPK, CK, CKMMB, CKMB, RCK3, CKMBT, CKNDX, CKND1, PAPO, TROPT, TROIQ, FIORDALIZA, TROPT, TNIPOC, BNP, BNPP  
 
BNP No results found for: BNP, BNPP, XBNPT Coagulation Recent Labs  
  03/12/21 
0602 03/11/21 
0626 03/10/21 [address] PTP 20.1* 21.2* 20.3* INR 1.8* 1.9* 1.8* Thyroid  Lab Results Component Value Date/Time TSH 1.45 09/10/2020 10:21 AM  
   
No results found for: T4  
 
Urinalysis Lab Results Component Value Date/Time Color DARK YELLOW 03/09/2021 08:15 AM  
 Appearance CLOUDY 03/09/2021 08:15 AM  
 Specific gravity 1.024 03/09/2021 08:15 AM  
 pH (UA) 5.0 03/09/2021 08:15 AM  
 Protein TRACE (A) 03/09/2021 08:15 AM  
 Glucose Negative 03/09/2021 08:15 AM  
 Ketone TRACE (A) 03/09/2021 08:15 AM  
 Bilirubin SMALL (A) 03/09/2021 08:15 AM  
 Urobilinogen 1.0 03/09/2021 08:15 AM  
 Nitrites Positive (A) 03/09/2021 08:15 AM  
 Leukocyte Esterase SMALL (A) 03/09/2021 08:15 AM  
 Epithelial cells FEW 03/09/2021 08:15 AM  
 Bacteria 2+ (A) 03/09/2021 08:15 AM  
 WBC 4 to 6 03/09/2021 08:15 AM  
 RBC 0 to 2 03/09/2021 08:15 AM  
  
 
 
 Ref. Range 3/10/2021 18:35 3/11/2021 00:37 3/11/2021 06:26 CK Latest Ref Range: 26 - 192 U/L 846 (H) 605 (H) 546 (H) CK-MB Index Latest Ref Range: 0.0 - 4.0 % 0.8 0.7 1.0 CK - MB Latest Ref Range: <3.6 ng/ml 6.7 (H) 4.4 (H) 5.4 (H) Troponin-I, Qt. Latest Ref Range: 0.0 - 0.045 NG/ML <0.02 <0.02 <0.02 EKG 3/9/2021 07:44 Diagnosis   Final  
Sinus tachycardia Abnormal ECG When compared with ECG of 08-JAN-2020 21:35,  
Rate faster Nonspecific T wave abnormality no longer evident in Anterior leads Culture data during this hospitalization. All Micro Results Procedure Component Value Units Date/Time CULTURE, BLOOD [[id number]] Collected: 03/10/21 2045 Order Status: Completed Specimen: Blood Updated: 03/12/21 2261   Special Requests: NO SPECIAL REQUESTS Culture result: NO GROWTH 2 DAYS     
 CULTURE, BLOOD [[id number]] Collected: 03/10/21 2035 Order Status: Completed Specimen: Blood Updated: 03/12/21 7312 Special Requests: NO SPECIAL REQUESTS Culture result: NO GROWTH 2 DAYS     
  
  
 
 
PFT June 2020 RESULTS: 
Flow volume loop has upward concavity suggestive of airway obstruction. Total expiratory time was 11.39 seconds.  Forced vital capacity was 3.15 liters, which is 96% of predicted. FEV1 was 1.78 liters, which is 70% of predicted. FEV1/FVC ratio was 57%. No significant response to bronchodilator on spirometry.  
Total lung was 1.87 liters, which is 99% of predicted. Residual volume is 1.72 liters, which is 92% of predicted.  
Diffusion capacity was 11, which is 45% of predicted.  IMPRESSION: 
1. Spirometry showed moderate airway obstruction. There was no bronchodilator response. 2.  Lung volumes did not show restriction or hyperinflation. 3.  There is moderate reduction in diffusion capacity.   
 
 
ECHO   Interpretation Summary Result status: Final result · LV: Estimated LVEF is 55 - 60%. Normal cavity size, wall thickness, systolic function (ejection fraction normal) and diastolic function. E'E= 11.03. Images report reviewed by me: 
CT 3/9 (Most Recent) (CT reviewed by me) [place] [facility] [place] on 03/09/21 CT ABD PELV W CONT Narrative EXAM: CT of the abdomen and pelvis INDICATION: Fall with abdominal pain. Right-sided abdominal bruising. Cirrhosis. CT abdomen and pelvis 1/8/2020 COMPARISON: CT abdomen and pelvis 1/8/2020 TECHNIQUE: Axial CT imaging of the abdomen and pelvis was performed with 
intravenous contrast. Multiplanar reformats were generated. One or more dose reduction techniques were used on this CT: automated exposure 
control, adjustment of the mAs and/or kVp according to patient size, and 
iterative reconstruction techniques.   The specific techniques used on this CT 
exam have been documented in the patient's electronic medical record. Digital 
Imaging and Communications in Medicine (DICOM) format image data are available 
to nonaffiliated external healthcare facilities or entities on a secure, media 
free, reciprocally searchable basis with patient authorization for at least a 
12-month period after this study. _______________ FINDINGS: 
 
LOWER CHEST: Unremarkable. LIVER, BILIARY: Cirrhotic morphology of the liver. No focal mass. No biliary 
dilation. Multiple gallstones are present. Gallbladder is distended. No 
gallbladder wall thickening. CBD appears normal. 
 
PANCREAS: Normal. 
 
SPLEEN: Normal. 
 
ADRENALS: Normal. 
 
KIDNEYS: Small hypodensity anterior lower pole right kidney likely represents a 
cyst. Kidneys are otherwise unremarkable. LYMPH NODES: No enlarged lymph nodes. GASTROINTESTINAL TRACT: No bowel dilation or wall thickening. Scattered colonic 
diverticula. No evidence of bowel obstruction. PELVIC ORGANS: Unremarkable. VASCULATURE: Patent and very enlarged umbilical vein extending caudally into the 
abdominal wall. This extends through an enlarged right rectus muscle which is 
consistent with acute rectus muscle hematoma. On image 108 there is a tiny area 
of high attenuation which may be external to the umbilical vein suggesting small 
area of venous bruising. Portal vein is patent. BONES: No acute or aggressive osseous abnormalities identified. Stable superior 
endplate compression deformity L4. 
 
OTHER: Enlarged right rectus muscle with mixed areas of high and low attenuation 
consistent with rectus muscle hematoma. This measures 21 cm craniocaudally and 6 
cm AP. Small to moderate ascites. Subcutaneous edematous change consistent with 
anasarca. 
 
_______________ Impression 1.  Acute right rectus muscle hematoma with questionable small area of venous 
imaging from recanalized umbilical vein running through this muscle. No large 
areas of active extravasation. 2. Cirrhotic liver with ascites. 3. Cholelithiasis without evidence of acute cholecystitis. CT head 3/9/2021 FINDINGS: 
BRAIN AND POSTERIOR FOSSA: The sulci, folia, ventricles and basal cisterns are 
within normal limits for the patient's age. There is no intracranial hemorrhage, 
mass effect, or midline shift. There are no areas of abnormal parenchymal 
attenuation. EXTRA-AXIAL SPACES AND MENINGES: There are no abnormal extra-axial fluid 
collections. CALVARIUM: Intact. SINUSES: Minimal mucosal thickening of the ethmoid sinuses. Other visualized 
sinuses are clear. OTHER: Mastoid air cells are clear. IMPRESSION No evidence of acute intracranial injury, hemorrhage or fracture. Normal 
noncontrast CT brain. CXR reviewed by me: 
XR 3/12 (Most Recent). CXR  reviewed by me and compared with previous CXR Results from [facility] [place] on 03/10/21 XR CHEST PORT Narrative EXAM: XR CHEST PORT 
 
CLINICAL INDICATION/HISTORY: Shortness of breath 
-Additional: None COMPARISON: March 9, 2021 TECHNIQUE: Frontal view of the chest 
 
_______________ FINDINGS: 
 
HEART AND MEDIASTINUM: Stable appearing cardiac size and mediastinal contours. LUNGS AND PLEURAL SPACES: Rotated nature of the projection foreshortening the 
left hemithorax. Mild interval increase in the degree of pulmonary vascular 
prominence. No evidence of pneumothorax or pleural effusion. BONY THORAX AND SOFT TISSUES: No acute osseous abnormality 
 
_______________ Impression Mild interval increase in pulmonary vascular congestion interval from prior 
study. X-ray femur left 3/9 IMPRESSION 1. No acute osseous abnormality involving the left femur. 2. Comminuted, depressed medial tibial plateau fracture with metaphyseal 
extension and small volume lipohemarthrosis. X-ray 3/9 FINDINGS: 
AP and lateral views were performed. There is redemonstration of a comminuted 
medial tibial plateau fracture which is depressed. Lateral film shows 
significant posterior displacement of the large fragment. Distal tibia and the 
fibula are intact. Possible small hemarthrosis. IMPRESSION Comminuted, depressed, displaced medial tibial plateau fracture. IMPRESSION:  
· Acute respiratory failure with hypoxemia · Aspiration pneumonia · Anemia · Cirrhosis · Abdominal wall hematoma · Alcohol withdrawal 
· Alcoholic hepatitis · Coagulopathy · Thrombocytopenia · Fracture left tibia · Alcohol abuse · COPD · Bipolar disorder · Patient Active Problem List  
Diagnosis Code  ETOH abuse F10.10  COPD (chronic obstructive pulmonary disease) ([facility]) J44.9  Bipolar mood disorder ([facility] [address] F31.9  Tobacco abuse Z72.0  Thrombocytopenia ([facility] [address] D69.6  Alcohol withdrawal ([facility]) F10.239  Anemia D64.9  Alcoholic cirrhosis of liver with ascites ([facility]) K70.31  
 Abdominal wall hematoma S30. Britt Owen  Alcoholic hepatitis with ascites K70.11  
 Coagulopathy ([facility]) D68.9  Tibial fracture S82.209A · Code status: Full code RECOMMENDATIONS:  
Respiratory: Chest x-ray consistent with aspiration pneumonia; on oxygen via facemask. Prior PFT showed FEV1 of 70% and DLCO 45%consistent with moderate/stage II COPD. Continue monitoring in the ICU; aspiration precautions; keep oxygen saturation greater than 91%. Bronchodilatorsbudesonide and DuoNeb; avoid LABA due to risk of tachycardia and atrial fibrillation. HOB 30 degree elevation all the time. Aggressive pulmonary toileting. Aspiration precautions. Incentive spirometry when tolerated. CVS: Mild hypotension; patient on phenylephrine; continue fluids and albumin. Negative troponins; EF normal; no pulmonary hypertension. ID: Aspiration pneumonia with hypotension; patient on Zosyn; would add IV vancomycin due to MRSA risks.   Blood cultures negative; Deescalate antibiotic when appropriate. Hematology/Oncology: Patient has anemia likely due to chronic nutritional deficiency with alcoholism, and acute abdominal wall hematoma from recent fall; s/p 1 unit PRBC; hemoglobin 8.3 this morningstable; continue to monitor. Patient baseline coagulopathy from cirrhosiss/p 2 units PRBC; INR 1.8 this morning. Chronic mild thrombocytopenia platelets 20M today; continue to monitor. Renal: Normal renal function; creatinine normal; continue to monitor renal function and urine output. GI/: Patient has Reyna catheter. Ultrasound abdomen to evaluate ascites. Lactulose as tolerated to keep ammonia down; ammonia 27 today. Hepatology consult pending; informed Dr. Ana Cristina Higgins. Chip Founds Endocrine: Monitor BSstable; watch for hypoglycemia. Neurology: CT head nil acute; CIWA protocol with Ativan for alcohol withdrawal. 
Continue Precedex infusion to avoid progression of alcohol withdrawal into DTs. Toxicology: Serum alcohol elevated on admission to [place] [address] ER. Skin/Wound: Monitor right abdominal wall bruisingstable. Electrolytes: Replace electrolytes per ICU electrolyte replacement protocol. Replace Phos and calcium today. IVF: Normal saline, and nutritional supplemental fluids; albumin infusions. Nutrition: Keep n.p.o. for now except sips of water and oral meds. Prophylaxis: DVT Prophylaxis: SCD. GI Prophylaxis: Protonix. Restraints: none Lines/Tubes: PIVs; poor IV accesswould consult IR for PICC line. Reyna: 3/10 (Medically necessary for strict input/output monitoring in critically ill patient, will remove it when not needed. Reyna bundle followed). Preop pulmonary risk assessment Patient has multiple comorbidities with moderate to high risk of ventilator dependence from pulmonary standpoint; patient at risk of getting worse with DTs; defer to timing of surgery to orthopedic surgeon.  
 
Advance Directive/Palliative Care: consulted; poor prognosis overall with high risk for cardiorespiratory failure, intubation, ventilator support, mortality. Will defer respective systems problem management to primary and other respective consultant and follow patient in ICU with primary and other medical team. 
Further recommendations will be based on the patient's response to recommended treatment and results of the investigation ordered. Quality Care: PPI, DVT prophylaxis, HOB elevated, Infection control all reviewed and addressed. Care of plan d/w hospitalist team, RN, RT. High complexity decision making was performed during the evaluation of this patient at high risk for decompensation with multiple organ involvement. Total critical care time spent rendering care exclusive of procedures/family discussion/coordination of care: 44 minutes. Name Relation Home Work Mobile James Tsang Boyfriend [phone] Keri Morrison Daughter Reviewed notes from palliative care; patient's daughter is estranged with her mom; daughter declined to be healthcare surrogate to; patient has appointed boyfriend/fiancé Sadie Slaughter as medical power of . I have called and discussed with Sadie Slaughter. Discussed in detail about patient's multiple medical issues including COPD, aspiration pneumonia, oxygen requirements, hypotension, fracture of the leg, immobility, risks of cardiorespiratory failure, ventilator support, alcohol withdrawal and DTs, anemia needing blood transfusions, abdominal wall bruising etc.  Discussed CODE STATUS in layman termsduring cardiorespiratory arrest, patient high risk of mortality, anoxic brain injury, ventilator dependence, prolonged ventilator support etc.; Mr. Gina Durham would not want her to suffer on life support, but currently CODE STATUS remains full code. ·Please note: Voice-recognition software may have been used to generate this report, which may have resulted in some phonetic-based errors in grammar and contents.  Even though attempts were made to correct all the mistakes, some may have been missed, and remained in the body of the document. Malcom Saldivar MD 
3/12/2021

## 2021-03-13 NOTE — CONSULTS
Hermelindo Axon [address] [facility] Reina Asia Raphael MD, Geneisabella Martínez, [facility] Jayda Willis MD, MPH AMY Byrd, [facility] Martine Hanley, [facility]   Luis Migueljuan Isaac, [facility] Lizet Robert, [facility] 136 
  at [address] 
  [address] [place] [address] Av, [address] Katie Rosales  22. 
  [phone] FAX: [address] [place] [address] Avenue 
  [facility] 
  [address], [address] 98 [facility], [address] 
  [phone] FAX: [phone] HEPATOLOGY NOTE I am not at [facility] today. I was asked to review the chart and make management suggests regarding liver disease by Dr Alea Raymond. I have reviewed the physician notes, laboratory and imaging studies in the EMR. HISTORY: 
The patient is a 61 y.o. female with alcohol induced cirrhosis who continues to consume alcohol in excess. Cirrhosis was initially documented by imaging in 5/2019. She came to the ED at [facility] on 3/9/2021 because she felt dizzy, fell and developed pain in the left leg. CT abdomen/pelvis showed evidence of right rectus muscle hematoma, of cirrhosis and ascites. X-ray showed left proximal tibia fracture. While in the ED the patient started to develop symptoms of alcohol withdrawal including tachycardia and agitation. She was trearted with Ativan per [facility] protocol. Rapid Covid test was negative. She was transferred to [facility] for additional care. She has been in the ICU at [facility] for 3 days. She is on a non-rebreather mask with 100% O2 sat. SBP is in the 110-120 range with normal pulse that occassionaly increases to over 110. She is on NE and phenylephrine to maintain BP. She is on IV ABX for presumed sepsis. She is on IV albumin because of cirrhosis and HENRRY. She has recieved RBC and FFP. ASSESSMENT AND PLAN: 
Cirrhosis The diagnosis of cirrhosis is based upon imaging, laboratory studies, complications of cirrhosis. Cirrhosis is presumed secondary to alcohol. Serology for other causes of chronic liver disease have not been performed. The CTP is 10. Child class C. The MELD score is 20. Based upon the MELD and CTP scores the patient has a mortality of about 20% within the next 90 day but 90% within the next 2 years unless she stops consuming alchohol Alcohol hepatitis The DF is 34. This is just barely over the value of 32 to place her in the category for severe which is associated with a 30% mortality in the next 6 months. Given hypotension I would NOT start IV solumedrol Given normal renal function I would NOT start IV NAC The DF is only minimally into the severe category and the alcoholic hepatitis is likely to resolve without treatment. Coagulopathy This is secondary to cirrhosis and alcoholic hepatitis. She is getting IV vitamin Kin banana bag. Ascites Ascites developed for the first time in 3/2021. Ascites is mild in quanty. She has been on broad spectrum ABX for several days. No need to perform diagnostic paracentesis at this time as it will not  and ulikely to document that SBP even if this was present on admission. AMS vs Hepatic encephalopathy She has AMS secondary to alcohol withdrawal. 
The ammonia is not very elevated and I really doubt this is HE. I see you have already started lactulose TID. If she is having diarrhea with this dose I would back off to BID. Anemia Baseline HB in 2020 was 12 gms HB has drifted down to 8 gms since admission. This is likely due to dilution. There is no reported overt GI bleeding. She will need EGD to evaluate for esophageal varices at some point during this hospitalization. Monitor HB and transfuse if HB drops to less than 7 gms. Thrombocytopenia This is secondary to cirrhosis.  
There is no evidence of overt bleeding. No treatment is required. LABORATORY: 
Results for Andreea Nj (MRN [id number]) as of 3/13/2021 07:30 Ref. Range 3/9/2021 07:00 3/10/2021 06:05 3/11/2021 06:26 3/12/2021 06:02 3/13/2021 04:30 WBC Latest Ref Range: 4.6 - 13.2 K/uL 11.2 12.5 7.1 6.0 5.2 HGB Latest Ref Range: 12.0 - 16.0 g/dL 11.6 (L) 9.2 (L) 7.1 (L) 8.3 (L) 8.1 (L) PLATELET Latest Ref Range: 135 - 420 K/uL 104 (L) 99 (L) 78 (L) 71 (L) 83 (L) INR Latest Ref Range: 0.8 - 1.2   1.5 (H)  1.9 (H) 1.8 (H) 1.8 (H) Sodium Latest Ref Range: 136 - 145 mmol/L 126 (L) 129 (L) 133 (L) 138 142 Potassium Latest Ref Range: 3.5 - 5.5 mmol/L 4.9 4.8 4.4 3.9 3.0 (L) Chloride Latest Ref Range: 100 - 111 mmol/L 91 (L) 99 (L) 105 108 110 CO2 Latest Ref Range: 21 - 32 mmol/L 22 24 23 22 24 Glucose Latest Ref Range: 74 - 99 mg/dL 102 (H) 98 87 77 74 BUN Latest Ref Range: 7.0 - 18 MG/DL 13 16 19 (H) 15 11 Creatinine Latest Ref Range: 0.6 - 1.3 MG/DL 0.78 0.62 0.38 (L) 0.47 (L) 0.49 (L) Bilirubin, total Latest Ref Range: 0.2 - 1.0 MG/DL 4.0 (H) 5.1 (H) 5.1 (H) 6.1 (H) Albumin Latest Ref Range: 3.4 - 5.0 g/dL 2.7 (L) 2.2 (L) 2.2 (L) 2.8 (L) ALT Latest Ref Range: 13 - 56 U/L 101 (H) 104 (H) 89 (H) 82 (H) AST Latest Ref Range: 10 - 38 U/L 191 (H) 191 (H) 172 (H) 161 (H) Alk. phosphatase Latest Ref Range: 45 - 117 U/L 215 (H) 177 (H) 135 (H) 124 (H) Ammonia Latest Ref Range: 11 - 32 UMOL/L 64 (H) 61 (H) 35 (H) 27 43 (H) RADIOLOGY: 
3/2021. CT scan abdomen with IV contrast.  Changes consistent with cirrhosis. No liver mass lesions. No dilated bile ducts. No ascites. 3/2021. Ultrasound of liver. Echogenic consistent with cirrhosis. No liver mass lesions. No dilated bile ducts. Mild ascites. MD Mely Kinney [facility] 136 [address] [address], [address] [facility] 22. 
[phone] [address] [place] [address]

## 2021-03-13 NOTE — PROGRESS NOTES
Pulmonary Specialists Pulmonary, Critical Care, and Sleep Medicine Name: Chen Shields MRN: [id number] : 1965 Hospital: [facility] [place] Date: 3/13/2021  Room: [address] [place] [address] Note Consult requesting physician: Dr. Salina Mccarthy Reason for Consult: Alcohol withdrawal, fracture tibia, cirrhosis Subjective/History of Present Illness:  
 
Patient is a 61 y.o. female with PMHx significant for alcohol abuse and cirrhosis. The patient came to ED at [facility] on 3/9 morning with fall and left leg pains. Patient reportedly had vertigo and fell in the bathroom. She did not lose consciousness. She had bruising in her abdomen from a previous fall few days ago per ER report. CT head was nil acute. CT abdomen/pelvis showed evidence of right rectus muscle hematoma and evidence of cirrhosis with ascites. X-ray showed left proximal tibia fracture. Subsequently, patient went into alcohol withdrawal with tachycardia and agitation needing Ativan per CIWA protocol. She was on 2 L oxygen in the ER. Rapid Covid test was negative. Due to lack of beds, patient stayed in the ER and subsequently transferred yesterday evening [3/10] to THE [facility] ICU. 
 
3/13/2021 Patient in ICU; agitated and confused overnight. Oxygen desaturations; patient on nonrebreather mask. Afebrile; blood pressure stablephenylephrine 60 mcg/min. Telemetrysinus rhythm. Urine output2.9 L yesterday. Stable breathing; not tachypneic; no significant cough symptoms. No chest pain or vomiting or hematemesis or rectal bleeding. Patient has significant bruising right-sided abdomen. Dripsphenylephrine 60 mcg/min; Precedex 0.7 mcg/kg/h; normal saline 75 mill per hour. Social historychronic alcoholic; drinking mostly wine and beer.  
 
 
Review of symptomslimited due to patient condition Allergies Allergen Reactions  Animal Dander Itching  Egg Nausea and Vomiting Pt stated she does not have true allergy to eggs. Past Medical History:  
Diagnosis Date  Bipolar affective ([address]  Chronic obstructive pulmonary disease ([address]  Cirrhosis ([address]  Encounter for screening colonoscopy  ETOH abuse  Fall at home 2020  
 hitting head & right eye (bruised); did not seek medical attention  Former smoker 1 PPD x40 years  History of ascites  History of sinusitis  Hyperlipidemia 2009 Patient denies  Left breast mass 2011 U/S Left Breast: No definite mass identified. Recommended recheck in 6 months  Manic depression ([address]  On home O2   
 as needed  Panic disorder  Vitamin D insufficiency 2009  
 23 ng/mL  Wrist fracture, right 2010 Non-Displaced Distal Radius/Ulnar Styloid Fx Past Surgical History:  
Procedure Laterality Date  COLONOSCOPY N/A 2020 SCREENING COLONOSCOPY with bx and endoclip x 1 performed by Magy Norton MD at [address] [facility] HX ENDOSCOPY    
  [facility] Social History Tobacco Use  Smoking status: Former Smoker Packs/day: 1.00 Years: 40.00 Pack years: 40.00 Quit date: 2020 Years since quittin.1  Smokeless tobacco: Former User Quit date: 2016 Substance Use Topics  Alcohol use: Not Currently Comment: Quit 2020 Family History Problem Relation Age of Onset  No Known Problems Daughter  No Known Problems Adoptive Father  No Known Problems Adoptive Mother Prior to Admission medications Medication Sig Start Date End Date Taking?  Authorizing Provider  
ziprasidone (GEODON) 20 mg capsule BID 20   Provider, Historical  
cholecalciferol, vitamin d3, 10 mcg (400 unit) cap Vitamin D3 10 mcg (400 unit) capsule    Provider, Historical  
zolpidem (AMBIEN) 5 mg tablet zolpidem 5 mg tablet    Provider, Historical  
venlafaxine-SR (EFFEXOR-XR) 150 mg capsule venlafaxine  mg capsule,extended release 24 hr Take 1 capsule every day by oral route. Provider, Historical  
montelukast (SINGULAIR) 10 mg tablet montelukast 10 mg tablet    Provider, Historical  
fluticasone/umeclidin/vilanter (TRELEGY ELLIPTA IN) Take 1 Puff by inhalation daily. Provider, Historical  
Oxygen as needed. 2L/NC    Provider, Historical  
lactulose (CHRONULAC) 10 gram/15 mL solution Take 2 tsp by mouth two (2) times a day. Provider, Historical  
budesonide-formoteroL (SYMBICORT) 160-4.5 mcg/actuation HFAA Take 2 Puffs by inhalation two (2) times a day. 2/14/20   Mak Lopez NP  
ipratropium (ATROVENT HFA) 17 mcg/actuation inhaler Take 1 Puff by inhalation every four (4) hours as needed for Wheezing. 2/14/20   Mak Lopez NP  
albuterol (PROVENTIL HFA) 90 mcg/actuation inhaler Take 1-2 Puffs by inhalation. 10/21/18   Provider, Historical  
omeprazole (PRILOSEC) 40 mg capsule Take 20 mg by mouth daily. Provider, Historical  
magnesium oxide (MAG-OX) 400 mg tablet Take 1 Tab by mouth daily. 5/10/19   Lisette Duffy MD  
thiamine HCL (B-1) 100 mg tablet Take 1 Tab by mouth daily. 5/10/19   Lisette Duffy MD  
 
Current Facility-Administered Medications Medication Dose Route Frequency  0.9% sodium chloride 1,000 mL with mvi, adult no. 4 with vit K 10 mL, thiamine 081 mg, folic acid 1 mg infusion   IntraVENous DAILY  fluticasone propionate (FLONASE) 50 mcg/actuation nasal spray 2 Spray  2 Spray Both Nostrils DAILY  furosemide (LASIX) injection 20 mg  20 mg IntraVENous BID  vancomycin (VANCOCIN) 1250 mg in  ml infusion  1,250 mg IntraVENous Q12H  Vancomycin - Rx to dose and monitor  1 Each Other Rx Dosing/Monitoring  piperacillin-tazobactam (ZOSYN) 3.375 g in 0.9% sodium chloride (MBP/ADV) 100 mL MBP  3.375 g IntraVENous Q8H  
 0.9% sodium chloride infusion  75 mL/hr IntraVENous CONTINUOUS  
 budesonide (PULMICORT) 500 mcg/2 ml nebulizer suspension  500 mcg Nebulization BID RT  
 albuterol-ipratropium (DUO-NEB) 2.5 MG-0.5 MG/3 ML  3 mL Nebulization QID RT  
 ziprasidone (GEODON) 20 mg in sterile water (preservative free) 1 mL injection  20 mg IntraMUSCular BID  PHENYLephrine (ISAMAR-SYNEPHRINE) 30 mg in 0.9% sodium chloride 250 mL infusion   mcg/min IntraVENous TITRATE  dexmedeTOMidine (PRECEDEX) 400 mcg in 0.9% sodium chloride 100 mL infusion  0.1-1.5 mcg/kg/hr IntraVENous TITRATE  sodium chloride (NS) flush 5-40 mL  5-40 mL IntraVENous Q8H  
 albumin human 25% (BUMINATE) solution 12.5 g  12.5 g IntraVENous Q6H  
 lactulose (CHRONULAC) 10 gram/15 mL solution 30 mL  20 g Oral TID  pantoprazole (PROTONIX) 40 mg in 0.9% sodium chloride 10 mL injection  40 mg IntraVENous Q12H  
 venlafaxine-SR (EFFEXOR-XR) capsule 150 mg  150 mg Oral DAILY WITH BREAKFAST  [Held by provider] ziprasidone (GEODON) capsule 20 mg  20 mg Oral BID WITH MEALS Objective:  
Vital Signs:   
Visit Vitals /75 Pulse 98 Temp 99.9 °F (37.7 °C) Resp 23 Ht 5' 6\" (1.676 m) Wt 83.2 kg (183 lb 6.8 oz) SpO2 98% BMI 29.61 kg/m² O2 Device: Non-rebreather mask O2 Flow Rate (L/min): 4 l/min Temp (24hrs), Av.5 °F (36.9 °C), Min:97.9 °F (36.6 °C), Max:99.9 °F (37.7 °C) Intake/Output:  
Last shift:      No intake/output data recorded. Last 3 shifts:  1901 -  0700 In: 6256 [I.V.:6256] Out: Edwin Dial [[id number]] Intake/Output Summary (Last 24 hours) at 3/13/2021 1046 Last data filed at 3/13/2021 0400 Gross per 24 hour Intake 3585.08 ml Output 2950 ml Net 635.08 ml Last 3 Recorded Weights in this Encounter 21 0741 21 0921 21 Weight: 78.9 kg (174 lb) 78.9 kg (174 lb) 83.2 kg (183 lb 6.8 oz) Physical Exam:  
Patient awake, confused; appears chronically ill; on oxygen via nonrebreather mask; acyanotic; appears frail and older than stated age HEENT: pupils not dilated, reactive, severe bilateral scleral jaundice, dry oral mucosa, no nasal drainage; neck supple Neck: No adenopathy or thyroid swelling CVS: Heart sounds S1-S2; no murmur; JVD not elevated; telemetrysinus rhythm RS: Moderate air entry bilaterally; symmetrical breath sounds; no wheezing; mild bibasal crackles heard; not tachypneic or in distress Abd: Soft, distended with ascites; no tenderness, no guarding or rigidity; right-sided abdominal wall bruising, difficult to palpate liver; no obvious splenomegaly; bowel sounds present Neuro: Awake, confused and agitated this morning; moving all extremities with the exception of fractured left leg Extrm: no leg edema; mild bilateral upper extremity hand edema; no clubbing Skin: no rash; bruising in the right abdominal wall and right foot; spider nevi over the chest 
Lymphatic: no cervical or supraclavicular adenopathy Psych: Agitated and confused Vasc: DPs palpable in the lower extremities Data:  
   
Recent Results (from the past 24 hour(s)) GLUCOSE, POC Collection Time: 03/12/21 11:07 AM  
Result Value Ref Range Glucose (POC) 87 70 - 110 mg/dL HGB & HCT Collection Time: 03/12/21  4:15 PM  
Result Value Ref Range HGB 8.1 (L) 12.0 - 16.0 g/dL HCT 23.7 (L) 35.0 - 45.0 % POC G3 Collection Time: 03/12/21  4:53 PM  
Result Value Ref Range Device: Non rebreather Flow rate (POC) 12 L/M  
 FIO2 (POC) 100 % pH (POC) 7.38 7.35 - 7.45    
 pCO2 (POC) 38.2 35.0 - 45.0 MMHG  
 pO2 (POC) 86 80 - 100 MMHG  
 HCO3 (POC) 22.3 22 - 26 MMOL/L  
 sO2 (POC) 96 92 - 97 % Base deficit (POC) 3 mmol/L Allens test (POC) YES Site RIGHT RADIAL Specimen type (POC) ARTERIAL Performed by Cayetano Kumari GLUCOSE, POC Collection Time: 03/12/21  5:16 PM  
Result Value Ref Range Glucose (POC) 88 70 - 110 mg/dL GLUCOSE, POC Collection Time: 03/13/21 12:08 AM  
Result Value Ref Range Glucose (POC) 96 70 - 110 mg/dL PROTHROMBIN TIME + INR Collection Time: 03/13/21  4:30 AM  
Result Value Ref Range Prothrombin time 20.7 (H) 11.5 - 15.2 sec INR 1.8 (H) 0.8 - 1.2 MAGNESIUM Collection Time: 03/13/21  4:30 AM  
Result Value Ref Range Magnesium 1.9 1.6 - 2.6 mg/dL AMMONIA Collection Time: 03/13/21  4:30 AM  
Result Value Ref Range Ammonia 43 (H) 11 - 32 UMOL/L  
CBC WITH AUTOMATED DIFF Collection Time: 03/13/21  4:30 AM  
Result Value Ref Range WBC 5.2 4.6 - 13.2 K/uL  
 RBC 2.36 (L) 4.20 - 5.30 M/uL HGB 8.1 (L) 12.0 - 16.0 g/dL HCT 24.1 (L) 35.0 - 45.0 % .1 (H) 74.0 - 97.0 FL  
 MCH 34.3 (H) 24.0 - 34.0 PG  
 MCHC 33.6 31.0 - 37.0 g/dL  
 RDW 17.9 (H) 11.6 - 14.5 % PLATELET 83 (L) 245 - 420 K/uL MPV 10.8 9.2 - 11.8 FL  
 NEUTROPHILS 61 40 - 73 % LYMPHOCYTES 12 (L) 21 - 52 % MONOCYTES 26 (H) 3 - 10 % EOSINOPHILS 0 0 - 5 % BASOPHILS 1 0 - 2 %  
 ABS. NEUTROPHILS 3.2 1.8 - 8.0 K/UL  
 ABS. LYMPHOCYTES 0.6 (L) 0.9 - 3.6 K/UL  
 ABS. MONOCYTES 1.3 (H) 0.05 - 1.2 K/UL  
 ABS. EOSINOPHILS 0.0 0.0 - 0.4 K/UL  
 ABS. BASOPHILS 0.0 0.0 - 0.1 K/UL  
 DF AUTOMATED PHOSPHORUS Collection Time: 03/13/21  4:30 AM  
Result Value Ref Range Phosphorus 2.1 (L) 2.5 - 4.9 MG/DL  
METABOLIC PANEL, BASIC Collection Time: 03/13/21  4:30 AM  
Result Value Ref Range Sodium 142 136 - 145 mmol/L Potassium 3.0 (L) 3.5 - 5.5 mmol/L Chloride 110 100 - 111 mmol/L  
 CO2 24 21 - 32 mmol/L Anion gap 8 3.0 - 18 mmol/L Glucose 74 74 - 99 mg/dL BUN 11 7.0 - 18 MG/DL Creatinine 0.49 (L) 0.6 - 1.3 MG/DL  
 BUN/Creatinine ratio 22 (H) 12 - 20 GFR est AA >60 >60 ml/min/1.73m2 GFR est non-AA >60 >60 ml/min/1.73m2 Calcium 7.3 (L) 8.5 - 10.1 MG/DL  
GLUCOSE, POC Collection Time: 03/13/21  6:24 AM  
Result Value Ref Range Glucose (POC) 88 70 - 110 mg/dL Chemistry Recent Labs  
  03/13/21 
0430 03/12/21 
0602 03/11/21 3531 GLU 74 77 87  138 133* K 3.0* 3.9 4.4  
 108 105 CO2 24 22 23 BUN 11 15 19* CREA 0.49* 0.47* 0.38* CA 7.3* 7.4* 7.1*  
MG 1.9 2.2 1.9 PHOS 2.1* 1.7* 2.1* AGAP 8 8 5 BUCR 22* 32* 50* AP  --  124* 135* TP  --  5.3* 5.0* ALB  --  2.8* 2.2*  
GLOB  --  2.5 2.8 AGRAT  --  1.1 0.8 Lactic Acid Lactic acid Date Value Ref Range Status 05/10/2019 2.3 (HH) 0.4 - 2.0 MMOL/L Final  
  Comment:  
  CALLED TO AND CORRECTLY REPEATED BY: 
ZELALEM AGUAYO 3000 RN ON [id number] AT 0958 TO LL 
  
 
No results for input(s): LAC in the last 72 hours. Liver Enzymes Protein, total  
Date Value Ref Range Status 03/12/2021 5.3 (L) 6.4 - 8.2 g/dL Final  
 
Albumin Date Value Ref Range Status 03/12/2021 2.8 (L) 3.4 - 5.0 g/dL Final  
 
Globulin Date Value Ref Range Status 03/12/2021 2.5 2.0 - 4.0 g/dL Final  
 
A-G Ratio Date Value Ref Range Status 03/12/2021 1.1 0.8 - 1.7   Final  
 
Alk. phosphatase Date Value Ref Range Status 03/12/2021 124 (H) 45 - 117 U/L Final  
 
Recent Labs  
  03/12/21 
0602 03/11/21 
9623 TP 5.3* 5.0* ALB 2.8* 2.2*  
GLOB 2.5 2.8 AGRAT 1.1 0.8 * 135* CBC w/Diff Recent Labs  
  03/13/21 
0430 03/12/21 
1615 03/12/21 
0602 03/11/21 
4624 03/11/21 
2312 WBC 5.2  --  6.0  --  7.1 RBC 2.36*  --  2.45*  --  2.16* HGB 8.1* 8.1* 8.3*   < > 7.1*  
HCT 24.1* 23.7* 24.7*   < > 21.7* PLT 83*  --  71*  --  78* GRANS 61  --  61  --  63  
LYMPH 12*  --  15*  --  15* EOS 0  --  0  --  0  
 < > = values in this interval not displayed. Cardiac Enzymes No results found for: CPK, CK, CKMMB, CKMB, RCK3, CKMBT, CKNDX, CKND1, PAPO, TROPT, TROIQ, FIORDALIZA, TROPT, TNIPOC, BNP, BNPP  
 
BNP No results found for: BNP, BNPP, XBNPT Coagulation Recent Labs  
  03/13/21 
0430 03/12/21 
0602 03/11/21 
1001 PTP 20.7* 20.1* 21.2* INR 1.8* 1.8* 1.9* Thyroid  Lab Results Component Value Date/Time  TSH 1.45 09/10/2020 10:21 AM  
   
No results found for: T4  
 
Urinalysis Lab Results Component Value Date/Time Color DARK YELLOW 03/09/2021 08:15 AM  
 Appearance CLOUDY 03/09/2021 08:15 AM  
 Specific gravity 1.024 03/09/2021 08:15 AM  
 pH (UA) 5.0 03/09/2021 08:15 AM  
 Protein TRACE (A) 03/09/2021 08:15 AM  
 Glucose Negative 03/09/2021 08:15 AM  
 Ketone TRACE (A) 03/09/2021 08:15 AM  
 Bilirubin SMALL (A) 03/09/2021 08:15 AM  
 Urobilinogen 1.0 03/09/2021 08:15 AM  
 Nitrites Positive (A) 03/09/2021 08:15 AM  
 Leukocyte Esterase SMALL (A) 03/09/2021 08:15 AM  
 Epithelial cells FEW 03/09/2021 08:15 AM  
 Bacteria 2+ (A) 03/09/2021 08:15 AM  
 WBC 4 to 6 03/09/2021 08:15 AM  
 RBC 0 to 2 03/09/2021 08:15 AM  
  
 
 
 Ref. Range 3/10/2021 18:35 3/11/2021 00:37 3/11/2021 06:26 CK Latest Ref Range: 26 - 192 U/L 846 (H) 605 (H) 546 (H) CK-MB Index Latest Ref Range: 0.0 - 4.0 % 0.8 0.7 1.0 CK - MB Latest Ref Range: <3.6 ng/ml 6.7 (H) 4.4 (H) 5.4 (H) Troponin-I, Qt. Latest Ref Range: 0.0 - 0.045 NG/ML <0.02 <0.02 <0.02 EKG 3/9/2021 07:44 Diagnosis   Final  
Sinus tachycardia Abnormal ECG When compared with ECG of 08-JAN-2020 21:35,  
Rate faster Nonspecific T wave abnormality no longer evident in Anterior leads Culture data during this hospitalization. All Micro Results Procedure Component Value Units Date/Time CULTURE, BLOOD [[id number]] Collected: 03/10/21 2035 Order Status: Completed Specimen: Blood Updated: 03/13/21 0412 Special Requests: NO SPECIAL REQUESTS Culture result: NO GROWTH 3 DAYS     
 CULTURE, BLOOD [[id number]] Collected: 03/10/21 2045 Order Status: Completed Specimen: Blood Updated: 03/13/21 3011 Special Requests: NO SPECIAL REQUESTS Culture result: NO GROWTH 3 DAYS     
  
  
 
 
PFT June 2020 RESULTS: 
Flow volume loop has upward concavity suggestive of airway obstruction. Total expiratory time was 11.39 seconds.  Forced vital capacity was 3.15 liters, which is 96% of predicted. FEV1 was 1.78 liters, which is 70% of predicted. FEV1/FVC ratio was 57%. No significant response to bronchodilator on spirometry.  
Total lung was 1.87 liters, which is 99% of predicted. Residual volume is 1.72 liters, which is 92% of predicted.  
Diffusion capacity was 11, which is 45% of predicted.  IMPRESSION: 
1. Spirometry showed moderate airway obstruction. There was no bronchodilator response. 2.  Lung volumes did not show restriction or hyperinflation. 3.  There is moderate reduction in diffusion capacity.   
 
 
ECHO   Interpretation Summary Result status: Final result · LV: Estimated LVEF is 55 - 60%. Normal cavity size, wall thickness, systolic function (ejection fraction normal) and diastolic function. E'E= 11.03. Images report reviewed by me: 
CT 3/9 (Most Recent) (CT reviewed by me) Results from [facility] [place]counter encounter on 03/09/21 CT ABD PELV W CONT Narrative EXAM: CT of the abdomen and pelvis INDICATION: Fall with abdominal pain. Right-sided abdominal bruising. Cirrhosis. CT abdomen and pelvis 1/8/2020 COMPARISON: CT abdomen and pelvis 1/8/2020 TECHNIQUE: Axial CT imaging of the abdomen and pelvis was performed with 
intravenous contrast. Multiplanar reformats were generated. One or more dose reduction techniques were used on this CT: automated exposure 
control, adjustment of the mAs and/or kVp according to patient size, and 
iterative reconstruction techniques. The specific techniques used on this CT 
exam have been documented in the patient's electronic medical record. Digital 
Imaging and Communications in Medicine (DICOM) format image data are available 
to nonaffiliated external healthcare facilities or entities on a secure, media 
free, reciprocally searchable basis with patient authorization for at least a 
12-month period after this study. _______________ FINDINGS: 
 
LOWER CHEST: Unremarkable.  
 
LIVER, BILIARY: Cirrhotic morphology of the liver. No focal mass. No biliary 
dilation. Multiple gallstones are present. Gallbladder is distended. No 
gallbladder wall thickening. CBD appears normal. 
 
PANCREAS: Normal. 
 
SPLEEN: Normal. 
 
ADRENALS: Normal. 
 
KIDNEYS: Small hypodensity anterior lower pole right kidney likely represents a 
cyst. Kidneys are otherwise unremarkable. LYMPH NODES: No enlarged lymph nodes. GASTROINTESTINAL TRACT: No bowel dilation or wall thickening. Scattered colonic 
diverticula. No evidence of bowel obstruction. PELVIC ORGANS: Unremarkable. VASCULATURE: Patent and very enlarged umbilical vein extending caudally into the 
abdominal wall. This extends through an enlarged right rectus muscle which is 
consistent with acute rectus muscle hematoma. On image 108 there is a tiny area 
of high attenuation which may be external to the umbilical vein suggesting small 
area of venous bruising. Portal vein is patent. BONES: No acute or aggressive osseous abnormalities identified. Stable superior 
endplate compression deformity L4. 
 
OTHER: Enlarged right rectus muscle with mixed areas of high and low attenuation 
consistent with rectus muscle hematoma. This measures 21 cm craniocaudally and 6 
cm AP. Small to moderate ascites. Subcutaneous edematous change consistent with 
anasarca. 
 
_______________ Impression 1. Acute right rectus muscle hematoma with questionable small area of venous 
imaging from recanalized umbilical vein running through this muscle. No large 
areas of active extravasation. 2. Cirrhotic liver with ascites. 3. Cholelithiasis without evidence of acute cholecystitis. CT head 3/9/2021 FINDINGS: 
BRAIN AND POSTERIOR FOSSA: The sulci, folia, ventricles and basal cisterns are 
within normal limits for the patient's age. There is no intracranial hemorrhage, 
mass effect, or midline shift. There are no areas of abnormal parenchymal 
attenuation. EXTRA-AXIAL SPACES AND MENINGES: There are no abnormal extra-axial fluid 
collections. CALVARIUM: Intact. SINUSES: Minimal mucosal thickening of the ethmoid sinuses. Other visualized 
sinuses are clear. OTHER: Mastoid air cells are clear. IMPRESSION No evidence of acute intracranial injury, hemorrhage or fracture. Normal 
noncontrast CT brain. CXR reviewed by me: 
XR 3/12 (Most Recent). CXR  reviewed by me and compared with previous CXR Results from [facility] Encounter encounter on 03/10/21 XR CHEST PORT Narrative EXAM: XR CHEST PORT 
 
CLINICAL INDICATION/HISTORY: Shortness of breath 
-Additional: None COMPARISON: March 9, 2021 TECHNIQUE: Frontal view of the chest 
 
_______________ FINDINGS: 
 
HEART AND MEDIASTINUM: Stable appearing cardiac size and mediastinal contours. LUNGS AND PLEURAL SPACES: Rotated nature of the projection foreshortening the 
left hemithorax. Mild interval increase in the degree of pulmonary vascular 
prominence. No evidence of pneumothorax or pleural effusion. BONY THORAX AND SOFT TISSUES: No acute osseous abnormality 
 
_______________ Impression Mild interval increase in pulmonary vascular congestion interval from prior 
study. X-ray femur left 3/9 IMPRESSION 1. No acute osseous abnormality involving the left femur. 2. Comminuted, depressed medial tibial plateau fracture with metaphyseal 
extension and small volume lipohemarthrosis. X-ray 3/9 FINDINGS: 
AP and lateral views were performed. There is redemonstration of a comminuted 
medial tibial plateau fracture which is depressed. Lateral film shows 
significant posterior displacement of the large fragment. Distal tibia and the 
fibula are intact. Possible small hemarthrosis. IMPRESSION Comminuted, depressed, displaced medial tibial plateau fracture. Ultrasound abdomen 3/12 EXAM: Ultrasound abdomen limited INDICATION: Evaluate ascites COMPARISON: None.  
FINDINGS: 
4 quadrant survey was performed of the abdomen. Low volume ascites noted in the 
right upper and lower quadrants, trace ascites adjacent to the splenic tip, no 
ascites in the left lower quadrant. IMPRESSION Low volume ascites in the right abdomen. IMPRESSION:  
· Acute respiratory failure with hypoxemia · Aspiration pneumonia · Anemia · Cirrhosis · Abdominal wall hematoma · Alcohol withdrawal 
· Alcoholic hepatitis · Coagulopathy · Thrombocytopenia · Fracture left tibia · Alcohol abuse · COPD · Bipolar disorder · Patient Active Problem List  
Diagnosis Code  ETOH abuse F10.10  COPD (chronic obstructive pulmonary disease) (HCC) J44.9  Bipolar mood disorder ([facility] F31.9  Tobacco abuse Z72.0  Thrombocytopenia ([facility] D69.6  Alcohol withdrawal (HCC) F10.239  Anemia D64.9  Alcoholic cirrhosis of liver with ascites (HCC) K70.31  
 Abdominal wall hematoma S30. Charyl Ano  Alcoholic hepatitis with ascites K70.11  
 Coagulopathy (HCC) D68.9  Tibial fracture S82.209A · Code status: Full code RECOMMENDATIONS:  
Respiratory: Chest x-ray consistent with aspiration pneumoniamild bilateral interstitial infiltrates. Continue oxygen supplementationcurrently on nonrebreather mask since patient agitated and does not keep the nasal cannula in place, resulting in oxygen desaturations. Prior PFT showed FEV1 of 70% and DLCO 45%consistent with moderate/stage II COPD; continue nebulizer therapybudesonide and DuoNeb. Avoid LABA due to risk of tachycardia and atrial fibrillation. HOB 30 degree elevation all the time. Aggressive pulmonary toileting. Aspiration precautions. Incentive spirometrypatient not cooperative. CVS: Mild hypotension; patient remains on phenylephrine currently 60 mcg/min; continue to wean for systolic blood pressure greater than 95 mmHg; continue IV fluids and albumin. Negative troponins; EF normal; no pulmonary hypertension.  
ID: Aspiration pneumonia with hypotension; continue antibioticsZosyn and IV vancomycin. Blood cultures remain negative; continue current antibiotics for now. Hematology/Oncology: Patient has anemia likely due to chronic nutritional deficiency with alcoholism, and acute abdominal wall hematoma from recent fall; s/p 1 unit PRBC; hemoglobin remain stable8. 1 this morning; platelets [id number] today; INR 1.8chronic coagulopathy from cirrhotic liver. Monitor for bleeding; no GI bleeding; CT abdomen and shown right rectus muscle hematoma. Renal: Stable renal function and urine output; replace electrolytes per protocolreplace potassium and magnesium. GI/: Patient has Reyna catheter. Ultrasound abdomenlow volume ascites. Lactulose as tolerated to keep ammonia down; ammonia 43 today. Hepatology consult appreciated from Dr. Ana Cristina Higgins. Chip Rey Endocrine: Monitor BSstable; watch for hypoglycemia. Neurology: CT head nil acute; CIWA protocol with Ativan for alcohol withdrawal. 
Precedex infusion for alcohol withdrawal symptoms and DTs. Toxicology: Serum alcohol elevated on admission to [place] [id number] ER. Skin/Wound: Monitor right abdominal wall bruisingstable. IVF: Switch to D5NS 75 ml per hour; nutritional supplemental fluids; albumin infusions. Nutrition: Keep n.p.o. for now except sips of water due to high risk of aspirations. Prophylaxis: DVT Prophylaxis: SCD. GI Prophylaxis: Protonix. Restraints: none Lines/Tubes: PIVs 
PICC line 3/12medical necessity due to poor IV access; no signs of infection or bleeding or hematoma. Reyna: 3/10 (Medically necessary for strict input/output monitoring in critically ill patient, will remove it when not needed. Reyna bundle followed). Preop pulmonary risk assessment Patient has multiple comorbidities with moderate to high risk of ventilator dependence from pulmonary standpoint; patient at risk of getting worse with DTs; defer to timing of surgery to orthopedic surgeon.  
 
Advance Directive/Palliative Care: consulted; poor prognosis overall with high risk for cardiorespiratory failure, intubation, ventilator support, mortality. 
 
Will defer respective systems problem management to primary and other respective consultant and follow patient in ICU with primary and other medical team. 
Further recommendations will be based on the patient's response to recommended treatment and results of the investigation ordered. 
Quality Care: PPI, DVT prophylaxis, HOB elevated, Infection control all reviewed and addressed. 
 
Care of plan d/w RN, RT. 
 
High complexity decision making was performed during the evaluation of this patient at high risk for decompensation with multiple organ involvement. 
 
Total critical care time spent rendering care exclusive of procedures/family discussion/coordination of care: 40 minutes. 
 
Name Relation Home Work Mobile  
Presley Wilson Boyfriend [phone]  
Robles Manuela Daughter     
 
Reviewed notes from palliative care; patient's daughter is estranged with her mom; daughter declined to be healthcare surrogate to; patient has appointed boyfriend/fiancé Presley Wilson as medical power of .  
 
I have called and discussed with Presley Wilson on 3/12/2021.  Discussed in detail about patient's multiple medical issues including COPD, aspiration pneumonia, oxygen requirements, hypotension, fracture of the leg, immobility, risks of cardiorespiratory failure, ventilator support, alcohol withdrawal and DTs, anemia needing blood transfusions, abdominal wall bruising etc.  Discussed CODE STATUS in layman terms–during cardiorespiratory arrest, patient high risk of mortality, anoxic brain injury, ventilator dependence, prolonged ventilator support etc.; Mr. Wilson would not want her to suffer on life support, but currently CODE STATUS remains full code. 
 
  
 
 
·Please note: Voice-recognition software may have been used to generate this report, which may  have resulted in some phonetic-based errors in grammar and contents. Even though attempts were made to correct all the mistakes, some may have been missed, and remained in the body of the document. Norma Aldridge MD 
3/13/2021

## 2021-03-13 NOTE — PROGRESS NOTES
Hospitalist Progress Note Patient: Julita Morales MRN: [id number]  CSN: [id number] YOB: 1965  Age: 61 y.o. Sex: female DOA: 3/10/2021 LOS:  LOS: 3 days Assessment/Plan Principal Problem: 
  Alcohol withdrawal ([address] (3/10/2021) Active Problems: 
  Anemia (3/11/2021) Alcoholic cirrhosis of liver with ascites ([address] (3/11/2021) Abdominal wall hematoma (3/11/2021) Alcoholic hepatitis with ascites (3/11/2021) Coagulopathy ([address] (3/11/2021) Tibial fracture (3/11/2021) CC: 60 yo female, alcoholic, fell and fractured tibia, sent from [facility] where no ICU beds for active etoh withdrawal 
She is on precedex and pressor support Alcohol withdrawal -CIWA, precedex, banana bag daily. Abdominal wall hematoma, H&H stabilized. Will monitor pain and H&H 
 
COPD on home oxygen, 02 and nebs rotuine, add flonase for nasal congestion Bipolar disorder, etoh abuse-geodon Cirrhosis with ascites: IV albumin. Lactulose 3 times daily 
 
history of hep C: Seen by Dr. Cesar Dc. IV albumin for cirrhosis and HENRRY Tibial fracture-for surgery once medical issues more stabilized UTI, cx neg Coagulopathy-follow INR, under 2 now. State post RBC and FFP Severe anemia, blood loss: Monitor H&H transfuse as needed Hyponatremia, continue IVF, albumin, lasix for ascites and edema 
 
hypophosphatemia-NaPhos ordered Morbid obese with BMI of thirty-eight increased risk of mortality and mobility. Total time of care greater than 35 minutes Dispo: TBD Subjective: Pt was seen and examined with the nurse in the morning round. Remain in ICU; agitated and confused overnight. On Nonrebreather mask Review of systems Limited due to her mental status Objective:  
  
Visit Vitals /75 Pulse 98 Temp 98.4 °F (36.9 °C) Resp 23 Ht 5' 6\" (1.676 m) Wt 83.2 kg (183 lb 6.8 oz) SpO2 96% BMI 29.61 kg/m² Physical Exam: 
 
Gen: NAD, chronically ill appearing. Frail Heent:  MMM, NC, AT. Cor: s1s2 RRR. No MRG. PMI mid 5th intercostal space. Resp: Decreased breathing sound bilateral 
Abd: Distended with ascites. BS positive. No rebound or guarding. No masses. Ext: No edema or cyanosis. Intake and Output: 
Current Shift:  03/13 0701 - 03/13 1900 In: -  
Out: 7591 [IUADH:[id number]] Last three shifts:  03/11 1901 - 03/13 0700 In: 6256 [I.V.:6256] [facility] Humphries [id number] Labs: Results:  
   
Chemistry Recent Labs  
  03/13/21 0430 03/12/21 
0602 03/11/21 
3174 GLU 74 77 87  138 133* K 3.0* 3.9 4.4  
 108 105 CO2 24 22 23 BUN 11 15 19* CREA 0.49* 0.47* 0.38* CA 7.3* 7.4* 7.1* AGAP 8 8 5 BUCR 22* 32* 50* AP  --  124* 135* TP  --  5.3* 5.0* ALB  --  2.8* 2.2*  
GLOB  --  2.5 2.8 AGRAT  --  1.1 0.8 CBC w/Diff Recent Labs  
  03/13/21 
0430 03/12/21 
1615 03/12/21 
0602 03/11/21 
0515 03/11/21 
1475 WBC 5.2  --  6.0  --  7.1 RBC 2.36*  --  2.45*  --  2.16* HGB 8.1* 8.1* 8.3*   < > 7.1*  
HCT 24.1* 23.7* 24.7*   < > 21.7* PLT 83*  --  71*  --  78* GRANS 61  --  61  --  63  
LYMPH 12*  --  15*  --  15* EOS 0  --  0  --  0  
 < > = values in this interval not displayed. Cardiac Enzymes Recent Labs  
  03/11/21 
0626 03/11/21 
0037 * 605* CKND1 1.0 0.7 Coagulation Recent Labs  
  03/13/21 
0430 03/12/21 
0602 PTP 20.7* 20.1* INR 1.8* 1.8* Lipid Panel Lab Results Component Value Date/Time Cholesterol, total 205 (H) 09/10/2020 10:22 AM  
 HDL Cholesterol 87 (H) 09/10/2020 10:22 AM  
 LDL, calculated 109.2 (H) 09/10/2020 10:22 AM  
 VLDL, calculated 8.8 09/10/2020 10:22 AM  
 Triglyceride 44 09/10/2020 10:22 AM  
 CHOL/HDL Ratio 2.4 09/10/2020 10:22 AM  
  
BNP No results for input(s): BNPP in the last 72 hours. Liver Enzymes Recent Labs  
  03/12/21 
0602 TP 5.3* ALB 2.8* * Thyroid Studies Lab Results Component Value Date/Time TSH 1.45 09/10/2020 10:21 AM  
    
Procedures/imaging: see electronic medical records for all procedures/Xrays and details which were not copied into this note but were reviewed prior to creation of Plan Medications Reviewed Tomás Jones MD

## 2021-03-13 NOTE — PROGRESS NOTES
1900: Bedside and Verbal shift change report given to Jose Mc RN (oncoming nurse) by Carleen Harrison RN (offgoing nurse). Report included the following information SBAR, Kardex, Intake/Output, MAR, Recent Results, Med Rec Status, Cardiac Rhythm NSR/Sinus Tach and Alarm Parameters . 2000: Shift assessment completed. 0000: Reassessment completed. 0400: Reassessment completed. 0710: Bedside and Verbal shift change report given to DAYAN Craft RN (oncoming nurse) by Jose Mc RN (offgoing nurse). Report included the following information SBAR, Kardex, Procedure Summary, Intake/Output, MAR, Recent Results, Med Rec Status, Cardiac Rhythm NSR/Sinus Tach and Alarm Parameters .

## 2021-03-13 NOTE — PROGRESS NOTES
Progress Note Patient: Nils Dunlap MRN: [id number]  SSN: [id number] YOB: 1965  Age: 61 y.o. Sex: female Admit Date: 3/10/2021 Admit Diagnosis: Alcohol withdrawal ([facility] [address] [F10.239] Subjective:   
 
Patient is a 61year old female with left tibial plateau fracture after fall 3 days ago. Admitted to [facility] for management of acute etoh withdrawal with numerous underlying health issues. Objective:  
  
 
Temp (24hrs), Av.5 °F (36.9 °C), Min:97.9 °F (36.6 °C), Max:99.9 °F (37.7 °C) Body mass index is 29.61 kg/m². Patient Vitals for the past 12 hrs: 
 BP Temp Pulse Resp SpO2  
21 0817     98 % 21 0700  99.9 °F (37.7 °C)     
21 0400  98.8 °F (37.1 °C)     
21 0330 109/75  98 23 95 % 21 0300 (!) 105/59  98 21 96 % 21 0230 (!) 116/48  88 22 96 % 21 0215 (!) 111/52  90 22 97 % 21 0200 (!) 117/54  89 18 97 % 21 0145 116/61  91 21 97 % 21 0130 (!) 108/49  88 18 98 % 21 0115 (!) 112/54  88 18 97 % 21 0100 (!) 119/54  99 17 92 % 21 0030 (!) 126/58  (!) 125 25   
21 0015 (!) 111/57  (!) 125 25 90 % 21 0000 (!) 102/43 99.1 °F (37.3 °C) 89 22 96 % 21 2345 (!) 100/50  90 19 97 % 21 2300 (!) 117/51  93 22 96 % 21 2245 (!) 119/52  90 21 96 % 21 (!) 105/58  95 21 94 % 21 (!) 115/56  90 19 95 % 21 (!) 109/49  90 19 96 % 21 (!) 114/55  92 17 96 % 21 (!) 115/59  92 17 95 % Recent Labs  
  21 
0430 HGB 8.1* HCT 24.1* INR 1.8*   
K 3.0*  
 CO2 24 BUN 11  
CREA 0.49* GLU 74 Assessment/Plan: 1. Continue knee immobilizer/strict non-weight bearing 2. Ortho remains available for surgical management of fracture once patient is medically stable and cleared for surgery. Signed By: TAY Gómez  , 2021

## 2021-03-14 NOTE — PROGRESS NOTES
Comprehensive Nutrition Assessment Type and Reason for Visit: (P) Initial, NPO/clear liquid Nutrition Recommendations/Plan: Diet: Adv diet per MD; Avoid prolonged NPO diet order as it does not meet estimated needs Nutrition Assessment:  (P) pt admitted w/ ETOH withdrawl, Abdominal wall hematoma, cirrhosis w/ ascites, COPD, tibial fracture, UTI, coagulopathy, severe anemia, hypoantremia, hypophosphatemia Estimated Daily Nutrient Needs: 
Energy (kcal): (P) 2068; Weight Used for Energy Requirements: (P) Ideal 
Protein (g): (P) 35-59; Weight Used for Protein Requirements: (P) Ideal(0.6-1) Fluid (ml/day): (P) 2068; Method Used for Fluid Requirements: (P) 1 ml/kcal 
 
 
Nutrition Related Findings:  (P) K-2.9 Phos-1.5 ALT-76 AST-141 Meds: banana bag, humalog, lactulose, methylpredioslone, protonix, zofran, miralax potassium phosphorus; pt NPO in ICU Wounds:   
(P) None Current Nutrition Therapies: DIET NPO With [employer], With Sips of Clear Fluids, Except Meds Anthropometric Measures: 
Height:  (P) 5' 6\" (167.6 cm) Current Body Wt:  (P) 82.1 kg (181 lb) Admission Body Wt:      
Usual Body Wt:  (P) 63.5 kg (140 lb)(1/21/20) Ideal Body Wt:  (P) 130 lbs:  (P) 139.2 % Adjusted Body Weight:   ; Weight Adjustment for:    
Adjusted BMI:      
BMI Category:       
 
Nutrition Diagnosis: (P) Inadequate oral intake related to (P) cognitive or neurological impairment as evidenced by (P) NPO or clear liquid status due to medical condition Nutrition Interventions:  
Food and/or Nutrient Delivery: (P) Start oral diet Nutrition Education and Counseling: (P) No recommendations at this time Coordination of Nutrition Care: (P) Continue to monitor while inpatient Goals: (P) Adv diet per MD in the next 1-3days Nutrition Monitoring and Evaluation:  
Behavioral-Environmental Outcomes:   
Food/Nutrient Intake Outcomes: (P) Diet advancement/tolerance Physical Signs/Symptoms Outcomes: (P) Biochemical data, Skin, Weight, GI status, Fluid status or edema Discharge Planning:   
(P) Too soon to determine Electronically signed by Trupti Eugene on 3/14/2021 at 1:07 PM

## 2021-03-14 NOTE — PROGRESS NOTES
1900: Bedside and Verbal shift change report given to Ivonne Curran RN (oncoming nurse) by Talon Felipe RN (offgoing nurse). Report included the following information SBAR, Kardex, Intake/Output, MAR, Recent Results, Med Rec Status, Cardiac Rhythm NSR and Alarm Parameters . 2000: Shift assessment completed. Pt A&Ox3 disoriented to time with hallucinations. Pt in bilateral soft wrist restraints. PICC in right basilic and PIV 68N in left AC. 
 
0000: Reassessment completed. Pt agitated, SEE MAR. After redirection, she stated she has night terrors. [employer]INGS 
 
0400: Reassessment completed. 0715: Bedside and Verbal shift change report given to Kristy Gallagher RN (oncoming nurse) by Ivonne Curran RN (offgoing nurse). Report included the following information SBAR, Kardex, Intake/Output, MAR, Recent Results, Med Rec Status, Cardiac Rhythm NSR and Alarm Parameters .

## 2021-03-14 NOTE — PROGRESS NOTES
[facility] Pulmonary, Critical Care, and Sleep Medicine Name: Yonathan Levy MRN: [id number] : 1965 Hospital: [facility] [place] Date: 3/14/2021  Room: [address] [place] [address] Note Consult requesting physician: Dr. Demond Bateman Reason for Consult: Alcohol withdrawal, fracture tibia, cirrhosis Subjective/History of Present Illness:  
 
Patient is a 61 y.o. female with PMHx significant for alcohol abuse and cirrhosis. The patient came to ED at [facility] on 3/9 morning with fall and left leg pains. Patient reportedly had vertigo and fell in the bathroom. She did not lose consciousness. She had bruising in her abdomen from a previous fall few days ago per ER report. CT head was nil acute. CT abdomen/pelvis showed evidence of right rectus muscle hematoma and evidence of cirrhosis with ascites. X-ray showed left proximal tibia fracture. Subsequently, patient went into alcohol withdrawal with tachycardia and agitation needing Ativan per [facility] protocol. She was on 2 L oxygen in the ER. Rapid Covid test was negative. Due to lack of beds, patient stayed in the ER and subsequently transferred yesterday evening [3/10] to THE [facility] ICU. 
 
3/14/2021 Patient in ICU; episodes of agitation and confusion. Patient on Precedex drip, and Ativan as needed. Patient remains on nonrebreather mask; oxygenation stable when not agitated and sleepy; episodic shortness of breath symptoms during agitation. Afebrile, blood pressure stable, telemetrysinus rhythm. Urine output3.3 L yesterday. No acute issues overnight. No reports of chest pain or vomiting or hematemesis or rectal bleeding. Patient has significant bruising right-sided abdomen. Dripsphenylephrine 70 mcg/min; Precedex 1.1 mcg/kg/h; banana bag 75 mL per hour.  
 
 
Social historychronic alcoholic; drinking mostly wine and beer.  
 
 
Review of symptomslimited due to patient condition Allergies Allergen Reactions  Animal Dander Itching  Egg Nausea and Vomiting Pt stated she does not have true allergy to eggs. Past Medical History:  
Diagnosis Date  Bipolar affective ([address]  Chronic obstructive pulmonary disease ([address]  Cirrhosis ([address]  Encounter for screening colonoscopy  ETOH abuse  Fall at home 2020  
 hitting head & right eye (bruised); did not seek medical attention  Former smoker 1 PPD x40 years  History of ascites  History of sinusitis  Hyperlipidemia 2009 Patient denies  Left breast mass 2011 U/S Left Breast: No definite mass identified. Recommended recheck in 6 months  Manic depression ([address]  On home O2   
 as needed  Panic disorder  Vitamin D insufficiency 2009  
 23 ng/mL  Wrist fracture, right 2010 Non-Displaced Distal Radius/Ulnar Styloid Fx Past Surgical History:  
Procedure Laterality Date  COLONOSCOPY N/A 2020 SCREENING COLONOSCOPY with bx and endoclip x 1 performed by Rustam Levy MD at [facility] HX ENDOSCOPY    
  [facility] Social History Tobacco Use  Smoking status: Former Smoker Packs/day: 1.00 Years: 40.00 Pack years: 40.00 Quit date: 2020 Years since quittin.1  Smokeless tobacco: Former User Quit date: 2016 Substance Use Topics  Alcohol use: Not Currently Comment: Quit 2020 Family History Problem Relation Age of Onset  No Known Problems Daughter  No Known Problems Adoptive Father  No Known Problems Adoptive Mother Prior to Admission medications Medication Sig Start Date End Date Taking?  Authorizing Provider  
ziprasidone (GEODON) 20 mg capsule BID 20   Provider, Historical  
cholecalciferol, vitamin d3, 10 mcg (400 unit) cap Vitamin D3 10 mcg (400 unit) capsule    Provider, Historical zolpidem (AMBIEN) 5 mg tablet zolpidem 5 mg tablet    Provider, Historical  
venlafaxine-SR (EFFEXOR-XR) 150 mg capsule venlafaxine  mg capsule,extended release 24 hr Take 1 capsule every day by oral route. Provider, Historical  
montelukast (SINGULAIR) 10 mg tablet montelukast 10 mg tablet    Provider, Historical  
fluticasone/umeclidin/vilanter (TRELEGY ELLIPTA IN) Take 1 Puff by inhalation daily. Provider, Historical  
Oxygen as needed. 2L/NC    Provider, Historical  
lactulose (CHRONULAC) 10 gram/15 mL solution Take 2 tsp by mouth two (2) times a day. Provider, Historical  
budesonide-formoteroL (SYMBICORT) 160-4.5 mcg/actuation HFAA Take 2 Puffs by inhalation two (2) times a day. 2/14/20   Merry Bueno NP  
ipratropium (ATROVENT HFA) 17 mcg/actuation inhaler Take 1 Puff by inhalation every four (4) hours as needed for Wheezing. 2/14/20   Merry Bueno NP  
albuterol (PROVENTIL HFA) 90 mcg/actuation inhaler Take 1-2 Puffs by inhalation. 10/21/18   Provider, Historical  
omeprazole (PRILOSEC) 40 mg capsule Take 20 mg by mouth daily. Provider, Historical  
magnesium oxide (MAG-OX) 400 mg tablet Take 1 Tab by mouth daily. 5/10/19   Vane Wolf MD  
thiamine HCL (B-1) 100 mg tablet Take 1 Tab by mouth daily. 5/10/19   Vane Wolf MD  
 
Current Facility-Administered Medications Medication Dose Route Frequency  potassium chloride 10 mEq in 100 ml IVPB  10 mEq IntraVENous Q1H  
 [START ON 3/15/2021] VANCOMYCIN TROUGH DRAW 03/15/21 0730  1 Each Other ONCE  
 dextrose 5% and 0.9% NaCl infusion  75 mL/hr IntraVENous CONTINUOUS  
 0.9% sodium chloride 1,000 mL with mvi, adult no. 4 with vit K 10 mL, thiamine 168 mg, folic acid 1 mg infusion   IntraVENous DAILY  fluticasone propionate (FLONASE) 50 mcg/actuation nasal spray 2 Spray  2 Spray Both Nostrils DAILY  vancomycin (VANCOCIN) 1250 mg in  ml infusion  1,250 mg IntraVENous Q12H  Vancomycin - Rx to dose and monitor  1 Each Other Rx Dosing/Monitoring  piperacillin-tazobactam (ZOSYN) 3.375 g in 0.9% sodium chloride (MBP/ADV) 100 mL MBP  3.375 g IntraVENous Q8H  
 budesonide (PULMICORT) 500 mcg/2 ml nebulizer suspension  500 mcg Nebulization BID RT  
 albuterol-ipratropium (DUO-NEB) 2.5 MG-0.5 MG/3 ML  3 mL Nebulization QID RT  
 ziprasidone (GEODON) 20 mg in sterile water (preservative free) 1 mL injection  20 mg IntraMUSCular BID  PHENYLephrine (ISAMAR-SYNEPHRINE) 30 mg in 0.9% sodium chloride 250 mL infusion   mcg/min IntraVENous TITRATE  dexmedeTOMidine (PRECEDEX) 400 mcg in 0.9% sodium chloride 100 mL infusion  0.1-1.5 mcg/kg/hr IntraVENous TITRATE  sodium chloride (NS) flush 5-40 mL  5-40 mL IntraVENous Q8H  
 albumin human 25% (BUMINATE) solution 12.5 g  12.5 g IntraVENous Q6H  
 lactulose (CHRONULAC) 10 gram/15 mL solution 30 mL  20 g Oral TID  pantoprazole (PROTONIX) 40 mg in 0.9% sodium chloride 10 mL injection  40 mg IntraVENous Q12H  
 venlafaxine-SR (EFFEXOR-XR) capsule 150 mg  150 mg Oral DAILY WITH BREAKFAST  [Held by provider] ziprasidone (GEODON) capsule 20 mg  20 mg Oral BID WITH MEALS Objective:  
Vital Signs:   
Visit Vitals BP (!) 120/58 Pulse (!) 103 Temp 97.7 °F (36.5 °C) Resp 23 Ht 5' 6\" (1.676 m) Wt 82.3 kg (181 lb 7 oz) SpO2 91% BMI 29.28 kg/m² O2 Device: Non-rebreather mask O2 Flow Rate (L/min): 15 l/min Temp (24hrs), Av.2 °F (36.8 °C), Min:97.7 °F (36.5 °C), Max:98.6 °F (37 °C) Intake/Output:  
Last shift:      No intake/output data recorded. Last 3 shifts:  1901 -  0700 In: 2297 [I.V.:2297] Out: 3900 [RKOPJ:0937] Intake/Output Summary (Last 24 hours) at 3/14/2021 1041 Last data filed at 3/14/2021 0400 Gross per 24 hour Intake 2297 ml Output 2725 ml Net -428 ml Last 3 Recorded Weights in this Encounter 21 Weight: 78.9 kg (174 lb) 83.2 kg (183 lb 6.8 oz) 82.3 kg (181 lb 7 oz) Physical Exam:  
Patient somnolent; arousable; baseline confused; appears chronically ill; on oxygen via nonrebreather mask; acyanotic; appears frail and older than stated age HEENT: pupils not dilated, reactive, severe bilateral scleral jaundice, dry oral mucosa, no nasal drainage; neck supple Neck: No adenopathy or thyroid swelling CVS: S1-S2; no murmurs; telemetrysinus rhythm; JVD not elevated RS: Symmetrical breath sounds; moderate air entry bilaterally; bilateral wheezing today; mild bibasal crackles; not tachypneic or in distress currently Abd: Soft, no worsening distention, baseline abdominal distention with ascites; nontender, no guarding or rigidity; right-sided abdominal wall bruising; no obvious hepatomegaly or splenomegaly; bowel sounds heard Neuro: Somnolent; arousable; baseline confused; moving all extremities with the exception of fractured left leg Extrm: no leg edema; mild bilateral upper extremity hand edema; no clubbing Skin: no rash; bruising in the right abdominal wall and right foot; spider nevi over the chest 
Lymphatic: no cervical or supraclavicular adenopathy Psych: Baseline agitated and confused episodically Vasc: DPs palpable in the lower extremities Data:  
   
Recent Results (from the past 24 hour(s)) GLUCOSE, POC Collection Time: 03/13/21 11:46 AM  
Result Value Ref Range Glucose (POC) 90 70 - 110 mg/dL CALCIUM, IONIZED Collection Time: 03/13/21 12:00 PM  
Result Value Ref Range Ionized Calcium 1.04 (L) 1.12 - 1.32 MMOL/L  
HGB & HCT Collection Time: 03/13/21  3:35 PM  
Result Value Ref Range HGB 8.0 (L) 12.0 - 16.0 g/dL HCT 25.2 (L) 35.0 - 45.0 % GLUCOSE, POC Collection Time: 03/13/21  5:53 PM  
Result Value Ref Range Glucose (POC) 97 70 - 110 mg/dL GLUCOSE, POC Collection Time: 03/14/21 12:18 AM  
Result Value Ref Range Glucose (POC) 100 70 - 110 mg/dL PROTHROMBIN TIME + INR  
 Collection Time: 03/14/21  5:30 AM  
Result Value Ref Range Prothrombin time 20.8 (H) 11.5 - 15.2 sec INR 1.8 (H) 0.8 - 1.2 MAGNESIUM Collection Time: 03/14/21  5:30 AM  
Result Value Ref Range Magnesium 1.8 1.6 - 2.6 mg/dL AMMONIA Collection Time: 03/14/21  5:30 AM  
Result Value Ref Range Ammonia 50 (H) 11 - 32 UMOL/L  
PHOSPHORUS Collection Time: 03/14/21  5:30 AM  
Result Value Ref Range Phosphorus 1.5 (L) 2.5 - 4.9 MG/DL  
METABOLIC PANEL, BASIC Collection Time: 03/14/21  5:30 AM  
Result Value Ref Range Sodium 145 136 - 145 mmol/L Potassium 2.9 (LL) 3.5 - 5.5 mmol/L Chloride 112 (H) 100 - 111 mmol/L  
 CO2 24 21 - 32 mmol/L Anion gap 9 3.0 - 18 mmol/L Glucose 90 74 - 99 mg/dL BUN 7 7.0 - 18 MG/DL Creatinine 0.53 (L) 0.6 - 1.3 MG/DL  
 BUN/Creatinine ratio 13 12 - 20 GFR est AA >60 >60 ml/min/1.73m2 GFR est non-AA >60 >60 ml/min/1.73m2 Calcium 7.6 (L) 8.5 - 10.1 MG/DL  
CBC WITH AUTOMATED DIFF Collection Time: 03/14/21  5:30 AM  
Result Value Ref Range WBC 5.2 4.6 - 13.2 K/uL  
 RBC 2.48 (L) 4.20 - 5.30 M/uL HGB 8.4 (L) 12.0 - 16.0 g/dL HCT 25.5 (L) 35.0 - 45.0 % .8 (H) 74.0 - 97.0 FL  
 MCH 33.9 24.0 - 34.0 PG  
 MCHC 32.9 31.0 - 37.0 g/dL  
 RDW 18.6 (H) 11.6 - 14.5 % PLATELET 74 (L) 556 - 420 K/uL MPV 10.2 9.2 - 11.8 FL  
 NEUTROPHILS 64 40 - 73 % LYMPHOCYTES 14 (L) 21 - 52 % MONOCYTES 21 (H) 3 - 10 % EOSINOPHILS 0 0 - 5 % BASOPHILS 1 0 - 2 %  
 ABS. NEUTROPHILS 3.3 1.8 - 8.0 K/UL  
 ABS. LYMPHOCYTES 0.7 (L) 0.9 - 3.6 K/UL  
 ABS. MONOCYTES 1.1 0.05 - 1.2 K/UL  
 ABS. EOSINOPHILS 0.0 0.0 - 0.4 K/UL  
 ABS. BASOPHILS 0.1 0.0 - 0.1 K/UL  
 DF AUTOMATED    
GLUCOSE, POC Collection Time: 03/14/21  6:13 AM  
Result Value Ref Range Glucose (POC) 104 70 - 110 mg/dL Chemistry Recent Labs  
  03/14/21 
0530 03/13/21 
0430 03/12/21 
0602 GLU 90 74 77  142 138  
K 2.9* 3.0* 3.9 * 110 108  
CO2 24 24 22 BUN 7 11 15 CREA 0.53* 0.49* 0.47* CA 7.6* 7.3* 7.4* MG 1.8 1.9 2.2 PHOS 1.5* 2.1* 1.7* AGAP 9 8 8 BUCR 13 22* 32* AP  --   --  124* TP  --   --  5.3* ALB  --   --  2.8*  
GLOB  --   --  2.5 AGRAT  --   --  1.1 Lactic Acid Lactic acid Date Value Ref Range Status 05/10/2019 2.3 (HH) 0.4 - 2.0 MMOL/L Final  
  Comment:  
  CALLED TO AND CORRECTLY REPEATED BY: 
ZELALEM Swain RN ON [id number] AT 0958 TO  
  
 
No results for input(s): LAC in the last 72 hours. Liver Enzymes Protein, total  
Date Value Ref Range Status 03/12/2021 5.3 (L) 6.4 - 8.2 g/dL Final  
 
Albumin Date Value Ref Range Status 03/12/2021 2.8 (L) 3.4 - 5.0 g/dL Final  
 
Globulin Date Value Ref Range Status 03/12/2021 2.5 2.0 - 4.0 g/dL Final  
 
A-G Ratio Date Value Ref Range Status 03/12/2021 1.1 0.8 - 1.7   Final  
 
Alk. phosphatase Date Value Ref Range Status 03/12/2021 124 (H) 45 - 117 U/L Final  
 
Recent Labs  
  03/12/21 
0602 TP 5.3* ALB 2.8*  
GLOB 2.5 AGRAT 1.1 * CBC w/Diff Recent Labs  
  03/14/21 
0530 03/13/21 
1535 03/13/21 
0430 03/12/21 
0602 03/12/21 
0602 WBC 5.2  --  5.2  --  6.0  
RBC 2.48*  --  2.36*  --  2.45* HGB 8.4* 8.0* 8.1*   < > 8.3* HCT 25.5* 25.2* 24.1*   < > 24.7*  
PLT 74*  --  83*  --  71* GRANS 64  --  61  --  61  
LYMPH 14*  --  12*  --  15* EOS 0  --  0  --  0  
 < > = values in this interval not displayed. Cardiac Enzymes No results found for: CPK, CK, CKMMB, CKMB, RCK3, CKMBT, CKNDX, CKND1, PAPO, TROPT, TROIQ, FIORDALIZA, TROPT, TNIPOC, BNP, BNPP  
 
BNP No results found for: BNP, BNPP, XBNPT Coagulation Recent Labs  
  03/14/21 
0530 03/13/21 
0430 03/12/21 
0602 PTP 20.8* 20.7* 20.1* INR 1.8* 1.8* 1.8* Thyroid  Lab Results Component Value Date/Time TSH 1.45 09/10/2020 10:21 AM  
   
No results found for: T4  
 
Urinalysis Lab Results Component Value Date/Time  Color DARK YELLOW 03/09/2021 08:15 AM  
 Appearance CLOUDY 03/09/2021 08:15 AM  
 Specific gravity 1.024 03/09/2021 08:15 AM  
 pH (UA) 5.0 03/09/2021 08:15 AM  
 Protein TRACE (A) 03/09/2021 08:15 AM  
 Glucose Negative 03/09/2021 08:15 AM  
 Ketone TRACE (A) 03/09/2021 08:15 AM  
 Bilirubin SMALL (A) 03/09/2021 08:15 AM  
 Urobilinogen 1.0 03/09/2021 08:15 AM  
 Nitrites Positive (A) 03/09/2021 08:15 AM  
 Leukocyte Esterase SMALL (A) 03/09/2021 08:15 AM  
 Epithelial cells FEW 03/09/2021 08:15 AM  
 Bacteria 2+ (A) 03/09/2021 08:15 AM  
 WBC 4 to 6 03/09/2021 08:15 AM  
 RBC 0 to 2 03/09/2021 08:15 AM  
  
 
 
 Ref. Range 3/10/2021 18:35 3/11/2021 00:37 3/11/2021 06:26 CK Latest Ref Range: 26 - 192 U/L 846 (H) 605 (H) 546 (H) CK-MB Index Latest Ref Range: 0.0 - 4.0 % 0.8 0.7 1.0 CK - MB Latest Ref Range: <3.6 ng/ml 6.7 (H) 4.4 (H) 5.4 (H) Troponin-I, Qt. Latest Ref Range: 0.0 - 0.045 NG/ML <0.02 <0.02 <0.02 EKG 3/9/2021 07:44 Diagnosis   Final  
Sinus tachycardia Abnormal ECG When compared with ECG of 08-JAN-2020 21:35,  
Rate faster Nonspecific T wave abnormality no longer evident in Anterior leads Culture data during this hospitalization. All Micro Results Procedure Component Value Units Date/Time CULTURE, BLOOD [[id number]] Collected: 03/10/21 2135 Order Status: Completed Specimen: Blood Updated: 03/14/21 0710 Special Requests: NO SPECIAL REQUESTS Culture result: NO GROWTH 4 DAYS     
 CULTURE, BLOOD [[id number]] Collected: 03/10/21 2145 Order Status: Completed Specimen: Blood Updated: 03/14/21 0710 Special Requests: NO SPECIAL REQUESTS Culture result: NO GROWTH 4 DAYS     
  
  
 
 
PFT June 2020 RESULTS: 
Flow volume loop has upward concavity suggestive of airway obstruction. Total expiratory time was 11.39 seconds.  Forced vital capacity was 3.15 liters, which is 96% of predicted. FEV1 was 1.78 liters, which is 70% of predicted.   FEV1/FVC ratio was 57%.  No significant response to bronchodilator on spirometry.  
Total lung was 1.87 liters, which is 99% of predicted. Residual volume is 1.72 liters, which is 92% of predicted.  
Diffusion capacity was 11, which is 45% of predicted.  IMPRESSION: 
1. Spirometry showed moderate airway obstruction. There was no bronchodilator response. 2.  Lung volumes did not show restriction or hyperinflation. 3.  There is moderate reduction in diffusion capacity.   
 
 
ECHO   Interpretation Summary Result status: Final result · LV: Estimated LVEF is 55 - 60%. Normal cavity size, wall thickness, systolic function (ejection fraction normal) and diastolic function. E'E= 11.03. Images report reviewed by me: 
CT 3/9 (Most Recent) (CT reviewed by me) [place] [facility] [place]/21 CT ABD PELV W CONT Narrative EXAM: CT of the abdomen and pelvis INDICATION: Fall with abdominal pain. Right-sided abdominal bruising. Cirrhosis. CT abdomen and pelvis 1/8/2020 COMPARISON: CT abdomen and pelvis 1/8/2020 TECHNIQUE: Axial CT imaging of the abdomen and pelvis was performed with 
intravenous contrast. Multiplanar reformats were generated. One or more dose reduction techniques were used on this CT: automated exposure 
control, adjustment of the mAs and/or kVp according to patient size, and 
iterative reconstruction techniques. The specific techniques used on this CT 
exam have been documented in the patient's electronic medical record. Digital 
Imaging and Communications in Medicine (DICOM) format image data are available 
to nonaffiliated external healthcare facilities or entities on a secure, media 
free, reciprocally searchable basis with patient authorization for at least a 
12-month period after this study. _______________ FINDINGS: 
 
LOWER CHEST: Unremarkable. LIVER, BILIARY: Cirrhotic morphology of the liver. No focal mass. No biliary 
dilation.  Multiple gallstones are present. Gallbladder is distended. No 
gallbladder wall thickening. CBD appears normal. 
 
PANCREAS: Normal. 
 
SPLEEN: Normal. 
 
ADRENALS: Normal. 
 
KIDNEYS: Small hypodensity anterior lower pole right kidney likely represents a 
cyst. Kidneys are otherwise unremarkable. LYMPH NODES: No enlarged lymph nodes. GASTROINTESTINAL TRACT: No bowel dilation or wall thickening. Scattered colonic 
diverticula. No evidence of bowel obstruction. PELVIC ORGANS: Unremarkable. VASCULATURE: Patent and very enlarged umbilical vein extending caudally into the 
abdominal wall. This extends through an enlarged right rectus muscle which is 
consistent with acute rectus muscle hematoma. On image 108 there is a tiny area 
of high attenuation which may be external to the umbilical vein suggesting small 
area of venous bruising. Portal vein is patent. BONES: No acute or aggressive osseous abnormalities identified. Stable superior 
endplate compression deformity L4. 
 
OTHER: Enlarged right rectus muscle with mixed areas of high and low attenuation 
consistent with rectus muscle hematoma. This measures 21 cm craniocaudally and 6 
cm AP. Small to moderate ascites. Subcutaneous edematous change consistent with 
anasarca. 
 
_______________ Impression 1. Acute right rectus muscle hematoma with questionable small area of venous 
imaging from recanalized umbilical vein running through this muscle. No large 
areas of active extravasation. 2. Cirrhotic liver with ascites. 3. Cholelithiasis without evidence of acute cholecystitis. CT head 3/9/2021 FINDINGS: 
BRAIN AND POSTERIOR FOSSA: The sulci, folia, ventricles and basal cisterns are 
within normal limits for the patient's age. There is no intracranial hemorrhage, 
mass effect, or midline shift. There are no areas of abnormal parenchymal 
attenuation. EXTRA-AXIAL SPACES AND MENINGES: There are no abnormal extra-axial fluid 
collections. CALVARIUM: Intact. SINUSES: Minimal mucosal thickening of the ethmoid sinuses. Other visualized 
sinuses are clear. OTHER: Mastoid air cells are clear. IMPRESSION No evidence of acute intracranial injury, hemorrhage or fracture. Normal 
noncontrast CT brain. CXR reviewed by me: 
XR 3/12 (Most Recent). CXR  reviewed by me and compared with previous CXR Results from [facility] [place] Encounter encounter on 03/10/21 XR CHEST PORT Narrative EXAM: XR CHEST PORT 
 
CLINICAL INDICATION/HISTORY: Shortness of breath 
-Additional: None COMPARISON: March 9, 2021 TECHNIQUE: Frontal view of the chest 
 
_______________ FINDINGS: 
 
HEART AND MEDIASTINUM: Stable appearing cardiac size and mediastinal contours. LUNGS AND PLEURAL SPACES: Rotated nature of the projection foreshortening the 
left hemithorax. Mild interval increase in the degree of pulmonary vascular 
prominence. No evidence of pneumothorax or pleural effusion. BONY THORAX AND SOFT TISSUES: No acute osseous abnormality 
 
_______________ Impression Mild interval increase in pulmonary vascular congestion interval from prior 
study. X-ray femur left 3/9 IMPRESSION 1. No acute osseous abnormality involving the left femur. 2. Comminuted, depressed medial tibial plateau fracture with metaphyseal 
extension and small volume lipohemarthrosis. X-ray 3/9 FINDINGS: 
AP and lateral views were performed. There is redemonstration of a comminuted 
medial tibial plateau fracture which is depressed. Lateral film shows 
significant posterior displacement of the large fragment. Distal tibia and the 
fibula are intact. Possible small hemarthrosis. IMPRESSION Comminuted, depressed, displaced medial tibial plateau fracture. Ultrasound abdomen 3/12 EXAM: Ultrasound abdomen limited INDICATION: Evaluate ascites COMPARISON: None. FINDINGS: 
4 quadrant survey was performed of the abdomen.  Low volume ascites noted in the 
right upper and lower quadrants, trace ascites adjacent to the splenic tip, no 
ascites in the left lower quadrant. IMPRESSION Low volume ascites in the right abdomen. IMPRESSION:  
· Acute respiratory failure with hypoxemia · Aspiration pneumonia · Anemia · Cirrhosis · Abdominal wall hematoma · Alcohol withdrawal 
· Alcoholic hepatitis · Coagulopathy · Thrombocytopenia · Fracture left tibia · Alcohol abuse · COPD · Bipolar disorder · Patient Active Problem List  
Diagnosis Code  ETOH abuse F10.10  COPD (chronic obstructive pulmonary disease) (HCC) J44.9  Bipolar mood disorder ([facility] F31.9  Tobacco abuse Z72.0  Thrombocytopenia ([facility] D69.6  Alcohol withdrawal (HCC) F10.239  Anemia D64.9  Alcoholic cirrhosis of liver with ascites (HCC) K70.31  
 Abdominal wall hematoma S30. Helyn Marcelino  Alcoholic hepatitis with ascites K70.11  
 Coagulopathy (HCC) D68.9  Tibial fracture S82.209A · Code status: Full code RECOMMENDATIONS:  
Respiratory: Chest x-ray consistent with aspiration pneumoniamild bilateral interstitial infiltrates; plan for repeating chest x-ray tomorrow. Patient wheezing todayadded Solu-Medrol 60 mg every 6 hours; continue bronchodilatorsDuoNeb and budesonide. Continue oxygen supplemental therapy; patient needing nonrebreather mask to keep O2 sats greater than 90%; patient not tolerating high flow nasal cannula oxygen due to episodic agitation. Prior PFT showed FEV1 of 70% and DLCO 45%; patient clinically has advanced COPD. Avoid LABA due to risk of tachycardia and atrial fibrillation. HOB 30 degree elevation all the time. Aspiration precautions. Incentive spirometrypatient not cooperative. CVS: Patient remains mildly hypotensive; on phenylephrinecurrently at 70 mcg/min; wean pressor for systolic blood pressure >47 mmHg; continue albumin infusions. Negative troponins; EF normal; no pulmonary hypertension. Lasix 20 mg 1 dose today.  
ID: Continue antibiotics for aspiration pneumonia; Zosyn and IV vancomycin; complete 7 to 10 days based on clinical and radiological improvement; check chest x-ray tomorrow. Blood cultures reviewedremain negative. Hematology/Oncology: Patient has anemia likely due to chronic nutritional deficiency with alcoholism, and acute abdominal wall hematoma from recent fall; s/p 1 unit PRBC. Stable hemoglobin8. 4 this morning; platelets WHRZHR54 K this morning; no active bleeding issues. INR remains mildly elevated1.8 todaychronic coagulopathy from cirrhotic liver. No active GI bleeding; CT abdomen had shown right rectus muscle hematoma. Renal: Stable renal function; urine output good; potassium being replaced today; replace phosphorus and magnesium as well. GI/: Patient has Reyna catheter. Ultrasound abdomenlow volume ascites. Poor oral intake due to DTs; continue Lactulose as tolerated to keep ammonia down; ammonia 50 today. Hepatology consult appreciated from Dr. Sam Lombardi. Iza Miramontes Check LFTs and ammonia daily. Endocrine: Monitor BSstable; risk of hyperglycemia with addition of steroids today; sliding scale insulin added. Neurology: CT head nil acute; CIWA protocol with Ativan for alcohol withdrawal. 
Precedex infusion for alcohol withdrawal symptoms and DTs. Toxicology: Serum alcohol elevated on admission to [place] [id number] ER. Skin/Wound: Monitor right abdominal wall bruisingstable. IVF: Daily nutritional fluids at 50 mL/h; albumin infusions. Nutrition: Keep n.p.o. for now except sips of water due to high risk of aspirations. Prophylaxis: DVT Prophylaxis: SCD. GI Prophylaxis: Protonix. Restraints: none Lines/Tubes: PIVs 
PICC line 3/12medical necessity due to poor IV access; no signs of infection or bleeding or hematoma. Reyna: 3/10 (Medically necessary for strict input/output monitoring in critically ill patient, will remove it when not needed. Reyna bundle followed). Preop pulmonary risk assessment Patient has multiple comorbidities with moderate to high risk of ventilator dependence from pulmonary standpoint; patient at risk of getting worse with DTs; defer to timing of surgery to orthopedic surgeon. Advance Directive/Palliative Care: consulted; poor prognosis overall with high risk for cardiorespiratory failure, intubation, ventilator support, mortality. Will defer respective systems problem management to primary and other respective consultant and follow patient in ICU with primary and other medical team. 
Further recommendations will be based on the patient's response to recommended treatment and results of the investigation ordered. Quality Care: PPI, DVT prophylaxis, HOB elevated, Infection control all reviewed and addressed. Care of plan d/w RN, RT in detail. High complexity decision making was performed during the evaluation of this patient at high risk for decompensation with multiple organ involvement. Total critical care time spent rendering care exclusive of procedures/family discussion/coordination of care:  
38 minutes. Name Relation Home Work Mobile Flores Mc Boyfriend [phone] Edelmira Certain Daughter Reviewed notes from palliative care; patient's daughter is estranged with her mom; daughter declined to be healthcare surrogate to; patient has appointed boyfriend/fiancé Mayra Morrison as medical power of . I have called and discussed with Mayra Morrison again today; discussed about current medical management; discussed about risks of cardiorespiratory failure; discussed poor prognosis regarding CPR, chest compressions, breathing tube, life support etc.; discussed about medical futility of such aggressive measures in the setting of cirrhosis, COPD and DTs; high risk for ventilator dependence; Mr. Ashlyn Mixon does not want to these aggressive measures considering medical futility; he wishes for DNR/DNI status. Palliative care also has been consulted.   
CODE STATUS DNR/DNI. ·Please note: Voice-recognition software may have been used to generate this report, which may have resulted in some phonetic-based errors in grammar and contents. Even though attempts were made to correct all the mistakes, some may have been missed, and remained in the body of the document. Cal Luis MD 
3/14/2021

## 2021-03-14 NOTE — PROGRESS NOTES
Hospitalist Progress Note Patient: Tanya Dixon MRN: [id number]  CSN: [id number] YOB: 1965  Age: 61 y.o. Sex: female DOA: 3/10/2021 LOS:  LOS: 4 days Assessment/Plan Principal Problem: 
  Alcohol withdrawal ([address] (3/10/2021) Active Problems: 
  Anemia (3/11/2021) Alcoholic cirrhosis of liver with ascites ([address] (3/11/2021) Abdominal wall hematoma (3/11/2021) Alcoholic hepatitis with ascites (3/11/2021) Coagulopathy ([address] (3/11/2021) Tibial fracture (3/11/2021) CC: 60 yo female, alcoholic, fell and fractured tibia, sent from [facility] where no ICU beds for active etoh withdrawal 
She is on phenylephrine 70 mcg/min; Precedex 1.1 mcg/kg/h; banana bag 75 mL per hour. 
  
Alcohol withdrawal -CIWA, precedex, banana bag daily. 
  
Abdominal wall hematoma, H&H stabilized. Will monitor pain and H&H Hypokalemia: K repletion.  
  
COPD on home oxygen, 02 and nebs rotuine, add flonase for nasal congestion 
  
Bipolar disorder, etoh abuse-geodon 
  
Cirrhosis with ascites: IV albumin. Lactulose 3 times daily 
  
history of hep C: Seen by Dr. Madiha Grande. IV albumin for cirrhosis and HENRRY 
  
Tibial fracture-for surgery once medical issues more stabilized 
  
UTI, cx neg 
  
Coagulopathy-follow INR, under 2 now. State post RBC and FFP 
  
Severe anemia, blood loss: Monitor H&H transfuse as needed 
  
Hyponatremia, continue IVF, albumin, lasix for ascites and edema 
  
hypophosphatemia-NaPhos ordered 
  
Morbid obese with BMI of thirty-eight increased risk of mortality and mobility. 
  
Total time of care greater than 35 minutes 
  
Dispo: TBD Subjective:  
  
Pt was seen and examined with the nurse in the morning round.  
  
Remain in ICU; agitated and confused. On high flow. On Nonrebreather mask 
  
  
Review of systems 
  
Limited due to her mental status Objective:  
  
Visit Vitals BP (!) 114/59 Pulse (!) 109 Temp 97.7 °F (36.5 °C)  
Resp 22 Ht 5' 6\" (1.676 m) Wt 82.3 kg (181 lb 7 oz) SpO2 90% BMI 29.28 kg/m² Physical Exam: 
 
Gen: NAD, on Non breather mask Heent:  MMM, NC, AT. Cor: s1s2 RRR. No MRG. PMI mid 5th intercostal space. Resp:  CTA b/l. No w/r/r. Nml effort and diaphragmatic excursion. Abd:  NT ND.  BS positive. No rebound or guarding. Ext: 1+ edema or cyanosis. Intake and Output: 
Current Shift:  No intake/output data recorded. Last three shifts:  03/12 1901 - 03/14 0700 In: 2297 [I.V.:2297] Out: 3900 [DNRKE:0863] Labs: Results:  
   
Chemistry Recent Labs  
  03/14/21 0530 03/13/21 
0430 03/12/21 
0602 GLU 90 74 77  142 138  
K 2.9* 3.0* 3.9 * 110 108 CO2 24 24 22 BUN 7 11 15 CREA 0.53* 0.49* 0.47* CA 7.6* 7.3* 7.4* AGAP 9 8 8 BUCR 13 22* 32* AP  --   --  124* TP  --   --  5.3* ALB  --   --  2.8*  
GLOB  --   --  2.5 AGRAT  --   --  1.1  
  
CBC w/Diff Recent Labs  
  03/14/21 
0530 03/13/21 
1535 03/13/21 
0430 03/12/21 
0602 03/12/21 
0602 WBC 5.2  --  5.2  --  6.0  
RBC 2.48*  --  2.36*  --  2.45* HGB 8.4* 8.0* 8.1*   < > 8.3* HCT 25.5* 25.2* 24.1*   < > 24.7*  
PLT 74*  --  83*  --  71* GRANS 64  --  61  --  61  
LYMPH 14*  --  12*  --  15* EOS 0  --  0  --  0  
 < > = values in this interval not displayed. Cardiac Enzymes No results for input(s): CPK, CKND1, PAPO in the last 72 hours. No lab exists for component: Hesstristin Salines Coagulation Recent Labs  
  03/14/21 
0530 03/13/21 
0430 PTP 20.8* 20.7* INR 1.8* 1.8* Lipid Panel Lab Results Component Value Date/Time Cholesterol, total 205 (H) 09/10/2020 10:22 AM  
 HDL Cholesterol 87 (H) 09/10/2020 10:22 AM  
 LDL, calculated 109.2 (H) 09/10/2020 10:22 AM  
 VLDL, calculated 8.8 09/10/2020 10:22 AM  
 Triglyceride 44 09/10/2020 10:22 AM  
 CHOL/HDL Ratio 2.4 09/10/2020 10:22 AM  
  
BNP No results for input(s): BNPP in the last 72 hours. Liver Enzymes Recent Labs 03/12/21 
0602 TP 5.3* ALB 2.8* * Thyroid Studies Lab Results Component Value Date/Time TSH 1.45 09/10/2020 10:21 AM  
    
Procedures/imaging: see electronic medical records for all procedures/Xrays and details which were not copied into this note but were reviewed prior to creation of Plan Medications Reviewed Michael Guerin MD

## 2021-03-15 NOTE — PROCEDURES
3/15/2021    Interventional Radiology Brief Procedure Note     Performed By: Jason Elias PA-C    Supervising Radiologist:  Vernon Nathan MD     Pre-operative Diagnosis:  Symptomatic Ascites     Post-operative Diagnosis: Same as pre-op diagnosis     Procedure(s) Performed: US Guided Paracentesis     Anesthesia:  Local Anesthesia     Findings:  5 [place] and LLQ approach chosen. 1.7 liters of clear yellow fluid was removed. Please see dictated report for details. Complications: None immediate     Estimated Blood Loss:  Minimal     Tubes and Drains: None     Specimens: Sent to the lab as requested     Condition: Good     Plan: Procedure performed at bedside in ICU.   Remain on unit & plan per primary team.        Jason Elias PA-C  [facility]  Vascular & Interventional Radiology  3/15/2021

## 2021-03-15 NOTE — PROGRESS NOTES
Pulmonary Specialists Pulmonary, Critical Care, and Sleep Medicine Name: Jabari Winchester MRN: [id number] : 1965 Hospital: [facility] [place] Date: 3/15/2021  Room: [address] [place] [address] Note Consult requesting physician: Dr. Evert Wall Reason for Consult: Alcohol withdrawal, fracture tibia, cirrhosis Subjective/History of Present Illness:  
 
Patient is a 61 y.o. female with PMHx significant for alcohol abuse and cirrhosis. The patient came to ED at [facility] on 3/9 morning with fall and left leg pains. Patient reportedly had vertigo and fell in the bathroom. She did not lose consciousness. She had bruising in her abdomen from a previous fall few days ago per ER report. CT head was nil acute. CT abdomen/pelvis showed evidence of right rectus muscle hematoma and evidence of cirrhosis with ascites. X-ray showed left proximal tibia fracture. Subsequently, patient went into alcohol withdrawal with tachycardia and agitation needing Ativan per CIWA protocol. She was on 2 L oxygen in the ER. Rapid Covid test was negative. Due to lack of beds, patient stayed in the ER and subsequently transferred yesterday evening [3/10] to THE [facility] ICU. 
 
3/15/2021 Patient remains intensive care unit critically ill. Still on nonrebreather. Hard time tolerating nasal cannula. Oxygenation not improving ascites worsening will consider paracentesis even if palliative for comfort. Still on pressors. But significant anasarca so we will add gentle diuresis as tolerated. Already on Albumin's. Prognosis is very poor Worsening coagulation give FFP No acute issues overnight. No reports of chest pain or vomiting or hematemesis or rectal bleeding. Patient has significant bruising right-sided abdomen. Dripsphenylephrine, Precedex , banana bag 75 mL per hour.  
 
 
Social historychronic alcoholic; drinking mostly wine and beer.  
 
 
Review of symptomslimited due to patient condition Allergies Allergen Reactions  Animal Dander Itching  Egg Nausea and Vomiting Pt stated she does not have true allergy to eggs. Past Medical History:  
Diagnosis Date  Bipolar affective ([facility] [address]  Chronic obstructive pulmonary disease ([facility] [address] [facility]  Cirrhosis ([facility] [address]  Encounter for screening colonoscopy  ETOH abuse  Fall at home 2020  
 hitting head & right eye (bruised); did not seek medical attention  Former smoker 1 PPD x40 years  History of ascites  History of sinusitis  Hyperlipidemia 2009 Patient denies  Left breast mass 2011 U/S Left Breast: No definite mass identified. Recommended recheck in 6 months  Manic depression ([facility] [address]  On home O2   
 as needed  Panic disorder  Vitamin D insufficiency 2009  
 23 ng/mL  Wrist fracture, right 2010 Non-Displaced Distal Radius/Ulnar Styloid Fx Past Surgical History:  
Procedure Laterality Date  COLONOSCOPY N/A 2020 SCREENING COLONOSCOPY with bx and endoclip x 1 performed by Madison Berry MD at [address] [facility] HX ENDOSCOPY    
  [facility] Social History Tobacco Use  Smoking status: Former Smoker Packs/day: 1.00 Years: 40.00 Pack years: 40.00 Quit date: 2020 Years since quittin.1  Smokeless tobacco: Former User Quit date: 2016 Substance Use Topics  Alcohol use: Not Currently Comment: Quit 2020 Family History Problem Relation Age of Onset  No Known Problems Daughter  No Known Problems Adoptive Father  No Known Problems Adoptive Mother Prior to Admission medications Medication Sig Start Date End Date Taking?  Authorizing Provider  
ziprasidone (GEODON) 20 mg capsule BID 20   Provider, Historical  
cholecalciferol, vitamin d3, 10 mcg (400 unit) cap Vitamin D3 10 mcg (400 unit) capsule Provider, Historical  
zolpidem (AMBIEN) 5 mg tablet zolpidem 5 mg tablet    Provider, Historical  
venlafaxine-SR (EFFEXOR-XR) 150 mg capsule venlafaxine  mg capsule,extended release 24 hr Take 1 capsule every day by oral route. Provider, Historical  
montelukast (SINGULAIR) 10 mg tablet montelukast 10 mg tablet    Provider, Historical  
fluticasone/umeclidin/vilanter (TRELEGY ELLIPTA IN) Take 1 Puff by inhalation daily. Provider, Historical  
Oxygen as needed. 2L/NC    Provider, Historical  
lactulose (CHRONULAC) 10 gram/15 mL solution Take 2 tsp by mouth two (2) times a day. Provider, Historical  
budesonide-formoteroL (SYMBICORT) 160-4.5 mcg/actuation HFAA Take 2 Puffs by inhalation two (2) times a day. 2/14/20   Leonel Grade, NP  
ipratropium (ATROVENT HFA) 17 mcg/actuation inhaler Take 1 Puff by inhalation every four (4) hours as needed for Wheezing. 2/14/20   Leonel Grade, NP  
albuterol (PROVENTIL HFA) 90 mcg/actuation inhaler Take 1-2 Puffs by inhalation. 10/21/18   Provider, Historical  
omeprazole (PRILOSEC) 40 mg capsule Take 20 mg by mouth daily. Provider, Historical  
magnesium oxide (MAG-OX) 400 mg tablet Take 1 Tab by mouth daily. 5/10/19   Dante Gan MD  
thiamine HCL (B-1) 100 mg tablet Take 1 Tab by mouth daily. 5/10/19   Dante Gan MD  
 
Current Facility-Administered Medications Medication Dose Route Frequency  vancomycin (VANCOCIN) 1250 mg in  ml infusion  1,250 mg IntraVENous Q8H  
 furosemide (LASIX) injection 20 mg  20 mg IntraVENous DAILY  insulin lispro (HUMALOG) injection   SubCUTAneous BID  methylPREDNISolone (PF) (SOLU-MEDROL) injection 40 mg  40 mg IntraVENous Q6H  
 0.9% sodium chloride 1,000 mL with mvi, adult no. 4 with vit K 10 mL, thiamine 691 mg, folic acid 1 mg infusion   IntraVENous DAILY  fluticasone propionate (FLONASE) 50 mcg/actuation nasal spray 2 Spray  2 Spray Both Nostrils DAILY  Vancomycin - Rx to dose and monitor  1 Each Other Rx Dosing/Monitoring  piperacillin-tazobactam (ZOSYN) 3.375 g in 0.9% sodium chloride (MBP/ADV) 100 mL MBP  3.375 g IntraVENous Q8H  
 budesonide (PULMICORT) 500 mcg/2 ml nebulizer suspension  500 mcg Nebulization BID RT  
 albuterol-ipratropium (DUO-NEB) 2.5 MG-0.5 MG/3 ML  3 mL Nebulization QID RT  
 ziprasidone (GEODON) 20 mg in sterile water (preservative free) 1 mL injection  20 mg IntraMUSCular BID  PHENYLephrine (ISAMAR-SYNEPHRINE) 30 mg in 0.9% sodium chloride 250 mL infusion   mcg/min IntraVENous TITRATE  dexmedeTOMidine (PRECEDEX) 400 mcg in 0.9% sodium chloride 100 mL infusion  0.1-1.5 mcg/kg/hr IntraVENous TITRATE  sodium chloride (NS) flush 5-40 mL  5-40 mL IntraVENous Q8H  
 albumin human 25% (BUMINATE) solution 12.5 g  12.5 g IntraVENous Q6H  
 lactulose (CHRONULAC) 10 gram/15 mL solution 30 mL  20 g Oral TID  pantoprazole (PROTONIX) 40 mg in 0.9% sodium chloride 10 mL injection  40 mg IntraVENous Q12H  
 venlafaxine-SR (EFFEXOR-XR) capsule 150 mg  150 mg Oral DAILY WITH BREAKFAST  [Held by provider] ziprasidone (GEODON) capsule 20 mg  20 mg Oral BID WITH MEALS Objective:  
Vital Signs:   
Visit Vitals /66 Pulse 81 Temp 98.6 °F (37 °C) Resp 14 Ht 5' 6\" (1.676 m) Wt 82.3 kg (181 lb 7 oz) SpO2 95% BMI 29.28 kg/m² O2 Device: Non-rebreather mask O2 Flow Rate (L/min): 15 l/min Temp (24hrs), Av.6 °F (36.4 °C), Min:97.1 °F (36.2 °C), Max:98.6 °F (37 °C) Intake/Output:  
Last shift:      No intake/output data recorded. Last 3 shifts:  1901 - 03/15 0700 In: -  
[facility] 4625 [id number] Intake/Output Summary (Last 24 hours) at 3/15/2021 1128 Last data filed at 3/14/2021 2000 Gross per 24 hour Intake  Output 2750 ml Net -2750 ml Last 3 Recorded Weights in this Encounter 21 0921 21 Weight: 78.9 kg (174 lb) 83.2 kg (183 lb 6.8 oz) 82.3 kg (181 lb 7 oz) PE: 
Patient sleepy ; arousable;  confused; appears chronically ill; on oxygen via nonrebreather mask; acyanotic; appears frail and older than stated age HEENT: pupils not dilated, reactive, severe bilateral scleral jaundice, dry oral mucosa, no nasal drainage; neck supple Neck: No adenopathy or thyroid swelling CVS: S1-S2; no murmurs; telemetrysinus rhythm; JVD not elevated RS: Symmetrical breath sounds; moderate air entry bilaterally; bilateral wheezing today; mild bibasal crackles; not tachypneic or in distress currently Abd: Soft, moderate  distention, baseline abdominal distention with ascites; nontender, no guarding or rigidity; right-sided abdominal wall bruising; no obvious hepatomegaly or splenomegaly; bowel sounds heard Neuro: Somnolent; opening  eyes to verbal stimuli; baseline confused; moving all extremities with the exception of fractured left leg Extrm: no leg edema; mild bilateral upper extremity hand edema; no clubbing Skin: no rash; bruising in the right abdominal wall and right foot; spider nevi over the chest 
Lymphatic: no cervical or adenopathy Psych: Baseline agitated and confused episodically Vasc: DPs palpable in the lower extremities Data:  
   
Recent Results (from the past 24 hour(s)) GLUCOSE, POC Collection Time: 03/14/21 12:05 PM  
Result Value Ref Range Glucose (POC) 113 (H) 70 - 110 mg/dL HGB & HCT Collection Time: 03/14/21  4:35 PM  
Result Value Ref Range HGB 8.3 (L) 12.0 - 16.0 g/dL HCT 25.0 (L) 35.0 - 45.0 % POTASSIUM Collection Time: 03/14/21  4:35 PM  
Result Value Ref Range Potassium 3.2 (L) 3.5 - 5.5 mmol/L MAGNESIUM Collection Time: 03/14/21  4:35 PM  
Result Value Ref Range Magnesium 2.3 1.6 - 2.6 mg/dL GLUCOSE, POC Collection Time: 03/14/21  5:38 PM  
Result Value Ref Range Glucose (POC) 127 (H) 70 - 110 mg/dL GLUCOSE, POC Collection Time: 03/15/21  1:35 AM  
Result Value Ref Range  Glucose (POC) 160 (H) 70 - 110 mg/dL PROTHROMBIN TIME + INR Collection Time: 03/15/21  6:30 AM  
Result Value Ref Range Prothrombin time 22.0 (H) 11.5 - 15.2 sec INR 2.0 (H) 0.8 - 1.2 MAGNESIUM Collection Time: 03/15/21  6:30 AM  
Result Value Ref Range Magnesium 2.2 1.6 - 2.6 mg/dL AMMONIA Collection Time: 03/15/21  6:30 AM  
Result Value Ref Range Ammonia 79 (H) 11 - 32 UMOL/L  
PHOSPHORUS Collection Time: 03/15/21  6:30 AM  
Result Value Ref Range Phosphorus 1.3 (L) 2.5 - 4.9 MG/DL  
CBC WITH AUTOMATED DIFF Collection Time: 03/15/21  6:30 AM  
Result Value Ref Range WBC 7.1 4.6 - 13.2 K/uL  
 RBC 2.52 (L) 4.20 - 5.30 M/uL HGB 8.5 (L) 12.0 - 16.0 g/dL HCT 26.2 (L) 35.0 - 45.0 % .0 (H) 74.0 - 97.0 FL  
 MCH 33.7 24.0 - 34.0 PG  
 MCHC 32.4 31.0 - 37.0 g/dL  
 RDW 19.2 (H) 11.6 - 14.5 % PLATELET 92 (L) 208 - 420 K/uL MPV 10.7 9.2 - 11.8 FL  
 NEUTROPHILS 75 (H) 40 - 73 % LYMPHOCYTES 7 (L) 21 - 52 % MONOCYTES 18 (H) 3 - 10 % EOSINOPHILS 0 0 - 5 % BASOPHILS 0 0 - 2 %  
 ABS. NEUTROPHILS 5.3 1.8 - 8.0 K/UL  
 ABS. LYMPHOCYTES 0.5 (L) 0.9 - 3.6 K/UL  
 ABS. MONOCYTES 1.3 (H) 0.05 - 1.2 K/UL  
 ABS. EOSINOPHILS 0.0 0.0 - 0.4 K/UL  
 ABS. BASOPHILS 0.0 0.0 - 0.1 K/UL  
 DF AUTOMATED METABOLIC PANEL, BASIC Collection Time: 03/15/21  6:30 AM  
Result Value Ref Range Sodium 147 (H) 136 - 145 mmol/L Potassium 3.5 3.5 - 5.5 mmol/L Chloride 114 (H) 100 - 111 mmol/L  
 CO2 28 21 - 32 mmol/L Anion gap 5 3.0 - 18 mmol/L Glucose 121 (H) 74 - 99 mg/dL BUN 10 7.0 - 18 MG/DL Creatinine 0.43 (L) 0.6 - 1.3 MG/DL  
 BUN/Creatinine ratio 23 (H) 12 - 20 GFR est AA >60 >60 ml/min/1.73m2 GFR est non-AA >60 >60 ml/min/1.73m2 Calcium 7.8 (L) 8.5 - 10.1 MG/DL  
HEPATIC FUNCTION PANEL Collection Time: 03/15/21  6:30 AM  
Result Value Ref Range Protein, total 5.8 (L) 6.4 - 8.2 g/dL Albumin 3.5 3.4 - 5.0 g/dL  Globulin 2.3 2.0 - 4.0 g/dL  
 A-G Ratio 1.5 0.8 - 1.7 Bilirubin, total 9.6 (H) 0.2 - 1.0 MG/DL Bilirubin, direct 5.9 (H) 0.0 - 0.2 MG/DL Alk. phosphatase 111 45 - 117 U/L  
 AST (SGOT) 159 (H) 10 - 38 U/L  
 ALT (SGPT) 86 (H) 13 - 56 U/L  
VANCOMYCIN, TROUGH Collection Time: 03/15/21  6:30 AM  
Result Value Ref Range Vancomycin,trough 4.5 (L) 10.0 - 20.0 ug/mL GLUCOSE, POC Collection Time: 03/15/21  7:20 AM  
Result Value Ref Range Glucose (POC) 128 (H) 70 - 110 mg/dL Chemistry Recent Labs  
  03/15/21 
0630 03/14/21 
1635 03/14/21 
0530 03/13/21 
0430 *  --  90 74 *  --  145 142  
K 3.5 3.2* 2.9* 3.0*  
*  --  112* 110  
CO2 28  --  24 24 BUN 10  --  7 11 CREA 0.43*  --  0.53* 0.49* CA 7.8*  --  7.6* 7.3*  
MG 2.2 2.3 1.8 1.9 PHOS 1.3*  --  1.5* 2.1* AGAP 5  --  9 8 BUCR 23*  --  13 22*   --  109  --   
TP 5.8*  --  5.6*  --   
ALB 3.5  --  3.4  --   
GLOB 2.3  --  2.2  --   
AGRAT 1.5  --  1.5  -- Lactic Acid Lactic acid Date Value Ref Range Status 05/10/2019 2.3 (HH) 0.4 - 2.0 MMOL/L Final  
  Comment:  
  CALLED TO AND CORRECTLY REPEATED BY: 
ZELALEM AGUAYO 3000 RN ON [id number] AT 0995 TO LL 
  
 
No results for input(s): LAC in the last 72 hours. Liver Enzymes Protein, total  
Date Value Ref Range Status 03/15/2021 5.8 (L) 6.4 - 8.2 g/dL Final  
 
Albumin Date Value Ref Range Status 03/15/2021 3.5 3.4 - 5.0 g/dL Final  
 
Globulin Date Value Ref Range Status 03/15/2021 2.3 2.0 - 4.0 g/dL Final  
 
A-G Ratio Date Value Ref Range Status 03/15/2021 1.5 0.8 - 1.7   Final  
 
Alk. phosphatase Date Value Ref Range Status 03/15/2021 111 45 - 117 U/L Final  
 
Recent Labs  
  03/15/21 
0630 03/14/21 
0530 TP 5.8* 5.6* ALB 3.5 3.4 GLOB 2.3 2.2 AGRAT 1.5 1.5  109 CBC w/Diff Recent Labs  
  03/15/21 
0630 03/14/21 
1635 03/14/21 
0530 03/13/21 
0430 03/13/21 
0430 WBC 7.1  --  5.2  --  5.2  
RBC 2.52*  --  2.48* --  2.36* HGB 8.5* 8.3* 8.4*   < > 8.1* HCT 26.2* 25.0* 25.5*   < > 24.1*  
PLT 92*  --  74*  --  83* GRANS 75*  --  64  --  61  
LYMPH 7*  --  14*  --  12* EOS 0  --  0  --  0  
 < > = values in this interval not displayed. Cardiac Enzymes No results found for: CPK, CK, CKMMB, CKMB, RCK3, CKMBT, CKNDX, CKND1, PAPO, TROPT, TROIQ, FIORDALIZA, TROPT, TNIPOC, BNP, BNPP  
 
BNP No results found for: BNP, BNPP, XBNPT Coagulation Recent Labs  
  03/15/21 
0630 03/14/21 
0530 03/13/21 
0430 PTP 22.0* 20.8* 20.7* INR 2.0* 1.8* 1.8* Thyroid  Lab Results Component Value Date/Time TSH 1.45 09/10/2020 10:21 AM  
   
No results found for: T4  
 
Urinalysis Lab Results Component Value Date/Time Color DARK YELLOW 03/09/2021 08:15 AM  
 Appearance CLOUDY 03/09/2021 08:15 AM  
 Specific gravity 1.024 03/09/2021 08:15 AM  
 pH (UA) 5.0 03/09/2021 08:15 AM  
 Protein TRACE (A) 03/09/2021 08:15 AM  
 Glucose Negative 03/09/2021 08:15 AM  
 Ketone TRACE (A) 03/09/2021 08:15 AM  
 Bilirubin SMALL (A) 03/09/2021 08:15 AM  
 Urobilinogen 1.0 03/09/2021 08:15 AM  
 Nitrites Positive (A) 03/09/2021 08:15 AM  
 Leukocyte Esterase SMALL (A) 03/09/2021 08:15 AM  
 Epithelial cells FEW 03/09/2021 08:15 AM  
 Bacteria 2+ (A) 03/09/2021 08:15 AM  
 WBC 4 to 6 03/09/2021 08:15 AM  
 RBC 0 to 2 03/09/2021 08:15 AM  
  
 
 
 Ref. Range 3/10/2021 18:35 3/11/2021 00:37 3/11/2021 06:26 CK Latest Ref Range: 26 - 192 U/L 846 (H) 605 (H) 546 (H) CK-MB Index Latest Ref Range: 0.0 - 4.0 % 0.8 0.7 1.0 CK - MB Latest Ref Range: <3.6 ng/ml 6.7 (H) 4.4 (H) 5.4 (H) Troponin-I, Qt. Latest Ref Range: 0.0 - 0.045 NG/ML <0.02 <0.02 <0.02 EKG 3/9/2021 07:44 Diagnosis   Final  
Sinus tachycardia Abnormal ECG When compared with ECG of 08-JAN-2020 21:35,  
Rate faster Nonspecific T wave abnormality no longer evident in Anterior leads Culture data during this hospitalization. All Micro Results Procedure Component Value Units Date/Time CULTURE, BODY FLUID W Jose Barriga [[id number]] Order Status: Sent Specimen: Body Fluid from Abdominal Fluid CULTURE, BLOOD [[id number]] Collected: 03/10/21 2135 Order Status: Completed Specimen: Blood Updated: 03/15/21 4738 Special Requests: NO SPECIAL REQUESTS Culture result: NO GROWTH 5 DAYS     
 CULTURE, BLOOD [[id number]] Collected: 03/10/21 2145 Order Status: Completed Specimen: Blood Updated: 03/15/21 4713 Special Requests: NO SPECIAL REQUESTS Culture result: NO GROWTH 5 DAYS     
  
  
 
 
PFT June 2020 RESULTS: 
Flow volume loop has upward concavity suggestive of airway obstruction. Total expiratory time was 11.39 seconds.  Forced vital capacity was 3.15 liters, which is 96% of predicted. FEV1 was 1.78 liters, which is 70% of predicted. FEV1/FVC ratio was 57%. No significant response to bronchodilator on spirometry.  
Total lung was 1.87 liters, which is 99% of predicted. Residual volume is 1.72 liters, which is 92% of predicted.  
Diffusion capacity was 11, which is 45% of predicted.  IMPRESSION: 
1. Spirometry showed moderate airway obstruction. There was no bronchodilator response. 2.  Lung volumes did not show restriction or hyperinflation. 3.  There is moderate reduction in diffusion capacity.   
 
 
ECHO   Interpretation Summary Result status: Final result · LV: Estimated LVEF is 55 - 60%. Normal cavity size, wall thickness, systolic function (ejection fraction normal) and diastolic function. E'E= 11.03. Images report reviewed by me: 
CT 3/9 (Most Recent) (CT reviewed by me) [place] [facility] [place]/09/21 CT ABD PELV W CONT Narrative EXAM: CT of the abdomen and pelvis INDICATION: Fall with abdominal pain. Right-sided abdominal bruising. Cirrhosis. CT abdomen and pelvis 1/8/2020 COMPARISON: CT abdomen and pelvis 1/8/2020 TECHNIQUE: Axial CT imaging of the abdomen and pelvis was performed with 
intravenous contrast. Multiplanar reformats were generated. One or more dose reduction techniques were used on this CT: automated exposure 
control, adjustment of the mAs and/or kVp according to patient size, and 
iterative reconstruction techniques. The specific techniques used on this CT 
exam have been documented in the patient's electronic medical record. Digital 
Imaging and Communications in Medicine (DICOM) format image data are available 
to nonaffiliated external healthcare facilities or entities on a secure, media 
free, reciprocally searchable basis with patient authorization for at least a 
12-month period after this study. _______________ FINDINGS: 
 
LOWER CHEST: Unremarkable. LIVER, BILIARY: Cirrhotic morphology of the liver. No focal mass. No biliary 
dilation. Multiple gallstones are present. Gallbladder is distended. No 
gallbladder wall thickening. CBD appears normal. 
 
PANCREAS: Normal. 
 
SPLEEN: Normal. 
 
ADRENALS: Normal. 
 
KIDNEYS: Small hypodensity anterior lower pole right kidney likely represents a 
cyst. Kidneys are otherwise unremarkable. LYMPH NODES: No enlarged lymph nodes. GASTROINTESTINAL TRACT: No bowel dilation or wall thickening. Scattered colonic 
diverticula. No evidence of bowel obstruction. PELVIC ORGANS: Unremarkable. VASCULATURE: Patent and very enlarged umbilical vein extending caudally into the 
abdominal wall. This extends through an enlarged right rectus muscle which is 
consistent with acute rectus muscle hematoma. On image 108 there is a tiny area 
of high attenuation which may be external to the umbilical vein suggesting small 
area of venous bruising. Portal vein is patent. BONES: No acute or aggressive osseous abnormalities identified.  Stable superior 
endplate compression deformity L4. 
 
OTHER: Enlarged right rectus muscle with mixed areas of high and low attenuation 
consistent with rectus muscle hematoma. This measures 21 cm craniocaudally and 6 
cm AP. Small to moderate ascites. Subcutaneous edematous change consistent with 
anasarca. 
 
_______________ Impression 1. Acute right rectus muscle hematoma with questionable small area of venous 
imaging from recanalized umbilical vein running through this muscle. No large 
areas of active extravasation. 2. Cirrhotic liver with ascites. 3. Cholelithiasis without evidence of acute cholecystitis. CT head 3/9/2021 FINDINGS: 
BRAIN AND POSTERIOR FOSSA: The sulci, folia, ventricles and basal cisterns are 
within normal limits for the patient's age. There is no intracranial hemorrhage, 
mass effect, or midline shift. There are no areas of abnormal parenchymal 
attenuation. EXTRA-AXIAL SPACES AND MENINGES: There are no abnormal extra-axial fluid 
collections. CALVARIUM: Intact. SINUSES: Minimal mucosal thickening of the ethmoid sinuses. Other visualized 
sinuses are clear. OTHER: Mastoid air cells are clear. IMPRESSION No evidence of acute intracranial injury, hemorrhage or fracture. Normal 
noncontrast CT brain. CXR reviewed by me: 
XR 3/12 (Most Recent). CXR  reviewed by me and compared with previous CXR Results from [place] Encounter encounter on 03/10/21 XR CHEST PORT Narrative CHEST AP PORTABLE Indication: Aspiration pneumonia, shortness of breath. Comparison: X-ray 03/12/2021. Findings: Patient is rotated to the right. Interval placement of right arm PICC 
with catheter tip at the superior vena cava. Monitoring leads overlie the chest. 
 
There is bilateral diffuse hazy opacity, new or significantly progressed from 
prior examination. Slightly more confluent patchy opacity seen in the right 
lower lung zone. The cardiac silhouette and pulmonary vascularity appear within 
normal limits. No evidence for pneumothorax or pleural effusion. Impression 1. Interval right arm PICC appears in good position.  
 
2. Interval new or significantly progressed bilateral diffuse opacities, edema 
versus pneumonia. X-ray femur left 3/9 IMPRESSION 1. No acute osseous abnormality involving the left femur. 2. Comminuted, depressed medial tibial plateau fracture with metaphyseal 
extension and small volume lipohemarthrosis. X-ray 3/9 FINDINGS: 
AP and lateral views were performed. There is redemonstration of a comminuted 
medial tibial plateau fracture which is depressed. Lateral film shows 
significant posterior displacement of the large fragment. Distal tibia and the 
fibula are intact. Possible small hemarthrosis. IMPRESSION Comminuted, depressed, displaced medial tibial plateau fracture. Ultrasound abdomen 3/12 EXAM: Ultrasound abdomen limited INDICATION: Evaluate ascites COMPARISON: None. FINDINGS: 
4 quadrant survey was performed of the abdomen. Low volume ascites noted in the 
right upper and lower quadrants, trace ascites adjacent to the splenic tip, no 
ascites in the left lower quadrant. IMPRESSION Low volume ascites in the right abdomen. IMPRESSION:  
· Acute respiratory failure with hypoxemia · Aspiration pneumonia · Anemia · Cirrhosis · Abdominal wall hematoma · Alcohol withdrawal 
· Alcoholic hepatitis · Coagulopathy · Thrombocytopenia · Fracture left tibia · Alcohol abuse · COPD · Bipolar disorder · Patient Active Problem List  
Diagnosis Code  ETOH abuse F10.10  COPD (chronic obstructive pulmonary disease) (HCC) J44.9  Bipolar mood disorder ([facility] [address] F31.9  Tobacco abuse Z72.0  Thrombocytopenia ([facility] [address] D69.6  Alcohol withdrawal (HCC) F10.239  Anemia D64.9  Alcoholic cirrhosis of liver with ascites (HCC) K70.31  
 Abdominal wall hematoma S30. Louvenia Amber  Alcoholic hepatitis with ascites K70.11  
 Coagulopathy (HCC) D68.9  Tibial fracture S82.209A · Code status: Full code RECOMMENDATIONS:  
Respiratory: Chest x-ray consistent with aspiration pneumonia Aspiration pneumonia and pulmonary congestion add gentle diuresis COPD was on Solu-Medrol 60 mg every 6 hours over 240 every 6 hours; continue bronchodilatorsDuoNeb and budesonide. Continue oxygen supplemental therapy; patient needing nonrebreather mask to keep O2 sats greater than 90%; patient not tolerating high flow nasal cannula oxygen due to episodic agitation. Prior PFT showed FEV1 of 70% and DLCO 45%; patient clinically has advanced COPD. Avoid LABA due to risk of tachycardia and atrial fibrillation. HOB 30 degree elevation all the time. Aspiration precautions. Incentive spirometrypatient not cooperative. CVS: Patient remains mildly hypotensive; on phenylephrinecurrently at 70 mcg/min; wean pressor for systolic blood pressure >89 mmHg; continue albumin infusions. Negative troponins; EF normal; no pulmonary hypertension. Ascites add gentle diuresis as tolerated 20 today ID: Continue antibiotics for aspiration pneumonia; Zosyn and IV vancomycin; complete 7 to 10 days based on clinical and radiological improvement; check chest x-ray tomorrow. Blood cultures reviewedremain negative. Hematology/Oncology: Coagulopathy as add FFP Patient has anemia likely due to chronic nutritional deficiency with alcoholism, and acute abdominal wall hematoma from recent fall; s/p 1 unit PRBC. Stable hemoglobin8. 4 this morning; platelets YKEOTJ07 K this morning; no active bleeding issues. INR remains mildly elevated1.8 todaychronic coagulopathy from cirrhotic liver. No active GI bleeding; CT abdomen had shown right rectus muscle hematoma. Renal: Stable renal function; urine output good; potassium being replaced today; replace phosphorus and magnesium as well. GI/: Liver cirrhosis ascites worse today will repeat ultrasound give FFP before paracentesis Patient has Reyna catheter. Ultrasound abdomen on admission low volume ascites.   
Poor oral intake due to DTs; continue Lactulose as tolerated to keep ammonia down; ammonia 50 today. 
Hepatology consult appreciated from Dr. Larry.. 
Check LFTs and ammonia daily. 
Endocrine: Monitor BS–stable; risk of hyperglycemia with addition of steroids today; sliding scale insulin added. 
Neurology: CT head nil acute; CIWA protocol with Ativan for alcohol withdrawal. 
Precedex infusion for alcohol withdrawal symptoms and DTs.    
Toxicology: Serum alcohol elevated on admission to [facility] ER. 
Skin/Wound: Monitor right abdominal wall bruising–stable. 
IVF: Daily nutritional fluids at 50 mL/h; albumin infusions. 
Nutrition: Keep n.p.o. for now except sips of water due to high risk of aspirations. 
Prophylaxis: DVT Prophylaxis: SCD. GI Prophylaxis: Protonix.  
Restraints: none  
Lines/Tubes: PIVs 
PICC line 3/12–medical necessity due to poor IV access; no signs of infection or bleeding or hematoma. 
Reyna: 3/10 (Medically necessary for strict input/output monitoring in critically ill patient, will remove it when not needed. Reyna bundle followed). 
 
Preop pulmonary risk assessment 
Patient has multiple comorbidities with moderate to high risk of ventilator dependence from pulmonary standpoint; patient at risk of getting worse with DTs; defer to timing of surgery to orthopedic surgeon. 
 
Advance Directive/Palliative Care: consulted; poor prognosis overall with high risk for cardiorespiratory failure, intubation, ventilator support, mortality. 
 
Will defer respective systems problem management to primary and other respective consultant and follow patient in ICU with primary and other medical team. 
Further recommendations will be based on the patient's response to recommended treatment and results of the investigation ordered. 
Quality Care: PPI, DVT prophylaxis, HOB elevated, Infection control all reviewed and addressed. 
 
Care of plan d/w RN, RT in detail. 
 
High complexity decision making was performed during the evaluation of this patient at high risk  for decompensation with multiple organ involvement. Total critical care time spent rendering care exclusive of procedures/family discussion/coordination of care:  
35 minutes. Name Relation Home Work Mobile Flores Mc Boyfriend [phone] Edelmira Certain Daughter Reviewed notes from palliative care; patient's daughter is estranged with her mom; daughter declined to be healthcare surrogate to; patient has appointed boyfriend/fiancé Mayra Morrison as medical power of . I have called and discussed with Mayra Morrison again today; discussed about current medical management; discussed about risks of cardiorespiratory failure; discussed poor prognosis regarding CPR, chest compressions, breathing tube, life support etc.; discussed about medical futility of such aggressive measures in the setting of cirrhosis, COPD and DTs; high risk for ventilator dependence; Mr. Ashlyn Mixon does not want to these aggressive measures considering medical futility; he wishes for DNR/DNI status. Palliative care also has been consulted. CODE STATUS DNR/DNI. Patient mental status not improving her boyfriend is healthcare proxy he will come and visit her today ·Please note: Voice-recognition software may have been used to generate this report, which may have resulted in some phonetic-based errors in grammar and contents. Even though attempts were made to correct all the mistakes, some may have been missed, and remained in the body of the document. Erich Abreu MD 
3/15/2021

## 2021-03-15 NOTE — PROGRESS NOTES
Chart reviewed pt remains in ICU level of care, paracentesis performed today, pt cont being closely monitored for alcohol withdrawal, once more stable and cleared by medical plan is than to address tibia fracture, cm will cont to view case and remain available.

## 2021-03-15 NOTE — PROGRESS NOTES
Palliative Medicine Significant other Devika Palafox) completed POST today. Discussion over the weekend to change code status to DNR/DNI. POST done for confirmation. ACP documents you currently have include: 
[] Advance Directive or Living Will 
[] Durable Do Not Resuscitate [x] Physician Orders for Scope of Treatment (POST) [] Medical Power of  
[] Other CODE STATUS: DNR DNI; feeding tube for designated amount of time Date of Initial Consult: 3/12/2021 Follow up Visit: 3/15/2021 Reason for Consult: Care decisions, identification of surrogate decision maker Requesting Provider:  Dr Manasa Jensen Primary Care Physician: Viky Kunz NP 
  
F/U VISIT   (seen with Dany Perez NP) 
  
PMH: COPD; cirrhosis; ETOH abuse; bipolar d/o; hepatitis C 
  
Hospital recap: She was brought to the [facility] ED on 3/9/2021 after sustaining a fall at home. Became more agitated and unstable necessitating transfer to [facility] ICU for care. On 3/10/2021 she was transferred to [facility]. She has required CIWA protocol medication and has had periods of disorientation. Has required precedex for sedation and levophed and phenylephrine for BP support. Restraints have been required for patient safety 3/15/2021: Seen today in ICU 6. O2 on at 15 lpm per mask. Respirations labored using accessory muscles. Restraints remain on. Speech garbled and unable to answer orientation questions. Occasionally follows commands. Looks acutely and chronically ill. Paracentesis done today--1.7 liters fluid removed. Met with Mr Lisa Velazquez this afternoon along with Ms Jerilyn Stratton update given. Explained that Jung Youssef is not showing any improvement and that, even with all the medications and treatments, she is not improving. Explained that she has a limited chance of improvement and that she is fatiguing.  He wants to give her a few more days to rebound before making any decisions about limiting/stoppin aggressive medical care. Will f/u via telephone tomorrow with medical update and offer an opportunity for him to ask questions. .Disposition plan: anticipate she will decline and there will need to be a discussion about transitioning to comfort care. Palliative Medicine remains available for any questions or concerns Marko Wright RN, MSN 
P: [phone]

## 2021-03-15 NOTE — CONSULTS
Manuela Craft [address] [facility] Reina Katerina Helms MD, Bakari Robles [facility] Tiff Carroll MD, MPH Willard Roberts, PACONSUELO Osborne, [facility] Martine Hanley, [facility]   Tania Osbornton, [facility]- Nikki Moamy, [facility] 136 
  at [address], [address] [facility], [address]. 
  [phone] FAX: [address] [place] [address] Avenue 
  at [facility] 
  [address], [address] [address] [facility], [address] 
  [phone] FAX: [phone] HEPATOLOGY CONSULT NOTE The patient is a 61 y.o. female with alcohol induced cirrhosis who continues to consume alcohol in excess. Cirrhosis was initially documented by imaging in 5/2019. She came to the ED at [facility] on 3/9/2021 because she felt dizzy, fell and developed pain in the left leg. CT abdomen/pelvis showed evidence of right rectus muscle hematoma, of cirrhosis and ascites. X-ray showed left proximal tibia fracture. While in the ED the patient started to develop symptoms of alcohol withdrawal including tachycardia and agitation. She was trearted with Ativan per [facility] protocol. Rapid Covid test was negative. She was transferred to [facility] for additional care. I was asked to review her case and make recommendations a few days ago. I have now examined her. She is disoriented and cannot respond to questions. She has been in the ICU at [facility] for 5 days. She is on a non-rebreather mask with 100% O2 sat with sats of only 84-86%. SBP is in the 140 range with normal pulse off pressers. She remains on IV ABX She remains on IV albumin She has recieved 2 units of FFP and 1 unit of blood today. ASSESSMENT AND PLAN: 
Cirrhosis The diagnosis of cirrhosis is based upon imaging, laboratory studies, complications of cirrhosis. 
Cirrhosis is presumed secondary to alcohol.    
Serology for other causes of chronic liver disease have not been performed. 
 
The CTP is 10.  Child class C.  The MELD score is up to 23. 
 
Based upon the MELD and CTP scores the patient has a mortality of about 20% within the next 90 day but 90% within the next 2 years unless she stops consuming alchohol 
 
Alcohol hepatitis 
The DF is is up to 46 from 34 a few days ago.  This is now in the category of severe alcoholic hepatitis and is associated with a 30% mortality within the next 6 months.   
Now that she is hemodynamically stable with good BP off pressers and presumed sepsis is under control I would start IV solumedrol.   
Given that we are in mist of deciding if she should be comfort care will hold off decision to start Solumedrol till AM. 
 
Coagulopathy 
This is secondary to cirrhosis and alcoholic hepatitis. 
She recieved IV vitamin K in banana bag a few days ago 
Despite this the INR has increased  
Since sepsis has been treated and we do not think she is in DIC at this time the prolongation in INR indicates worsening liver failure. 
 
Ascites  
Ascites developed for the first time in 3/2021. 
No obvious ascites at this time.   
She has been on broad spectrum ABX for several days. 
No need to perform diagnostic paracentesis at this time as it will not  and ulikely to document that SBP even if this was present on admission. 
 
AMS vs Hepatic encephalopathy  
She has AMS secondary to alcohol withdrawal and probably hypoxia. 
The ammonia has been low and although lactulose is ordered this has not been given because of her inability to safely take PO and no NG  
Ammonia is up to the 70s today.   
If this continues to increase and MS remains poor may need give via enema. 
 
Anemia  
Baseline HB in 2020 was 12 gms 
HB has drifted down to 8 gms since admission. 
This is likely due to dilution.  There is no reported overt GI bleeding. 
She will  need EGD to evaluate for esophageal varices at some point during this hospitalization. She was given 1 unit of blood today Continue to monitor HB and transfuse if HB drops to less than 7 gms. Thrombocytopenia This is secondary to cirrhosis. The PLT is in the 90s. There is no evidence of overt bleeding. No treatment is required. LABORATORY: 
Results for Renee Galaviz (MRN [id number]) as of 3/15/2021 19:07 Ref. Range 3/13/2021 04:30 3/14/2021 05:30 3/15/2021 06:30 WBC Latest Ref Range: 4.6 - 13.2 K/uL 5.2 5.2 7.1 HGB Latest Ref Range: 12.0 - 16.0 g/dL 8.1 (L) 8.4 (L) 8.5 (L) PLATELET Latest Ref Range: 135 - 420 K/uL 83 (L) 74 (L) 92 (L) INR Latest Ref Range: 0.8 - 1.2   1.8 (H) 1.8 (H) 2.0 (H) Sodium Latest Ref Range: 136 - 145 mmol/L 142 145 147 (H) Potassium Latest Ref Range: 3.5 - 5.5 mmol/L 3.0 (L) 2.9 (LL) 3.5 Chloride Latest Ref Range: 100 - 111 mmol/L 110 112 (H) 114 (H) CO2 Latest Ref Range: 21 - 32 mmol/L 24 24 28 Glucose Latest Ref Range: 74 - 99 mg/dL 74 90 121 (H) BUN Latest Ref Range: 7.0 - 18 MG/DL 11 7 10 Creatinine Latest Ref Range: 0.6 - 1.3 MG/DL 0.49 (L) 0.53 (L) 0.43 (L) Bilirubin, total Latest Ref Range: 0.2 - 1.0 MG/DL  8.2 (H) 9.6 (H) Albumin Latest Ref Range: 3.4 - 5.0 g/dL  3.4 3.5 ALT Latest Ref Range: 13 - 56 U/L  76 (H) 86 (H) AST Latest Ref Range: 10 - 38 U/L  141 (H) 159 (H) Alk. phosphatase Latest Ref Range: 45 - 117 U/L  109 111 Ammonia Latest Ref Range: 11 - 32 UMOL/L 43 (H) 50 (H) 79 (H) RADIOLOGY: 
3/2021. CT scan abdomen with IV contrast.  Changes consistent with cirrhosis. No liver mass lesions. No dilated bile ducts. No ascites. 3/2021. Ultrasound of liver. Echogenic consistent with cirrhosis. No liver mass lesions. No dilated bile ducts. Mild ascites. MD Mely Hammonds [facility] [place] [id number], [address] [facility]. 
[phone] Michael Gupta [facility]

## 2021-03-15 NOTE — PROGRESS NOTES
Pharmacy Dosing Services: Vancomycin Consult for Vancomycin Dosing by Pharmacy by Dr. Ricardo Vivar Consult provided for this 61y.o. year old female , for indication of Aspiration Pneumonia. Day of Therapy 4 Scr = 0.43   CrCl = 95.9 ml/min Vancomycin Trough = 4.5  (3/15/21 at 06:30) Below therapeutic goal of 15 - 20 Increase dose:  Vancomycin  1500 mg IV once, scheduled for 3/15/21 at 09:00. Followed by Vancomycin 1250 mg IV q8h Pharmacy to follow daily and will make changes to dose and/or frequency based on clinical status. Ht Readings from Last 1 Encounters:  
03/14/21 167.6 cm (66\") Wt Readings from Last 1 Encounters:  
03/13/21 82.3 kg (181 lb 7 oz) Previous Regimen Vancomycin 1250 mg IV q12h Other Current Antibiotics Zosyn 3.375 grams IV q8h Significant Cultures Blood (3/10/21):  no growth 5 days Serum Creatinine Lab Results Component Value Date/Time Creatinine 0.43 (L) 03/15/2021 06:30 AM  
  
Creatinine Clearance Estimated Creatinine Clearance: 95.9 mL/min (A) (by C-G formula based on SCr of 0.43 mg/dL (L)). BUN Lab Results Component Value Date/Time BUN 10 03/15/2021 06:30 AM  
  
WBC Lab Results Component Value Date/Time WBC 7.1 03/15/2021 06:30 AM  
  
H/H Lab Results Component Value Date/Time HGB 8.5 (L) 03/15/2021 06:30 AM  
  
Platelets Lab Results Component Value Date/Time PLATELET 92 (L) 08/80/8916 06:30 AM  
  
Temp (P) 97.1 °F (36.2 °C) Pharmacist Michael Vogel [facility]

## 2021-03-15 NOTE — ROUTINE PROCESS
Ultrasound guided LLQ paracentesis was performed. Pt tolerated procedure well, no signs of distress. Peritoneal fluid was collected and sent to lab for testingf. 1,700 mL of fluid drained. Pt left in stable condition. . Nurse Rubi John given report.

## 2021-03-15 NOTE — CONSULTS
Palliative Medicine Consult Patient Name: Gonsalo Corona YOB: 1965 Date of Initial Consult: March 12, 2021, March 15, 2021 Reason for Consult: Goals of care discussions Requesting Provider: Dr. Hernandez Hooker Primary Care Physician: Mak Lopez NP 
  
 SUMMARY:  
Gonsalo Corona is a 61 y.o. with a past history of COPD on intermittent oxygen, bipolar, cirrhosis, alcohol abuse ascites multiple fractures, who was admitted on 3/10/2021 from home with a diagnosis of alcohol withdrawal, abdominal wall hematoma, tibial fracture, and tachycardia. Current medical issues leading to Palliative Medicine involvement include: Support and goals of care discussions. March 15, 2021: Patient was less alert today, unable to answer orientation questions, incomprehensible speech, drowsy, appears distressed with labored respirations and accessory muscle use. PALLIATIVE DIAGNOSES:  
1. Goals of care discussions 2. Alcohol abuse 3. Cirrhosis 4. COPD 5. Debility PLAN:  
March 15, 2021: Palliative medicine team including Angela Colvin RN and I met with patient at patient's bedside. Patient was less alert today, unable to answer orientation questions, incomprehensible speech, drowsy, appears distressed with labored respirations and accessory muscle use. Called medical power of /significant other Sharda Rand to bedside today as patient appears critically ill. Medical update provided from intensivist and myself, and and shared the concern with patient decline despite aggressive efforts. Discussed the benefits and burdens of continuing aggressive measures versus implementing comfort measures with the support of hospice at discharge. Clarified code status and confirmed DNR/DNI.   Kallie Sousa is hopeful that maybe patient will improve since she was able to see him and had a paracentesis today, but recognizes that if she continues to decline overnight he may have to make some tough decisions regarding care moving forward. At this time patient is a DNR/DNI, continue current level of care including pressor support. POST form signed by medical power of  Elena Haney with the following measures: DNR/DNI with limited interventions, feeding tubes for defined trial.  Support offered to both patient and Yajaira Raoe during this difficult time. We will follow up with Yajaira Landrum tomorrow via telephone to answer any questions and to continue goals of care discussions. See previous discussions below March 12, 2021: Goals of care discussions: Palliative medicine team including Mili Houston, ANA and I met with patient at patient's bedside. Patient was awake alert and oriented x4. Her mouth was very dry which made speech difficult, but she was able to talk better once oral care was performed. Introduced our role of palliative medicine and support offered as patient shares that she had a fall. Patient has no AMD on file, she is not , she has 1 daughter Heike Ramos who per chart review admits she is estranged and does not want involvement in patient's care, and lives with a significant other \"fiancé\" Elena Lyndon. Patient consistent with desire for Yajaira Landrum to make medical decisions in the event she is unable; agreed to AMD completion today naming a Elena Haney (significant other) as medical power of . She declined naming a secondary. Discussed with Yajaira Landrum at bedside who agrees with this role. He shares that they have been together for 3 years, patient shares they both met at a bar. Per both patient and Yajaira Landrum, they are both going to try alcohol cessation together. Encouragement given. Discussed the benefits and burdens of CPR in the setting of alcohol abuse, cirrhosis, COPD with intermittent oxygen use, and other comorbidities. Patient would like more time to think about this and to talk with her significant other.   Patient understands that at this time she remains a full code with full interventions. We will plan to follow-up Monday if remains hospitalized for further clarification and goals of care. 1. Alcohol abuse: Patient reportedly drinks 2 cans of beer and 2 glasses of wine daily. Per Kaylee Baum she has given up \"hard liquor\". Encouragement provided. 2. Cirrhosis: Not clear whether she has a hepatologist.  Sclera jaundice noted. Coagulopathy status post 2 units packed red blood cells. 3. COPD: On oxygen intermittently at home, on oxygen via nasal cannula here. Baseline shortness of breath with activity. On bronchodilators. 4. Debility: Lives with significant other. Generalized weakness with falls. Not clear how many falls she has sustained recently. Now here with a tibial fracture which needs surgical repair, complicated by current alcohol withdrawal.  Ortho is following. Patient is nonweightbearing. 5. Initial consult note routed to primary continuity provider 6. Communicated plan of care with: Palliative IDT, patient, family, MD, RN 
 
 
 GOALS OF CARE / TREATMENT PREFERENCES:  
[====Goals of Care====] GOALS OF CARE: DNR/DNI Patient/Health Care Proxy Stated Goals: Prolong life TREATMENT PREFERENCES:  
Code Status: DNR Advance Care Planning: 
Advance Care Planning 6/26/2020 Patient's Healthcare Decision Maker is: Legal Next of Kin Confirm Advance Directive None Patient Would Like to Complete Advance Directive No  
 
 
Medical Interventions: Limited additional interventions Artificially Administered Nutrition: Feeding tube for a defined trial period The palliative care team has discussed with patient / health care proxy about goals of care / treatment preferences for patient. 
[====Goals of Care====] HISTORY:  
 
History obtained from: Patient, chart, significant other CHIEF COMPLAINT: Dry mouth HPI/SUBJECTIVE: The patient is:  
[] Verbal and participatory [x] Non-participatory due to:   
Unable to answer orientation questions, speech  Incomprehensible. significant other also not able to understand her Clinical Pain Assessment (nonverbal scale for severity on nonverbal patients):  
Clinical Pain Assessment Severity: 3 Adult Nonverbal Pain Scale Face: Occasional grimace, tearing, frowning, wrinkled forehead Activity (Movement): Lying quietly, normal position Guarding: Lying quietly, no positioning of hands over areas of body Physiology (Vital Signs): Stable vital signs Respiratory: RR > 20 above baseline or 10% decrease SpO2 severe asynchrony with ventilator Total Score: 3 FUNCTIONAL ASSESSMENT:  
 
Palliative Performance Scale (PPS): PPS: 30 
 
 
 PSYCHOSOCIAL/SPIRITUAL SCREENING:  
 
Advance Care Planning: 
Advance Care Planning 6/26/2020 Patient's Healthcare Decision Maker is: Legal Next of Kin Confirm Advance Directive None Patient Would Like to Complete Advance Directive No  
 
  
Any spiritual / Spiritism concerns: 
[] Yes /  [x] No 
 
Caregiver Burnout: 
[] Yes /  [] No /  [x] No Caregiver Present Anticipatory grief assessment:  
[] Normal  / [x] Maladaptive REVIEW OF SYSTEMS:  
 
Positive and pertinent negative findings in ROS are noted above in HPI. The following systems were [] reviewed / [x] unable to be reviewed as noted in HPI Other findings are noted below. Systems: constitutional, ears/nose/mouth/throat, respiratory, gastrointestinal, genitourinary, musculoskeletal, integumentary, neurologic, psychiatric, endocrine. Positive findings noted below. Modified ESAS Completed by: provider Fatigue: 5 Pain: 3 Anxiety: 0 Nausea: 0 Dyspnea: 0 Constipation: No  
     
 
 
 PHYSICAL EXAM:  
 
From RN flowsheet: 
Wt Readings from Last 3 Encounters:  
03/13/21 82.3 kg (181 lb 7 oz) 03/09/21 73.5 kg (162 lb)  
06/26/20 73.8 kg (162 lb 11.2 oz) Blood pressure (!) 144/75, pulse 79, temperature 98.6 °F (37 °C), resp.  rate 13, height 5' 6\" (1.676 m), weight 82.3 kg (181 lb 7 oz), SpO2 97 %. Pain Scale 1: Visual 
Pain Intensity 1: 0 Constitutional: Awake, drowsy, appears uncomfortable, appears chronically ill Eyes: pupils equal, anicteric ENMT: dry mucous membranes Cardiovascular: regular rhythm, distal pulses intact Respiratory: breathing mildly labored with abdominal accessory muscle use, on nonrebreather at 15 L Gastrointestinal: Hard distended, significant bruising, mild tenderness Musculoskeletal: no deformity, no tenderness to palpation Skin: warm, dry Neurologic: following some commands, moving all extremities, speech is not clear, unable to answer orientation questions HISTORY:  
 
Principal Problem: 
  Alcohol withdrawal ([address] (3/10/2021) Active Problems: 
  Anemia (3/11/2021) Alcoholic cirrhosis of liver with ascites ([address] (3/11/2021) Abdominal wall hematoma (3/11/2021) Alcoholic hepatitis with ascites (3/11/2021) Coagulopathy ([address] (3/11/2021) Tibial fracture (3/11/2021) Past Medical History:  
Diagnosis Date  Bipolar affective ([address]  Chronic obstructive pulmonary disease ([address]  Cirrhosis ([address]  Encounter for screening colonoscopy  ETOH abuse  Fall at home 06/03/2020  
 hitting head & right eye (bruised); did not seek medical attention  Former smoker 1 PPD x40 years  History of ascites  History of sinusitis  Hyperlipidemia 11/20/2009 Patient denies  Left breast mass 05/13/2011 U/S Left Breast: No definite mass identified. Recommended recheck in 6 months  Manic depression ([address]  On home O2   
 as needed  Panic disorder  Vitamin D insufficiency 11/20/2009  
 23 ng/mL  Wrist fracture, right 01/06/2010 Non-Displaced Distal Radius/Ulnar Styloid Fx Past Surgical History:  
Procedure Laterality Date  COLONOSCOPY N/A 6/26/2020  SCREENING COLONOSCOPY with bx and endoclip x 1 performed by Rolando Elaine MD at [facility] ENDOSCOPY  
 HX ENDOSCOPY    
 HX WISDOM TEETH EXTRACTION Family History Problem Relation Age of Onset  No Known Problems Daughter  No Known Problems Adoptive Father  No Known Problems Adoptive Mother History reviewed, no pertinent family history. Social History Tobacco Use  Smoking status: Former Smoker Packs/day: 1.00 Years: 40.00 Pack years: 40.00 Quit date: 2020 Years since quittin.1  Smokeless tobacco: Former User Quit date: 2016 Substance Use Topics  Alcohol use: Not Currently Comment: Quit 2020 Allergies Allergen Reactions  Animal Dander Itching  Egg Nausea and Vomiting Pt stated she does not have true allergy to eggs. Current Facility-Administered Medications Medication Dose Route Frequency  vancomycin (VANCOCIN) 1250 mg in  ml infusion  1,250 mg IntraVENous Q8H  
 furosemide (LASIX) injection 20 mg  20 mg IntraVENous DAILY  0.9% sodium chloride infusion 250 mL  250 mL IntraVENous PRN  
 insulin lispro (HUMALOG) injection   SubCUTAneous BID  methylPREDNISolone (PF) (SOLU-MEDROL) injection 40 mg  40 mg IntraVENous Q6H  
 ELECTROLYTE REPLACEMENT PROTOCOL - Potassium Renal Dosing  1 Each Other PRN  
 ELECTROLYTE REPLACEMENT PROTOCOL - Magnesium   1 Each Other PRN  
 ELECTROLYTE REPLACEMENT PROTOCOL  - Phosphorus Renal Dosing  1 Each Other PRN  
 ELECTROLYTE REPLACEMENT PROTOCOL - Calcium   1 Each Other PRN  
 0.9% sodium chloride 1,000 mL with mvi, adult no. 4 with vit K 10 mL, thiamine 212 mg, folic acid 1 mg infusion   IntraVENous DAILY  glucose chewable tablet 16 g  4 Tab Oral PRN  
 glucagon (GLUCAGEN) injection 1 mg  1 mg IntraMUSCular PRN  
 dextrose 10% infusion 125-250 mL  125-250 mL IntraVENous PRN  
 fluticasone propionate (FLONASE) 50 mcg/actuation nasal spray 2 Spray  2 Spray Both Nostrils DAILY  heparin (porcine) 100 unit/mL injection 500 Units  500 Units InterCATHeter Q8H PRN  Vancomycin - Rx to dose and monitor  1 Each Other Rx Dosing/Monitoring  piperacillin-tazobactam (ZOSYN) 3.375 g in 0.9% sodium chloride (MBP/ADV) 100 mL MBP  3.375 g IntraVENous Q8H  
 0.9% sodium chloride infusion 250 mL  250 mL IntraVENous PRN  
 0.9% sodium chloride infusion 250 mL  250 mL IntraVENous PRN  
 budesonide (PULMICORT) 500 mcg/2 ml nebulizer suspension  500 mcg Nebulization BID RT  
 albuterol-ipratropium (DUO-NEB) 2.5 MG-0.5 MG/3 ML  3 mL Nebulization QID RT  
 ziprasidone (GEODON) 20 mg in sterile water (preservative free) 1 mL injection  20 mg IntraMUSCular BID  PHENYLephrine (ISAMAR-SYNEPHRINE) 30 mg in 0.9% sodium chloride 250 mL infusion   mcg/min IntraVENous TITRATE  dexmedeTOMidine (PRECEDEX) 400 mcg in 0.9% sodium chloride 100 mL infusion  0.1-1.5 mcg/kg/hr IntraVENous TITRATE  sodium chloride (NS) flush 5-40 mL  5-40 mL IntraVENous Q8H  
 sodium chloride (NS) flush 5-40 mL  5-40 mL IntraVENous PRN  
 acetaminophen (TYLENOL) tablet 650 mg  650 mg Oral Q6H PRN Or  
 acetaminophen (TYLENOL) suppository 650 mg  650 mg Rectal Q6H PRN  polyethylene glycol (MIRALAX) packet 17 g  17 g Oral DAILY PRN  promethazine (PHENERGAN) tablet 12.5 mg  12.5 mg Oral Q6H PRN Or  
 ondansetron (ZOFRAN) injection 4 mg  4 mg IntraVENous Q6H PRN  
 albumin human 25% (BUMINATE) solution 12.5 g  12.5 g IntraVENous Q6H  
 sodium chloride (NS) flush 5-40 mL  5-40 mL IntraVENous PRN  
 LORazepam (ATIVAN) tablet 1 mg  1 mg Oral Q1H PRN Or  
 LORazepam (ATIVAN) injection 1 mg  1 mg IntraVENous Q1H PRN  
 LORazepam (ATIVAN) tablet 2 mg  2 mg Oral Q1H PRN Or  
 LORazepam (ATIVAN) injection 2 mg  2 mg IntraVENous Q1H PRN  
 LORazepam (ATIVAN) injection 3 mg  3 mg IntraVENous Q15MIN PRN  
 lactulose (CHRONULAC) 10 gram/15 mL solution 30 mL  20 g Oral TID  pantoprazole (PROTONIX) 40 mg in 0.9% sodium chloride 10 mL injection  40 mg IntraVENous Q12H  venlafaxine-SR (EFFEXOR-XR) capsule 150 mg  150 mg Oral DAILY WITH BREAKFAST  [Held by provider] ziprasidone (GEODON) capsule 20 mg  20 mg Oral BID WITH MEALS  
 albuterol-ipratropium (DUO-NEB) 2.5 MG-0.5 MG/3 ML  3 mL Nebulization Q4H PRN  
 
 
 
 LAB AND IMAGING FINDINGS:  
 
Lab Results Component Value Date/Time WBC 7.1 03/15/2021 06:30 AM  
 HGB 8.5 (L) 03/15/2021 06:30 AM  
 PLATELET 92 (L) 65/23/3694 06:30 AM  
 
Lab Results Component Value Date/Time Sodium 147 (H) 03/15/2021 06:30 AM  
 Potassium 3.5 03/15/2021 06:30 AM  
 Chloride 114 (H) 03/15/2021 06:30 AM  
 CO2 28 03/15/2021 06:30 AM  
 BUN 10 03/15/2021 06:30 AM  
 Creatinine 0.43 (L) 03/15/2021 06:30 AM  
 Calcium 7.8 (L) 03/15/2021 06:30 AM  
 Magnesium 2.2 03/15/2021 06:30 AM  
 Phosphorus 1.3 (L) 03/15/2021 06:30 AM  
  
Lab Results Component Value Date/Time Alk. phosphatase 111 03/15/2021 06:30 AM  
 Protein, total 5.8 (L) 03/15/2021 06:30 AM  
 Albumin 3.5 03/15/2021 06:30 AM  
 Globulin 2.3 03/15/2021 06:30 AM  
 
Lab Results Component Value Date/Time INR 2.0 (H) 03/15/2021 06:30 AM  
 Prothrombin time 22.0 (H) 03/15/2021 06:30 AM  
  
No results found for: IRON, FE, TIBC, IBCT, PSAT, FERR No results found for: PH, PCO2, PO2 No components found for: [place] Lab Results Component Value Date/Time  (H) 03/11/2021 06:26 AM  
 CK - MB 5.4 (H) 03/11/2021 06:26 AM  
  
 
 
   
 
Total time: 65 minutes Counseling / coordination time, spent as noted above: 60 minutes  
> 50% counseling / coordination?:  Yes with patient and family Prolonged service was provided for  [x]30 min   []75 min in face to face time in the presence of the patient, spent as noted above. Time Start:  1330 Time End: 1435 Note: this can only be billed with 47553 (initial) or 92594 (follow up). If multiple start / stop times, list each separately. Documentation completed with the [employer].  Unintentional typographical errors may occur. In the event further clarification is needed, please reach out to my office [phone].

## 2021-03-15 NOTE — WOUND CARE
Chart audited for low brett score, patient with high risk for skin breakdown, no documented wounds at time of audit. Skin Care & Pressure Relief Recommendations Minimize layers of linen Pads under patient to optimize support surface Turn/reposition approximately every 2 hours Pillow wedges Manage incontinence Promote continence; Skin Protective lotion/cream to buttocks and sacrum daily and as needed with incontinence care Offload heels pillows Consult wound care if any wounds/ skin care needs noted during admission

## 2021-03-15 NOTE — PROGRESS NOTES
Hospitalist Progress Note Patient: Alyson German MRN: [id number]  CSN: [id number] YOB: 1965  Age: 61 y.o. Sex: female DOA: 3/10/2021 LOS:  LOS: 5 days Chief Complaint: 
 
etoh withdrawal 
 
 
Assessment/Plan  
 
62 yo female, alcoholic, fell and fractured tibia, sent from [facility] where no ICU beds for active etoh withdrawal 
 
Continued pressor support for hypotension Pneumonia-on zosyn, vanco 
 
Steroids started for Ac copd exacerbation COPD on home oxygen 
  
Alcohol withdrawal -with associated delerium-CIWA, precedex gtt Alcoholic liver disease-appreciate hepatology consult Ascites not enough to tap 
  
Abdominal wall hematoma, H&H stabilized.   
  
Hypokalemia: K repletion. Hypophosphatemia-repletion given 
  
Bipolar disorder, etoh abuse-geodon 
  
Tibial fracture-for surgery once medical issues more stabilized 
   
Coagulopathy-follow INR State post RBC and FFP, INR 2 
  Severe anemia, blood loss: Monitor H&H transfuse as needed, Hgb stable over 8 
  
 Morbid obesity Requires ICU care Critically ill still High risk for further morbidity and mortality Test for covid with infiltrates on CXR, rapid done initially negative-would be prudent to get PCR Disposition : 
Patient Active Problem List  
Diagnosis Code  ETOH abuse F10.10  COPD (chronic obstructive pulmonary disease) (HCC) J44.9  Bipolar mood disorder ([facility] [address] F31.9  Tobacco abuse Z72.0  Thrombocytopenia ([facility] [address] D69.6  Alcohol withdrawal (HCC) F10.239  Anemia D64.9  Alcoholic cirrhosis of liver with ascites (HCC) K70.31  
 Abdominal wall hematoma S30. Carolina Manish  Alcoholic hepatitis with ascites K70.11  
 Coagulopathy (HCC) D68.9  Tibial fracture S82.209A Subjective: 
 
Still on pressor and precedex gtt 
delerious this am 
Wearing NRB I cannot understand her speech, she is saying things that do not make sense Review of systems: 
 
Not reliable Vital signs/Intake and Output: 
Visit Vitals BP (!) 153/77 Pulse 89 Temp (P) 97.1 °F (36.2 °C) Resp 20 Ht 5' 6\" (1.676 m) Wt 82.3 kg (181 lb 7 oz) SpO2 93% BMI 29.28 kg/m² Current Shift:  No intake/output data recorded. Last three shifts:  03/13 1901 - 03/15 0700 In: -  
Out: 4625 [[facility]:[id number]] Exam: 
 
General:pale debilitated WF weak confused Head/Neck: NCAT, supple, No masses, No lymphadenopathy CVS:Regular rate and rhythm, no M/R/G, S1/S2 heard, no thrill Lungs:Clear to auscultation bilaterally, no wheezes, rhonchi, or rales Abdomen: Soft, Nontender, mild distention, abd wall hematoma unchanged Extremities: No C/C/E, pulses palpable 2+ Skin:bruising Neuro:grossly normal 
   
   
   
 
Labs: Results:  
   
Chemistry Recent Labs  
  03/15/21 
0630 03/14/21 
1635 03/14/21 
0530 03/13/21 
0430 *  --  90 74 *  --  145 142  
K 3.5 3.2* 2.9* 3.0*  
*  --  112* 110  
CO2 28  --  24 24 BUN 10  --  7 11 CREA 0.43*  --  0.53* 0.49* CA 7.8*  --  7.6* 7.3* AGAP 5  --  9 8 BUCR 23*  --  13 22*   --  109  --   
TP 5.8*  --  5.6*  --   
ALB 3.5  --  3.4  --   
GLOB 2.3  --  2.2  --   
AGRAT 1.5  --  1.5  --   
  
CBC w/Diff Recent Labs  
  03/14/21 
1635 03/14/21 
0530 03/13/21 
1535 03/13/21 
0430 WBC  --  5.2  --  5.2  
RBC  --  2.48*  --  2.36* HGB 8.3* 8.4* 8.0* 8.1* HCT 25.0* 25.5* 25.2* 24.1*  
PLT  --  74*  --  83* GRANS  --  64  --  61  
LYMPH  --  14*  --  12* EOS  --  0  --  0 Cardiac Enzymes No results for input(s): CPK, CKND1, PAPO in the last 72 hours. No lab exists for component: Jose Berry Coagulation Recent Labs  
  03/15/21 
0630 03/14/21 
0530 PTP 22.0* 20.8* INR 2.0* 1.8* Lipid Panel Lab Results Component Value Date/Time  Cholesterol, total 205 (H) 09/10/2020 10:22 AM  
 HDL Cholesterol 87 (H) 09/10/2020 10:22 AM  
 LDL, calculated 109.2 (H) 09/10/2020 10:22 AM  
 VLDL, calculated 8.8 09/10/2020 10:22 AM  
 Triglyceride 44 09/10/2020 10:22 AM  
 CHOL/HDL Ratio 2.4 09/10/2020 10:22 AM  
  
BNP No results for input(s): BNPP in the last 72 hours. Liver Enzymes Recent Labs  
  03/15/21 
0630 TP 5.8* ALB 3.5  Thyroid Studies Lab Results Component Value Date/Time TSH 1.45 09/10/2020 10:21 AM  
    
Procedures/imaging: see electronic medical records for all procedures/Xrays and details which were not copied into this note but were reviewed prior to creation of Plan Garth Pereira MD

## 2021-03-16 NOTE — ACP (ADVANCE CARE PLANNING)
Advanced Steps Advance Care Planning Conversation Date of conversation:3/16/2021    Location [facility] Length (minutes): 45 
 
Participants: 
 [x] Patient unable to participate  
 [x] Healthcare agent (already designated in existing ACP document) Name:  Samir Day Relationship to Patient:Boyfrienal Phone number: [phone] Advanced Steps® ACP Facilitator: Abigail Hopper RN, Laquita Bird NP Conversation Topics Ms. Jennifer Conti was viewed via window due to isolation precautions and to preserve  PPE. Palliative team members Laquita Bird NP and this writer conferenced with [address] [place] [address]. Patient with increased lethargy and use of accessory muscle for breathing and appears distressed. Hr 106, BP -151/75 (off of pressors). She is no longer verbal and only responds to noxious stimuli with facial grimacing. Call placed to pt's friend/MPOA Samir Day. Medical Update on patient's grave condition provided. Mr. Samir Day agrees to make patient comfort measures only at this time. Goal is to focus on symptom management and comfort, orders placed. Updated BSRN and attending MD in change of goals. At this time continue DNR/DNI, comfort measures only. We will continue to follow for support and comfort Needs to discuss with spiritual/Anabaptist advisor: [] Yes  [x] No 
 
Needs more information about illness and complications:  [] Yes  [x] No 
 
 
Cardiopulmonary Resuscitation Order Elected for CPR:  []  Attempt Resuscitation [x]  Do Not Attempt Resuscitation When NOT in Cardiopulmonary Arrest, Order Elected:   
 
[x] Comfort Measures 
[] Limited Additional Interventions [] Full Interventions Artificially Administered Nutrition, Order Elected: 
 
[x] No Feeding Tube  
[] Feeding Tube for a defined trial period 
[] Feeding Tube long-term if indicated Meeting Outcomes: 
 [x] ACP discussion completed 
 [x] Yamilet form completed 
[x] Yamilet prepared for Provider review and signature 
 [x] Original placed on Chart, if in facility (form to be sent with patient at discharge) [x] Copy given to healthcare agent [x] Copy scanned to electronic medical record If ACP discussion not completed, last interview topic discussed: Ms Darrick Tate is imminent and will transition to comfort measures only. PCA morphine gtt started. Plan for hospice referral if pt survives overnight. Follow-up plan:   
 [x] Schedule follow-up conversation to continue planning 
 [] Referred individual to Provider for additional questions/concerns  
[] Advised patient/agent/surrogate to review completed POST form and update if needed with changes in condition, patient preferences or care setting  
 
[] This note routed to one or more involved healthcare providers Kristel Graffion RN, BSN, [facility]

## 2021-03-16 NOTE — PROGRESS NOTES
[address] 137 [facility] Reina hSalini Covington MD, [facility] Carisa Foreman MD, MPH AMY Clifton, [facility] Martine Hanley, [facility]   Danny Councilman, FNP-C Soila Vuong, [facility] Mely [facility] 136 
  at [address], [facility] Katie Rosales  22. 
  [phone] FAX: [address] [place] [address] Avenue 
  [facility] 
  [address], [address] 98 [facility], [address] 
  [phone] FAX: [phone] HEPATOLOGY PROGRESS NOTE The patient is a 61 y.o. female with alcohol induced cirrhosis who continues to consume alcohol in excess. Cirrhosis was initially documented by imaging in 5/2019. She came to the ED at [facility] on 3/9/2021 because she felt dizzy, fell and developed pain in the left leg. CT abdomen/pelvis showed evidence of right rectus muscle hematoma, of cirrhosis and ascites. X-ray showed left proximal tibia fracture. While in the ED the patient started to develop symptoms of alcohol withdrawal including tachycardia and agitation. She was trearted with Ativan per CIWA protocol. Rapid Covid test was negative. She was transferred to [facility] for additional care. I was asked to review her case and make recommendations a few days ago. I have now examined her. She is disoriented and cannot respond to questions. She has been in the ICU at [facility] for 6 days. She is still on a non-rebreather mask with 100% O2 sat with sats of only 84-86%. SBP is in the 140 range with normal pulse off pressers. She remains on IV ABX She remains on IV albumin She has recieved 2 units of FFP and 1 unit of blood today. ASSESSMENT AND PLAN: 
Cirrhosis The diagnosis of cirrhosis is based upon imaging, laboratory studies, complications of cirrhosis. Cirrhosis is presumed secondary to alcohol. Serology for other causes of chronic liver disease have not been performed. The CTP is 10. Child class C. The MELD score is up to 23. Based upon the MELD and CTP scores the patient has a mortality of about 20% within the next 90 day but 90% within the next 2 years unless she stops consuming alchohol Decision has been made to transition patient to comfort care. I agree with this decision. Alcohol hepatitis The DF is is up to 55 from 34 a few days ago. This is now in the category of severe alcoholic hepatitis and is associated with a 30% mortality within the next 6 months. Now that she is hemodynamically stable with good BP off pressers and presumed sepsis is under control I would start IV solumedrol. Given that we are in mist of deciding if she should be comfort care will hold off decision to start Solumedrol till AM. Coagulopathy This is secondary to cirrhosis and alcoholic hepatitis. She recieved IV vitamin K in banana bag a few days ago Despite this the INR has increased Since sepsis has been treated and we do not think she is in DIC at this time the prolongation in INR indicates worsening liver failure. Ascites Ascites developed for the first time in 3/2021. No obvious ascites at this time. She has been on broad spectrum ABX for several days. No need to perform diagnostic paracentesis at this time as it will not  and ulikely to document that SBP even if this was present on admission. AMS vs Hepatic encephalopathy She has AMS secondary to alcohol withdrawal and probably hypoxia. The ammonia has been low and although lactulose is ordered this has not been given because of her inability to safely take PO and no NG Ammonia is up to the 70s today. If this continues to increase and MS remains poor may need give via enema. Anemia Baseline HB in 2020 was 12 gms HB has drifted down to 8 gms since admission. This is likely due to dilution. There is no reported overt GI bleeding. She will need EGD to evaluate for esophageal varices at some point during this hospitalization. She was given 1 unit of blood today Continue to monitor HB and transfuse if HB drops to less than 7 gms. Thrombocytopenia This is secondary to cirrhosis. The PLT is in the 90s. There is no evidence of overt bleeding. No treatment is required. LABORATORY: 
Results for Judith Waite (MRN [id number]) as of 3/15/2021 19:07 Ref. Range 3/13/2021 04:30 3/14/2021 05:30 3/15/2021 06:30 WBC Latest Ref Range: 4.6 - 13.2 K/uL 5.2 5.2 7.1 HGB Latest Ref Range: 12.0 - 16.0 g/dL 8.1 (L) 8.4 (L) 8.5 (L) PLATELET Latest Ref Range: 135 - 420 K/uL 83 (L) 74 (L) 92 (L) INR Latest Ref Range: 0.8 - 1.2   1.8 (H) 1.8 (H) 2.0 (H) Sodium Latest Ref Range: 136 - 145 mmol/L 142 145 147 (H) Potassium Latest Ref Range: 3.5 - 5.5 mmol/L 3.0 (L) 2.9 (LL) 3.5 Chloride Latest Ref Range: 100 - 111 mmol/L 110 112 (H) 114 (H) CO2 Latest Ref Range: 21 - 32 mmol/L 24 24 28 Glucose Latest Ref Range: 74 - 99 mg/dL 74 90 121 (H) BUN Latest Ref Range: 7.0 - 18 MG/DL 11 7 10 Creatinine Latest Ref Range: 0.6 - 1.3 MG/DL 0.49 (L) 0.53 (L) 0.43 (L) Bilirubin, total Latest Ref Range: 0.2 - 1.0 MG/DL  8.2 (H) 9.6 (H) Albumin Latest Ref Range: 3.4 - 5.0 g/dL  3.4 3.5 ALT Latest Ref Range: 13 - 56 U/L  76 (H) 86 (H) AST Latest Ref Range: 10 - 38 U/L  141 (H) 159 (H) Alk. phosphatase Latest Ref Range: 45 - 117 U/L  109 111 Ammonia Latest Ref Range: 11 - 32 UMOL/L 43 (H) 50 (H) 79 (H) RADIOLOGY: 
3/2021. CT scan abdomen with IV contrast.  Changes consistent with cirrhosis. No liver mass lesions. No dilated bile ducts. No ascites. 3/2021. Ultrasound of liver. Echogenic consistent with cirrhosis. No liver mass lesions. No dilated bile ducts. Mild ascites.    
 
 
Darcie Michelle MD 
[facility] [address] [address], [address] Katie Palmer  22. 
[phone] [address] [place] [address]

## 2021-03-16 NOTE — PROGRESS NOTES
Patient is now on COVID precautions. After discussion with Palliative Care and the boyfriend (MPOA) Alphonso Paredes patient was moved to comfort care. Rodolfo Mckeonailyn was able to visit with her yesterday (before COVID precautions were added). He left a message for me in the office. I have attempted to call him back but had to leave a message. Please do page the  when patient passes or if Rodolfo Bailee has any other questions or needs. [address], M.Div. Board Certified [employer] [phone] - Office

## 2021-03-16 NOTE — PROGRESS NOTES
Hospitalist Progress Note Patient: Cindy Mareser MRN: [id number]  CSN: [id number] YOB: 1965  Age: 61 y.o. Sex: female DOA: 3/10/2021 LOS:  LOS: 6 days Chief Complaint: 
 
etoh withdrawal 
 
 
Assessment/Plan  
 
  
60 yo female, alcoholic, fell and fractured tibia, sent from [facility] where no ICU beds for active etoh withdrawal 
  
Continued pressor support for hypotension She appears lethargic, tachypneic, pale and in very poor condition In fact it appears like she is worsening and nearing the end of her life 
  
Pneumonia-on zosyn, vanco 
 
Severe anemia-transfuse 1 unit blood this am 
  
Steroids started for Ac copd exacerbation COPD on home oxygen Bipolar-reduce geodon dosing as lethargic 
 
hypernatremia 
  
Alcohol withdrawal -with associated delerium-CIWA, precedex gtt 
  
Alcoholic liver disease and cirrhosis-appreciate hepatology consult Ascites s/p 1.7 liter para 3/15 at bedside 
  
Abdominal wall hematoma, H&H stabilized.   
  
Hypokalemia: K repletion again this am IV 
  
Hypophosphatemia-repletion again this am in process 
   
Tibial fracture 
   
Coagulopathy Morbid obesity DNR in place I expect she will not survive to leave the hospital here, prognosis appears grim 
  
 
  
Disposition : 
Patient Active Problem List  
Diagnosis Code  ETOH abuse F10.10  COPD (chronic obstructive pulmonary disease) (HCC) J44.9  Bipolar mood disorder ([facility] [address] F31.9  Tobacco abuse Z72.0  Thrombocytopenia ([facility] [address] D69.6  Alcohol withdrawal (HCC) F10.239  Anemia D64.9  Alcoholic cirrhosis of liver with ascites (HCC) K70.31  
 Abdominal wall hematoma S30. Adolm Solitario  Alcoholic hepatitis with ascites K70.11  
 Coagulopathy (HCC) D68.9  Tibial fracture S82.209A Subjective: 
 
Very ill Frail, weak, coarse BS this am 
On NC and NRB Delerious, very pale Overnight no acute events but nursing notes worsening Review of systems: UTO Vital signs/Intake and Output: 
Visit Vitals BP (!) 158/76 Pulse (!) 111 Temp 98.4 °F (36.9 °C) Resp (!) 32 Ht 5' 6\" (1.676 m) Wt 81.8 kg (180 lb 5.4 oz) SpO2 90% BMI 29.11 kg/m² Current Shift:  No intake/output data recorded. Last three shifts:  03/14 1901 - 03/16 0700 In: 4697 Out: 3710 [[id number]] Exam: 
 
General: delerious frail pale WF on NRB, speech unintelligible Coarse rhonchorous BS Head/Neck: NCAT, supple, No masses, No lymphadenopathy CVS:tachy reg,  no M/R/G, S1/S2 heard, no thrill Lungs:Coarse BS with rales BL Abdomen: Soft, Nontender, mild distention, bruising, Normal Bowel sounds Extremities:leg in splint Neuro:mild tremulousness Labs: Results:  
   
Chemistry Recent Labs  
  03/16/21 
0442 03/15/21 
1640 03/15/21 
0630 03/14/21 
0530 03/14/21 
0530 *  --  121*  --  90  
*  --  147*  --  145  
K 3.4* 3.7 3.5   < > 2.9*  
*  --  114*  --  112* CO2 28  --  28  --  24 BUN 12  --  10  --  7  
CREA 0.35*  --  0.43*  --  0.53* CA 7.0*  --  7.8*  --  7.6* AGAP 5  --  5  --  9  
BUCR 34*  --  23*  --  13  
AP 87  --  111  --  109  
TP 5.4*  --  5.8*  --  5.6* ALB 3.2*  --  3.5  --  3.4 GLOB 2.2  --  2.3  --  2.2 AGRAT 1.5  --  1.5  --  1.5  
 < > = values in this interval not displayed. CBC w/Diff Recent Labs  
  03/16/21 
0442 03/15/21 
1640 03/15/21 
0630 03/14/21 
0530 03/14/21 
0530 WBC 4.1*  --  7.1  --  5.2  
RBC 2.00*  --  2.52*  --  2.48* HGB 6.9* 8.3* 8.5*   < > 8.4* HCT 21.3* 25.6* 26.2*   < > 25.5* PLT 64*  --  92*  --  74* GRANS 76*  --  75*  --  64  
LYMPH 7*  --  7*  --  14* EOS 0  --  0  --  0  
 < > = values in this interval not displayed. Cardiac Enzymes No results for input(s): CPK, CKND1, PAPO in the last 72 hours. No lab exists for component: Tania Anguiano Coagulation Recent Labs  
  03/16/21 
0442 03/15/21 
0630 PTP 22.5* 22.0* INR 2.0* 2.0* Lipid Panel Lab Results Component Value Date/Time Cholesterol, total 205 (H) 09/10/2020 10:22 AM  
 HDL Cholesterol 87 (H) 09/10/2020 10:22 AM  
 LDL, calculated 109.2 (H) 09/10/2020 10:22 AM  
 VLDL, calculated 8.8 09/10/2020 10:22 AM  
 Triglyceride 44 09/10/2020 10:22 AM  
 CHOL/HDL Ratio 2.4 09/10/2020 10:22 AM  
  
BNP No results for input(s): BNPP in the last 72 hours. Liver Enzymes Recent Labs  
  03/16/21 
0442 TP 5.4* ALB 3.2* AP 87 Thyroid Studies Lab Results Component Value Date/Time TSH 1.45 09/10/2020 10:21 AM  
    
Procedures/imaging: see electronic medical records for all procedures/Xrays and details which were not copied into this note but were reviewed prior to creation of Plan Delma Wiggins MD

## 2021-03-16 NOTE — CONSULTS
Palliative Medicine Consult Patient Name: Linda Lucas YOB: 1965 Date of Initial Consult: March 12, 2021, March 15, 2021, March 16, 2021 Reason for Consult: Goals of care discussions Requesting Provider: Dr. Jazzy Singleton Primary Care Physician: Nae Nassar NP 
  
 SUMMARY:  
Linda Lucas is a 61 y.o. with a past history of COPD on intermittent oxygen, bipolar, cirrhosis, alcohol abuse ascites multiple fractures, who was admitted on 3/10/2021 from home with a diagnosis of alcohol withdrawal, abdominal wall hematoma, tibial fracture, and tachycardia. Current medical issues leading to Palliative Medicine involvement include: Support and goals of care discussions. March 15, 2021: Patient was less alert today, unable to answer orientation questions, incomprehensible speech, drowsy, appears distressed with labored respirations and accessory muscle use. March 16, 2021: Patient is less alert today, lying in bed with more significant accessory muscle use to breathe, she is tachycardic with elevated blood pressures (off of pressors). She is no longer verbal and only responds to noxious stimuli with facial grimacing. Death appears imminent, and patient appears distressed. PALLIATIVE DIAGNOSES:  
1. Goals of care discussions 2. Alcohol abuse 3. Cirrhosis 4. COPD 5. Debility PLAN:  
March 16 2021: Palliative medicine team including Gonzalo Taveras RN and I met with patient at patient's bedside. Patient is less alert today, lying in bed with more significant accessory muscle use to breathe, she is tachycardic with elevated blood pressures (off of pressors). She is no longer verbal and only responds to noxious stimuli with facial grimacing. Death appears imminent, and patient appears distressed. Discussed with patient's significant other/medical power of  Valente Cuba who has agreed to make patient comfort measures only at this time.   Our goal at this point is to focus on comfort only, comfort orders placed. Discussed with RN and intensivist.  Ordered a morphine PCA due to significant distress observed on assessment. Patient likely imminent but should she survive the night then would consider a hospice consult. At this time continue DNR/DNI, comfort measures only. We will continue to follow for support and comfort. See previous discussions below March 15, 2021: Palliative medicine team including Ari Cannon RN and I met with patient at patient's bedside. Patient was less alert today, unable to answer orientation questions, incomprehensible speech, drowsy, appears distressed with labored respirations and accessory muscle use. Called medical power of /significant other Linda Black to bedside today as patient appears critically ill. Medical update provided from intensivist and myself, and and shared the concern with patient decline despite aggressive efforts. Discussed the benefits and burdens of continuing aggressive measures versus implementing comfort measures with the support of hospice at discharge. Clarified code status and confirmed DNR/DNI. Louise Cerda is hopeful that maybe patient will improve since she was able to see him and had a paracentesis today, but recognizes that if she continues to decline overnight he may have to make some tough decisions regarding care moving forward. At this time patient is a DNR/DNI, continue current level of care including pressor support. POST form signed by medical power of  Linda Black with the following measures: DNR/DNI with limited interventions, feeding tubes for defined trial.  Support offered to both patient and Louise Fordtristin during this difficult time. We will follow up with Louise Cerda tomorrow via telephone to answer any questions and to continue goals of care discussions.  
 
 
 
March 12, 2021: Goals of care discussions: Palliative medicine team including Ari Cannon RN and I met with patient at patient's bedside. Patient was awake alert and oriented x4. Her mouth was very dry which made speech difficult, but she was able to talk better once oral care was performed. Introduced our role of palliative medicine and support offered as patient shares that she had a fall. Patient has no AMD on file, she is not , she has 1 daughter Gary Luevano who per chart review admits she is estranged and does not want involvement in patient's care, and lives with a significant other \"fiancé\" Meg Squibb. Patient consistent with desire for Sy Childers to make medical decisions in the event she is unable; agreed to AMD completion today naming a Meg Squibb (significant other) as medical power of . She declined naming a secondary. Discussed with Sy Childers at bedside who agrees with this role. He shares that they have been together for 3 years, patient shares they both met at a bar. Per both patient and Sy Childers, they are both going to try alcohol cessation together. Encouragement given. Discussed the benefits and burdens of CPR in the setting of alcohol abuse, cirrhosis, COPD with intermittent oxygen use, and other comorbidities. Patient would like more time to think about this and to talk with her significant other. Patient understands that at this time she remains a full code with full interventions. We will plan to follow-up Monday if remains hospitalized for further clarification and goals of care. 1. Alcohol abuse: Patient reportedly drinks 2 cans of beer and 2 glasses of wine daily. Per Sy Childers she has given up \"hard liquor\". Encouragement provided. 2. Cirrhosis: Not clear whether she has a hepatologist.  Sclera jaundice noted. Coagulopathy status post 2 units packed red blood cells. 3. COPD: On oxygen intermittently at home, on oxygen via nasal cannula here. Baseline shortness of breath with activity. On bronchodilators. 4. Debility: Lives with significant other.   Generalized weakness with falls. Not clear how many falls she has sustained recently. Now here with a tibial fracture which needs surgical repair, complicated by current alcohol withdrawal.  Ortho is following. Patient is nonweightbearing. 5. Initial consult note routed to primary continuity provider 6. Communicated plan of care with: Palliative IDT, patient, family, MD, RN 
 
 
 GOALS OF CARE / TREATMENT PREFERENCES:  
[====Goals of Care====] GOALS OF CARE: DNR/DNI, comfort measures only Patient/Health Care Proxy Stated Goals: Comfort TREATMENT PREFERENCES:  
Code Status: DNR Advance Care Planning: 
Advance Care Planning 6/26/2020 Patient's Healthcare Decision Maker is: Legal Next of Kin Confirm Advance Directive None Patient Would Like to Complete Advance Directive No  
 
 
Medical Interventions: Comfort measures Artificially Administered Nutrition: No feeding tube The palliative care team has discussed with patient / health care proxy about goals of care / treatment preferences for patient. 
[====Goals of Care====] HISTORY:  
 
History obtained from: Patient, chart, significant other CHIEF COMPLAINT: Dry mouth HPI/SUBJECTIVE: The patient is:  
[] Verbal and participatory [x] Non-participatory due to:  
Response with facial grimacing to painful stimuli Clinical Pain Assessment (nonverbal scale for severity on nonverbal patients):  
Clinical Pain Assessment Severity: 3 Adult Nonverbal Pain Scale Face: Occasional grimace, tearing, frowning, wrinkled forehead Activity (Movement): Lying quietly, normal position Guarding: Lying quietly, no positioning of hands over areas of body Physiology (Vital Signs): Change in any of the following: SBP > 20 mm HG or HR > 20/minute Respiratory: RR > 20 above baseline or 10% decrease SpO2 severe asynchrony with ventilator Total Score: 4 FUNCTIONAL ASSESSMENT:  
 
Palliative Performance Scale (PPS): PPS: 20 
 
 
 PSYCHOSOCIAL/SPIRITUAL SCREENING:  
 
Advance Care Planning: 
Advance Care Planning 6/26/2020 Patient's Healthcare Decision Maker is: Legal Next of Kin Confirm Advance Directive None Patient Would Like to Complete Advance Directive No  
 
  
Any spiritual / Jewish concerns: 
[] Yes /  [x] No 
 
Caregiver Burnout: 
[] Yes /  [] No /  [x] No Caregiver Present Anticipatory grief assessment:  
[] Normal  / [x] Maladaptive REVIEW OF SYSTEMS:  
 
Positive and pertinent negative findings in ROS are noted above in HPI. The following systems were [] reviewed / [x] unable to be reviewed as noted in HPI Other findings are noted below. Systems: constitutional, ears/nose/mouth/throat, respiratory, gastrointestinal, genitourinary, musculoskeletal, integumentary, neurologic, psychiatric, endocrine. Positive findings noted below. Modified ESAS Completed by: provider Fatigue: 5 Pain: 3 Anxiety: 0 Nausea: 0 Dyspnea: 0 Constipation: No  
     
 
 
 PHYSICAL EXAM:  
 
From RN flowsheet: 
Wt Readings from Last 3 Encounters:  
03/16/21 81.8 kg (180 lb 5.4 oz) 03/09/21 73.5 kg (162 lb)  
06/26/20 73.8 kg (162 lb 11.2 oz) Blood pressure (!) 147/70, pulse (!) 102, temperature 97.8 °F (36.6 °C), resp. rate (!) 31, height 5' 6\" (1.676 m), weight 81.8 kg (180 lb 5.4 oz), SpO2 93 %. Pain Scale 1: Adult Nonverbal Pain Scale Pain Intensity 1: 0 Constitutional:  appears uncomfortable, appears chronically ill, death appears imminent Eyes: pupils equal, anicteric ENMT: dry mucous membranes Cardiovascular: Tacky rhythm, distal pulses intact Respiratory: Breathing unlabored with abdominal accessory muscle use, on nonrebreather at 15 L Gastrointestinal: Soft distended, significant bruising, mild tenderness Musculoskeletal: no deformity Skin: warm, dry Neurologic: No command following, moving all extremities, responds to painful stimuli only, nonverbal 
 
 
 
 HISTORY:  
 
Principal Problem: 
  Alcohol withdrawal ([address] (3/10/2021) Active Problems: 
  Anemia (3/11/2021) Alcoholic cirrhosis of liver with ascites ([address] (3/11/2021) Abdominal wall hematoma (3/11/2021) Alcoholic hepatitis with ascites (3/11/2021) Coagulopathy ([address] (3/11/2021) Tibial fracture (3/11/2021) Past Medical History:  
Diagnosis Date  Bipolar affective ([address]  Chronic obstructive pulmonary disease ([address]  Cirrhosis ([address]  Encounter for screening colonoscopy  ETOH abuse  Fall at home 2020  
 hitting head & right eye (bruised); did not seek medical attention  Former smoker 1 PPD x40 years  History of ascites  History of sinusitis  Hyperlipidemia 2009 Patient denies  Left breast mass 2011 U/S Left Breast: No definite mass identified. Recommended recheck in 6 months  Manic depression ([address]  On home O2   
 as needed  Panic disorder  Vitamin D insufficiency 2009  
 23 ng/mL  Wrist fracture, right 2010 Non-Displaced Distal Radius/Ulnar Styloid Fx Past Surgical History:  
Procedure Laterality Date  COLONOSCOPY N/A 2020 SCREENING COLONOSCOPY with bx and endoclip x 1 performed by Maddy Richards MD at [facility] HX ENDOSCOPY    
 HX [address] [place] [address] Family History Problem Relation Age of Onset  No Known Problems Daughter  No Known Problems Adoptive Father  No Known Problems Adoptive Mother History reviewed, no pertinent family history. Social History Tobacco Use  Smoking status: Former Smoker Packs/day: 1.00 Years: 40.00 Pack years: 40.00 Quit date: 2020 Years since quittin.1  Smokeless tobacco: Former User Quit date: 2016 Substance Use Topics  Alcohol use: Not Currently Comment: Quit 2020 Allergies Allergen Reactions  Animal Dander Itching  Egg Nausea and Vomiting   Pt stated she does not have true allergy to eggs. Current Facility-Administered Medications Medication Dose Route Frequency  ziprasidone (GEODON) 10 mg in sterile water (preservative free) 0.5 mL injection  10 mg IntraMUSCular BID  LORazepam (ATIVAN) injection 2 mg  2 mg IntraVENous Q15MIN PRN  
 acetaminophen (TYLENOL) tablet 650 mg  650 mg Oral Q4H PRN Or  
 acetaminophen (TYLENOL) solution 650 mg  650 mg Oral Q4H PRN Or  
 acetaminophen (TYLENOL) suppository 650 mg  650 mg Rectal Q4H PRN  
 glycopyrrolate (ROBINUL) injection 0.2 mg  0.2 mg IntraVENous Q4H PRN  
 bisacodyL (DULCOLAX) suppository 10 mg  10 mg Rectal DAILY PRN  
 morphine (PF)  mg/30 ml   IntraVENous TITRATE  furosemide (LASIX) injection 20 mg  20 mg IntraVENous DAILY  ondansetron (ZOFRAN) injection 4 mg  4 mg IntraVENous Q6H PRN  
 [Held by provider] ziprasidone (GEODON) capsule 20 mg  20 mg Oral BID WITH MEALS  
 albuterol-ipratropium (DUO-NEB) 2.5 MG-0.5 MG/3 ML  3 mL Nebulization Q4H PRN  
 
 
 
 LAB AND IMAGING FINDINGS:  
 
Lab Results Component Value Date/Time WBC 4.1 (L) 03/16/2021 04:42 AM  
 HGB 6.9 (L) 03/16/2021 04:42 AM  
 PLATELET 64 (L) 79/33/0503 04:42 AM  
 
Lab Results Component Value Date/Time Sodium 152 (H) 03/16/2021 04:42 AM  
 Potassium 3.4 (L) 03/16/2021 04:42 AM  
 Chloride 119 (H) 03/16/2021 04:42 AM  
 CO2 28 03/16/2021 04:42 AM  
 BUN 12 03/16/2021 04:42 AM  
 Creatinine 0.35 (L) 03/16/2021 04:42 AM  
 Calcium 7.0 (L) 03/16/2021 04:42 AM  
 Magnesium 2.0 03/16/2021 04:42 AM  
 Phosphorus 1.2 (L) 03/16/2021 04:42 AM  
  
Lab Results Component Value Date/Time Alk. phosphatase 87 03/16/2021 04:42 AM  
 Protein, total 5.4 (L) 03/16/2021 04:42 AM  
 Albumin 3.2 (L) 03/16/2021 04:42 AM  
 Globulin 2.2 03/16/2021 04:42 AM  
 
Lab Results Component Value Date/Time  INR 2.0 (H) 03/16/2021 04:42 AM  
 Prothrombin time 22.5 (H) 03/16/2021 04:42 AM  
  
No results found for: IRON, FE, TIBC, IBCT, PSAT, FERR No results found for: PH, PCO2, PO2 No components found for: [place] Lab Results Component Value Date/Time  (H) 03/11/2021 06:26 AM  
 CK - MB 5.4 (H) 03/11/2021 06:26 AM  
  
 
 
   
 
Total time: 35 minutes Counseling / coordination time, spent as noted above: 25 minutes  
> 50% counseling / coordination?:  Yes with patient and family, RN, MD 
 
Prolonged service was provided for  [x]30 min   []75 min in face to face time in the presence of the patient, spent as noted above. Time Start:  1330 Time End: 1435 Note: this can only be billed with 63320 (initial) or 91920 (follow up). If multiple start / stop times, list each separately. Documentation completed with the [employer]. Unintentional typographical errors may occur. In the event further clarification is needed, please reach out to my office [phone].

## 2021-03-16 NOTE — PROGRESS NOTES
[facility] 
Pulmonary, Critical Care, and Sleep Medicine 
 
Name: Bronwyn Arboleda MRN: [id number]  
: 1965 Hospital: [facility]   
Date: 3/16/2021  Room: [address] [place] [address] Note 
                                           
Consult requesting physician: Dr. JORY Wood 
Reason for Consult: Alcohol withdrawal, fracture tibia, cirrhosis 
 
 
Subjective/History of Present Illness:  
 
Patient is a 56 y.o. female with PMHx significant for alcohol abuse and cirrhosis.  The patient came to ED at [facility] on 3/9 morning with fall and left leg pains.  Patient reportedly had vertigo and fell in the bathroom.  She did not lose consciousness.  She had bruising in her abdomen from a previous fall few days ago per ER report.  CT head was nil acute.  CT abdomen/pelvis showed evidence of right rectus muscle hematoma and evidence of cirrhosis with ascites.  X-ray showed left proximal tibia fracture.  Subsequently, patient went into alcohol withdrawal with tachycardia and agitation needing Ativan per CIWA protocol.  She was on 2 L oxygen in the ER.  Rapid Covid test was negative.  Due to lack of beds, patient stayed in the ER and subsequently transferred yesterday evening [3/10] to [facility] ICU. 
 
3/16/2021 
Patient remains intensive care unit critically ill. 
Still on nonrebreather. 
Hard time tolerating nasal cannula. 
Saturation ranges between 84 to 90%. 
Patient is not comfortable. 
She is receiving multiple medications to help her with comfort including Precedex, painkillers, anxiety medications. 
Also received neuroleptics. 
Due to his underlying psychiatric issues. 
Healthcare proxy considering comfort care. 
No improvement. 
Worsening liver failure. 
Anemia coagulopathy 
 
Still on pressors. 
But significant anasarca so we will add gentle diuresis as tolerated. 
Already on Albumin's. 
Prognosis is very poor 
Worsening coagulation give FFP 
No acute issues overnight. 
No reports of  chest pain or vomiting or hematemesis or rectal bleeding. Patient has significant bruising right-sided abdomen. Dripsphenylephrine, Precedex , banana bag 75 mL per hour. Social historychronic alcoholic; drinking mostly wine and beer.  
 
 
Review of symptomslimited due to patient condition Allergies Allergen Reactions  Animal Dander Itching  Egg Nausea and Vomiting Pt stated she does not have true allergy to eggs. Past Medical History:  
Diagnosis Date  Bipolar affective ([address]  Chronic obstructive pulmonary disease ([address]  Cirrhosis ([address]  Encounter for screening colonoscopy  ETOH abuse  Fall at home 2020  
 hitting head & right eye (bruised); did not seek medical attention  Former smoker 1 PPD x40 years  History of ascites  History of sinusitis  Hyperlipidemia 2009 Patient denies  Left breast mass 2011 U/S Left Breast: No definite mass identified. Recommended recheck in 6 months  Manic depression ([address]  On home O2   
 as needed  Panic disorder  Vitamin D insufficiency 2009  
 23 ng/mL  Wrist fracture, right 2010 Non-Displaced Distal Radius/Ulnar Styloid Fx Past Surgical History:  
Procedure Laterality Date  COLONOSCOPY N/A 2020 SCREENING COLONOSCOPY with bx and endoclip x 1 performed by Ella Tam MD at [address] HX ENDOSCOPY    
  [facility] Social History Tobacco Use  Smoking status: Former Smoker Packs/day: 1.00 Years: 40.00 Pack years: 40.00 Quit date: 2020 Years since quittin.1  Smokeless tobacco: Former User Quit date: 2016 Substance Use Topics  Alcohol use: Not Currently Comment: Quit 2020 Family History Problem Relation Age of Onset  No Known Problems Daughter  No Known Problems Adoptive Father  No Known Problems Adoptive Mother Prior to Admission medications Medication Sig Start Date End Date Taking? Authorizing Provider  
ziprasidone (GEODON) 20 mg capsule BID 8/6/20   Provider, Historical  
cholecalciferol, vitamin d3, 10 mcg (400 unit) cap Vitamin D3 10 mcg (400 unit) capsule    Provider, Historical  
zolpidem (AMBIEN) 5 mg tablet zolpidem 5 mg tablet    Provider, Historical  
venlafaxine-SR (EFFEXOR-XR) 150 mg capsule venlafaxine  mg capsule,extended release 24 hr Take 1 capsule every day by oral route. Provider, Historical  
montelukast (SINGULAIR) 10 mg tablet montelukast 10 mg tablet    Provider, Historical  
fluticasone/umeclidin/vilanter (TRELEGY ELLIPTA IN) Take 1 Puff by inhalation daily. Provider, Historical  
Oxygen as needed. 2L/NC    Provider, Historical  
lactulose (CHRONULAC) 10 gram/15 mL solution Take 2 tsp by mouth two (2) times a day. Provider, Historical  
budesonide-formoteroL (SYMBICORT) 160-4.5 mcg/actuation HFAA Take 2 Puffs by inhalation two (2) times a day. 2/14/20   Missy Flores NP  
ipratropium (ATROVENT HFA) 17 mcg/actuation inhaler Take 1 Puff by inhalation every four (4) hours as needed for Wheezing. 2/14/20   Missy Flores NP  
albuterol (PROVENTIL HFA) 90 mcg/actuation inhaler Take 1-2 Puffs by inhalation. 10/21/18   Provider, Historical  
omeprazole (PRILOSEC) 40 mg capsule Take 20 mg by mouth daily. Provider, Historical  
magnesium oxide (MAG-OX) 400 mg tablet Take 1 Tab by mouth daily. 5/10/19   Martin Neal MD  
thiamine HCL (B-1) 100 mg tablet Take 1 Tab by mouth daily. 5/10/19   Martin Neal MD  
 
Current Facility-Administered Medications Medication Dose Route Frequency  ziprasidone (GEODON) 10 mg in sterile water (preservative free) 0.5 mL injection  10 mg IntraMUSCular BID  morphine (PF)  mg/30 ml   IntraVENous TITRATE  furosemide (LASIX) injection 20 mg  20 mg IntraVENous DAILY  [Held by provider] ziprasidone (GEODON) capsule 20 mg  20 mg Oral BID WITH MEALS Objective:  
Vital Signs:   
Visit Vitals BP (!) 158/76 Pulse (!) 111 Temp 97.8 °F (36.6 °C) Resp (!) 32 Ht 5' 6\" (1.676 m) Wt 81.8 kg (180 lb 5.4 oz) SpO2 90% BMI 29.11 kg/m² O2 Device: Non-rebreather mask O2 Flow Rate (L/min): 15 l/min Temp (24hrs), Av.3 °F (36.8 °C), Min:97.5 °F (36.4 °C), Max:99 °F (37.2 °C) Intake/Output:  
Last shift:       07 - 1900 In: -  
Out: 850 [Urine:850] Last 3 shifts:  190 -  07 In: 1341 Out: 3710 [DSHXA:[id number]] Intake/Output Summary (Last 24 hours) at 3/16/2021 1158 Last data filed at 3/16/2021 1147 Gross per 24 hour Intake 1566 ml Output 1710 ml Net -144 ml Last 3 Recorded Weights in this Encounter 21 [id number] Weight: 82.3 kg (181 lb 7 oz) 73.5 kg (162 lb) 81.8 kg (180 lb 5.4 oz) PE: 
Patient sleepy ; arousable;  confused; appears chronically ill; on oxygen via nonrebreather mask; acyanotic; appears frail and older than stated age HEENT: pupils not dilated, reactive, severe bilateral scleral jaundice, dry oral mucosa, no nasal drainage; neck supple Neck: No adenopathy or thyroid swelling CVS: S1-S2; no murmurs; telemetrysinus rhythm; JVD not elevated RS: Symmetrical breath sounds; moderate air entry bilaterally; bilateral wheezing upperlobes; mild bibasal crackles; not tachypneic or in distress currently Abd: Soft, moderate  distention, baseline abdominal distention with ascites; nontender, no guarding or rigidity; right-sided abdominal wall bruising; no obvious hepatomegaly or splenomegaly; bowel sounds heard Neuro: Somnolent; opening  eyes to verbal stimuli; baseline confused; moving all extremities with the exception of fractured left leg Extrm: mild leg edema; mild bilateral upper extremity hand edema; no clubbing Skin: no rash; bruising in the right abdominal wall and right foot; spider nevi over the chest Lymphatic: no cervical or adenopathy Psych: Baseline agitated and confused episodically Vasc: DPs palpable in the lower extremities Data:  
   
Recent Results (from the past 24 hour(s)) ALBUMIN, FLUID Collection Time: 03/15/21  1:33 PM  
Result Value Ref Range Fluid Type: ABDOMINAL FLUID Albumin, body fld. <0.6 g/dL CELL COUNT AND DIFF, BODY FLUID Collection Time: 03/15/21  1:33 PM  
Result Value Ref Range BODY FLUID TYPE ABDOMINAL FLUID FLUID COLOR YELLOW    
 FLUID APPEARANCE CLEAR    
 FLUID RBC CT. 693 (A) NRRE /cu mm FLUID NUCLEATED CELLS 100 (A) NRRE /cu mm  
 FLD NEUTROPHILS 29 % FLD BANDS 0 % FLD LYMPHS 2 % FLD MONOCYTES 0 % FLD EOSINS 0 % MACROPHAGE 69 % WBC COMMENTS 46 MACOPHAGES,23 MESOTHELIAL CELLS. PROTEIN TOTAL, FLUID Collection Time: 03/15/21  1:33 PM  
Result Value Ref Range Fluid Type: ABDOMINAL FLUID Protein total, body fld. <2.0 g/dL CULTURE, BODY FLUID W GRAM STAIN Collection Time: 03/15/21  1:33 PM  
 Specimen: Abdominal Fluid; Body Fluid Result Value Ref Range Special Requests: NO SPECIAL REQUESTS    
 GRAM STAIN 1+ WBCS SEEN    
 GRAM STAIN NO ORGANISMS SEEN Culture result: PENDING   
SARS-COV-2 Collection Time: 03/15/21  4:15 PM  
Result Value Ref Range SARS-CoV-2 Please find results under separate order HGB & HCT Collection Time: 03/15/21  4:40 PM  
Result Value Ref Range HGB 8.3 (L) 12.0 - 16.0 g/dL HCT 25.6 (L) 35.0 - 45.0 % POTASSIUM Collection Time: 03/15/21  4:40 PM  
Result Value Ref Range Potassium 3.7 3.5 - 5.5 mmol/L  
GLUCOSE, POC Collection Time: 03/15/21  8:45 PM  
Result Value Ref Range Glucose (POC) 134 (H) 70 - 110 mg/dL PROTHROMBIN TIME + INR Collection Time: 03/16/21  4:42 AM  
Result Value Ref Range Prothrombin time 22.5 (H) 11.5 - 15.2 sec INR 2.0 (H) 0.8 - 1.2 MAGNESIUM Collection Time: 03/16/21  4:42 AM  
Result Value Ref Range Magnesium 2.0 1.6 - 2.6 mg/dL PHOSPHORUS Collection Time: 03/16/21  4:42 AM  
Result Value Ref Range Phosphorus 1.2 (L) 2.5 - 4.9 MG/DL  
CBC WITH AUTOMATED DIFF Collection Time: 03/16/21  4:42 AM  
Result Value Ref Range WBC 4.1 (L) 4.6 - 13.2 K/uL  
 RBC 2.00 (L) 4.20 - 5.30 M/uL HGB 6.9 (L) 12.0 - 16.0 g/dL HCT 21.3 (L) 35.0 - 45.0 % .5 (H) 74.0 - 97.0 FL  
 MCH 34.5 (H) 24.0 - 34.0 PG  
 MCHC 32.4 31.0 - 37.0 g/dL RDW 20.0 (H) 11.6 - 14.5 % PLATELET 64 (L) 473 - 420 K/uL MPV 10.6 9.2 - 11.8 FL  
 NEUTROPHILS 76 (H) 40 - 73 % LYMPHOCYTES 7 (L) 21 - 52 % MONOCYTES 17 (H) 3 - 10 % EOSINOPHILS 0 0 - 5 % BASOPHILS 0 0 - 2 %  
 ABS. NEUTROPHILS 3.1 1.8 - 8.0 K/UL  
 ABS. LYMPHOCYTES 0.3 (L) 0.9 - 3.6 K/UL  
 ABS. MONOCYTES 0.7 0.05 - 1.2 K/UL  
 ABS. EOSINOPHILS 0.0 0.0 - 0.4 K/UL  
 ABS. BASOPHILS 0.0 0.0 - 0.1 K/UL  
 DF AUTOMATED METABOLIC PANEL, BASIC Collection Time: 03/16/21  4:42 AM  
Result Value Ref Range Sodium 152 (H) 136 - 145 mmol/L Potassium 3.4 (L) 3.5 - 5.5 mmol/L Chloride 119 (H) 100 - 111 mmol/L  
 CO2 28 21 - 32 mmol/L Anion gap 5 3.0 - 18 mmol/L Glucose 115 (H) 74 - 99 mg/dL BUN 12 7.0 - 18 MG/DL Creatinine 0.35 (L) 0.6 - 1.3 MG/DL  
 BUN/Creatinine ratio 34 (H) 12 - 20 GFR est AA >60 >60 ml/min/1.73m2 GFR est non-AA >60 >60 ml/min/1.73m2 Calcium 7.0 (L) 8.5 - 10.1 MG/DL  
HEPATIC FUNCTION PANEL Collection Time: 03/16/21  4:42 AM  
Result Value Ref Range Protein, total 5.4 (L) 6.4 - 8.2 g/dL Albumin 3.2 (L) 3.4 - 5.0 g/dL Globulin 2.2 2.0 - 4.0 g/dL A-G Ratio 1.5 0.8 - 1.7 Bilirubin, total 8.3 (H) 0.2 - 1.0 MG/DL Bilirubin, direct 5.2 (H) 0.0 - 0.2 MG/DL Alk. phosphatase 87 45 - 117 U/L  
 AST (SGOT) 123 (H) 10 - 38 U/L  
 ALT (SGPT) 74 (H) 13 - 56 U/L  
NT-PRO BNP Collection Time: 03/16/21  4:42 AM  
Result Value Ref Range  NT pro-BNP 1,946 (H) 0 - 900 PG/ML  
AMMONIA  
 Collection Time: 03/16/21  4:43 AM  
Result Value Ref Range Ammonia 59 (H) 11 - 32 UMOL/L  
GLUCOSE, POC Collection Time: 03/16/21 11:44 AM  
Result Value Ref Range Glucose (POC) 154 (H) 70 - 110 mg/dL Chemistry Recent Labs  
  03/16/21 
0442 03/15/21 
1640 03/15/21 
0630 03/14/21 
1635 03/14/21 
0530 *  --  121*  --  90  
*  --  147*  --  145  
K 3.4* 3.7 3.5 3.2* 2.9*  
*  --  114*  --  112* CO2 28  --  28  --  24 BUN 12  --  10  --  7  
CREA 0.35*  --  0.43*  --  0.53* CA 7.0*  --  7.8*  --  7.6*  
MG 2.0  --  2.2 2.3 1.8 PHOS 1.2*  --  1.3*  --  1.5* AGAP 5  --  5  --  9  
BUCR 34*  --  23*  --  13  
AP 87  --  111  --  109  
TP 5.4*  --  5.8*  --  5.6* ALB 3.2*  --  3.5  --  3.4 GLOB 2.2  --  2.3  --  2.2 AGRAT 1.5  --  1.5  --  1.5 Lactic Acid Lactic acid Date Value Ref Range Status 05/10/2019 2.3 (HH) 0.4 - 2.0 MMOL/L Final  
  Comment:  
  CALLED TO AND CORRECTLY REPEATED BY: 
ZELALEM AGUAYO 3000 RN ON [id number] AT 0958 TO LL 
  
 
No results for input(s): LAC in the last 72 hours. Liver Enzymes Protein, total  
Date Value Ref Range Status 03/16/2021 5.4 (L) 6.4 - 8.2 g/dL Final  
 
Albumin Date Value Ref Range Status 03/16/2021 3.2 (L) 3.4 - 5.0 g/dL Final  
 
Globulin Date Value Ref Range Status 03/16/2021 2.2 2.0 - 4.0 g/dL Final  
 
A-G Ratio Date Value Ref Range Status 03/16/2021 1.5 0.8 - 1.7   Final  
 
Alk. phosphatase Date Value Ref Range Status 03/16/2021 87 45 - 117 U/L Final  
 
Recent Labs  
  03/16/21 
0442 03/15/21 
0630 03/14/21 
0530 TP 5.4* 5.8* 5.6* ALB 3.2* 3.5 3.4 GLOB 2.2 2.3 2.2 AGRAT 1.5 1.5 1.5 AP 87 111 109 CBC w/Diff Recent Labs  
  03/16/21 
0442 03/15/21 
1640 03/15/21 
0630 03/14/21 
0530 03/14/21 
0530 WBC 4.1*  --  7.1  --  5.2  
RBC 2.00*  --  2.52*  --  2.48* HGB 6.9* 8.3* 8.5*   < > 8.4* HCT 21.3* 25.6* 26.2*   < > 25.5* PLT 64*  --  92*  --  74* GRANS 76*  -- 75*  --  64  
LYMPH 7*  --  7*  --  14* EOS 0  --  0  --  0  
 < > = values in this interval not displayed. Cardiac Enzymes No results found for: CPK, CK, CKMMB, CKMB, RCK3, CKMBT, CKNDX, CKND1, PAPO, TROPT, TROIQ, FIORDALIZA, TROPT, TNIPOC, BNP, BNPP  
 
BNP No results found for: BNP, BNPP, XBNPT Coagulation Recent Labs  
  03/16/21 
0442 03/15/21 
0630 03/14/21 
0530 PTP 22.5* 22.0* 20.8* INR 2.0* 2.0* 1.8* Thyroid  Lab Results Component Value Date/Time TSH 1.45 09/10/2020 10:21 AM  
   
No results found for: T4  
 
Urinalysis Lab Results Component Value Date/Time Color DARK YELLOW 03/09/2021 08:15 AM  
 Appearance CLOUDY 03/09/2021 08:15 AM  
 Specific gravity 1.024 03/09/2021 08:15 AM  
 pH (UA) 5.0 03/09/2021 08:15 AM  
 Protein TRACE (A) 03/09/2021 08:15 AM  
 Glucose Negative 03/09/2021 08:15 AM  
 Ketone TRACE (A) 03/09/2021 08:15 AM  
 Bilirubin SMALL (A) 03/09/2021 08:15 AM  
 Urobilinogen 1.0 03/09/2021 08:15 AM  
 Nitrites Positive (A) 03/09/2021 08:15 AM  
 Leukocyte Esterase SMALL (A) 03/09/2021 08:15 AM  
 Epithelial cells FEW 03/09/2021 08:15 AM  
 Bacteria 2+ (A) 03/09/2021 08:15 AM  
 WBC 4 to 6 03/09/2021 08:15 AM  
 RBC 0 to 2 03/09/2021 08:15 AM  
  
 
 
 Ref. Range 3/10/2021 18:35 3/11/2021 00:37 3/11/2021 06:26 CK Latest Ref Range: 26 - 192 U/L 846 (H) 605 (H) 546 (H) CK-MB Index Latest Ref Range: 0.0 - 4.0 % 0.8 0.7 1.0 CK - MB Latest Ref Range: <3.6 ng/ml 6.7 (H) 4.4 (H) 5.4 (H) Troponin-I, Qt. Latest Ref Range: 0.0 - 0.045 NG/ML <0.02 <0.02 <0.02 EKG 3/9/2021 07:44 Diagnosis   Final  
Sinus tachycardia Abnormal ECG When compared with ECG of 08-JAN-2020 21:35,  
Rate faster Nonspecific T wave abnormality no longer evident in Anterior leads Culture data during this hospitalization. All Micro Results Procedure Component Value Units Date/Time CULTURE, BODY FLUID Manjarrez Must [[id number]] Collected: 03/15/21 1333  Order Status: Completed Specimen: Body Fluid from Abdominal Fluid Updated: 03/16/21 8717 Special Requests: NO SPECIAL REQUESTS     
  GRAM STAIN 1+ WBCS SEEN     
   NO ORGANISMS SEEN Culture result: PENDING  
 CULTURE, BLOOD [[id number]] Collected: 03/10/21 2135 Order Status: Completed Specimen: Blood Updated: 03/16/21 2599 Special Requests: NO SPECIAL REQUESTS Culture result: NO GROWTH 6 DAYS     
 CULTURE, BLOOD [[id number]] Collected: 03/10/21 2145 Order Status: Completed Specimen: Blood Updated: 03/16/21 5231 Special Requests: NO SPECIAL REQUESTS Culture result: NO GROWTH 6 DAYS     
  
  
 
 
PFT June 2020 RESULTS: 
Flow volume loop has upward concavity suggestive of airway obstruction. Total expiratory time was 11.39 seconds.  Forced vital capacity was 3.15 liters, which is 96% of predicted. FEV1 was 1.78 liters, which is 70% of predicted. FEV1/FVC ratio was 57%. No significant response to bronchodilator on spirometry.  
Total lung was 1.87 liters, which is 99% of predicted. Residual volume is 1.72 liters, which is 92% of predicted.  
Diffusion capacity was 11, which is 45% of predicted.  IMPRESSION: 
1. Spirometry showed moderate airway obstruction. There was no bronchodilator response. 2.  Lung volumes did not show restriction or hyperinflation. 3.  There is moderate reduction in diffusion capacity.   
 
 
ECHO   Interpretation Summary Result status: Final result · LV: Estimated LVEF is 55 - 60%. Normal cavity size, wall thickness, systolic function (ejection fraction normal) and diastolic function. E'E= 11.03. Images report reviewed by me: 
CT 3/9 (Most Recent) (CT reviewed by me) Results from [facility] Encounter encounter on 03/09/21 CT ABD PELV W CONT Narrative EXAM: CT of the abdomen and pelvis INDICATION: Fall with abdominal pain. Right-sided abdominal bruising. Cirrhosis. CT abdomen and pelvis 1/8/2020 COMPARISON: CT abdomen and pelvis 1/8/2020 TECHNIQUE: Axial CT imaging of the abdomen and pelvis was performed with 
intravenous contrast. Multiplanar reformats were generated. One or more dose reduction techniques were used on this CT: automated exposure 
control, adjustment of the mAs and/or kVp according to patient size, and 
iterative reconstruction techniques. The specific techniques used on this CT 
exam have been documented in the patient's electronic medical record. Digital 
Imaging and Communications in Medicine (DICOM) format image data are available 
to nonaffiliated external healthcare facilities or entities on a secure, media 
free, reciprocally searchable basis with patient authorization for at least a 
12-month period after this study. _______________ FINDINGS: 
 
LOWER CHEST: Unremarkable. LIVER, BILIARY: Cirrhotic morphology of the liver. No focal mass. No biliary 
dilation. Multiple gallstones are present. Gallbladder is distended. No 
gallbladder wall thickening. CBD appears normal. 
 
PANCREAS: Normal. 
 
SPLEEN: Normal. 
 
ADRENALS: Normal. 
 
KIDNEYS: Small hypodensity anterior lower pole right kidney likely represents a 
cyst. Kidneys are otherwise unremarkable. LYMPH NODES: No enlarged lymph nodes. GASTROINTESTINAL TRACT: No bowel dilation or wall thickening. Scattered colonic 
diverticula. No evidence of bowel obstruction. PELVIC ORGANS: Unremarkable. VASCULATURE: Patent and very enlarged umbilical vein extending caudally into the 
abdominal wall. This extends through an enlarged right rectus muscle which is 
consistent with acute rectus muscle hematoma. On image 108 there is a tiny area 
of high attenuation which may be external to the umbilical vein suggesting small 
area of venous bruising. Portal vein is patent. BONES: No acute or aggressive osseous abnormalities identified.  Stable superior 
endplate compression deformity L4. 
 
OTHER: Enlarged right rectus muscle with mixed areas of high and low attenuation 
consistent with rectus muscle hematoma. This measures 21 cm craniocaudally and 6 
cm AP. Small to moderate ascites. Subcutaneous edematous change consistent with 
anasarca. 
 
_______________ Impression 1. Acute right rectus muscle hematoma with questionable small area of venous 
imaging from recanalized umbilical vein running through this muscle. No large 
areas of active extravasation. 2. Cirrhotic liver with ascites. 3. Cholelithiasis without evidence of acute cholecystitis. CT head 3/9/2021 FINDINGS: 
BRAIN AND POSTERIOR FOSSA: The sulci, folia, ventricles and basal cisterns are 
within normal limits for the patient's age. There is no intracranial hemorrhage, 
mass effect, or midline shift. There are no areas of abnormal parenchymal 
attenuation. EXTRA-AXIAL SPACES AND MENINGES: There are no abnormal extra-axial fluid 
collections. CALVARIUM: Intact. SINUSES: Minimal mucosal thickening of the ethmoid sinuses. Other visualized 
sinuses are clear. OTHER: Mastoid air cells are clear. IMPRESSION No evidence of acute intracranial injury, hemorrhage or fracture. Normal 
noncontrast CT brain. CXR reviewed by me: 
XR 3/12 (Most Recent). CXR  reviewed by me and compared with previous CXR Results from [facility] [place] Encounter encounter on 03/10/21 XR CHEST PORT Narrative CHEST AP PORTABLE Indication: Aspiration pneumonia, shortness of breath. Comparison: X-ray 03/12/2021. Findings: Patient is rotated to the right. Interval placement of right arm PICC 
with catheter tip at the superior vena cava. Monitoring leads overlie the chest. 
 
There is bilateral diffuse hazy opacity, new or significantly progressed from 
prior examination. Slightly more confluent patchy opacity seen in the right 
lower lung zone. The cardiac silhouette and pulmonary vascularity appear within 
normal limits. No evidence for pneumothorax or pleural effusion. Impression 1.  Interval right arm PICC appears in good position. 2. Interval new or significantly progressed bilateral diffuse opacities, edema 
versus pneumonia. X-ray femur left 3/9 IMPRESSION 1. No acute osseous abnormality involving the left femur. 2. Comminuted, depressed medial tibial plateau fracture with metaphyseal 
extension and small volume lipohemarthrosis. X-ray 3/9 FINDINGS: 
AP and lateral views were performed. There is redemonstration of a comminuted 
medial tibial plateau fracture which is depressed. Lateral film shows 
significant posterior displacement of the large fragment. Distal tibia and the 
fibula are intact. Possible small hemarthrosis. IMPRESSION Comminuted, depressed, displaced medial tibial plateau fracture. Ultrasound abdomen 3/12 EXAM: Ultrasound abdomen limited INDICATION: Evaluate ascites COMPARISON: None. FINDINGS: 
4 quadrant survey was performed of the abdomen. Low volume ascites noted in the 
right upper and lower quadrants, trace ascites adjacent to the splenic tip, no 
ascites in the left lower quadrant. IMPRESSION Low volume ascites in the right abdomen. IMPRESSION:  
· Acute respiratory failure with hypoxemia · Aspiration pneumonia · Anemia · Cirrhosis · Abdominal wall hematoma · Alcohol withdrawal 
· Alcoholic hepatitis · Coagulopathy · Thrombocytopenia · Fracture left tibia · Alcohol abuse · COPD · Bipolar disorder · Patient Active Problem List  
Diagnosis Code  ETOH abuse F10.10  COPD (chronic obstructive pulmonary disease) (HCC) J44.9  Bipolar mood disorder ([address] F31.9  Tobacco abuse Z72.0  Thrombocytopenia ([facility] D69.6  Alcohol withdrawal (HCC) F10.239  Anemia D64.9  Alcoholic cirrhosis of liver with ascites (HCC) K70.31  
 Abdominal wall hematoma S30. Chip Freeze  Alcoholic hepatitis with ascites K70.11  
 Coagulopathy (HCC) D68.9  Tibial fracture S82.209A · Code status: Full code RECOMMENDATIONS: Respiratory: Chest x-ray consistent with aspiration pneumonia Cute hypoxemic respiratory failure not improving not able to tolerate nasal cannula 100% nonrebreather. No improvement with gentle diuresis Aspiration pneumonia and pulmonary congestion add gentle diuresis COPD was on Solu-Medrol 60 mg every 6 hours over 240 every 6 hours; continue bronchodilatorsDuoNeb and budesonide. Continue oxygen supplemental therapy; patient needing nonrebreather mask to keep O2 sats greater than 90%; patient not tolerating high flow nasal cannula oxygen due to episodic agitation. Prior PFT showed FEV1 of 70% and DLCO 45%; patient clinically has advanced COPD. Avoid LABA due to risk of tachycardia and atrial fibrillation. HOB 30 degree elevation all the time. Aspiration precautions. Incentive spirometrypatient not cooperative. CVS: Patient remains mildly hypotensive; on phenylephrinecurrently at 70 mcg/min; wean pressor for systolic blood pressure >31 mmHg; continue albumin infusions. Negative troponins; EF normal; no pulmonary hypertension. Ascites add gentle diuresis as tolerated 20 today ID: Continue antibiotics for aspiration pneumonia; Zosyn and IV vancomycin; complete 7 to 10 days based on clinical and radiological improvement; check chest x-ray tomorrow. Blood cultures reviewedremain negative. Hematology/Oncology: Coagulopathy as add FFP Coagulopathy not improving in spite of vitamin K and FFP anemia worsening. Patient has anemia likely due to chronic nutritional deficiency with alcoholism, and acute abdominal wall hematoma from recent fall; s/p 1 unit PRBC. Stable hemoglobin8. 4 this morning; platelets KCEHLK82 K this morning; no active bleeding issues. INR remains mildly elevated1.8 todaychronic coagulopathy from cirrhotic liver. No active GI bleeding; CT abdomen had shown right rectus muscle hematoma.  
Renal: Stable renal function; urine output good; potassium being replaced today; replace phosphorus and magnesium as well. GI/: Liver cirrhosis ascites worse today will repeat ultrasound give FFP before paracentesis Patient has Reyna catheter. Ultrasound abdomen on admission low volume ascites. Poor oral intake due to DTs; continue Lactulose as tolerated to keep ammonia down; ammonia 50 today. Hepatology consult appreciated from Dr. Hussein Rea. Janet Knight Check LFTs and ammonia daily. Endocrine: Monitor BSstable; risk of hyperglycemia with addition of steroids today; sliding scale insulin added. Neurology: CT head nil acute; CIWA protocol with Ativan for alcohol withdrawal. 
Precedex infusion for alcohol withdrawal symptoms and DTs. Toxicology: Serum alcohol elevated on admission to [place] [id number] ER. Skin/Wound: Monitor right abdominal wall bruisingstable. IVF: Daily nutritional fluids at 50 mL/h; albumin infusions. Nutrition: Keep n.p.o. for now except sips of water due to high risk of aspirations. Prophylaxis: DVT Prophylaxis: SCD. GI Prophylaxis: Protonix. Restraints: none Lines/Tubes: PIVs 
PICC line 3/12medical necessity due to poor IV access; no signs of infection or bleeding or hematoma. Reyna: 3/10 (Medically necessary for strict input/output monitoring in critically ill patient, will remove it when not needed. Reyna bundle followed). Preop pulmonary risk assessment Patient has multiple comorbidities with moderate to high risk of ventilator dependence from pulmonary standpoint; patient at risk of getting worse with DTs; defer to timing of surgery to orthopedic surgeon. Advance Directive/Palliative Care: consulted; poor prognosis overall with high risk for cardiorespiratory failure, intubation, ventilator support, mortality.  
 
Will defer respective systems problem management to primary and other respective consultant and follow patient in ICU with primary and other medical team. 
Further recommendations will be based on the patient's response to recommended treatment and results of the investigation ordered. Quality Care: PPI, DVT prophylaxis, HOB elevated, Infection control all reviewed and addressed. Care of plan d/w RN, RT in detail. High complexity decision making was performed during the evaluation of this patient at high risk for decompensation with multiple organ involvement. Total critical care time spent rendering care exclusive of procedures/family discussion/coordination of care:  
38 minutes. Name Relation Home Work Mobile Mesha Ho Boyfriend [phone] Pina Bridsari Daughter Reviewed notes from palliative care; patient's daughter is estranged with her mom; daughter declined to be healthcare surrogate to; patient has appointed boyfriend/finarayan Avila as medical power of . I have called and discussed with Katie Avila again today; discussed about current medical management; discussed about risks of cardiorespiratory failure; discussed poor prognosis regarding CPR, chest compressions, breathing tube, life support etc.; discussed about medical futility of such aggressive measures in the setting of cirrhosis, COPD and DTs; high risk for ventilator dependence; Mr. Amador Lainez does not want to these aggressive measures considering medical futility; he wishes for DNR/DNI status. Palliative care also has been consulted. CODE STATUS DNR/DNI. Patient mental status not improving her boyfriend is healthcare proxy he will come and visit her today ·Please note: Voice-recognition software may have been used to generate this report, which may have resulted in some phonetic-based errors in grammar and contents. Even though attempts were made to correct all the mistakes, some may have been missed, and remained in the body of the document. Lydia Ingram MD 
3/16/2021

## 2021-03-17 NOTE — PROGRESS NOTES
EMR reviewed. Patient noted to be in restraints within 24 hours of the time of death. Information entered into internal log on: 3/17/21 at 1358.

## 2021-03-17 NOTE — DISCHARGE SUMMARY
Discharge Summary Patient: Garima yWatt MRN: [id number]  CSN: [id number] YOB: 1965  Age: 61 y.o. Sex: female DOA: 3/10/2021 LOS:  LOS: 7 days   Discharge Date:   
 
Primary Care Provider:  Elvira Valenzuela NP Admission Diagnoses: Alcohol withdrawal ([facility].) [F10.239] Discharge Diagnoses:   
Problem List as of 3/17/2021 Date Reviewed: 8/14/2020 Codes Class Noted - Resolved Anemia ICD-10-CM: D64.9 ICD-9-CM: 285.9  3/11/2021 - Present Alcoholic cirrhosis of liver with ascites ([facility] ICD-10-CM: K70.31 ICD-9-CM: 571.2  3/11/2021 - Present Abdominal wall hematoma ICD-10-CM: S30. Shelva Dy ICD-9-CM: 922.2  3/11/2021 - Present Alcoholic hepatitis with ascites ICD-10-CM: K70.11 ICD-9-CM: 571.1  3/11/2021 - Present Coagulopathy ([facility] ICD-10-CM: L54.7 ICD-9-CM: 286.9  3/11/2021 - Present Tibial fracture ICD-10-CM: S82.209A ICD-9-CM: 823.80  3/11/2021 - Present * (Principal) Alcohol withdrawal ([facility].) ICD-10-CM: K93.364 ICD-9-CM: 291.81  3/10/2021 - Present Thrombocytopenia ([facility].) ICD-10-CM: D69.6 ICD-9-CM: 287.5  1/9/2020 - Present Tobacco abuse ICD-10-CM: Z72.0 ICD-9-CM: 305.1  10/21/2018 - Present Overview Addendum 5/14/2020  9:49 AM by Elvira Valenzuela NP Quit 1/8/20, now on continuous O2 
 
  
  
   
 ETOH abuse ICD-10-CM: F10.10 ICD-9-CM: 305.00  Unknown - Present COPD (chronic obstructive pulmonary disease) (HCC) ICD-10-CM: J44.9 ICD-9-CM: 496  Unknown - Present Bipolar mood disorder ([facility] ICD-10-CM: F31.9 ICD-9-CM: 296.80  Unknown - Present Overview Signed 8/14/2020 12:44 PM by Elvira Valenzuela, NP Managed by Dr. Mary Arreola RESOLVED: Nicotine abuse ICD-10-CM: Z72.0 ICD-9-CM: 305.1  5/14/2020 - 3/11/2021 Overview Addendum 5/14/2020  5:16 PM by Elvira Valenzuela, NP  
  3/2020 started vaping using 50mg nicotine  RESOLVED: Primary insomnia ICD-10-CM: F51.01 
ICD-9-CM: 307.42  5/14/2020 - 3/11/2021 RESOLVED: Fluid overload ICD-10-CM: E87.70 ICD-9-CM: 276.69  1/14/2020 - 2/14/2020 RESOLVED: Alcohol-induced pancreatitis ICD-10-CM: K85.20 ICD-9-CM: 935.3  1/14/2020 - 3/11/2021 RESOLVED: Hypokalemia ICD-10-CM: E87.6 ICD-9-CM: 276.8  1/9/2020 - 2/14/2020 RESOLVED: Macrocytic anemia ICD-10-CM: D53.9 ICD-9-CM: 281.9  1/9/2020 - 3/11/2021 RESOLVED: Leukopenia ICD-10-CM: W68.912 ICD-9-CM: 288.50  1/9/2020 - 3/11/2021 RESOLVED: UTI (urinary tract infection) ICD-10-CM: N39.0 ICD-9-CM: 599.0  1/9/2020 - 2/14/2020 RESOLVED: HENRRY (acute kidney injury) ([facility] ICD-10-CM: N17.9 ICD-9-CM: 584.9  1/9/2020 - 3/11/2021 RESOLVED: Pancytopenia ([facility].) ICD-10-CM: R88.402 ICD-9-CM: 284.19  1/9/2020 - 3/11/2021 RESOLVED: Acute hypotension ICD-10-CM: I95.9 ICD-9-CM: 458.9  1/9/2020 - 2/14/2020 RESOLVED: Acute respiratory failure with hypoxia ([facility] ICD-10-CM: J96.01 
ICD-9-CM: 518.81  1/9/2020 - 2/14/2020 RESOLVED: Cirrhosis ([facility] ICD-10-CM: K74.60 ICD-9-CM: 571.5  1/9/2020 - 2/14/2020 RESOLVED: Alcohol withdrawal ([facility] ICD-10-CM: J22.217 ICD-9-CM: 291.81  5/10/2019 - 2/14/2020 RESOLVED: Intractable nausea and vomiting ICD-10-CM: R11.2 ICD-9-CM: 536.2  5/10/2019 - 3/11/2021 RESOLVED: Alcoholic cirrhosis of liver without ascites (HCC) ICD-10-CM: K70.30 ICD-9-CM: 571.2  10/21/2018 - 3/11/2021 Overview Addendum 8/14/2020 12:45 PM by Delmy Joseph NP Quit 1/2020 RESOLVED: Hyperlipidemia ICD-10-CM: E78.5 ICD-9-CM: 272.4  Unknown - 3/11/2021 RESOLVED: Panic disorder ICD-10-CM: F41.0 ICD-9-CM: 300.01  Unknown - 3/11/2021 RESOLVED: Acute exacerbation of chronic obstructive pulmonary disease (COPD) ([facility] ICD-10-CM: J44.1 ICD-9-CM: 491.21  9/14/2016 - 5/26/2019 RESOLVED: Acute maxillary sinusitis ICD-10-CM: J01.00 ICD-9-CM: 461.0  9/14/2016 - 2020 RESOLVED: Former smoker ICD-10-CM: P61.401 ICD-9-CM: V15.82  2016 - 2020 RESOLVED: Left breast mass ICD-10-CM: N63.20 ICD-9-CM: 611.72  2011 - 2020 Overview Signed 2016  9:26 AM by Marta Navarrete  
  U/S Left Breast: No definite mass identified. Recommended recheck in 3 months. RESOLVED: Wrist fracture, right ICD-10-CM: S62.101A ICD-9-CM: 814.00  2010 - 2020 Overview Signed 2016  1:24 PM by Marta Navarrete Non-Displaced Distal Radius/Ulnar Styloid Fx RESOLVED: Vitamin D insufficiency ICD-10-CM: E55.9 ICD-9-CM: 268.9  2009 - 3/11/2021 Overview Signed 2016  9:25 AM by Marta Navarrete  
  23 ng/mL Discharge Medications:    
Current Discharge Medication List  
  
 
 
Discharge Condition:  Procedures : none Consults: Pulmonary/Critical Care, Orthopedics, Hepatology PHYSICAL EXAM  
Visit Vitals /70 Pulse (!) 108 Temp 98.2 °F (36.8 °C) Resp 16 Ht 5' 6\" (1.676 m) Wt 81.8 kg (180 lb 5.4 oz) SpO2 (!) 86% BMI 29.11 kg/m² Death exam performed which revealed the absence of cardiac sounds x1 minute as well as absent corneal and pupillary reflexes Admission HPI : Isabel George is a 61 y.o.  female who has history of COPD on intermittent oxygen, bipolar, cirrhosis, alcohol abuse ascites multiple fractures Presents to [place] [id number] emergency room after falling and sustaining a tibial fracture while awaiting transfer for Ortho evaluation patient became worsening confused with tachycardia and developed this CIWA of over 22 despite being given multiple doses of Ativan patient was attempted to be transferred to ICU and \[facility]\"" view however no beds so we have accepted in transfer.  
According to her fiancé she no longer drinks hard liquor but drinks mostly wine and beer daily she has had increasing abdominal girth and increasing falls and instability with prior fractures. Hospital Course : She was transferred from [facility] as she required ICU care for severe alcohol withdrawal. Orthopedics planned for tibial fracture repair when she was medically stabilized. She was started on Smooth-Synephrine and Precedex drips. She was transfused 1U PRBC for acute on chronic anemia from the fall. She had significant alcohol withdrawal symptoms and agitation. She was seen by hepatology and determined to at high risk of mortality due to severity of her alcoholic cirrhosis. She became increasingly lethargic and tachypneic. Her MPOA and boyfriend decided to pursue comfort measures. The patient  shortly thereafter at 0133. Disposition:  Minutes spent on discharge: 40 minutes Labs: Results:  
   
Chemistry Recent Labs  
  03/16/21 
0442 03/15/21 
1640 03/15/21 
0621 
0521 
05 *  --  121*  --  90  
*  --  147*  --  145  
K 3.4* 3.7 3.5   < > 2.9*  
*  --  114*  --  112* CO2 28  --  28  --  24 BUN 12  --  10  --  7  
CREA 0.35*  --  0.43*  --  0.53* CA 7.0*  --  7.8*  --  7.6* AGAP 5  --  5  --  9  
BUCR 34*  --  23*  --  13  
AP 87  --  111  --  109  
TP 5.4*  --  5.8*  --  5.6* ALB 3.2*  --  3.5  --  3.4 GLOB 2.2  --  2.3  --  2.2 AGRAT 1.5  --  1.5  --  1.5  
 < > = values in this interval not displayed. CBC w/Diff Recent Labs  
  03/16/21 
0442 03/15/21 
1640 03/15/21 
0630 21 
0530 21 
0530 WBC 4.1*  --  7.1  --  5.2  
RBC 2.00*  --  2.52*  --  2.48* HGB 6.9* 8.3* 8.5*   < > 8.4* HCT 21.3* 25.6* 26.2*   < > 25.5* PLT 64*  --  92*  --  74* GRANS 76*  --  75*  --  64  
LYMPH 7*  --  7*  --  14* EOS 0  --  0  --  0  
 < > = values in this interval not displayed. Cardiac Enzymes No results for input(s): CPK, CKND1, PAPO in the last 72 hours. No lab exists for component: Julio Alamo Coagulation Recent Labs  
  21 
0442 03/15/21 0630  
PTP 22.5* 22.0* INR 2.0* 2.0* Lipid Panel Lab Results Component Value Date/Time Cholesterol, total 205 (H) 09/10/2020 10:22 AM  
 HDL Cholesterol 87 (H) 09/10/2020 10:22 AM  
 LDL, calculated 109.2 (H) 09/10/2020 10:22 AM  
 VLDL, calculated 8.8 09/10/2020 10:22 AM  
 Triglyceride 44 09/10/2020 10:22 AM  
 CHOL/HDL Ratio 2.4 09/10/2020 10:22 AM  
  
BNP No results for input(s): BNPP in the last 72 hours. Liver Enzymes Recent Labs  
  03/16/21 
0442 TP 5.4* ALB 3.2* AP 87 Thyroid Studies Lab Results Component Value Date/Time TSH 1.45 09/10/2020 10:21 AM  
    
 
 
Significant Diagnostic Studies: Xr Femur Lt 2 V Result Date: 3/9/2021 EXAM: XR FEMUR LT 2 V CLINICAL INDICATION/HISTORY: left leg pain s/p fall   > Additional: None. COMPARISON: None. TECHNIQUE: 2 separate views of the left femur on 6 total exposures _______________ FINDINGS: Osseous alignment is as expected on the provided projections. Femur appears intact. There is a comminuted, depressed medial tibial plateau fracture present. Small volume lipohemarthrosis at the knee joint. _______________ 1. No acute osseous abnormality involving the left femur. 2. Comminuted, depressed medial tibial plateau fracture with metaphyseal extension and small volume lipohemarthrosis. Xr Tib/fib Lt Result Date: 3/9/2021 EXAM: Left tibia-fibula INDICATION: Fall with left leg pain COMPARISON: Left femur series done earlier today _______________ FINDINGS: AP and lateral views were performed. There is redemonstration of a comminuted medial tibial plateau fracture which is depressed. Lateral film shows significant posterior displacement of the large fragment. Distal tibia and the fibula are intact. Possible small hemarthrosis. _______________ Comminuted, depressed, displaced medial tibial plateau fracture. Ct Head Wo Cont Result Date: 3/9/2021 EXAM: CT head INDICATION: Fall with head injury COMPARISON: None. TECHNIQUE: Axial CT imaging of the head was performed without intravenous contrast. Coronal and sagittal reconstructions were performed. One or more dose reduction techniques were used on this CT: automated exposure control, adjustment of the mAs and/or kVp according to patient size, and iterative reconstruction techniques. The specific techniques used on this CT exam have been documented in the patient's electronic medical record. Digital Imaging and Communications in Medicine (DICOM) format image data are available to nonaffiliated external healthcare facilities or entities on a secure, media free, reciprocally searchable basis with patient authorization for at least a 12-month period after this study. _______________ FINDINGS: BRAIN AND POSTERIOR FOSSA: The sulci, folia, ventricles and basal cisterns are within normal limits for the patient's age. There is no intracranial hemorrhage, mass effect, or midline shift. There are no areas of abnormal parenchymal attenuation. EXTRA-AXIAL SPACES AND MENINGES: There are no abnormal extra-axial fluid collections. CALVARIUM: Intact. SINUSES: Minimal mucosal thickening of the ethmoid sinuses. Other visualized sinuses are clear. OTHER: Mastoid air cells are clear. _______________ No evidence of acute intracranial injury, hemorrhage or fracture. Normal noncontrast CT brain. Ct Abd Pelv W Cont Result Date: 3/9/2021 EXAM: CT of the abdomen and pelvis INDICATION: Fall with abdominal pain. Right-sided abdominal bruising. Cirrhosis. CT abdomen and pelvis 1/8/2020 COMPARISON: CT abdomen and pelvis 1/8/2020 TECHNIQUE: Axial CT imaging of the abdomen and pelvis was performed with intravenous contrast. Multiplanar reformats were generated. One or more dose reduction techniques were used on this CT: automated exposure control, adjustment of the mAs and/or kVp according to patient size, and iterative reconstruction techniques.   The specific techniques used on this CT exam have been documented in the patient's electronic medical record. Digital Imaging and Communications in Medicine (DICOM) format image data are available to nonaffiliated external healthcare facilities or entities on a secure, media free, reciprocally searchable basis with patient authorization for at least a 12-month period after this study. _______________ FINDINGS: LOWER CHEST: Unremarkable. LIVER, BILIARY: Cirrhotic morphology of the liver. No focal mass. No biliary dilation. Multiple gallstones are present. Gallbladder is distended. No gallbladder wall thickening. CBD appears normal. PANCREAS: Normal. SPLEEN: Normal. ADRENALS: Normal. KIDNEYS: Small hypodensity anterior lower pole right kidney likely represents a cyst. Kidneys are otherwise unremarkable. LYMPH NODES: No enlarged lymph nodes. GASTROINTESTINAL TRACT: No bowel dilation or wall thickening. Scattered colonic diverticula. No evidence of bowel obstruction. PELVIC ORGANS: Unremarkable. VASCULATURE: Patent and very enlarged umbilical vein extending caudally into the abdominal wall. This extends through an enlarged right rectus muscle which is consistent with acute rectus muscle hematoma. On image 108 there is a tiny area of high attenuation which may be external to the umbilical vein suggesting small area of venous bruising. Portal vein is patent. BONES: No acute or aggressive osseous abnormalities identified. Stable superior endplate compression deformity L4. OTHER: Enlarged right rectus muscle with mixed areas of high and low attenuation consistent with rectus muscle hematoma. This measures 21 cm craniocaudally and 6 cm AP. Small to moderate ascites. Subcutaneous edematous change consistent with anasarca. _______________ 1. Acute right rectus muscle hematoma with questionable small area of venous imaging from recanalized umbilical vein running through this muscle. No large areas of active extravasation. 2. Cirrhotic liver with ascites.  3. Cholelithiasis without evidence of acute cholecystitis. Us Guide Paracentesis Result Date: 3/15/2021 PROCEDURE:  ULTRASOUND GUIDED THERAPEUTIC AND DIAGNOSTIC PARACENTESIS INDICATION: Symptomatic Ascites PERFORMED BY:  Peterson Ortiz PA-C ATTENDING PHYSICIAN:  Nancie Munson MD SUPERVISION: General TECHNIQUE & FINDINGS: Informed consent was obtained prior to the procedure. Standard pre-procedure time out taken at 1226 hours. Preliminary ultrasound of the abdomen shows a small amount of ascites, ultrasound image saved in PACS. A left lower quadrant approach was chosen. Sterile probe cover and gel were used. The skin was marked, prepped and draped in sterile fashion and 1% Lidocaine was used for local anesthesia. A 5 Western Mariela Yueh sheathed needle was advanced into the peritoneal cavity, was connected to a vacuum, and a total of 1.7 liters of clear yellow fluid was removed. A specimen was collected and taken to the lab, as requested. The patient tolerated the procedure well and there were no immediate complications. GUIDANCE: Ultrasound guidance was used to position (and confirm the position of) the Yueh sheathed needle. Image(s) saved in PACS: Ultrasound Successful ultrasound guided paracentesis of approximately 1.7 liters of clear yellow fluid. [address] Result Date: 3/12/2021 EXAM: Ultrasound abdomen limited INDICATION: Evaluate ascites COMPARISON: None. _______________ FINDINGS: 4 quadrant survey was performed of the abdomen. Low volume ascites noted in the right upper and lower quadrants, trace ascites adjacent to the splenic tip, no ascites in the left lower quadrant. _______________ Low volume ascites in the right abdomen. Xr Chest [facility] Result Date: 3/15/2021 CHEST AP PORTABLE Indication: Aspiration pneumonia, shortness of breath. Comparison: X-ray 03/12/2021. Findings: Patient is rotated to the right.  Interval placement of right arm PICC with catheter tip at the superior vena cava. Monitoring leads overlie the chest. There is bilateral diffuse hazy opacity, new or significantly progressed from prior examination. Slightly more confluent patchy opacity seen in the right lower lung zone. The cardiac silhouette and pulmonary vascularity appear within normal limits. No evidence for pneumothorax or pleural effusion. 1. Interval right arm PICC appears in good position. 2. Interval new or significantly progressed bilateral diffuse opacities, edema versus pneumonia. Xr Chest [facility] Result Date: 3/12/2021 EXAM: XR CHEST PORT CLINICAL INDICATION/HISTORY: Shortness of breath -Additional: None COMPARISON: March 9, 2021 TECHNIQUE: Frontal view of the chest _______________ FINDINGS: HEART AND MEDIASTINUM: Stable appearing cardiac size and mediastinal contours. LUNGS AND PLEURAL SPACES: Rotated nature of the projection foreshortening the left hemithorax. Mild interval increase in the degree of pulmonary vascular prominence. No evidence of pneumothorax or pleural effusion. BONY THORAX AND SOFT TISSUES: No acute osseous abnormality _______________ Mild interval increase in pulmonary vascular congestion interval from prior study. Xr Chest [facility] Result Date: 3/9/2021 EXAM: XR CHEST PORT CLINICAL INDICATION/HISTORY: tachycardia -Additional: None COMPARISON: 1/12/2020 TECHNIQUE: Frontal view of the chest _______________ FINDINGS: HEART AND MEDIASTINUM: Normal cardiac size and mediastinal contours. LUNGS AND PLEURAL SPACES: Lungs are improved in the degree of expansion without evidence of focal pneumonic opacity. No pneumothorax or pleural effusion. BONY THORAX AND SOFT TISSUES: No acute osseous abnormality _______________ Approved pulmonary expansion without superimposed acute radiographic abnormality. Echo Adult Complete Result Date: 3/11/2021 · LV: Estimated LVEF is 55 - 60%.  Normal cavity size, wall thickness, systolic function (ejection fraction normal) and diastolic function. E'E= 11.03. Ir Guide Insert Picc Over 5 Yrs Result Date: 3/12/2021 PROCEDURE:  Fluoroscopic Guided Right Arm PICC Line Placement CLINICAL INDICATION/HISTORY: Poor peripheral veins, needs IV access for medications PERFORMED BY: Dwain Koroma PA-C ATTENDING RADIOLOGIST: Teresita Chavez MD SUPERVISION: General TECHNIQUE: After explaining the procedure to the patient, including the potential risks, benefits, and alternatives, informed verbal telephone consent was obtained from the patient's family. A time out was performed to verify appropriate patient, procedure, and site at 1446 hours. The procedure was performed following standard maximal barrier technique which includes, cap, mask, sterile gown, sterile gloves, sterile full body drape, hand hygiene with alcohol foam, and 2% chlorhexidine for cutaneous antisepsis. Sterile ultrasound gel and a sterile cover for the US probe was used. ICU medic had very recently started an IV in the patient's right upper arm with ultrasound guidance. The right arm and existing IV were prepped and draped in usual sterile fashion. Ultrasound was used to determine which vein was accessed. The right brachial vein is patent and normally compresses. 1% Lidocaine was used for local anesthesia. A guide wire was advanced under fluoroscopic control to the superior vena cava via the IV catheter. IV catheter was then removed. The distance between the superior vena cava and skin puncture site was measured and a 5 Western Mariela double lumen power PICC was cut to the appropriate length. Overall catheter length was 41 cm. The PICC was advanced to the SVC under fluoroscopic control. The line was flushed with heparinized saline and adhered to the skin with a Statlock device. Final coned AP view of the chest was obtained to document catheter position. The patient tolerated the procedure well and there were no immediate complications.  FINDINGS: Final coned AP view of the chest shows good position of the PICC with its tip in the SVC. Both ports flushed easily and there was good blood return. Radiation dose (reference air kerma):  4 mGy. Fluoroscopy Time: 0.7 minutes. GUIDANCE: Ultrasound and fluoroscopic guidance was used to position (and confirm the position of) the needle and catheter. Image(s) saved in PACS: Ultrasound and fluoroscopy. Successful placement of a double lumen power PICC line via the right arm. The PICC line is ready for immediate use. No results found for this or any previous visit.  
 
 
 
CC: Anoop Shin NP

## 2021-03-17 NOTE — DEATH NOTE
Death Note RN paged that patient became asystolic on the monitor. She was on comfort measures. Death exam revealed the absence of cardiac sounds x1 minute as well as absent corneal and pupillary reflexes. Time of death 12. Her daughter Andrew Carolina) was notified of her death. I left a voicemail message with Diane Gray (MPOA and boyfriend). Nursing staff to contact the .

## 2021-03-17 NOTE — PROGRESS NOTES
1920-Bedside verbal report received from Michelle Grey RN. Sbar, mar, labs, kardex and patient status reviewed. Assumed care of patient. 2010-Comfort measures assessment provided at this time. Pt is dyspneic, maxed on Morphine drip (4mg/hr), has wet/moist breath sounds, and restless in bed. PRN Robinol and Ativan given at this time. Repositioned with HOB elevated. Pt currently on Isolation to r/o Covid. Dr. Bree Villalobos would like rapid test to be done to transfer patient to medical unit. Verbal orders received to hold Geodon IM (Pt bruised and on comfort measures). 2100-Appears comfortable in bed. Rapid test negative. Droplet precautions DC'd per Dr. Bree Villalobos. Orders received to transfer to medical, awaiting bed. 0000-Resting quietly and appears comfortable. 0128- Entered patients room to assess comfort. Pt had an agonal breath at this time and was apneic for approx. 2 mins. Upon further assessment, unable to palpate pulses. Asystole on monitor with PEA present. No further respirations/heartbeat no longer ausculted. Paged Dr. Bree Villalobos. 0133-Rahulvatorsari Messenger in room to pronounce at this time. TOD 0133. Dr. Bree Villalobos left message with MARIKA (boyfriend, Jamas Sep answer) and updated patient's daughter, Peterson Lyn. Paged  and updated lifenet.

## 2021-03-17 NOTE — PROGRESS NOTES
Bereavement Note: 
 
 responded to the death of Cata Johnson, who is a 61 y.o., female,attempted to contact the [facility]; no answer on the listed phone. Date of Death: 3/17/2021 Extended Emergency Contact Information Primary Emergency Contact: Flores Mc Home Phone: [phone] Mobile Phone: [phone] Relation: Boyfriend Secondary Emergency Contact: Edelmira Joseph Mobile Phone: [phone] Relation: Daughter YES      NO  UNKNOWN Life Net   []        [x]    [] [facility]   [] [] [x] Medical Examiner  []        [x]  [] Going to [place]  [x]        [] [] Autopsy   []        [x]         [] Sympathy Card  [x]        [] Bereavement Materials  [x]        [] Business Card Provided  [x]        []  Home: Unknown at this time. Chaplains will continue to follow family and will provide spiritual care as needed. [address] [facility]  [phone] - Office

## 2021-03-20 LAB
BACTERIA SPEC CULT: NORMAL
GRAM STN SPEC: NORMAL
GRAM STN SPEC: NORMAL
SERVICE CMNT-IMP: NORMAL

## 2023-03-08 NOTE — TELEPHONE ENCOUNTER
Called pt back- stated that be on Lactulose for  3 MO TRIAL?  Told her I would fwd to Nader De Paz for review and get back to her today or tomorrow- Thanks Albendazole Pregnancy And Lactation Text: This medication is Pregnancy Category C and it isn't known if it is safe during pregnancy. It is also excreted in breast milk.

## (undated) DEVICE — SYR 50ML SLIP TIP NSAF LF STRL --

## (undated) DEVICE — CONTAINER PREFIL FRMLN 15ML --

## (undated) DEVICE — BLOCK BITE BLOX W DENTAL RIM --

## (undated) DEVICE — Device: Brand: DEFENDO VALVE AND CONNECTOR KIT

## (undated) DEVICE — BITE BLK ENDOSCP AD 54FR GRN POLYETH ENDOSCP W STRP SLD

## (undated) DEVICE — FLEX ADVANTAGE 1500CC: Brand: FLEX ADVANTAGE

## (undated) DEVICE — BASIN EMESIS 500CC ROSE 250/CS 60/PLT: Brand: MEDEGEN MEDICAL PRODUCTS, LLC

## (undated) DEVICE — MEDI-VAC NON-CONDUCTIVE SUCTION TUBING: Brand: CARDINAL HEALTH

## (undated) DEVICE — KIT COLON W/ 1.1OZ LUB AND 2 END

## (undated) DEVICE — INSTINCT ENDOSCOPIC HEMOCLIP: Brand: INSTINCT

## (undated) DEVICE — FORCEPS BX L240CM JAW DIA2.8MM L CAP W/ NDL MIC MESH TOOTH

## (undated) DEVICE — KENDALL 500 SERIES DIAPHORETIC FOAM MONITORING ELECTRODE - TEAR DROP SHAPE ( 30/PK): Brand: KENDALL